# Patient Record
Sex: FEMALE | Race: WHITE | NOT HISPANIC OR LATINO | Employment: OTHER | URBAN - METROPOLITAN AREA
[De-identification: names, ages, dates, MRNs, and addresses within clinical notes are randomized per-mention and may not be internally consistent; named-entity substitution may affect disease eponyms.]

---

## 2017-01-16 ENCOUNTER — HOSPITAL ENCOUNTER (OUTPATIENT)
Dept: RADIOLOGY | Facility: HOSPITAL | Age: 77
Discharge: HOME | End: 2017-01-16
Attending: FAMILY MEDICINE
Payer: MEDICARE

## 2017-01-16 ENCOUNTER — OFFICE VISIT (OUTPATIENT)
Dept: FAMILY MEDICINE | Facility: CLINIC | Age: 77
End: 2017-01-16
Payer: MEDICARE

## 2017-01-16 DIAGNOSIS — J44.9 COPD, MODERATE: ICD-10-CM

## 2017-01-16 DIAGNOSIS — I10 ESSENTIAL HYPERTENSION: ICD-10-CM

## 2017-01-16 DIAGNOSIS — F51.04 PSYCHOPHYSIOLOGICAL INSOMNIA: ICD-10-CM

## 2017-01-16 DIAGNOSIS — J43.9 PULMONARY EMPHYSEMA, UNSPECIFIED EMPHYSEMA TYPE: ICD-10-CM

## 2017-01-16 DIAGNOSIS — J44.1 COPD WITH ACUTE EXACERBATION: ICD-10-CM

## 2017-01-16 DIAGNOSIS — R35.0 URINARY FREQUENCY: ICD-10-CM

## 2017-01-16 DIAGNOSIS — J44.1 COPD WITH ACUTE EXACERBATION: Primary | ICD-10-CM

## 2017-01-16 DIAGNOSIS — E78.5 HYPERLIPIDEMIA, UNSPECIFIED HYPERLIPIDEMIA TYPE: ICD-10-CM

## 2017-01-16 DIAGNOSIS — E11.40 TYPE 2 DIABETES MELLITUS WITH DIABETIC NEUROPATHY, WITHOUT LONG-TERM CURRENT USE OF INSULIN: ICD-10-CM

## 2017-01-16 DIAGNOSIS — E66.9 OBESITY (BMI 30-39.9): ICD-10-CM

## 2017-01-16 LAB
BILIRUB SERPL-MCNC: ABNORMAL MG/DL
BLOOD URINE, POC: ABNORMAL
COLOR, POC UA: YELLOW
GLUCOSE UR QL STRIP: ABNORMAL
KETONES UR QL STRIP: ABNORMAL
LEUKOCYTE ESTERASE URINE, POC: ABNORMAL
NITRITE, POC UA: ABNORMAL
PH, POC UA: 7
PROTEIN, POC: ABNORMAL
SPECIFIC GRAVITY, POC UA: 1.01
UROBILINOGEN, POC UA: NORMAL

## 2017-01-16 PROCEDURE — 99499 UNLISTED E&M SERVICE: CPT | Mod: S$PBB,,, | Performed by: FAMILY MEDICINE

## 2017-01-16 PROCEDURE — 1126F AMNT PAIN NOTED NONE PRSNT: CPT | Mod: S$GLB,,, | Performed by: FAMILY MEDICINE

## 2017-01-16 PROCEDURE — 71020 XR CHEST PA AND LATERAL: CPT | Mod: TC,PO

## 2017-01-16 PROCEDURE — 99204 OFFICE O/P NEW MOD 45 MIN: CPT | Mod: 25,S$GLB,, | Performed by: FAMILY MEDICINE

## 2017-01-16 PROCEDURE — 94640 AIRWAY INHALATION TREATMENT: CPT | Mod: S$GLB,,, | Performed by: FAMILY MEDICINE

## 2017-01-16 PROCEDURE — 81002 URINALYSIS NONAUTO W/O SCOPE: CPT | Mod: S$GLB,,, | Performed by: FAMILY MEDICINE

## 2017-01-16 PROCEDURE — 87086 URINE CULTURE/COLONY COUNT: CPT

## 2017-01-16 PROCEDURE — 1160F RVW MEDS BY RX/DR IN RCRD: CPT | Mod: S$GLB,,, | Performed by: FAMILY MEDICINE

## 2017-01-16 PROCEDURE — 1159F MED LIST DOCD IN RCRD: CPT | Mod: S$GLB,,, | Performed by: FAMILY MEDICINE

## 2017-01-16 PROCEDURE — 2022F DILAT RTA XM EVC RTNOPTHY: CPT | Mod: S$GLB,,, | Performed by: FAMILY MEDICINE

## 2017-01-16 PROCEDURE — 4010F ACE/ARB THERAPY RXD/TAKEN: CPT | Mod: S$GLB,,, | Performed by: FAMILY MEDICINE

## 2017-01-16 PROCEDURE — 3046F HEMOGLOBIN A1C LEVEL >9.0%: CPT | Mod: S$GLB,,, | Performed by: FAMILY MEDICINE

## 2017-01-16 PROCEDURE — 1157F ADVNC CARE PLAN IN RCRD: CPT | Mod: S$GLB,,, | Performed by: FAMILY MEDICINE

## 2017-01-16 PROCEDURE — 71020 XR CHEST PA AND LATERAL: CPT | Mod: 26,,, | Performed by: RADIOLOGY

## 2017-01-16 PROCEDURE — 99999 PR PBB SHADOW E&M-EST. PATIENT-LVL IV: CPT | Mod: PBBFAC,,, | Performed by: FAMILY MEDICINE

## 2017-01-16 RX ORDER — ALBUTEROL SULFATE 90 UG/1
2 AEROSOL, METERED RESPIRATORY (INHALATION) EVERY 6 HOURS PRN
Qty: 3 INHALER | Refills: 3 | Status: CANCELLED | OUTPATIENT
Start: 2017-01-16

## 2017-01-16 RX ORDER — LISINOPRIL 5 MG/1
5 TABLET ORAL DAILY
Qty: 30 TABLET | Refills: 0 | Status: SHIPPED | OUTPATIENT
Start: 2017-01-16 | End: 2017-02-13 | Stop reason: SDUPTHER

## 2017-01-16 RX ORDER — IPRATROPIUM BROMIDE AND ALBUTEROL SULFATE 2.5; .5 MG/3ML; MG/3ML
3 SOLUTION RESPIRATORY (INHALATION)
Status: COMPLETED | OUTPATIENT
Start: 2017-01-16 | End: 2017-01-16

## 2017-01-16 RX ORDER — CETIRIZINE HYDROCHLORIDE 10 MG/1
10 TABLET ORAL DAILY
COMMUNITY
End: 2017-02-21 | Stop reason: SDUPTHER

## 2017-01-16 RX ORDER — ALBUTEROL SULFATE 90 UG/1
2 AEROSOL, METERED RESPIRATORY (INHALATION) EVERY 6 HOURS PRN
Qty: 1 INHALER | Refills: 0 | Status: SHIPPED | OUTPATIENT
Start: 2017-01-16 | End: 2017-03-17

## 2017-01-16 RX ORDER — SIMVASTATIN 10 MG/1
10 TABLET, FILM COATED ORAL NIGHTLY
Qty: 30 TABLET | Refills: 0 | Status: SHIPPED | OUTPATIENT
Start: 2017-01-16 | End: 2017-02-13 | Stop reason: SDUPTHER

## 2017-01-16 RX ORDER — ALBUTEROL SULFATE 90 UG/1
2 AEROSOL, METERED RESPIRATORY (INHALATION) EVERY 6 HOURS PRN
Qty: 1 INHALER | Refills: 11 | Status: CANCELLED | OUTPATIENT
Start: 2017-01-16

## 2017-01-16 RX ORDER — METFORMIN HYDROCHLORIDE 1000 MG/1
500 TABLET, FILM COATED, EXTENDED RELEASE ORAL 2 TIMES DAILY WITH MEALS
Status: CANCELLED | OUTPATIENT
Start: 2017-01-16

## 2017-01-16 RX ORDER — BUDESONIDE AND FORMOTEROL FUMARATE DIHYDRATE 160; 4.5 UG/1; UG/1
2 AEROSOL RESPIRATORY (INHALATION) EVERY 12 HOURS
Qty: 1 INHALER | Refills: 0 | Status: ON HOLD | OUTPATIENT
Start: 2017-01-16 | End: 2017-02-15

## 2017-01-16 RX ORDER — TRAZODONE HYDROCHLORIDE 50 MG/1
50 TABLET ORAL NIGHTLY
Qty: 30 TABLET | Refills: 0 | Status: SHIPPED | OUTPATIENT
Start: 2017-01-16 | End: 2017-01-20 | Stop reason: SDUPTHER

## 2017-01-16 RX ORDER — AZITHROMYCIN 500 MG/1
500 TABLET, FILM COATED ORAL DAILY
Qty: 10 TABLET | Refills: 0 | Status: SHIPPED | OUTPATIENT
Start: 2017-01-16 | End: 2017-01-23 | Stop reason: ALTCHOICE

## 2017-01-16 RX ORDER — IPRATROPIUM BROMIDE AND ALBUTEROL SULFATE 2.5; .5 MG/3ML; MG/3ML
3 SOLUTION RESPIRATORY (INHALATION) EVERY 6 HOURS
Qty: 360 VIAL | Refills: 0 | Status: SHIPPED | OUTPATIENT
Start: 2017-01-16 | End: 2017-01-23 | Stop reason: SDUPTHER

## 2017-01-16 RX ORDER — METFORMIN HYDROCHLORIDE 500 MG/1
500 TABLET ORAL 2 TIMES DAILY WITH MEALS
Qty: 60 TABLET | Refills: 0 | Status: SHIPPED | OUTPATIENT
Start: 2017-01-16 | End: 2017-02-13 | Stop reason: SDUPTHER

## 2017-01-16 RX ADMIN — IPRATROPIUM BROMIDE AND ALBUTEROL SULFATE 3 ML: 2.5; .5 SOLUTION RESPIRATORY (INHALATION) at 01:01

## 2017-01-16 NOTE — PROGRESS NOTES
Tolerated  Nebulizer treatment  Ipratropium vital signs on discharge /58\resp 21  Sao2 90 % pulse 91.  Power Coleman LPN

## 2017-01-16 NOTE — MR AVS SNAPSHOT
[facility]  [address]  [facility] [address]  Phone: [phone]  Fax: [phone]                  Petrona Gonzalez   2017 10:40 AM   Office Visit    Description:  Female : 1940   Provider:  Katy Arriaga MD   Department:  [facility]           Reason for Visit     COPD     Diabetes     Hyperlipidemia     Establish Care           Diagnoses this Visit        Comments    COPD with acute exacerbation    -  Primary     Pulmonary emphysema, unspecified emphysema type         COPD, moderate         Type 2 diabetes mellitus with diabetic neuropathy, without long-term current use of insulin         Essential hypertension         Hyperlipidemia, unspecified hyperlipidemia type         Psychophysiological insomnia         Obesity (BMI 30-39.9)         Urinary frequency                To Do List           Future Appointments        Provider Department Dept Phone    2017 1:30 PM [facility] XR1 [facility] [phone]    2017 8:50 AM LABORATORY, [place] [facility]     2017 10:10 AM PULMONARY LAB, [facility] - Pulm Function [facility] [phone]    2017 10:40 AM Juan Carlos Araujo MD [facility] Pul[facility] Services [phone]      Goals (5 Years of Data)     None       These Medications        Disp Refills Start End    albuterol-ipratropium 2.5mg-0.5mg/3mL (DUO-NEB) 0.5 mg-3 mg(2.5 mg base)/3 mL nebulizer solution 360 vial 0 2017    Take 3 mLs by nebulization every 6 (six) hours. - Nebulization    Pharmacy: [facility]/pharmacy #[id number] - [place] - [address] [address] Ph #: [phone]       budesonide-formoterol 160-4.5 mcg (SYMBICORT) 160-4.5 mcg/actuation HFAA 1 Inhaler 0 2017    Inhale 2 puffs into the lungs every 12 (twelve) hours. Wash out mouth after using - Inhalation    Pharmacy: [facility]/pharmacy #[id number] - [place] - [address] [address] Ph #: [phone]        lisinopril (PRINIVIL,ZESTRIL) 5 MG tablet 30 tablet 0 1/16/2017     Take 1 tablet (5 mg total) by mouth once daily. - Oral    Pharmacy: [facility]/pharmacy #[facility] [place], [address] Ph #: [phone]       simvastatin (ZOCOR) 10 MG tablet 30 tablet 0 1/16/2017     Take 1 tablet (10 mg total) by mouth every evening. - Oral    Pharmacy: [facility]/pharmacy #[facility] [place], [address] Ph #: [phone]       trazodone (DESYREL) 50 MG tablet 30 tablet 0 1/16/2017     Take 1 tablet (50 mg total) by mouth every evening. - Oral    Pharmacy: [facility]/pharmacy #[facility] [place], [address] Ph #: [phone]       metformin (GLUCOPHAGE) 500 MG tablet 60 tablet 0 1/16/2017 1/16/2018    Take 1 tablet (500 mg total) by mouth 2 (two) times daily with meals. - Oral    Pharmacy: [facility]/pharmacy #[facility] [place], [address] Ph #: [phone]       albuterol 90 mcg/actuation inhaler 1 Inhaler 0 1/16/2017 1/16/2018    Inhale 2 puffs into the lungs every 6 (six) hours as needed. Dispense with spacer. - Inhalation    Pharmacy: [facility] [place], [address] Ph #: [phone]       azithromycin (ZITHROMAX) 500 MG tablet 10 tablet 0 1/16/2017 1/26/2017    Take 1 tablet (500 mg total) by mouth once daily. - Oral    Pharmacy: [facility]/pharmacy #[facility] [place], [address] Ph #: [phone]         [facility] On Call     [facility] On Call Nurse Care Line - 24/7 Assistance  Registered nurses in the [facility] provide clinical advisement, health education, appointment booking, and other advisory services.  Call for this free service at [phone].             Medications           START taking these NEW medications        Refills    metformin (GLUCOPHAGE) 500 MG tablet 0    Sig: Take 1 tablet (500 mg total) by mouth 2 (two) times daily with meals.    Class: Normal    Route: Oral    albuterol 90 mcg/actuation inhaler 0    Sig: Inhale 2 puffs  into the lungs every 6 (six) hours as needed. Dispense with spacer.    Class: Normal    Route: Inhalation    azithromycin (ZITHROMAX) 500 MG tablet 0    Sig: Take 1 tablet (500 mg total) by mouth once daily.    Class: Normal    Route: Oral      These medications were administered today        Dose Freq    albuterol-ipratropium 2.5mg-0.5mg/3mL nebulizer solution 3 mL 3 mL Clinic/HOD 1 time    Sig: Take 3 mLs by nebulization one time.    Class: Normal    Route: Nebulization      CHANGE how you are taking these medications     Start Taking Instead of    lisinopril (PRINIVIL,ZESTRIL) 5 MG tablet lisinopril (PRINIVIL,ZESTRIL) 5 MG tablet    Dosage:  Take 1 tablet (5 mg total) by mouth once daily. Dosage:  Take 5 mg by mouth once daily.    Reason for Change:  Reorder     simvastatin (ZOCOR) 10 MG tablet simvastatin (ZOCOR) 10 MG tablet    Dosage:  Take 1 tablet (10 mg total) by mouth every evening. Dosage:  Take 10 mg by mouth every evening.    Reason for Change:  Reorder       STOP taking these medications     metformin (GLUMETZA) 1000 MG (MOD) 24 hr tablet Take 500 mg by mouth 2 (two) times daily with meals.            Verify that the below list of medications is an accurate representation of the medications you are currently taking.  If none reported, the list may be blank. If incorrect, please contact your healthcare provider. Carry this list with you in case of emergency.           Current Medications     albuterol-ipratropium 2.5mg-0.5mg/3mL (DUO-NEB) 0.5 mg-3 mg(2.5 mg base)/3 mL nebulizer solution Take 3 mLs by nebulization every 6 (six) hours.    aspirin (ECOTRIN) 81 MG EC tablet Take 81 mg by mouth once daily.    budesonide-formoterol 160-4.5 mcg (SYMBICORT) 160-4.5 mcg/actuation HFAA Inhale 2 puffs into the lungs every 12 (twelve) hours. Wash out mouth after using    cetirizine (ZYRTEC) 10 MG tablet Take 10 mg by mouth once daily.    guaifenesin (MUCINEX) 600 mg 12 hr tablet Take 600 mg by mouth.    lisinopril  "(PRINIVIL,ZESTRIL) 5 MG tablet Take 1 tablet (5 mg total) by mouth once daily.    simvastatin (ZOCOR) 10 MG tablet Take 1 tablet (10 mg total) by mouth every evening.    trazodone (DESYREL) 50 MG tablet Take 1 tablet (50 mg total) by mouth every evening.    albuterol 90 mcg/actuation inhaler Inhale 2 puffs into the lungs every 6 (six) hours as needed. Dispense with spacer.    azithromycin (ZITHROMAX) 500 MG tablet Take 1 tablet (500 mg total) by mouth once daily.    metformin (GLUCOPHAGE) 500 MG tablet Take 1 tablet (500 mg total) by mouth 2 (two) times daily with meals.    montelukast (SINGULAIR) 10 mg tablet Take 1 tablet (10 mg total) by mouth every evening.           Clinical Reference Information           Vital Signs - Last Recorded  Most recent update: 1/16/2017 11:02 AM by Gertrude Montenegro MA    Pulse Temp Resp Ht Wt       82 98.4 °F (36.9 °C) (Temporal) 14 5' 3" (1.6 m) 88 kg (194 lb 0.1 oz)     SpO2 BMI             (!) 93% 34.37 kg/m2         Allergies as of 1/16/2017     Meloxicam    Naproxen    Morphine      Immunizations Administered on Date of Encounter - 1/16/2017     None      Orders Placed During Today's Visit      Normal Orders This Visit    Microalbumin/creatinine urine ratio     POCT URINE DIPSTICK WITHOUT MICROSCOPE     Urine culture     Future Labs/Procedures Expected by Expires    CBC auto differential  1/16/2017 3/17/2018    Comprehensive metabolic panel  1/16/2017 3/17/2018    Hemoglobin A1c  1/16/2017 3/17/2018    Lipid panel  1/16/2017 3/17/2018    TSH  1/16/2017 3/17/2018    X-Ray Chest PA And Lateral  1/16/2017 1/16/2018 1/16/2017 12:16 PM - Power Coleman LPN      Component Results     Component    Color, UA    yellow    Spec Grav, UA    1.010    pH, UA    7    WBC, UA    2++    Nitrite, UA    neg    Protein, UA    trace    Glucose, UA    neg    Ketones, UA    small +    Urobilinogen, UA    normal    Bilirubin, UA    +    Blood, UA    neg            [facility] Sign-Up     " Activating your [facility] account is as easy as 1-2-3!     1) Visit [email or web address], select Sign Up Now, enter this activation code and your date of birth, then select Next.  [email or web address]  Expires: 3/2/2017 12:42 PM      2) Create a username and password to use when you visit [facility] in the future and select a security question in case you lose your password and select Next.    3) Enter your e-mail address and click Sign Up!    Additional Information  If you have questions, please e-mail [email or web address] or call [phone] to talk to our [facility] staff. Remember, [facility] is NOT to be used for urgent needs. For medical emergencies, dial 911.

## 2017-01-16 NOTE — PROGRESS NOTES
"Subjective:       Patient ID: Petrona Gonzalez is a 76 y.o. female.    Chief Complaint: COPD; Diabetes; Hyperlipidemia; and Establish Care    COPD   This is a chronic problem. The current episode started more than 1 year ago. The problem occurs constantly. The problem has been unchanged. Associated symptoms include arthralgias, coughing, fatigue and myalgias. Pertinent negatives include no abdominal pain, chest pain, chills, congestion, fever, nausea, rash, sore throat, visual change, vomiting or weakness. Associated symptoms comments: wheezing. The symptoms are aggravated by walking (exertion). Treatments tried: albuterol, supplemental oxygen, symbicort. The treatment provided moderate relief.   Diabetes   She presents for her initial diabetic visit. She has type 2 diabetes mellitus. Her disease course has been fluctuating. There are no hypoglycemic associated symptoms. Pertinent negatives for hypoglycemia include no dizziness or nervousness/anxiousness. Associated symptoms include fatigue. Pertinent negatives for diabetes include no blurred vision, no chest pain, no foot ulcerations, no polydipsia, no polyphagia, no polyuria, no visual change and no weakness. Diabetic complications include peripheral neuropathy. Risk factors for coronary artery disease include obesity, post-menopausal and sedentary lifestyle. Current diabetic treatments: Metformin. She is compliant with treatment most of the time.   Patient does not recall last A1c result. Sometime last year her PCP decreased Metformin from 1000mg BID to 500mg BID. No recent diabetic eye or foot examination.  Hyperlipidemia  Patient notes taking Zocor for HLD. She has not had any recent lipid monitoring. She reports a diet that varies from day to day. Her last PCP reportedly advised her to "eat lots of red meat" due to her blood counts and reported anemia. She has occasional leg cramps at night- uncertain as to whether statin use is contributory.     Establish " "Care  Patient desires to establish care.    Past Medical History   Diagnosis Date    COPD (chronic obstructive pulmonary disease) with emphysema     Disorder of kidney and ureter     Hyperlipidemia     Hypertension     Maxillary sinusitis, chronic      Past Surgical History   Procedure Laterality Date    Hysterectomy      Back surgery      Coronary artery bypass graft      Hernia repair       Family History   Problem Relation Age of Onset    Family history unknown: Yes     Review of Systems   Constitutional: Positive for fatigue. Negative for chills and fever.   HENT: Positive for postnasal drip and sinus pressure. Negative for congestion, ear pain, sore throat and trouble swallowing.    Eyes: Negative for blurred vision, pain and visual disturbance.   Respiratory: Positive for cough, chest tightness, shortness of breath and wheezing.    Cardiovascular: Negative for chest pain, palpitations and leg swelling.   Gastrointestinal: Negative for abdominal pain, blood in stool, constipation, diarrhea, nausea and vomiting.   Endocrine: Negative for polydipsia, polyphagia and polyuria.   Genitourinary: Positive for frequency. Negative for decreased urine volume, difficulty urinating, dysuria and hematuria.   Musculoskeletal: Positive for arthralgias and myalgias.   Skin: Negative for color change and rash.   Allergic/Immunologic: Positive for environmental allergies.   Neurological: Positive for light-headedness. Negative for dizziness and weakness.   Hematological: Negative for adenopathy. Bruises/bleeds easily.   Psychiatric/Behavioral: Positive for sleep disturbance. Negative for dysphoric mood and suicidal ideas. The patient is not nervous/anxious.        Objective:     Visit Vitals    Pulse 82    Temp 98.4 °F (36.9 °C) (Temporal)    Resp 14    Ht 5' 3" (1.6 m)    Wt 88 kg (194 lb 0.1 oz)    LMP Comment: 39 yrs old     SpO2 (!) 93%    BMI 34.37 kg/m2     Physical Exam   Constitutional: She is oriented " to person, place, and time. She appears well-developed. No distress.   Obese, non-toxic, chronically ill appearing   HENT:   Head: Normocephalic and atraumatic.   Right Ear: Tympanic membrane, external ear and ear canal normal.   Left Ear: Tympanic membrane, external ear and ear canal normal.   Nose: Rhinorrhea present. No mucosal edema.   Mouth/Throat: Oropharynx is clear and moist.   Eyes: Conjunctivae and EOM are normal. Pupils are equal, round, and reactive to light.   Neck: Normal range of motion. Neck supple.   Cardiovascular: Normal rate, regular rhythm and normal heart sounds.    Pulmonary/Chest: Effort normal. She has decreased breath sounds in the right lower field and the left lower field.   On O2 via NC; harsh non-productive cough   Abdominal: Soft. Bowel sounds are normal. She exhibits no distension. There is no tenderness.   Genitourinary:   Genitourinary Comments: No suprapubic tenderness   Lymphadenopathy:     She has no cervical adenopathy.        Right: No supraclavicular adenopathy present.        Left: No supraclavicular adenopathy present.   Neurological: She is alert and oriented to person, place, and time.   Skin: Skin is warm and dry. She is not diaphoretic.   Psychiatric: She has a normal mood and affect. Her behavior is normal.       Assessment:       1. COPD with acute exacerbation    2. Pulmonary emphysema, unspecified emphysema type    3. COPD, moderate    4. Type 2 diabetes mellitus with diabetic neuropathy, without long-term current use of insulin    5. Essential hypertension    6. Hyperlipidemia, unspecified hyperlipidemia type    7. Psychophysiological insomnia    8. Urinary frequency    9. Obesity (BMI 30-39.9)        Plan:   COPD with acute exacerbation  Duoneb was provided today with equivocal response. CXR without acute infiltrate. Recommend Azithromycin and Prednisone taper. Use of albuterol for rescue is advised with continuation of her usual regimen for maintenance. She  remains on supplemental oxygen as well. Discussed w/s in detail and have advised she seek treatment in the ER for worsening of symptoms. She will need to see Pulmonology for updated assessment as well.  -     albuterol 90 mcg/actuation inhaler; Inhale 2 puffs into the lungs every 6 (six) hours as needed. Dispense with spacer.  Dispense: 1 Inhaler; Refill: 0  -     X-Ray Chest PA And Lateral; Future; Expected date: 1/16/17  -     albuterol-ipratropium 2.5mg-0.5mg/3mL nebulizer solution 3 mL; Take 3 mLs by nebulization one time.  -     azithromycin (ZITHROMAX) 500 MG tablet; Take 1 tablet (500 mg total) by mouth once daily.  Dispense: 10 tablet; Refill: 0  -     predniSONE (DELTASONE) 20 MG tablet; Take 2 tabs po once daily for 4 days then 1 tab po once daily for 3 days then 1/2 tab po once daily for 3 days  Dispense: 14 tablet; Refill: 0    Pulmonary emphysema, unspecified emphysema type  -     albuterol-ipratropium 2.5mg-0.5mg/3mL (DUO-NEB) 0.5 mg-3 mg(2.5 mg base)/3 mL nebulizer solution; Take 3 mLs by nebulization every 6 (six) hours.  Dispense: 360 vial; Refill: 0  -     CBC auto differential; Future; Expected date: 1/16/17  -     X-Ray Chest PA And Lateral; Future; Expected date: 1/16/17  -     predniSONE (DELTASONE) 20 MG tablet; Take 2 tabs po once daily for 4 days then 1 tab po once daily for 3 days then 1/2 tab po once daily for 3 days  Dispense: 14 tablet; Refill: 0    COPD, moderate  -     budesonide-formoterol 160-4.5 mcg (SYMBICORT) 160-4.5 mcg/actuation HFAA; Inhale 2 puffs into the lungs every 12 (twelve) hours. Wash out mouth after using  Dispense: 1 Inhaler; Refill: 0  -     CBC auto differential; Future; Expected date: 1/16/17  -     X-Ray Chest PA And Lateral; Future; Expected date: 1/16/17    Type 2 diabetes mellitus with diabetic neuropathy, without long-term current use of insulin  Uncertain baseline. Recommend continuation of current measures and lab assessment. Annual diabetic eye and foot  examination was discussed as well. Will arrange for reassessment pending lab results.   -     lisinopril (PRINIVIL,ZESTRIL) 5 MG tablet; Take 1 tablet (5 mg total) by mouth once daily.  Dispense: 30 tablet; Refill: 0  -     metformin (GLUCOPHAGE) 500 MG tablet; Take 1 tablet (500 mg total) by mouth 2 (two) times daily with meals.  Dispense: 60 tablet; Refill: 0  -     Hemoglobin A1c; Future; Expected date: 1/16/17  -     Comprehensive metabolic panel; Future; Expected date: 1/16/17  -     Microalbumin/creatinine urine ratio    Essential hypertension  Elevated above desired level today. No home monitoring is performed. Will reassess at her next visit. Target BP <140/90.  -     lisinopril (PRINIVIL,ZESTRIL) 5 MG tablet; Take 1 tablet (5 mg total) by mouth once daily.  Dispense: 30 tablet; Refill: 0  -     Comprehensive metabolic panel; Future; Expected date: 1/16/17    Hyperlipidemia, unspecified hyperlipidemia type  Will assess lipid levels and have patient continue with usual dosing of Zocor. Recommend low cholesterol/heart healthy diet.  -     simvastatin (ZOCOR) 10 MG tablet; Take 1 tablet (10 mg total) by mouth every evening.  Dispense: 30 tablet; Refill: 0  -     Comprehensive metabolic panel; Future; Expected date: 1/16/17  -     Lipid panel; Future; Expected date: 1/16/17    Psychophysiological insomnia  -     trazodone (DESYREL) 50 MG tablet; Take 1 tablet (50 mg total) by mouth every evening.  Dispense: 30 tablet; Refill: 0  -     TSH; Future; Expected date: 1/16/17    Urinary frequency  -     POCT URINE DIPSTICK WITHOUT MICROSCOPE  -     Urine culture    Obesity (BMI 30-39.9)  Weight loss efforts are encouraged through diet and lifestyle measures.    Records release from former PCP.  Appt for updated assessment with Pulmonology  Fasting labs with a visit to follow

## 2017-01-17 ENCOUNTER — LAB VISIT (OUTPATIENT)
Dept: LAB | Facility: HOSPITAL | Age: 77
End: 2017-01-17
Attending: FAMILY MEDICINE
Payer: MEDICARE

## 2017-01-17 ENCOUNTER — TELEPHONE (OUTPATIENT)
Dept: FAMILY MEDICINE | Facility: CLINIC | Age: 77
End: 2017-01-17

## 2017-01-17 VITALS
WEIGHT: 194 LBS | HEIGHT: 63 IN | SYSTOLIC BLOOD PRESSURE: 150 MMHG | HEART RATE: 82 BPM | BODY MASS INDEX: 34.38 KG/M2 | RESPIRATION RATE: 14 BRPM | OXYGEN SATURATION: 93 % | DIASTOLIC BLOOD PRESSURE: 70 MMHG | TEMPERATURE: 98 F

## 2017-01-17 DIAGNOSIS — F51.04 PSYCHOPHYSIOLOGICAL INSOMNIA: ICD-10-CM

## 2017-01-17 DIAGNOSIS — I10 ESSENTIAL HYPERTENSION: ICD-10-CM

## 2017-01-17 DIAGNOSIS — E78.5 HYPERLIPIDEMIA, UNSPECIFIED HYPERLIPIDEMIA TYPE: ICD-10-CM

## 2017-01-17 DIAGNOSIS — J43.9 PULMONARY EMPHYSEMA, UNSPECIFIED EMPHYSEMA TYPE: ICD-10-CM

## 2017-01-17 DIAGNOSIS — J44.9 COPD, MODERATE: ICD-10-CM

## 2017-01-17 DIAGNOSIS — E11.40 TYPE 2 DIABETES MELLITUS WITH DIABETIC NEUROPATHY, WITHOUT LONG-TERM CURRENT USE OF INSULIN: ICD-10-CM

## 2017-01-17 LAB
ALBUMIN SERPL BCP-MCNC: 3.6 G/DL
ALP SERPL-CCNC: 91 U/L
ALT SERPL W/O P-5'-P-CCNC: 12 U/L
ANION GAP SERPL CALC-SCNC: 9 MMOL/L
AST SERPL-CCNC: 14 U/L
BASOPHILS # BLD AUTO: 0.01 K/UL
BASOPHILS NFR BLD: 0.1 %
BILIRUB SERPL-MCNC: 0.2 MG/DL
BUN SERPL-MCNC: 20 MG/DL
CALCIUM SERPL-MCNC: 9.1 MG/DL
CHLORIDE SERPL-SCNC: 101 MMOL/L
CHOLEST/HDLC SERPL: 3 {RATIO}
CO2 SERPL-SCNC: 30 MMOL/L
CREAT SERPL-MCNC: 0.8 MG/DL
DIFFERENTIAL METHOD: ABNORMAL
EOSINOPHIL # BLD AUTO: 0.1 K/UL
EOSINOPHIL NFR BLD: 1.1 %
ERYTHROCYTE [DISTWIDTH] IN BLOOD BY AUTOMATED COUNT: 15.5 %
EST. GFR  (AFRICAN AMERICAN): >60 ML/MIN/1.73 M^2
EST. GFR  (NON AFRICAN AMERICAN): >60 ML/MIN/1.73 M^2
GLUCOSE SERPL-MCNC: 141 MG/DL
HCT VFR BLD AUTO: 39.3 %
HDL/CHOLESTEROL RATIO: 32.9 %
HDLC SERPL-MCNC: 155 MG/DL
HDLC SERPL-MCNC: 51 MG/DL
HGB BLD-MCNC: 12.1 G/DL
LDLC SERPL CALC-MCNC: 92.2 MG/DL
LYMPHOCYTES # BLD AUTO: 1.3 K/UL
LYMPHOCYTES NFR BLD: 14.1 %
MCH RBC QN AUTO: 27.4 PG
MCHC RBC AUTO-ENTMCNC: 30.8 %
MCV RBC AUTO: 89 FL
MONOCYTES # BLD AUTO: 0.5 K/UL
MONOCYTES NFR BLD: 5.7 %
NEUTROPHILS # BLD AUTO: 7.3 K/UL
NEUTROPHILS NFR BLD: 79 %
NONHDLC SERPL-MCNC: 104 MG/DL
PLATELET # BLD AUTO: 281 K/UL
PMV BLD AUTO: 10.3 FL
POTASSIUM SERPL-SCNC: 4.5 MMOL/L
PROT SERPL-MCNC: 6.9 G/DL
RBC # BLD AUTO: 4.41 M/UL
SODIUM SERPL-SCNC: 140 MMOL/L
TRIGL SERPL-MCNC: 59 MG/DL
TSH SERPL DL<=0.005 MIU/L-ACNC: 2.08 UIU/ML
WBC # BLD AUTO: 9.27 K/UL

## 2017-01-17 PROCEDURE — 85025 COMPLETE CBC W/AUTO DIFF WBC: CPT

## 2017-01-17 PROCEDURE — 80061 LIPID PANEL: CPT

## 2017-01-17 PROCEDURE — 84443 ASSAY THYROID STIM HORMONE: CPT

## 2017-01-17 PROCEDURE — 80053 COMPREHEN METABOLIC PANEL: CPT

## 2017-01-17 PROCEDURE — 36415 COLL VENOUS BLD VENIPUNCTURE: CPT | Mod: PO

## 2017-01-17 PROCEDURE — 83036 HEMOGLOBIN GLYCOSYLATED A1C: CPT

## 2017-01-17 RX ORDER — PREDNISONE 20 MG/1
TABLET ORAL
Qty: 14 TABLET | Refills: 0 | Status: SHIPPED | OUTPATIENT
Start: 2017-01-17 | End: 2017-01-23

## 2017-01-17 NOTE — TELEPHONE ENCOUNTER
Pt was notified that prednisone was sent into her pharmacy, pt stated she is still feeling the same. Coming in this morning for fasting blood work

## 2017-01-18 LAB
ESTIMATED AVG GLUCOSE: 143 MG/DL
HBA1C MFR BLD HPLC: 6.6 %

## 2017-01-19 LAB
BACTERIA UR CULT: NORMAL
BACTERIA UR CULT: NORMAL

## 2017-01-20 DIAGNOSIS — F51.04 PSYCHOPHYSIOLOGICAL INSOMNIA: ICD-10-CM

## 2017-01-22 RX ORDER — TRAZODONE HYDROCHLORIDE 50 MG/1
50 TABLET ORAL NIGHTLY
Qty: 90 TABLET | Refills: 4 | Status: ON HOLD | OUTPATIENT
Start: 2017-01-22 | End: 2017-05-23 | Stop reason: HOSPADM

## 2017-01-23 ENCOUNTER — OFFICE VISIT (OUTPATIENT)
Dept: PULMONOLOGY | Facility: CLINIC | Age: 77
End: 2017-01-23
Payer: MEDICARE

## 2017-01-23 VITALS
WEIGHT: 194 LBS | BODY MASS INDEX: 34.38 KG/M2 | DIASTOLIC BLOOD PRESSURE: 68 MMHG | OXYGEN SATURATION: 90 % | RESPIRATION RATE: 28 BRPM | HEART RATE: 71 BPM | SYSTOLIC BLOOD PRESSURE: 116 MMHG | HEIGHT: 63 IN

## 2017-01-23 DIAGNOSIS — J44.1 COPD EXACERBATION: Primary | ICD-10-CM

## 2017-01-23 DIAGNOSIS — R93.89 ABNORMAL CXR (CHEST X-RAY): ICD-10-CM

## 2017-01-23 DIAGNOSIS — J43.9 PULMONARY EMPHYSEMA, UNSPECIFIED EMPHYSEMA TYPE: ICD-10-CM

## 2017-01-23 PROCEDURE — 1159F MED LIST DOCD IN RCRD: CPT | Mod: S$GLB,,, | Performed by: INTERNAL MEDICINE

## 2017-01-23 PROCEDURE — 99215 OFFICE O/P EST HI 40 MIN: CPT | Mod: 25,S$GLB,, | Performed by: INTERNAL MEDICINE

## 2017-01-23 PROCEDURE — 1126F AMNT PAIN NOTED NONE PRSNT: CPT | Mod: S$GLB,,, | Performed by: INTERNAL MEDICINE

## 2017-01-23 PROCEDURE — 3074F SYST BP LT 130 MM HG: CPT | Mod: S$GLB,,, | Performed by: INTERNAL MEDICINE

## 2017-01-23 PROCEDURE — 1160F RVW MEDS BY RX/DR IN RCRD: CPT | Mod: S$GLB,,, | Performed by: INTERNAL MEDICINE

## 2017-01-23 PROCEDURE — 3078F DIAST BP <80 MM HG: CPT | Mod: S$GLB,,, | Performed by: INTERNAL MEDICINE

## 2017-01-23 PROCEDURE — 99999 PR PBB SHADOW E&M-EST. PATIENT-LVL IV: CPT | Mod: PBBFAC,,, | Performed by: INTERNAL MEDICINE

## 2017-01-23 PROCEDURE — 96372 THER/PROPH/DIAG INJ SC/IM: CPT | Mod: S$GLB,,, | Performed by: INTERNAL MEDICINE

## 2017-01-23 PROCEDURE — 1157F ADVNC CARE PLAN IN RCRD: CPT | Mod: S$GLB,,, | Performed by: INTERNAL MEDICINE

## 2017-01-23 PROCEDURE — 99499 UNLISTED E&M SERVICE: CPT | Mod: S$PBB,,, | Performed by: INTERNAL MEDICINE

## 2017-01-23 RX ORDER — PREDNISONE 20 MG/1
TABLET ORAL
Qty: 50 TABLET | Refills: 1 | Status: ON HOLD | OUTPATIENT
Start: 2017-01-23 | End: 2017-02-15

## 2017-01-23 RX ORDER — IPRATROPIUM BROMIDE AND ALBUTEROL SULFATE 2.5; .5 MG/3ML; MG/3ML
3 SOLUTION RESPIRATORY (INHALATION) EVERY 6 HOURS
Qty: 360 VIAL | Refills: 11 | Status: ON HOLD | OUTPATIENT
Start: 2017-01-23 | End: 2017-12-26 | Stop reason: HOSPADM

## 2017-01-23 RX ORDER — GUAIFENESIN 600 MG/1
600 TABLET, EXTENDED RELEASE ORAL
COMMUNITY
Start: 2016-01-19 | End: 2017-01-23 | Stop reason: SDUPTHER

## 2017-01-23 RX ORDER — TRIAMCINOLONE ACETONIDE 40 MG/ML
80 INJECTION, SUSPENSION INTRA-ARTICULAR; INTRAMUSCULAR
Status: COMPLETED | OUTPATIENT
Start: 2017-01-23 | End: 2017-01-23

## 2017-01-23 RX ORDER — LEVOFLOXACIN 500 MG/1
500 TABLET, FILM COATED ORAL DAILY
Qty: 10 TABLET | Refills: 0 | Status: SHIPPED | OUTPATIENT
Start: 2017-01-23 | End: 2017-02-02

## 2017-01-23 RX ORDER — GUAIFENESIN 600 MG/1
600 TABLET, EXTENDED RELEASE ORAL 2 TIMES DAILY
Qty: 60 TABLET | Refills: 11 | Status: SHIPPED | OUTPATIENT
Start: 2017-01-23 | End: 2017-02-21 | Stop reason: SDUPTHER

## 2017-01-23 RX ADMIN — TRIAMCINOLONE ACETONIDE 80 MG: 40 INJECTION, SUSPENSION INTRA-ARTICULAR; INTRAMUSCULAR at 12:01

## 2017-01-23 NOTE — PROGRESS NOTES
Subjective:       Patient ID: Petrona Gonzalez is a 76 y.o. female.    Chief Complaint: Shortness of Breath (rev gopi,  cxr 1/16) and COPD    HPI COPD  She presents for evaluation and treatment of COPD. The patient is  currently having symptoms / an exacerbation. Current symptoms include acute dyspnea, cough productive of yellow sputum in small amounts and wheezing. Symptoms have been present since several weeks ago and have been gradually worsening. She denies chills and fever. Associated symptoms include poor exercise tolerance and shortness of breath. This episode appears to have been triggered by no identifiable factor. Treatments tried for the current exacerbation: albuterol nebulizer. The patient has been having similar episodes for approximately 10 years. She uses 1 pillows at night. Patient currently is on oxygen at 2.5- 3 liters L/min per nasal cannula.. The patient is having no constitutional symptoms, denying fever, chills, anorexia, or weight loss. The patient has been hospitalized for this condition before. She quit smoking approximately 5 years ago. The patient is experiencing exercise intolerance (difficulty walking 10 feet on flat ground).        Abnormal Chest X Ray - right middle lobe infitrate - chronic    Past Medical History   Diagnosis Date    COPD (chronic obstructive pulmonary disease) with emphysema     Disorder of kidney and ureter     Hyperlipidemia     Hypertension     Maxillary sinusitis, chronic      Past Surgical History   Procedure Laterality Date    Hysterectomy      Back surgery      Coronary artery bypass graft      Hernia repair       Social History     Social History    Marital status:      Spouse name: N/A    Number of children: N/A    Years of education: N/A     Occupational History    Not on file.     Social History Main Topics    Smoking status: Former Smoker     Years: 30.00    Smokeless tobacco: Never Used    Alcohol use No    Drug use: No     Sexual activity: Not Currently     Other Topics Concern    Not on file     Social History Narrative     Review of Systems   Constitutional: Positive for fatigue. Negative for fever.   HENT: Positive for postnasal drip and rhinorrhea. Negative for congestion.    Respiratory: Positive for cough, sputum production, shortness of breath, dyspnea on extertion, use of rescue inhaler and Paroxysmal Nocturnal Dyspnea.    Cardiovascular: Negative for chest pain, palpitations and leg swelling.   Skin: Negative for rash.   Gastrointestinal: Negative for nausea and abdominal pain.   Neurological: Negative for dizziness, syncope, weakness and light-headedness.   Hematological: Negative for adenopathy. Does not bruise/bleed easily.   Psychiatric/Behavioral: Negative for sleep disturbance. The patient is not nervous/anxious.        Objective:      Physical Exam   Constitutional: She is oriented to person, place, and time. She appears well-developed and well-nourished.   HENT:   Head: Normocephalic and atraumatic.   Mouth/Throat: Oropharyngeal exudate present.   Eyes: Conjunctivae are normal. Pupils are equal, round, and reactive to light.   Neck: Neck supple. No JVD present. No tracheal deviation present. No thyromegaly present.   Cardiovascular: Normal rate, regular rhythm and normal heart sounds.    Pulmonary/Chest: Effort normal. No respiratory distress. She has decreased breath sounds. She has wheezes in the right lower field and the left lower field. She has no rhonchi. She has no rales. She exhibits no tenderness.   Abdominal: Soft. Bowel sounds are normal.   Musculoskeletal: Normal range of motion. She exhibits no edema.   Lymphadenopathy:     She has no cervical adenopathy.   Neurological: She is alert and oriented to person, place, and time.   Skin: Skin is warm and dry.   Nursing note and vitals reviewed.    Personal Diagnostic Review  Chest X-Ray: I personally reviewed the films and findings are:, infiltrates: middle  lobe on the right  Pulmonary function tests:  Severe obstruct  No flowsheet data found.      Electronically signed by Sky Dickson MD on 12/12/2015 3:05 PM   Result Narrative   REASON FOR EXAM: SOB not improving     TECHNICAL FACTORS: Multiple contiguous axial CT images were obtained of the chest after administration of intravenous contrast.    DOSE: 70  mL [id number]    COMPARISON: None    FINDINGS: There is no axillary, mediastinal, or hilar lymphadenopathy.    The heart is normal in size. There are atherosclerotic calcifications of the coronary vessels and aortic arch. The aorta is normal caliber without aneurysmal dilatation or dissection.    There is a calcified granuloma within the inferior aspect of the left upper lobe. Remaining lungs are clear. There is no pneumothorax or pleural effusions present.        Assessment:       1. COPD exacerbation    2. Pulmonary emphysema, unspecified emphysema type    3. Abnormal CXR (chest x-ray)        Outpatient Encounter Prescriptions as of 1/23/2017   Medication Sig Dispense Refill    albuterol 90 mcg/actuation inhaler Inhale 2 puffs into the lungs every 6 (six) hours as needed. Dispense with spacer. 1 Inhaler 0    albuterol-ipratropium 2.5mg-0.5mg/3mL (DUO-NEB) 0.5 mg-3 mg(2.5 mg base)/3 mL nebulizer solution Take 3 mLs by nebulization every 6 (six) hours. 360 vial 11    aspirin (ECOTRIN) 81 MG EC tablet Take 81 mg by mouth once daily.      budesonide-formoterol 160-4.5 mcg (SYMBICORT) 160-4.5 mcg/actuation HFAA Inhale 2 puffs into the lungs every 12 (twelve) hours. Wash out mouth after using 1 Inhaler 0    cetirizine (ZYRTEC) 10 MG tablet Take 10 mg by mouth once daily.      guaifenesin (MUCINEX) 600 mg 12 hr tablet Take 1 tablet (600 mg total) by mouth 2 (two) times daily. 60 tablet 11    lisinopril (PRINIVIL,ZESTRIL) 5 MG tablet Take 1 tablet (5 mg total) by mouth once daily. 30 tablet 0    metformin (GLUCOPHAGE) 500 MG tablet Take 1 tablet (500 mg total)  by mouth 2 (two) times daily with meals. 60 tablet 0    simvastatin (ZOCOR) 10 MG tablet Take 1 tablet (10 mg total) by mouth every evening. 30 tablet 0    trazodone (DESYREL) 50 MG tablet Take 1 tablet (50 mg total) by mouth every evening. 90 tablet 4    [DISCONTINUED] albuterol-ipratropium 2.5mg-0.5mg/3mL (DUO-NEB) 0.5 mg-3 mg(2.5 mg base)/3 mL nebulizer solution Take 3 mLs by nebulization every 6 (six) hours. 360 vial 0    [DISCONTINUED] azithromycin (ZITHROMAX) 500 MG tablet Take 1 tablet (500 mg total) by mouth once daily. 10 tablet 0    [DISCONTINUED] guaifenesin (MUCINEX) 600 mg 12 hr tablet Take 600 mg by mouth.      [DISCONTINUED] predniSONE (DELTASONE) 20 MG tablet Take 2 tabs po once daily for 4 days then 1 tab po once daily for 3 days then 1/2 tab po once daily for 3 days 14 tablet 0    dextromethorphan-guaifenesin  mg (MUCINEX DM)  mg per 12 hr tablet Take 1 tablet by mouth.      levoFLOXacin (LEVAQUIN) 500 MG tablet Take 1 tablet (500 mg total) by mouth once daily. 10 tablet 0    predniSONE (DELTASONE) 20 MG tablet Prednisone 60 mg/day for 7 days,40 mg/day for 7 days,20 mg/day for 7 days,10 mg/ day (1/2 tablet) for 7 days, then stop 50 tablet 1    [DISCONTINUED] montelukast (SINGULAIR) 10 mg tablet Take 1 tablet (10 mg total) by mouth every evening. 30 tablet 11    [] triamcinolone acetonide injection 80 mg        No facility-administered encounter medications on file as of 2017.      Orders Placed This Encounter   Procedures    Ambulatory Referral to Pulmonary Rehab     Referral Priority:   Routine     Referral Type:   Consultation     Referral Reason:   Specialty Services Required     Requested Specialty:   Respiratory Therapy     Number of Visits Requested:   1    Incentive spirometry     Standing Status:   Future     Standing Expiration Date:   3/24/2018     Plan:       Requested Prescriptions     Signed Prescriptions Disp Refills    guaifenesin (MUCINEX) 600  mg 12 hr tablet 60 tablet 11     Sig: Take 1 tablet (600 mg total) by mouth 2 (two) times daily.    predniSONE (DELTASONE) 20 MG tablet 50 tablet 1     Sig: Prednisone 60 mg/day for 7 days,40 mg/day for 7 days,20 mg/day for 7 days,10 mg/ day (1/2 tablet) for 7 days, then stop    levoFLOXacin (LEVAQUIN) 500 MG tablet 10 tablet 0     Sig: Take 1 tablet (500 mg total) by mouth once daily.    albuterol-ipratropium 2.5mg-0.5mg/3mL (DUO-NEB) 0.5 mg-3 mg(2.5 mg base)/3 mL nebulizer solution 360 vial 11     Sig: Take 3 mLs by nebulization every 6 (six) hours.     COPD exacerbation  -     guaifenesin (MUCINEX) 600 mg 12 hr tablet; Take 1 tablet (600 mg total) by mouth 2 (two) times daily.  Dispense: 60 tablet; Refill: 11  -     predniSONE (DELTASONE) 20 MG tablet; Prednisone 60 mg/day for 7 days,40 mg/day for 7 days,20 mg/day for 7 days,10 mg/ day (1/2 tablet) for 7 days, then stop  Dispense: 50 tablet; Refill: 1  -     levoFLOXacin (LEVAQUIN) 500 MG tablet; Take 1 tablet (500 mg total) by mouth once daily.  Dispense: 10 tablet; Refill: 0  -     triamcinolone acetonide injection 80 mg; Inject 2 mLs (80 mg total) into the muscle one time.  -     Ambulatory Referral to Pulmonary Rehab    Pulmonary emphysema, unspecified emphysema type  -     albuterol-ipratropium 2.5mg-0.5mg/3mL (DUO-NEB) 0.5 mg-3 mg(2.5 mg base)/3 mL nebulizer solution; Take 3 mLs by nebulization every 6 (six) hours.  Dispense: 360 vial; Refill: 11    Abnormal CXR (chest x-ray)  -     Incentive spirometry; Future; Expected date: 1/23/17      Return in about 4 weeks (around 2/20/2017) for cxr.   MEDICAL DECISION MAKING: Moderate to high complexity.  Overall, the multiple problems listed are of moderate to high severity that may impact quality of life and activities of daily living. Side effects of medications, treatment plan as well as options and alternatives reviewed and discussed with patient. There was counseling of patient concerning these  issues.    Total time spent in face to face counseling and coordination of care - 40 minutes over 50% of time was used in discussion of prognosis, risks, benefits of treatment, instructions and compliance with regimen . Discussion with other physicians or health care providers (homehealth, durable medical equipment providers).

## 2017-01-23 NOTE — PATIENT INSTRUCTIONS
Discharge Instructions: Using an Incentive Spirometer  An incentive spirometer is a device that helps you do deep breathing exercises. These exercises will help you breathe better and improve the function of your lungs. The incentive spirometer gives you a way to take an active part in your recovery.  Follow these steps to use your incentive spirometer  Inhale normally. Relax and breathe out:  · Place your lips tightly around the mouthpiece.  · Make sure the device is upright and not tilted.  · Inhale as much air as you can through the mouthpiece (don't breathe through your nose). Some spirometers have an indicator to let you know that you are breathing in too fast. If the indicator goes off, breathe in more slowly.  · Hold your breath long enough to keep the balls (or disk) raised for at least 5 seconds.  · Do this exercise every hour while youre awake or as often as your healthcare provider instructs.  · If you were also taught coughing exercises, do them regularly as instructed.  Follow-up care  Make a follow-up appointment as directed by our staff.     When to call the healthcare provider  Call your healthcare provider right away if you have any of the following:  · Shortness of breath that doesn't get better after taking your medicine  · Chest pain  · Fever above 101.0°F (38.3°C)  · Brownish, bloody, or smelly sputum  · Blue lips or fingernails   © 8481-3538 [employer]. [address] [place] [address]. All rights reserved. This information is not intended as a substitute for professional medical care. Always follow your healthcare professional's instructions.        Using an Incentive Spirometer  Soon after your surgery, a nurse or therapist will teach you breathing exercises. These keep your lungs clear, strengthen your breathing muscles, and help prevent complications.  The exercises include doing a deep-breathing exercise using a device called an incentive spirometer.  To do these  exercises, you will breathe in through your mouth and not your nose. The incentive spirometer only works correctly if you breathe in through your mouth.  Four steps to clear lungs     Deep breathing expands the lungs, aids circulation, and helps prevent pneumonia.   Step 1. Exhale normally.  · Relax and breathe out.  Step 2. Place your lips tightly around the mouthpiece.  · Make sure the device is upright and not tilted.  Step 3. Inhale as much air as you can through the mouthpiece (don't breath through your nose).  · Inhale slowly and deeply.  · Hold your breath long enough to keep the balls or disk raised for at least 3 seconds.  · Some spirometers have an indicator to let you know that you are breathing in too fast. If the indicator goes off, breathe in more slowly.  Step 4. Repeat the exercise regularly.  · Do this exercise every hour while you're awake, or as your healthcare provider tells you to.  · You will also be taught coughing exercises and be asked to do them regularly on your own.  © 2641-6597 [employer]. [address] [place] [address]. All rights reserved. This information is not intended as a substitute for professional medical care. Always follow your healthcare professional's instructions.        Flexible Bronchoscopy  A flexible bronchoscopy is an exam of the airways of your lungs. A thin, flexible instrument called a bronchoscope is used. It has a light and small camera that allow the health care provider to view your airways.  Call your doctor if you have shortness of breath, a temperature above 101.0°F (38.3°C) for more than 24 hours, or bleeding from your nose or throat. If you have chest pain or severe shortness of breath, call right away.   Before your test    · Follow your health care provider's instructions carefully. If you dont, the exam may be canceled or need to be repeated.  · If you are taking blood-thinning medicine, ask your health care provider whether  you should stop taking the medicine before this test.  · Have no food or drink for 6 to 12 hours before the test. Also, avoid smoking for 24 hours before the test.  · You will need to remove any dentures or bridgework.  · Right before the test, you will be given sedating medications to help you relax. The medication may be given intravenously (IV) into one of your veins. In addition, your nose and throat may be numbed with a special spray to help prevent gagging and coughing.  · If you are having this test as an outpatient, make sure you have an adult friend or family member to drive you home.  During your test  Bronchoscopy takes 45 to 60 minutes and includes the following steps:  · You may receive anesthesia so that you are unconscious or asleep during the procedure.  · The health care provider inserts the tube into your nose or mouth.  · If you have not received anesthesia, you might feel a gagging sensation. To help relieve this feeling, you will be told to swallow or take deep breaths. Your airway will remain open even with the tube in place. But you wont be able to talk.  · The provider examines your breathing passages. He or she may also remove tiny tissue samples for biopsy.  After your test  · You may have a mild sore throat. Your voice may also be hoarse. And, you may have a cough.  · Do not eat or drink until the anesthesia wears off.  · If you had a biopsy, avoid coughing hard and clearing your throat.  · Call your health care provider if you have:  ¨ Shortness of breath  ¨ Chest pain  ¨ Bleeding from your nose or throat  ¨ A fever  © 0683-2624 [employer]. [address] [place] [address], [place] [address]. All rights reserved. This information is not intended as a substitute for professional medical care. Always follow your healthcare professional's instructions.

## 2017-01-23 NOTE — MR AVS SNAPSHOT
[facility]  [address] [place] [address]  [place] [address]  Phone: [phone]  Fax: [phone]                  Petrona Gonzalez   2017 10:40 AM   Office Visit    Description:  Female : 1940   Provider:  Juan Carlos Araujo MD   Department:  [facility]           Reason for Visit     Shortness of Breath     COPD           Diagnoses this Visit        Comments    COPD exacerbation    -  Primary     Pulmonary emphysema, unspecified emphysema type                To Do List           Future Appointments        Provider Department Dept Phone    2017 9:20 AM Katy Arriaga MD [facility] [phone]      Goals (5 Years of Data)     None      Follow-Up and Disposition     Return in about 4 weeks (around 2017).       These Medications        Disp Refills Start End    guaifenesin (MUCINEX) 600 mg 12 hr tablet 60 tablet 11 2017     Take 1 tablet (600 mg total) by mouth 2 (two) times daily. - Oral    Pharmacy: [facility]/pharmacy #[id number] - [place], [place] [id number] [facility] Ph #: [phone]       predniSONE (DELTASONE) 20 MG tablet 50 tablet 1 2017     Prednisone 60 mg/day for 7 days,40 mg/day for 7 days,20 mg/day for 7 days,10 mg/ day (1/2 tablet) for 7 days, then stop    Pharmacy: [facility]/pharmacy #[id number] - [place], [address] Ph #: [phone]       levoFLOXacin (LEVAQUIN) 500 MG tablet 10 tablet 0 2017    Take 1 tablet (500 mg total) by mouth once daily. - Oral    Pharmacy: [facility]/pharmacy #[id number] [place]  [address] [facility] Ph #: [phone]       albuterol-ipratropium 2.5mg-0.5mg/3mL (DUO-NEB) 0.5 mg-3 mg(2.5 mg base)/3 mL nebulizer solution 360 vial 11 2017    Take 3 mLs by nebulization every 6 (six) hours. - Nebulization    Pharmacy: [facility]/pharmacy #[id number] - [place], [place] [facility] #: [phone]         [facility]     [facility]  - 24/7 Assistance  Registered nurses in the [facility] provide clinical advisement, health education, appointment booking, and other advisory services.  Call for this free service at [phone].             Medications           START taking these NEW medications        Refills    guaifenesin (MUCINEX) 600 mg 12 hr tablet 11    Sig: Take 1 tablet (600 mg total) by mouth 2 (two) times daily.    Class: Normal    Route: Oral    predniSONE (DELTASONE) 20 MG tablet 1    Sig: Prednisone 60 mg/day for 7 days,40 mg/day for 7 days,20 mg/day for 7 days,10 mg/ day (1/2 tablet) for 7 days, then stop    Class: Normal    levoFLOXacin (LEVAQUIN) 500 MG tablet 0    Sig: Take 1 tablet (500 mg total) by mouth once daily.    Class: Normal    Route: Oral      These medications were administered today        Dose Freq    triamcinolone acetonide injection 80 mg 80 mg [facility] 1 time    Sig: Inject 2 mLs (80 mg total) into the muscle one time.    Class: Normal    Route: Intramuscular      STOP taking these medications     montelukast (SINGULAIR) 10 mg tablet Take 1 tablet (10 mg total) by mouth every evening.    azithromycin (ZITHROMAX) 500 MG tablet Take 1 tablet (500 mg total) by mouth once daily.           Verify that the below list of medications is an accurate representation of the medications you are currently taking.  If none reported, the list may be blank. If incorrect, please contact your healthcare provider. Carry this list with you in case of emergency.           Current Medications     albuterol 90 mcg/actuation inhaler Inhale 2 puffs into the lungs every 6 (six) hours as needed. Dispense with spacer.    albuterol-ipratropium 2.5mg-0.5mg/3mL (DUO-NEB) 0.5 mg-3 mg(2.5 mg base)/3 mL nebulizer solution Take 3 mLs by nebulization every 6 (six) hours.    aspirin (ECOTRIN) 81 MG EC tablet Take 81 mg by mouth once daily.    budesonide-formoterol 160-4.5 mcg (SYMBICORT) 160-4.5 mcg/actuation HFAA Inhale 2 puffs into  "the lungs every 12 (twelve) hours. Wash out mouth after using    cetirizine (ZYRTEC) 10 MG tablet Take 10 mg by mouth once daily.    dextromethorphan-guaifenesin  mg (MUCINEX DM)  mg per 12 hr tablet Take 1 tablet by mouth.    guaifenesin (MUCINEX) 600 mg 12 hr tablet Take 1 tablet (600 mg total) by mouth 2 (two) times daily.    levoFLOXacin (LEVAQUIN) 500 MG tablet Take 1 tablet (500 mg total) by mouth once daily.    lisinopril (PRINIVIL,ZESTRIL) 5 MG tablet Take 1 tablet (5 mg total) by mouth once daily.    metformin (GLUCOPHAGE) 500 MG tablet Take 1 tablet (500 mg total) by mouth 2 (two) times daily with meals.    predniSONE (DELTASONE) 20 MG tablet Prednisone 60 mg/day for 7 days,40 mg/day for 7 days,20 mg/day for 7 days,10 mg/ day (1/2 tablet) for 7 days, then stop    simvastatin (ZOCOR) 10 MG tablet Take 1 tablet (10 mg total) by mouth every evening.    trazodone (DESYREL) 50 MG tablet Take 1 tablet (50 mg total) by mouth every evening.           Clinical Reference Information           Vital Signs - Last Recorded  Most recent update: 1/23/2017 11:06 AM by Judy Dale LPN    BP Pulse Resp Ht Wt SpO2    116/68 71 (!) 28 5' 3" (1.6 m) 88 kg (194 lb) (!) 90%    BMI                34.37 kg/m2          Blood Pressure          Most Recent Value    BP  116/68      Allergies as of 1/23/2017     Meloxicam    Naproxen    Morphine      Immunizations Administered on Date of Encounter - 1/23/2017     None      Orders Placed During Today's Visit      Normal Orders This Visit    Ambulatory Referral to Pulmonary Rehab       Instructions      Discharge Instructions: Using an Incentive Spirometer  An incentive spirometer is a device that helps you do deep breathing exercises. These exercises will help you breathe better and improve the function of your lungs. The incentive spirometer gives you a way to take an active part in your recovery.  Follow these steps to use your incentive spirometer  Inhale normally. " Relax and breathe out:  · Place your lips tightly around the mouthpiece.  · Make sure the device is upright and not tilted.  · Inhale as much air as you can through the mouthpiece (don't breathe through your nose). Some spirometers have an indicator to let you know that you are breathing in too fast. If the indicator goes off, breathe in more slowly.  · Hold your breath long enough to keep the balls (or disk) raised for at least 5 seconds.  · Do this exercise every hour while youre awake or as often as your healthcare provider instructs.  · If you were also taught coughing exercises, do them regularly as instructed.  Follow-up care  Make a follow-up appointment as directed by our staff.     When to call the healthcare provider  Call your healthcare provider right away if you have any of the following:  · Shortness of breath that doesn't get better after taking your medicine  · Chest pain  · Fever above 101.0°F (38.3°C)  · Brownish, bloody, or smelly sputum  · Blue lips or fingernails   © 0516-7084 [employer]. [address] [place], [place] [address]. All rights reserved. This information is not intended as a substitute for professional medical care. Always follow your healthcare professional's instructions.        Using an Incentive Spirometer  Soon after your surgery, a nurse or therapist will teach you breathing exercises. These keep your lungs clear, strengthen your breathing muscles, and help prevent complications.  The exercises include doing a deep-breathing exercise using a device called an incentive spirometer.  To do these exercises, you will breathe in through your mouth and not your nose. The incentive spirometer only works correctly if you breathe in through your mouth.  Four steps to clear lungs     Deep breathing expands the lungs, aids circulation, and helps prevent pneumonia.   Step 1. Exhale normally.  · Relax and breathe out.  Step 2. Place your lips tightly around the  mouthpiece.  · Make sure the device is upright and not tilted.  Step 3. Inhale as much air as you can through the mouthpiece (don't breath through your nose).  · Inhale slowly and deeply.  · Hold your breath long enough to keep the balls or disk raised for at least 3 seconds.  · Some spirometers have an indicator to let you know that you are breathing in too fast. If the indicator goes off, breathe in more slowly.  Step 4. Repeat the exercise regularly.  · Do this exercise every hour while you're awake, or as your healthcare provider tells you to.  · You will also be taught coughing exercises and be asked to do them regularly on your own.  © 0654-4144 [employer]. [address] [place] [address], [place] [address]. All rights reserved. This information is not intended as a substitute for professional medical care. Always follow your healthcare professional's instructions.

## 2017-01-25 ENCOUNTER — PATIENT OUTREACH (OUTPATIENT)
Dept: ADMINISTRATIVE | Facility: HOSPITAL | Age: 77
End: 2017-01-25

## 2017-01-25 NOTE — LETTER
January 25, 2017    Petrona Gonzalez  [address] [address]  [facility] [address]             [facility]  [address]  [facility] [address]  Phone: [phone] Dear Mrs. Gonzalez:       [facility] is committed to your overall health.  To help you get the most out of each of your visits, we will review your information to make sure you are up to date on all of your recommended tests and/or procedures.      Katy Arriaga MD has found that you may be due for   Health Maintenance Due   Topic    Mammogram     DEXA SCAN     Colonoscopy     Zoster Vaccine     Influenza Vaccine       If you have had any of the above done at another facility, please bring the records or information with you so that your record at [facility] will be complete.    If you are currently taking medication, please bring it with you to your appointment for review.    We will be happy to assist you with scheduling any necessary appointments or you may contact the [facility] appointment desk at [phone] to schedule at your convenience.     Thank you for choosing [facility] for your healthcare needs,   Catherine MORENO LPN  Care Coordination Department  [facility]

## 2017-01-31 ENCOUNTER — TELEPHONE (OUTPATIENT)
Dept: PULMONOLOGY | Facility: CLINIC | Age: 77
End: 2017-01-31

## 2017-01-31 DIAGNOSIS — R06.02 SOB (SHORTNESS OF BREATH): Primary | ICD-10-CM

## 2017-02-02 ENCOUNTER — TELEPHONE (OUTPATIENT)
Dept: PULMONOLOGY | Facility: HOSPITAL | Age: 77
End: 2017-02-02

## 2017-02-13 ENCOUNTER — OFFICE VISIT (OUTPATIENT)
Dept: FAMILY MEDICINE | Facility: CLINIC | Age: 77
DRG: 189 | End: 2017-02-13
Payer: MEDICARE

## 2017-02-13 ENCOUNTER — HOSPITAL ENCOUNTER (INPATIENT)
Facility: HOSPITAL | Age: 77
LOS: 2 days | Discharge: HOME | DRG: 189 | End: 2017-02-15
Attending: EMERGENCY MEDICINE | Admitting: INTERNAL MEDICINE
Payer: MEDICARE

## 2017-02-13 VITALS
SYSTOLIC BLOOD PRESSURE: 136 MMHG | DIASTOLIC BLOOD PRESSURE: 70 MMHG | BODY MASS INDEX: 34.02 KG/M2 | TEMPERATURE: 97 F | HEART RATE: 64 BPM | HEIGHT: 63 IN | OXYGEN SATURATION: 89 % | WEIGHT: 192 LBS | RESPIRATION RATE: 16 BRPM

## 2017-02-13 DIAGNOSIS — J44.1 COPD WITH ACUTE EXACERBATION: ICD-10-CM

## 2017-02-13 DIAGNOSIS — J44.1 CHRONIC OBSTRUCTIVE PULMONARY DISEASE WITH ACUTE EXACERBATION: ICD-10-CM

## 2017-02-13 DIAGNOSIS — J44.1 ACUTE EXACERBATION OF CHRONIC OBSTRUCTIVE PULMONARY DISEASE (COPD): ICD-10-CM

## 2017-02-13 DIAGNOSIS — J96.22 ACUTE ON CHRONIC RESPIRATORY FAILURE WITH HYPOXIA AND HYPERCAPNIA: Primary | ICD-10-CM

## 2017-02-13 DIAGNOSIS — J44.1 COPD EXACERBATION: ICD-10-CM

## 2017-02-13 DIAGNOSIS — I10 ESSENTIAL HYPERTENSION: ICD-10-CM

## 2017-02-13 DIAGNOSIS — J44.9 COPD, MODERATE: ICD-10-CM

## 2017-02-13 DIAGNOSIS — J96.21 ACUTE ON CHRONIC RESPIRATORY FAILURE WITH HYPOXIA AND HYPERCAPNIA: Primary | ICD-10-CM

## 2017-02-13 DIAGNOSIS — J41.0 SIMPLE CHRONIC BRONCHITIS: ICD-10-CM

## 2017-02-13 DIAGNOSIS — J96.11 CHRONIC RESPIRATORY FAILURE WITH HYPOXIA: ICD-10-CM

## 2017-02-13 DIAGNOSIS — I27.29 OTHER SECONDARY PULMONARY HYPERTENSION: ICD-10-CM

## 2017-02-13 DIAGNOSIS — E78.5 HYPERLIPIDEMIA, UNSPECIFIED HYPERLIPIDEMIA TYPE: ICD-10-CM

## 2017-02-13 DIAGNOSIS — E66.9 OBESITY (BMI 30-39.9): ICD-10-CM

## 2017-02-13 DIAGNOSIS — E11.40 TYPE 2 DIABETES MELLITUS WITH DIABETIC NEUROPATHY, WITHOUT LONG-TERM CURRENT USE OF INSULIN: Primary | ICD-10-CM

## 2017-02-13 PROBLEM — E11.69 TYPE 2 DIABETES MELLITUS WITH HYPERLIPIDEMIA: Status: ACTIVE | Noted: 2017-02-13

## 2017-02-13 PROBLEM — D72.829 LEUKOCYTOSIS: Status: ACTIVE | Noted: 2017-02-13

## 2017-02-13 LAB
ALBUMIN SERPL BCP-MCNC: 3.9 G/DL
ALLENS TEST: ABNORMAL
ALP SERPL-CCNC: 65 U/L
ALT SERPL W/O P-5'-P-CCNC: 23 U/L
ANION GAP SERPL CALC-SCNC: 13 MMOL/L
AST SERPL-CCNC: 17 U/L
BASOPHILS # BLD AUTO: 0.02 K/UL
BASOPHILS NFR BLD: 0.1 %
BILIRUB SERPL-MCNC: 0.3 MG/DL
BILIRUB UR QL STRIP: NEGATIVE
BNP SERPL-MCNC: 101 PG/ML
BUN SERPL-MCNC: 21 MG/DL
CALCIUM SERPL-MCNC: 9.8 MG/DL
CHLORIDE SERPL-SCNC: 100 MMOL/L
CK SERPL-CCNC: 65 U/L
CLARITY UR: CLEAR
CO2 SERPL-SCNC: 28 MMOL/L
COLOR UR: YELLOW
CREAT SERPL-MCNC: 0.9 MG/DL
DELSYS: ABNORMAL
DIFFERENTIAL METHOD: ABNORMAL
EOSINOPHIL # BLD AUTO: 0.1 K/UL
EOSINOPHIL NFR BLD: 0.6 %
ERYTHROCYTE [DISTWIDTH] IN BLOOD BY AUTOMATED COUNT: 16.7 %
EST. GFR  (AFRICAN AMERICAN): >60 ML/MIN/1.73 M^2
EST. GFR  (NON AFRICAN AMERICAN): >60 ML/MIN/1.73 M^2
FIO2: 36
FLOW: 4
FLUAV AG SPEC QL IA: NEGATIVE
FLUBV AG SPEC QL IA: NEGATIVE
GLUCOSE SERPL-MCNC: 162 MG/DL
GLUCOSE UR QL STRIP: NEGATIVE
HCO3 UR-SCNC: 32.8 MMOL/L (ref 24–28)
HCT VFR BLD AUTO: 44.2 %
HGB BLD-MCNC: 13.7 G/DL
HGB UR QL STRIP: ABNORMAL
KETONES UR QL STRIP: NEGATIVE
LEUKOCYTE ESTERASE UR QL STRIP: NEGATIVE
LYMPHOCYTES # BLD AUTO: 2.1 K/UL
LYMPHOCYTES NFR BLD: 12.3 %
MCH RBC QN AUTO: 27.6 PG
MCHC RBC AUTO-ENTMCNC: 31 %
MCV RBC AUTO: 89 FL
MODE: ABNORMAL
MONOCYTES # BLD AUTO: 1 K/UL
MONOCYTES NFR BLD: 6 %
NEUTROPHILS # BLD AUTO: 13.5 K/UL
NEUTROPHILS NFR BLD: 81 %
NITRITE UR QL STRIP: NEGATIVE
PCO2 BLDA: 56.1 MMHG (ref 35–45)
PH SMN: 7.37 [PH] (ref 7.35–7.45)
PH UR STRIP: 6 [PH] (ref 5–8)
PLATELET # BLD AUTO: 248 K/UL
PMV BLD AUTO: 8.8 FL
PO2 BLDA: 108 MMHG (ref 80–100)
POC BE: 8 MMOL/L
POC SATURATED O2: 98 % (ref 95–100)
POCT GLUCOSE: 174 MG/DL (ref 70–110)
POTASSIUM SERPL-SCNC: 4.6 MMOL/L
PROT SERPL-MCNC: 7.3 G/DL
PROT UR QL STRIP: NEGATIVE
RBC # BLD AUTO: 4.97 M/UL
SAMPLE: ABNORMAL
SITE: ABNORMAL
SODIUM SERPL-SCNC: 141 MMOL/L
SP GR UR STRIP: 1.01 (ref 1–1.03)
SPECIMEN SOURCE: NORMAL
TROPONIN I SERPL DL<=0.01 NG/ML-MCNC: 0.01 NG/ML
TROPONIN I SERPL DL<=0.01 NG/ML-MCNC: 0.01 NG/ML
URN SPEC COLLECT METH UR: ABNORMAL
UROBILINOGEN UR STRIP-ACNC: NEGATIVE EU/DL
WBC # BLD AUTO: 16.73 K/UL

## 2017-02-13 PROCEDURE — 96376 TX/PRO/DX INJ SAME DRUG ADON: CPT

## 2017-02-13 PROCEDURE — 93010 ELECTROCARDIOGRAM REPORT: CPT | Mod: ,,, | Performed by: INTERNAL MEDICINE

## 2017-02-13 PROCEDURE — 81003 URINALYSIS AUTO W/O SCOPE: CPT

## 2017-02-13 PROCEDURE — 1157F ADVNC CARE PLAN IN RCRD: CPT | Mod: S$GLB,,, | Performed by: FAMILY MEDICINE

## 2017-02-13 PROCEDURE — 99285 EMERGENCY DEPT VISIT HI MDM: CPT | Mod: 25

## 2017-02-13 PROCEDURE — 99499 UNLISTED E&M SERVICE: CPT | Mod: S$PBB,,, | Performed by: FAMILY MEDICINE

## 2017-02-13 PROCEDURE — 85025 COMPLETE CBC W/AUTO DIFF WBC: CPT

## 2017-02-13 PROCEDURE — 63600175 PHARM REV CODE 636 W HCPCS: Performed by: EMERGENCY MEDICINE

## 2017-02-13 PROCEDURE — 83880 ASSAY OF NATRIURETIC PEPTIDE: CPT

## 2017-02-13 PROCEDURE — 96372 THER/PROPH/DIAG INJ SC/IM: CPT

## 2017-02-13 PROCEDURE — 82550 ASSAY OF CK (CPK): CPT

## 2017-02-13 PROCEDURE — 63600175 PHARM REV CODE 636 W HCPCS: Performed by: INTERNAL MEDICINE

## 2017-02-13 PROCEDURE — 21400001 HC TELEMETRY ROOM

## 2017-02-13 PROCEDURE — 25000242 PHARM REV CODE 250 ALT 637 W/ HCPCS: Performed by: EMERGENCY MEDICINE

## 2017-02-13 PROCEDURE — 84484 ASSAY OF TROPONIN QUANT: CPT

## 2017-02-13 PROCEDURE — 3044F HG A1C LEVEL LT 7.0%: CPT | Mod: S$GLB,,, | Performed by: FAMILY MEDICINE

## 2017-02-13 PROCEDURE — 36600 WITHDRAWAL OF ARTERIAL BLOOD: CPT

## 2017-02-13 PROCEDURE — 27000221 HC OXYGEN, UP TO 24 HOURS

## 2017-02-13 PROCEDURE — 3075F SYST BP GE 130 - 139MM HG: CPT | Mod: S$GLB,,, | Performed by: FAMILY MEDICINE

## 2017-02-13 PROCEDURE — 25000003 PHARM REV CODE 250: Performed by: INTERNAL MEDICINE

## 2017-02-13 PROCEDURE — 96366 THER/PROPH/DIAG IV INF ADDON: CPT

## 2017-02-13 PROCEDURE — 99999 PR PBB SHADOW E&M-EST. PATIENT-LVL III: CPT | Mod: PBBFAC,,, | Performed by: FAMILY MEDICINE

## 2017-02-13 PROCEDURE — 94640 AIRWAY INHALATION TREATMENT: CPT

## 2017-02-13 PROCEDURE — 4010F ACE/ARB THERAPY RXD/TAKEN: CPT | Mod: S$GLB,,, | Performed by: FAMILY MEDICINE

## 2017-02-13 PROCEDURE — 96365 THER/PROPH/DIAG IV INF INIT: CPT

## 2017-02-13 PROCEDURE — 99214 OFFICE O/P EST MOD 30 MIN: CPT | Mod: S$GLB,,, | Performed by: FAMILY MEDICINE

## 2017-02-13 PROCEDURE — 1126F AMNT PAIN NOTED NONE PRSNT: CPT | Mod: S$GLB,,, | Performed by: FAMILY MEDICINE

## 2017-02-13 PROCEDURE — 80053 COMPREHEN METABOLIC PANEL: CPT

## 2017-02-13 PROCEDURE — 96375 TX/PRO/DX INJ NEW DRUG ADDON: CPT

## 2017-02-13 PROCEDURE — 82803 BLOOD GASES ANY COMBINATION: CPT

## 2017-02-13 PROCEDURE — 84484 ASSAY OF TROPONIN QUANT: CPT | Mod: 91

## 2017-02-13 PROCEDURE — 1160F RVW MEDS BY RX/DR IN RCRD: CPT | Mod: S$GLB,,, | Performed by: FAMILY MEDICINE

## 2017-02-13 PROCEDURE — 2022F DILAT RTA XM EVC RTNOPTHY: CPT | Mod: S$GLB,,, | Performed by: FAMILY MEDICINE

## 2017-02-13 PROCEDURE — 1159F MED LIST DOCD IN RCRD: CPT | Mod: S$GLB,,, | Performed by: FAMILY MEDICINE

## 2017-02-13 PROCEDURE — 99900035 HC TECH TIME PER 15 MIN (STAT)

## 2017-02-13 PROCEDURE — 82962 GLUCOSE BLOOD TEST: CPT

## 2017-02-13 PROCEDURE — 3078F DIAST BP <80 MM HG: CPT | Mod: S$GLB,,, | Performed by: FAMILY MEDICINE

## 2017-02-13 PROCEDURE — 87400 INFLUENZA A/B EACH AG IA: CPT | Mod: 59

## 2017-02-13 RX ORDER — MOXIFLOXACIN HYDROCHLORIDE 400 MG/250ML
400 INJECTION, SOLUTION INTRAVENOUS
Status: DISCONTINUED | OUTPATIENT
Start: 2017-02-14 | End: 2017-02-15 | Stop reason: HOSPADM

## 2017-02-13 RX ORDER — LISINOPRIL 5 MG/1
5 TABLET ORAL DAILY
Status: DISCONTINUED | OUTPATIENT
Start: 2017-02-14 | End: 2017-02-15 | Stop reason: HOSPADM

## 2017-02-13 RX ORDER — ENOXAPARIN SODIUM 100 MG/ML
40 INJECTION SUBCUTANEOUS EVERY 24 HOURS
Status: DISCONTINUED | OUTPATIENT
Start: 2017-02-13 | End: 2017-02-15 | Stop reason: HOSPADM

## 2017-02-13 RX ORDER — ASPIRIN 81 MG/1
81 TABLET ORAL DAILY
Status: DISCONTINUED | OUTPATIENT
Start: 2017-02-14 | End: 2017-02-15 | Stop reason: HOSPADM

## 2017-02-13 RX ORDER — ARFORMOTEROL TARTRATE 15 UG/2ML
15 SOLUTION RESPIRATORY (INHALATION) 2 TIMES DAILY
Status: DISCONTINUED | OUTPATIENT
Start: 2017-02-13 | End: 2017-02-15 | Stop reason: HOSPADM

## 2017-02-13 RX ORDER — LISINOPRIL 5 MG/1
5 TABLET ORAL DAILY
Qty: 30 TABLET | Refills: 5 | Status: ON HOLD | OUTPATIENT
Start: 2017-02-13 | End: 2018-11-04 | Stop reason: HOSPADM

## 2017-02-13 RX ORDER — GUAIFENESIN 600 MG/1
600 TABLET, EXTENDED RELEASE ORAL 2 TIMES DAILY
Status: DISCONTINUED | OUTPATIENT
Start: 2017-02-13 | End: 2017-02-15 | Stop reason: HOSPADM

## 2017-02-13 RX ORDER — TRAZODONE HYDROCHLORIDE 50 MG/1
50 TABLET ORAL NIGHTLY
Status: DISCONTINUED | OUTPATIENT
Start: 2017-02-13 | End: 2017-02-15 | Stop reason: HOSPADM

## 2017-02-13 RX ORDER — IBUPROFEN 200 MG
16 TABLET ORAL
Status: DISCONTINUED | OUTPATIENT
Start: 2017-02-13 | End: 2017-02-15 | Stop reason: HOSPADM

## 2017-02-13 RX ORDER — SIMVASTATIN 10 MG/1
10 TABLET, FILM COATED ORAL NIGHTLY
Qty: 30 TABLET | Refills: 11 | Status: ON HOLD | OUTPATIENT
Start: 2017-02-13 | End: 2018-11-04 | Stop reason: HOSPADM

## 2017-02-13 RX ORDER — IPRATROPIUM BROMIDE AND ALBUTEROL SULFATE 2.5; .5 MG/3ML; MG/3ML
3 SOLUTION RESPIRATORY (INHALATION)
Status: COMPLETED | OUTPATIENT
Start: 2017-02-13 | End: 2017-02-13

## 2017-02-13 RX ORDER — IBUPROFEN 200 MG
24 TABLET ORAL
Status: DISCONTINUED | OUTPATIENT
Start: 2017-02-13 | End: 2017-02-15 | Stop reason: HOSPADM

## 2017-02-13 RX ORDER — GLUCAGON 1 MG
1 KIT INJECTION
Status: DISCONTINUED | OUTPATIENT
Start: 2017-02-13 | End: 2017-02-15 | Stop reason: HOSPADM

## 2017-02-13 RX ORDER — SIMVASTATIN 5 MG/1
10 TABLET, FILM COATED ORAL NIGHTLY
Status: DISCONTINUED | OUTPATIENT
Start: 2017-02-13 | End: 2017-02-15 | Stop reason: HOSPADM

## 2017-02-13 RX ORDER — METFORMIN HYDROCHLORIDE 500 MG/1
500 TABLET ORAL 2 TIMES DAILY WITH MEALS
Qty: 60 TABLET | Refills: 5 | Status: SHIPPED | OUTPATIENT
Start: 2017-02-13 | End: 2018-11-01

## 2017-02-13 RX ORDER — IPRATROPIUM BROMIDE AND ALBUTEROL SULFATE 2.5; .5 MG/3ML; MG/3ML
3 SOLUTION RESPIRATORY (INHALATION)
Status: COMPLETED | OUTPATIENT
Start: 2017-02-13 | End: 2017-02-14

## 2017-02-13 RX ORDER — INSULIN ASPART 100 [IU]/ML
0-5 INJECTION, SOLUTION INTRAVENOUS; SUBCUTANEOUS
Status: DISCONTINUED | OUTPATIENT
Start: 2017-02-13 | End: 2017-02-15 | Stop reason: HOSPADM

## 2017-02-13 RX ORDER — ONDANSETRON 2 MG/ML
4 INJECTION INTRAMUSCULAR; INTRAVENOUS EVERY 12 HOURS PRN
Status: DISCONTINUED | OUTPATIENT
Start: 2017-02-13 | End: 2017-02-15 | Stop reason: HOSPADM

## 2017-02-13 RX ORDER — ALBUTEROL SULFATE 2.5 MG/.5ML
2.5 SOLUTION RESPIRATORY (INHALATION)
Status: DISCONTINUED | OUTPATIENT
Start: 2017-02-13 | End: 2017-02-13

## 2017-02-13 RX ORDER — BUDESONIDE 0.5 MG/2ML
0.5 INHALANT ORAL EVERY 12 HOURS
Status: DISCONTINUED | OUTPATIENT
Start: 2017-02-13 | End: 2017-02-15 | Stop reason: HOSPADM

## 2017-02-13 RX ORDER — MOXIFLOXACIN HYDROCHLORIDE 400 MG/250ML
400 INJECTION, SOLUTION INTRAVENOUS
Status: COMPLETED | OUTPATIENT
Start: 2017-02-13 | End: 2017-02-13

## 2017-02-13 RX ADMIN — METHYLPREDNISOLONE SODIUM SUCCINATE 125 MG: 125 INJECTION, POWDER, FOR SOLUTION INTRAMUSCULAR; INTRAVENOUS at 02:02

## 2017-02-13 RX ADMIN — PANTOPRAZOLE SODIUM 600 MG: 40 TABLET, DELAYED RELEASE ORAL at 09:02

## 2017-02-13 RX ADMIN — IPRATROPIUM BROMIDE AND ALBUTEROL SULFATE 3 ML: .5; 3 SOLUTION RESPIRATORY (INHALATION) at 02:02

## 2017-02-13 RX ADMIN — IPRATROPIUM BROMIDE AND ALBUTEROL SULFATE 3 ML: .5; 3 SOLUTION RESPIRATORY (INHALATION) at 07:02

## 2017-02-13 RX ADMIN — BUDESONIDE 0.5 MG: 0.5 INHALANT RESPIRATORY (INHALATION) at 07:02

## 2017-02-13 RX ADMIN — ENOXAPARIN SODIUM 40 MG: 100 INJECTION SUBCUTANEOUS at 08:02

## 2017-02-13 RX ADMIN — SIMVASTATIN 10 MG: 5 TABLET, FILM COATED ORAL at 09:02

## 2017-02-13 RX ADMIN — TRAZODONE HYDROCHLORIDE 50 MG: 50 TABLET ORAL at 09:02

## 2017-02-13 RX ADMIN — MOXIFLOXACIN HYDROCHLORIDE 400 MG: 400 INJECTION, SOLUTION INTRAVENOUS at 04:02

## 2017-02-13 RX ADMIN — ARFORMOTEROL TARTRATE 15 MCG: 15 SOLUTION RESPIRATORY (INHALATION) at 07:02

## 2017-02-13 NOTE — ED PROVIDER NOTES
SCRIBE #1 NOTE: I, Randolph Mcmanus, am scribing for, and in the presence of, Carol Moody MD. I have scribed the entire note.      History      Chief Complaint   Patient presents with    Shortness of Breath     wears O2 at 2.5 at home       Review of patient's allergies indicates:   Allergen Reactions    Meloxicam Other (See Comments)      Patient stated that she has muscle aches and pains    Naproxen Other (See Comments)     Patient states she has muscle and aches.    Morphine Rash        HPI   HPI    2/13/2017, 4:03 PM   History obtained from the patient and family at bedside      History of Present Illness: Petrona Gonzalez is a 76 y.o. female patient who presents to the Emergency Department for SOB which worsened this AM. Symptoms are constant and moderate in severity. Sx are exacerbated by nothing and relieved by nothing. No other sxs reported. Family at bedside states she has been having worsening SOB intermittently for the past month. Family states she is on 3 liters of oxygen at home and 2 liters of oxygen via nasal canula when traveling. Patient denies any fever, N/V/D, chills, abd pain, lightheadedness, dizziness, weakness/numbness, chest tightness, CP and all other sxs at this time. No further complaints or concerns at this time.     Arrival mode: Personal vehicle    PCP: Katy Arriaga MD       Past Medical History:  Past Medical History   Diagnosis Date    COPD (chronic obstructive pulmonary disease) with emphysema     Diabetes mellitus     Disorder of kidney and ureter     Hyperlipidemia     Hypertension     Maxillary sinusitis, chronic        Past Surgical History:  Past Surgical History   Procedure Laterality Date    Hysterectomy      Back surgery      Coronary artery bypass graft      Hernia repair           Family History:  Family History   Problem Relation Age of Onset    Family history unknown: Yes       Social History:  Social History     Social History Main Topics     Smoking status: Former Smoker     Years: 30.00    Smokeless tobacco: Never Used    Alcohol use No    Drug use: No    Sexual activity: Not Currently       ROS   Review of Systems   Constitutional: Negative for chills and fever.   HENT: Negative for congestion and sore throat.    Respiratory: Positive for shortness of breath. Negative for cough and chest tightness.    Cardiovascular: Negative for chest pain.   Gastrointestinal: Negative for abdominal pain, nausea and vomiting.   Genitourinary: Negative for difficulty urinating and dysuria.   Musculoskeletal: Negative for back pain and neck pain.   Skin: Negative for rash.   Neurological: Negative for dizziness, weakness, light-headedness, numbness and headaches.   Psychiatric/Behavioral: Negative for agitation and confusion.   All other systems reviewed and are negative.      Physical Exam    Initial Vitals   BP Pulse Resp Temp SpO2   02/13/17 1433 02/13/17 1419 02/13/17 1419 02/13/17 1419 02/13/17 1419   183/70 107 30 98.4 °F (36.9 °C) 74 %      Physical Exam  Nursing Notes and Vital Signs Reviewed.  Constitutional: Patient is in moderate distress. Awake and alert. Well-developed and well-nourished.  Head: Atraumatic. Normocephalic.  Eyes: PERRL. EOM intact. Conjunctivae are not pale. No scleral icterus.  ENT: Mucous membranes are moist. Oropharynx is clear and symmetric.    Neck: Supple. Full ROM. No lymphadenopathy.  Cardiovascular: Regular rate. Regular rhythm. No murmurs, rubs, or gallops. Distal pulses are 2+ and symmetric.  Pulmonary/Chest: Moderate respiratory distress, speaking 2 word sentences. Diminished breath sounds noted bilaterally. No wheezing, rales, or rhonchi appreciated.  Abdominal: Soft and non-distended.  There is no tenderness.  No rebound, guarding, or rigidity. Good bowel sounds.  Musculoskeletal: Moves all extremities. No obvious deformities. No edema. No calf tenderness.  Skin: Warm and dry.  Neurological:  Alert, awake, and  "appropriate. Pt is speaking with 2 word sentences.  No acute focal neurological deficits are appreciated.  Psychiatric: Normal affect. Good eye contact. Appropriate in content.    ED Course    Procedures  ED Vital Signs:  Vitals:    02/13/17 1419 02/13/17 1426 02/13/17 1433 02/13/17 1436   BP:   (!) 183/70    Pulse: 107 109 86    Resp: (!) 30  (!) 27    Temp: 98.4 °F (36.9 °C)      TempSrc: Oral      SpO2: (!) 74% (!) 94% 95% 96%   Weight: 87.1 kg (192 lb)      Height: 5' 5" (1.651 m)       02/13/17 1440 02/13/17 1444 02/13/17 1450 02/13/17 1502   BP:    (!) 167/87   Pulse: 86 85 82 91   Resp: 17 16 17 20   Temp:       TempSrc:       SpO2: 97% 99% 98% 99%   Weight:       Height:        02/13/17 1605 02/13/17 1620 02/13/17 1635   BP: (!) 136/53 123/60 (!) 116/43   Pulse: 89 83 80   Resp: (!) 21 17 19   Temp:      TempSrc:      SpO2: (!) 93% (!) 94% 95%   Weight:      Height:          Abnormal Lab Results:  Labs Reviewed   CBC W/ AUTO DIFFERENTIAL - Abnormal; Notable for the following:        Result Value    WBC 16.73 (*)     MCHC 31.0 (*)     RDW 16.7 (*)     MPV 8.8 (*)     Gran # 13.5 (*)     Gran% 81.0 (*)     Lymph% 12.3 (*)     All other components within normal limits   COMPREHENSIVE METABOLIC PANEL - Abnormal; Notable for the following:     Glucose 162 (*)     All other components within normal limits   URINALYSIS - Abnormal; Notable for the following:     Occult Blood UA Trace (*)     All other components within normal limits   B-TYPE NATRIURETIC PEPTIDE - Abnormal; Notable for the following:      (*)     All other components within normal limits   ISTAT PROCEDURE - Abnormal; Notable for the following:     POC PCO2 56.1 (*)     POC PO2 108 (*)     POC HCO3 32.8 (*)     All other components within normal limits   CK   TROPONIN I   TROPONIN I        All Lab Results:  Results for orders placed or performed during the hospital encounter of 02/13/17   CBC auto differential   Result Value Ref Range    WBC " 16.73 (H) 3.90 - 12.70 K/uL    RBC 4.97 4.00 - 5.40 M/uL    Hemoglobin 13.7 12.0 - 16.0 g/dL    Hematocrit 44.2 37.0 - 48.5 %    MCV 89 82 - 98 fL    MCH 27.6 27.0 - 31.0 pg    MCHC 31.0 (L) 32.0 - 36.0 %    RDW 16.7 (H) 11.5 - 14.5 %    Platelets 248 150 - 350 K/uL    MPV 8.8 (L) 9.2 - 12.9 fL    Gran # 13.5 (H) 1.8 - 7.7 K/uL    Lymph # 2.1 1.0 - 4.8 K/uL    Mono # 1.0 0.3 - 1.0 K/uL    Eos # 0.1 0.0 - 0.5 K/uL    Baso # 0.02 0.00 - 0.20 K/uL    Gran% 81.0 (H) 38.0 - 73.0 %    Lymph% 12.3 (L) 18.0 - 48.0 %    Mono% 6.0 4.0 - 15.0 %    Eosinophil% 0.6 0.0 - 8.0 %    Basophil% 0.1 0.0 - 1.9 %    Differential Method Automated    Comprehensive metabolic panel   Result Value Ref Range    Sodium 141 136 - 145 mmol/L    Potassium 4.6 3.5 - 5.1 mmol/L    Chloride 100 95 - 110 mmol/L    CO2 28 23 - 29 mmol/L    Glucose 162 (H) 70 - 110 mg/dL    BUN, Bld 21 8 - 23 mg/dL    Creatinine 0.9 0.5 - 1.4 mg/dL    Calcium 9.8 8.7 - 10.5 mg/dL    Total Protein 7.3 6.0 - 8.4 g/dL    Albumin 3.9 3.5 - 5.2 g/dL    Total Bilirubin 0.3 0.1 - 1.0 mg/dL    Alkaline Phosphatase 65 55 - 135 U/L    AST 17 10 - 40 U/L    ALT 23 10 - 44 U/L    Anion Gap 13 8 - 16 mmol/L    eGFR if African American >60 >60 mL/min/1.73 m^2    eGFR if non African American >60 >60 mL/min/1.73 m^2   Urinalysis   Result Value Ref Range    Specimen UA Urine, Clean Catch     Color, UA Yellow Yellow, Straw, Barbara    Appearance, UA Clear Clear    pH, UA 6.0 5.0 - 8.0    Specific Gravity, UA 1.015 1.005 - 1.030    Protein, UA Negative Negative    Glucose, UA Negative Negative    Ketones, UA Negative Negative    Bilirubin (UA) Negative Negative    Occult Blood UA Trace (A) Negative    Nitrite, UA Negative Negative    Urobilinogen, UA Negative <2.0 EU/dL    Leukocytes, UA Negative Negative   Brain natriuretic peptide   Result Value Ref Range     (H) 0 - 99 pg/mL   CK   Result Value Ref Range    CPK 65 20 - 180 U/L   Troponin I   Result Value Ref Range    Troponin I  0.008 0.000 - 0.026 ng/mL   ISTAT PROCEDURE   Result Value Ref Range    POC PH 7.374 7.35 - 7.45    POC PCO2 56.1 (HH) 35 - 45 mmHg    POC PO2 108 (H) 80 - 100 mmHg    POC HCO3 32.8 (H) 24 - 28 mmol/L    POC BE 8 -2 to 2 mmol/L    POC SATURATED O2 98 95 - 100 %    Sample ARTERIAL     Site RR     Allens Test Pass     DelSys Nasal Can     Mode SPONT     Flow 4     FiO2 36          Imaging Results:  Imaging Results         X-Ray Chest AP Portable (Final result) Result time:  02/13/17 15:18:12    Final result by Goran Domínguez III, MD (02/13/17 15:18:12)    Impression:     Stable chest x-ray. Cardiomegaly. Chronic lung changes including scarring. No gross acute abnormality suggested.      Electronically signed by: GORAN DOMÍNGUEZ MD  Date:     02/13/17  Time:    15:18     Narrative:    Chest x-ray, single AP view.    Clinical indication: Emphysema. Shortness of breath.    Compared to January.    The heart size remains enlarged. This is an apical lordotic projection. There is again scarring in both lower lung fields with generalized emphysematous changes. The extreme lung bases are underpenetrated but the visualized lung zones are grossly clear. No consolidation. No effusion.                  The EKG was ordered, reviewed, and independently interpreted by the ED provider.  Interpretation time: 1501  Rate: 82 BPM  Rhythm: Sinus rhythm with premature atrial complexes  Interpretation: Left axis deviation. Septal infarct. Possible lateral infarct. No STEMI.      The Emergency Provider reviewed the vital signs and test results, which are outlined above.    ED Discussion     3:32 PM: Dr. Morocho transfers care of pt to Dr. Carol Moody, pending imaging and lab results.    4:25 PM: Dr. Carol Moody re-evaluated pt. Pt states she is feeling better overall but states she does not feel comfortable going home. Pt is sitting up on the side of the ED bed and in no distress. Diminished breath sounds and faint wheezing  noted bilaterally but still moving air. I have discussed test results, shared treatment plan, and the need for admission with patient and family at bedside. Pt and family express understanding at this time and agree with all information. All questions answered. Pt and family have no further questions or concerns at this time. Pt is ready for admit.    4:38 PM: Discussed case with Annemarie ([facility], [facility]). Dr. Bah agrees with current care and management of pt and accepts admission.   Admitting Service: [facility] medicine   Admitting Physician: Dr. Bah  Admit to: Tele    4:46 PM: Re-evaluated pt. I have discussed test results, shared treatment plan, and the need for admission with patient and family at bedside. Pt and family express understanding at this time and agree with all information. All questions answered. Pt and family have no further questions or concerns at this time. Pt is ready for admit.      ED Medication(s):  Medications   enoxaparin injection 40 mg (not administered)   ondansetron injection 4 mg (not administered)   methylPREDNISolone sod suc(PF) 125 mg/2 mL injection 80 mg (not administered)   albuterol-ipratropium 2.5mg-0.5mg/3mL nebulizer solution 3 mL (not administered)   albuterol-ipratropium 2.5mg-0.5mg/3mL nebulizer solution 3 mL (3 mLs Nebulization Given 2/13/17 1450)   methylPREDNISolone sod suc(PF) 125 mg/2 mL injection 125 mg (125 mg Intravenous Given 2/13/17 1451)   moxifloxacin 400 mg/250 mL IVPB 400 mg (400 mg Intravenous New Bag 2/13/17 1654)       New Prescriptions    No medications on file             Medical Decision Making    Medical Decision Making:   Clinical Tests:   Lab Tests: Reviewed and Ordered  Radiological Study: Ordered and Reviewed  Medical Tests: Reviewed and Ordered           Scribe Attestation:   Scribe #1: I performed the above scribed service and the documentation accurately describes the services I performed. I attest to the accuracy of the  note.    Attending:   Physician Attestation Statement for Scribe #1: I, Carol Moody MD, personally performed the services described in this documentation, as scribed by Randolph Mcmanus, in my presence, and it is both accurate and complete.          Clinical Impression       ICD-10-CM ICD-9-CM   1. Acute on chronic respiratory failure with hypoxia and hypercapnia J96.21 518.84    J96.22 786.09     799.02   2. Acute exacerbation of chronic obstructive pulmonary disease (COPD) J44.1 491.21   3. Simple chronic bronchitis J41.0 491.0       Disposition:   Disposition: Admitted (Tele)  Condition: Fair         Carol Moody MD  02/13/17 4914

## 2017-02-13 NOTE — IP AVS SNAPSHOT
[address] [place] [address]           Patient Discharge Instructions     Our goal is to set you up for success. This packet includes information on your condition, medications, and your home care. It will help you to care for yourself so you don't get sicker and need to go back to the hospital.     Please ask your nurse if you have any questions.        There are many details to remember when preparing to leave the hospital. Here is what you will need to do:    1. Take your medicine. If you are prescribed medications, review your Medication List in the following pages. You may have new medications to  at the pharmacy and others that you'll need to stop taking. Review the instructions for how and when to take your medications. Talk with your doctor or nurses if you are unsure of what to do.     2. Go to your follow-up appointments. Specific follow-up information is listed in the following pages. Your may be contacted by a transition nurse or clinical provider about future appointments. Be sure we have all of the phone numbers to reach you, if needed. Please contact your provider's office if you are unable to make an appointment.     3. Watch for warning signs. Your doctor or nurse will give you detailed warning signs to watch for and when to call for assistance. These instructions may also include educational information about your condition. If you experience any of warning signs to your health, call your doctor.               ** Verify the list of medication(s) below is accurate and up to date. Carry this with you in case of emergency. If your medications have changed, please notify your healthcare provider.             Medication List      START taking these medications        Additional Info                      azithromycin 250 MG tablet   Commonly known as:  Z-ERIK   Quantity:  5 tablet   Refills:  0   Dose:  250 mg    Instructions:  Take 1 tablet (250  mg total) by mouth once daily.     Begin Date    AM    Noon    PM    Bedtime         CHANGE how you take these medications        Additional Info                      predniSONE 20 MG tablet   Commonly known as:  DELTASONE   Quantity:  25 tablet   Refills:  1   What changed:  additional instructions    Instructions:  40 mg/day for 7 days,20 mg/day for 7 days,10 mg/ day (1/2 tablet) for 7 days, then stop     Begin Date    AM    Noon    PM    Bedtime         CONTINUE taking these medications        Additional Info                      albuterol 90 mcg/actuation inhaler   Quantity:  1 Inhaler   Refills:  0   Dose:  2 puff    Instructions:  Inhale 2 puffs into the lungs every 6 (six) hours as needed. Dispense with spacer.     Begin Date    AM    Noon    PM    Bedtime       albuterol-ipratropium 2.5mg-0.5mg/3mL 0.5 mg-3 mg(2.5 mg base)/3 mL nebulizer solution   Commonly known as:  DUO-NEB   Quantity:  360 vial   Refills:  11   Dose:  3 mL    Last time this was given:  3 mLs on 2/14/2017 12:56 PM   Instructions:  Take 3 mLs by nebulization every 6 (six) hours.     Begin Date    AM    Noon    PM    Bedtime       aspirin 81 MG EC tablet   Commonly known as:  ECOTRIN   Refills:  0   Dose:  81 mg    Last time this was given:  81 mg on 2/15/2017  9:58 AM   Instructions:  Take 81 mg by mouth once daily.     Begin Date    AM    Noon    PM    Bedtime       budesonide-formoterol 160-4.5 mcg 160-4.5 mcg/actuation Hfaa   Commonly known as:  SYMBICORT   Quantity:  1 Inhaler   Refills:  0   Dose:  2 puff    Instructions:  Inhale 2 puffs into the lungs every 12 (twelve) hours. Wash out mouth after using     Begin Date    AM    Noon    PM    Bedtime       cetirizine 10 MG tablet   Commonly known as:  ZYRTEC   Refills:  0   Dose:  10 mg    Instructions:  Take 10 mg by mouth once daily.     Begin Date    AM    Noon    PM    Bedtime       dextromethorphan-guaifenesin  mg  mg per 12 hr tablet   Commonly known as:  MUCINEX DM    Refills:  0   Dose:  1 tablet    Instructions:  Take 1 tablet by mouth.     Begin Date    AM    Noon    PM    Bedtime       guaifenesin 600 mg 12 hr tablet   Commonly known as:  MUCINEX   Quantity:  60 tablet   Refills:  11   Dose:  600 mg    Last time this was given:  600 mg on 2/15/2017  9:58 AM   Instructions:  Take 1 tablet (600 mg total) by mouth 2 (two) times daily.     Begin Date    AM    Noon    PM    Bedtime       lisinopril 5 MG tablet   Commonly known as:  PRINIVIL,ZESTRIL   Quantity:  30 tablet   Refills:  5   Dose:  5 mg    Last time this was given:  5 mg on 2/15/2017  9:58 AM   Instructions:  Take 1 tablet (5 mg total) by mouth once daily.     Begin Date    AM    Noon    PM    Bedtime       metformin 500 MG tablet   Commonly known as:  GLUCOPHAGE   Quantity:  60 tablet   Refills:  5   Dose:  500 mg    Instructions:  Take 1 tablet (500 mg total) by mouth 2 (two) times daily with meals.     Begin Date    AM    Noon    PM    Bedtime       simvastatin 10 MG tablet   Commonly known as:  ZOCOR   Quantity:  30 tablet   Refills:  11   Dose:  10 mg    Last time this was given:  10 mg on 2/14/2017  8:40 PM   Instructions:  Take 1 tablet (10 mg total) by mouth every evening.     Begin Date    AM    Noon    PM    Bedtime       trazodone 50 MG tablet   Commonly known as:  DESYREL   Quantity:  90 tablet   Refills:  4   Dose:  50 mg    Last time this was given:  50 mg on 2/14/2017  8:40 PM   Instructions:  Take 1 tablet (50 mg total) by mouth every evening.     Begin Date    AM    Noon    PM    Bedtime            Where to Get Your Medications      These medications were sent to [facility]/pharmacy #[id number] - [place], [place] - [address] [address] [facility]  [address] [facility] [address]     Phone:  [phone]     budesonide-formoterol 160-4.5 mcg 160-4.5 mcg/actuation Hfaa         You can get these medications from any pharmacy     Bring a paper prescription for each of these medications     azithromycin 250 MG tablet     predniSONE 20 MG tablet                  Please bring to all follow up appointments:    1. A copy of your discharge instructions.  2. All medicines you are currently taking in their original bottles.  3. Identification and insurance card.    Please arrive 15 minutes ahead of scheduled appointment time.    Please call 24 hours in advance if you must reschedule your appointment and/or time.        Your Scheduled Appointments     Feb 21, 2017  1:15 PM CST   Diagnostic Xray with [facility] XR1-   [facility]    [address] [place] [address] [phone]            Feb 21, 2017  1:40 PM CST   Established Patient Visit with Juan Carlos Araujo MD   [facility]    [address] [place] [id number] [phone]              Follow-up Information     Follow up with Katy Arriaga MD In 1 week.    Specialty:  Family Medicine    Contact information:    [address] [address] [address] [phone]          Follow up with Juan Carlos Araujo MD In 5 days.    Specialty:  Pulmonary Disease    Contact information:    [address] [facility] [address] [phone]          Discharge Instructions     Future Orders    Activity as tolerated     Call MD for:  difficulty breathing or increased cough     Call MD for:  increased confusion or weakness     Call MD for:  persistent dizziness, light-headedness, or visual disturbances     Call MD for:  persistent nausea and vomiting or diarrhea     Call MD for:  redness, tenderness, or signs of infection (pain, swelling, redness, odor or green/yellow discharge around incision site)     Call MD for:  severe persistent headache     Call MD for:  severe uncontrolled pain     Call MD for:  temperature >100.4     Call MD for:  worsening rash     Diet general     Questions:    Total calories:  1800 Calorie    Fat restriction, if any:      Protein restriction, if any:      Na  "restriction, if any:  2gNa    Fluid restriction:      Additional restrictions:          Primary Diagnosis     Your primary diagnosis was:  Chronic Bronchitis      Admission Information     Date & Time Provider Department CSN    2/13/2017  2:20 PM Calderon Sanon MD [facility] [id number]      Care Providers     Provider Role Specialty Primary office phone    Calderon Sanon MD Attending Provider Internal Medicine [phone]    Calderon Sanon MD Team Attending  Internal Medicine [phone]      Your Vitals Were     BP Pulse Temp Resp Height Weight    138/72 (BP Location: Left arm, Patient Position: Lying, BP Method: Automatic) 81 98.3 °F (36.8 °C) (Oral) 17 5' 3" (1.6 m) 88.3 kg (194 lb 9.6 oz)    SpO2 BMI             96% 34.47 kg/m2         Recent Lab Values        1/17/2017                           8:54 AM           A1C 6.6 (H)           Comment for A1C at  8:54 AM on 1/17/2017:  According to ADA guidelines, hemoglobin A1C <7.0% represents  optimal control in non-pregnant diabetic patients.  Different  metrics may apply to specific populations.   Standards of Medical Care in Diabetes - 2016.  For the purpose of screening for the presence of diabetes:  <5.7%     Consistent with the absence of diabetes  5.7-6.4%  Consistent with increasing risk for diabetes   (prediabetes)  >or=6.5%  Consistent with diabetes  Currently no consensus exists for use of hemoglobin A1C  for diagnosis of diabetes for children.        Allergies as of 2/15/2017        Reactions    Meloxicam Other (See Comments)     Patient stated that she has muscle aches and pains    Naproxen Other (See Comments)    Patient states she has muscle and aches.    Morphine Rash      [facility]     [facility] Nurse Care Line - 24/7 Assistance  Unless otherwise directed by your provider, please contact [facility], our nurse care line that is available for 24/7 assistance.     Registered nurses in the " [facility] provide clinical advisement, health education, appointment booking, and other advisory services.  Call for this free service at [phone].        Advance Directives     An advance directive is a document which, in the event you are no longer able to make decisions for yourself, tells your healthcare team what kind of treatment you do or do not want to receive, or who you would like to make those decisions for you.  If you do not currently have an advance directive, [facility] encourages you to create one.  For more information call:  [phone] ([phone]), [phone] ([phone]),  or log on to [email or web address].        Smoking Cessation     If you would like to quit smoking:   You may be eligible for free services if you are a [place] resident and started smoking cigarettes before September 1, 1988.  Call the Smoking Cessation Trust ([facility]) toll free at [phone] [phone] or [phone].   Call 5-997-QUIT-NOW if you do not meet the above criteria.            Language Assistance Services     ATTENTION: Language assistance services are available, free of charge. Please call [phone].      ATENCIÓN: Si habla español, tiene a portillo disposición servicios gratuitos de asistencia lingüística. Llame al [phone].     CHÚ Ý: N?u b?n nói Ti?ng Vi?t, có các d?ch v? h? tr? ngôn ng? mi?n phí dành cho b?n. G?i s? [phone].        Pneumonmia Discharge Instructions                Diabetes Discharge Instructions                                    [facility]  complies with applicable Federal civil rights laws and does not discriminate on the basis of race, color, national origin, age, disability, or sex.

## 2017-02-13 NOTE — PROGRESS NOTES
Subjective:       Patient ID: Petrona Gonzalez is a 76 y.o. female.    Chief Complaint: Chronic Care    HPI   Chronic Care  75yo female presents today for chronic care assessment. She is accompanied today by her sister who is providing some of the history. Patient notes she has been progressively more short of breath than her usual baseline. She has been coughing persistently. Compliance with recent outpatient treatment for COPD exacerbation is reported. Patient notes contemplating proceeding to the ER for evaluation earlier today but states she wanted to have her appointment for lab review. She is wearing supplemental oxygen. Lab findings demonstrate stable glycemic control with A1c of 6.6. No symptoms of hyper or hypoglycemia are reported. She is in need of diabetic eye exam. Lipid monitoring demonstrates stable control with Zocor use. She has been taking Lisinopril for BP and renal protection. She does not monitor BP levels at home. Since her last visit she has been evaluated per Pulmonology.     Review of Systems   Constitutional: Positive for fatigue. Negative for chills, fever and unexpected weight change.   HENT: Negative for congestion, ear pain and sinus pressure.    Eyes: Negative for visual disturbance.   Respiratory: Positive for cough, chest tightness, shortness of breath and wheezing.    Cardiovascular: Negative for chest pain, palpitations and leg swelling.   Gastrointestinal: Negative for abdominal pain, diarrhea and nausea.   Endocrine: Negative for polydipsia, polyphagia and polyuria.   Genitourinary: Negative for decreased urine volume and difficulty urinating.   Musculoskeletal: Positive for arthralgias. Negative for myalgias.   Skin: Negative for color change and rash.   Neurological: Negative for dizziness, weakness and headaches.   Psychiatric/Behavioral: Positive for sleep disturbance. The patient is nervous/anxious.        Objective:     Visit Vitals    /70    Pulse 64    Temp 97.1  "°F (36.2 °C) (Temporal)    Resp 16    Ht 5' 3" (1.6 m)    Wt 87.1 kg (192 lb)    SpO2 (!) 89%    BMI 34.01 kg/m2     Physical Exam   Constitutional: She is oriented to person, place, and time. She appears well-developed. No distress.   Obese, chronically ill appearing, non-toxic   HENT:   Head: Normocephalic and atraumatic.   Right Ear: External ear normal.   Left Ear: External ear normal.   Nose: Nose normal.   Mouth/Throat: Oropharynx is clear and moist.   Eyes: Conjunctivae and EOM are normal. Pupils are equal, round, and reactive to light.   Neck: Normal range of motion. Neck supple.   Cardiovascular: Normal rate, regular rhythm and normal heart sounds.    Pulmonary/Chest: Accessory muscle usage present. Tachypnea noted. She has decreased breath sounds in the right middle field, the right lower field, the left middle field and the left lower field.   Abdominal: Soft. Bowel sounds are normal.   Musculoskeletal: She exhibits no edema.   Feet:   Right Foot:   Protective Sensation: 6 sites tested. 6 sites sensed.   Skin Integrity: Positive for callus and dry skin. Negative for ulcer, blister or skin breakdown.   Left Foot:   Protective Sensation: 6 sites tested. 6 sites sensed.   Skin Integrity: Positive for callus and dry skin. Negative for ulcer, blister or skin breakdown.   Neurological: She is alert and oriented to person, place, and time.   Skin: Skin is warm and dry. She is not diaphoretic.   Psychiatric: She has a normal mood and affect.       Assessment:       1. Type 2 diabetes mellitus with diabetic neuropathy, without long-term current use of insulin    2. Hyperlipidemia, unspecified hyperlipidemia type    3. Essential hypertension    4. COPD with acute exacerbation    5. Chronic respiratory failure with hypoxia    6. Obesity (BMI 30-39.9)        Plan:   Type 2 diabetes mellitus with diabetic neuropathy, without long-term current use of insulin  Stable. Continuation of current dosing of Metformin is " advised. Updated diabetic eye exam is recommended. Foot examination was performed today and self examination with foot care was discussed. Will plan to reassess labs again in July 2017. Diabetic diet is advised.  -     metformin (GLUCOPHAGE) 500 MG tablet; Take 1 tablet (500 mg total) by mouth 2 (two) times daily with meals.  Dispense: 60 tablet; Refill: 5  -     lisinopril (PRINIVIL,ZESTRIL) 5 MG tablet; Take 1 tablet (5 mg total) by mouth once daily.  Dispense: 30 tablet; Refill: 5  -     HEMOGLOBIN A1C; Future; Expected date: 2/13/17    Hyperlipidemia, unspecified hyperlipidemia type  Stable. Continuation of statin at current dosing is advised.  -     simvastatin (ZOCOR) 10 MG tablet; Take 1 tablet (10 mg total) by mouth every evening.  Dispense: 30 tablet; Refill: 11    Essential hypertension  Stable. Continuation of Lisinopril at current dosing is advised.  -     lisinopril (PRINIVIL,ZESTRIL) 5 MG tablet; Take 1 tablet (5 mg total) by mouth once daily.  Dispense: 30 tablet; Refill: 5    COPD with acute exacerbation  Patient elects to proceed to the ER for further assessment. She has declined need for EMS- her sister will provide transportation.    Chronic respiratory failure with hypoxia  Continuation of oxygen use is advised. Symptoms today of acute on chronic failure secondary to COPD flare. Recommend assessment in ER as above.    Obesity (BMI 30-39.9)  Weight loss efforts remain encouraged through diet and lifestyle measures.    RTC in July 2017 with labs prior to the visit.

## 2017-02-13 NOTE — ED NOTES
Pt uses 3L of home o2. Pt's portable tank only goes up to 2L. Pt just left from f/u with PCP with no improvement with steriods and antibiotics given for the past 3 weeks.

## 2017-02-14 LAB
ANION GAP SERPL CALC-SCNC: 13 MMOL/L
BASOPHILS # BLD AUTO: 0.01 K/UL
BASOPHILS NFR BLD: 0.1 %
BUN SERPL-MCNC: 20 MG/DL
CALCIUM SERPL-MCNC: 9 MG/DL
CHLORIDE SERPL-SCNC: 99 MMOL/L
CO2 SERPL-SCNC: 30 MMOL/L
CREAT SERPL-MCNC: 0.8 MG/DL
DIASTOLIC DYSFUNCTION: NO
DIFFERENTIAL METHOD: ABNORMAL
EOSINOPHIL # BLD AUTO: 0 K/UL
EOSINOPHIL NFR BLD: 0 %
ERYTHROCYTE [DISTWIDTH] IN BLOOD BY AUTOMATED COUNT: 16.8 %
EST. GFR  (AFRICAN AMERICAN): >60 ML/MIN/1.73 M^2
EST. GFR  (NON AFRICAN AMERICAN): >60 ML/MIN/1.73 M^2
GLUCOSE SERPL-MCNC: 214 MG/DL
HCT VFR BLD AUTO: 40.9 %
HGB BLD-MCNC: 12.6 G/DL
LYMPHOCYTES # BLD AUTO: 0.3 K/UL
LYMPHOCYTES NFR BLD: 3.1 %
MAGNESIUM SERPL-MCNC: 2.4 MG/DL
MCH RBC QN AUTO: 27.3 PG
MCHC RBC AUTO-ENTMCNC: 30.8 %
MCV RBC AUTO: 89 FL
MONOCYTES # BLD AUTO: 0.1 K/UL
MONOCYTES NFR BLD: 0.8 %
NEUTROPHILS # BLD AUTO: 9.8 K/UL
NEUTROPHILS NFR BLD: 96 %
PHOSPHATE SERPL-MCNC: 3.7 MG/DL
PLATELET # BLD AUTO: 227 K/UL
PMV BLD AUTO: 9 FL
POCT GLUCOSE: 160 MG/DL (ref 70–110)
POCT GLUCOSE: 174 MG/DL (ref 70–110)
POCT GLUCOSE: 202 MG/DL (ref 70–110)
POCT GLUCOSE: 291 MG/DL (ref 70–110)
POTASSIUM SERPL-SCNC: 4.8 MMOL/L
RBC # BLD AUTO: 4.61 M/UL
RETIRED EF AND QEF - SEE NOTES: 60 (ref 55–65)
SODIUM SERPL-SCNC: 142 MMOL/L
TROPONIN I SERPL DL<=0.01 NG/ML-MCNC: 0.01 NG/ML
WBC # BLD AUTO: 10.18 K/UL

## 2017-02-14 PROCEDURE — 27000221 HC OXYGEN, UP TO 24 HOURS

## 2017-02-14 PROCEDURE — 94640 AIRWAY INHALATION TREATMENT: CPT

## 2017-02-14 PROCEDURE — 84484 ASSAY OF TROPONIN QUANT: CPT

## 2017-02-14 PROCEDURE — 85025 COMPLETE CBC W/AUTO DIFF WBC: CPT

## 2017-02-14 PROCEDURE — 25000242 PHARM REV CODE 250 ALT 637 W/ HCPCS: Performed by: EMERGENCY MEDICINE

## 2017-02-14 PROCEDURE — 80048 BASIC METABOLIC PNL TOTAL CA: CPT

## 2017-02-14 PROCEDURE — 84100 ASSAY OF PHOSPHORUS: CPT

## 2017-02-14 PROCEDURE — 63600175 PHARM REV CODE 636 W HCPCS: Performed by: EMERGENCY MEDICINE

## 2017-02-14 PROCEDURE — 93306 TTE W/DOPPLER COMPLETE: CPT

## 2017-02-14 PROCEDURE — 83735 ASSAY OF MAGNESIUM: CPT

## 2017-02-14 PROCEDURE — 21400001 HC TELEMETRY ROOM

## 2017-02-14 PROCEDURE — 25000003 PHARM REV CODE 250: Performed by: INTERNAL MEDICINE

## 2017-02-14 PROCEDURE — 63600175 PHARM REV CODE 636 W HCPCS: Performed by: INTERNAL MEDICINE

## 2017-02-14 PROCEDURE — 93306 TTE W/DOPPLER COMPLETE: CPT | Mod: 26,,, | Performed by: INTERNAL MEDICINE

## 2017-02-14 RX ADMIN — ARFORMOTEROL TARTRATE 15 MCG: 15 SOLUTION RESPIRATORY (INHALATION) at 08:02

## 2017-02-14 RX ADMIN — IPRATROPIUM BROMIDE AND ALBUTEROL SULFATE 3 ML: .5; 3 SOLUTION RESPIRATORY (INHALATION) at 08:02

## 2017-02-14 RX ADMIN — ASPIRIN 81 MG: 81 TABLET, COATED ORAL at 09:02

## 2017-02-14 RX ADMIN — INSULIN ASPART 2 UNITS: 100 INJECTION, SOLUTION INTRAVENOUS; SUBCUTANEOUS at 06:02

## 2017-02-14 RX ADMIN — METHYLPREDNISOLONE SODIUM SUCCINATE 80 MG: 125 INJECTION, POWDER, FOR SOLUTION INTRAMUSCULAR; INTRAVENOUS at 01:02

## 2017-02-14 RX ADMIN — BUDESONIDE 0.5 MG: 0.5 INHALANT RESPIRATORY (INHALATION) at 08:02

## 2017-02-14 RX ADMIN — SIMVASTATIN 10 MG: 5 TABLET, FILM COATED ORAL at 08:02

## 2017-02-14 RX ADMIN — IPRATROPIUM BROMIDE AND ALBUTEROL SULFATE 3 ML: .5; 3 SOLUTION RESPIRATORY (INHALATION) at 12:02

## 2017-02-14 RX ADMIN — LISINOPRIL 5 MG: 5 TABLET ORAL at 09:02

## 2017-02-14 RX ADMIN — METHYLPREDNISOLONE SODIUM SUCCINATE 80 MG: 125 INJECTION, POWDER, FOR SOLUTION INTRAMUSCULAR; INTRAVENOUS at 12:02

## 2017-02-14 RX ADMIN — METHYLPREDNISOLONE SODIUM SUCCINATE 80 MG: 125 INJECTION, POWDER, FOR SOLUTION INTRAMUSCULAR; INTRAVENOUS at 09:02

## 2017-02-14 RX ADMIN — INSULIN ASPART 3 UNITS: 100 INJECTION, SOLUTION INTRAVENOUS; SUBCUTANEOUS at 01:02

## 2017-02-14 RX ADMIN — PANTOPRAZOLE SODIUM 600 MG: 40 TABLET, DELAYED RELEASE ORAL at 09:02

## 2017-02-14 RX ADMIN — TRAZODONE HYDROCHLORIDE 50 MG: 50 TABLET ORAL at 08:02

## 2017-02-14 RX ADMIN — METHYLPREDNISOLONE SODIUM SUCCINATE 80 MG: 125 INJECTION, POWDER, FOR SOLUTION INTRAMUSCULAR; INTRAVENOUS at 06:02

## 2017-02-14 RX ADMIN — PANTOPRAZOLE SODIUM 600 MG: 40 TABLET, DELAYED RELEASE ORAL at 08:02

## 2017-02-14 RX ADMIN — MOXIFLOXACIN HYDROCHLORIDE 400 MG: 400 INJECTION, SOLUTION INTRAVENOUS at 06:02

## 2017-02-14 RX ADMIN — ENOXAPARIN SODIUM 40 MG: 100 INJECTION SUBCUTANEOUS at 01:02

## 2017-02-14 NOTE — SUBJECTIVE & OBJECTIVE
Interval History: Pt was seen and examined at bedside . She states feel  Better . No acute event overnight .     Review of Systems   Constitutional: Negative for chills, fatigue and fever.   HENT: Negative for congestion, ear discharge, ear pain, postnasal drip and sore throat.    Respiratory: Positive for cough and shortness of breath. Negative for chest tightness.    Cardiovascular: Negative for chest pain, palpitations and leg swelling.   Gastrointestinal: Negative for abdominal distention, abdominal pain, constipation, diarrhea and vomiting.   Genitourinary: Negative for hematuria and pelvic pain.   Musculoskeletal: Negative for arthralgias, back pain and neck pain.   Skin: Negative for rash and wound.   Neurological: Negative for dizziness, light-headedness, numbness and headaches.   Psychiatric/Behavioral: Negative for confusion and hallucinations. The patient is not nervous/anxious.      Objective:     Vital Signs (Most Recent):  Temp: 98.7 °F (37.1 °C) (02/14/17 1245)  Pulse: 91 (02/14/17 1256)  Resp: 18 (02/14/17 1256)  BP: (!) 154/68 (02/14/17 1245)  SpO2: (!) 94 % (02/14/17 1256) Vital Signs (24h Range):  Temp:  [97.7 °F (36.5 °C)-98.7 °F (37.1 °C)] 98.7 °F (37.1 °C)  Pulse:  [61-93] 91  Resp:  [17-24] 18  SpO2:  [91 %-98 %] 94 %  BP: (116-174)/(43-87) 154/68     Weight: 88.3 kg (194 lb 9.6 oz)  Body mass index is 34.47 kg/(m^2).  No intake or output data in the 24 hours ending 02/14/17 1544   Physical Exam   Constitutional: She is oriented to person, place, and time. She appears well-developed.   HENT:   Head: Normocephalic and atraumatic.   Eyes: EOM are normal. Pupils are equal, round, and reactive to light.   Neck: Normal range of motion. Neck supple.   Cardiovascular: Normal rate and regular rhythm.    No murmur heard.  Pulmonary/Chest: Effort normal. No respiratory distress.   Abdominal: Soft. She exhibits no distension. There is no tenderness. There is no rebound and no guarding.   Musculoskeletal:  Normal range of motion. She exhibits no edema.   Neurological: She is alert and oriented to person, place, and time. No cranial nerve deficit.   Skin: Skin is warm.   Psychiatric: She has a normal mood and affect. Her behavior is normal.   Nursing note and vitals reviewed.      Significant Labs:   BMP:   Recent Labs  Lab 02/14/17  0352   *      K 4.8   CL 99   CO2 30*   BUN 20   CREATININE 0.8   CALCIUM 9.0   MG 2.4     CBC:   Recent Labs  Lab 02/13/17  1430 02/14/17  0352   WBC 16.73* 10.18   HGB 13.7 12.6   HCT 44.2 40.9    227       Significant Imaging: I have reviewed all pertinent imaging results/findings within the past 24 hours.

## 2017-02-14 NOTE — H&P
[facility] Hospital Medicine  History & Physical    Patient Name: Petrona Gonzalez  MRN: [id number]  Admission Date: 2/13/2017  Attending Physician: Carol Moody MD   Primary Care Provider: Katy Arriaga MD         Patient information was obtained from patient and ER records.     Subjective:     Principal Problem:<principal problem not specified>    Chief Complaint:   Chief Complaint   Patient presents with    Shortness of Breath     wears O2 at 2.5 at home        HPI: 77 y/o WF with a PMHx of COPD  O2 dependent , DM and Hyperlipidemia presented to the ER with a  C/O  SOB  For the last days which has gotten worse this am .  She states  The SOB is associated with nonproductive cough  And REES . She denies any Chest pain , fever , runny nose , sore throat  , abdominal pain , nausea , vomit , diarrhea , dizziness , HA  Or weakness .  Last time admitted to the hospital  Was on 2015 .  She F/U Dr Araujo and Dr Gama as outpt .  Dr Araujo Prescribe a Levaquin and steroid taper at the end of January .  ED initial VS: Bp : 107/70   Hr 105    RR   30  Temp 97.1   o2 sat 74 %  ED Course: Solumedrol 125 mg iv x 1 , duoneb x 3 , avelox iv x 1 , ABG ; PH 7.37 ; PCo2 56 ; PO2 108 ; Hco3 32.8 ; WBC 16 ;  ; Troponin 0.008 ;  ; CXR no consolidation , no effusion .  Pt was seen and examined at bedside, She is aao x 3 in nad and hemodynamically stable .  She is NC with a o2 sat > 92 %.         Past Medical History   Diagnosis Date    COPD (chronic obstructive pulmonary disease) with emphysema     Diabetes mellitus     Disorder of kidney and ureter     Hyperlipidemia     Hypertension     Maxillary sinusitis, chronic        Past Surgical History   Procedure Laterality Date    Hysterectomy      Back surgery      Coronary artery bypass graft      Hernia repair         Review of patient's allergies indicates:   Allergen Reactions    Meloxicam Other (See Comments)      Patient stated  that she has muscle aches and pains    Naproxen Other (See Comments)     Patient states she has muscle and aches.    Morphine Rash       No current facility-administered medications on file prior to encounter.      Current Outpatient Prescriptions on File Prior to Encounter   Medication Sig    albuterol 90 mcg/actuation inhaler Inhale 2 puffs into the lungs every 6 (six) hours as needed. Dispense with spacer.    albuterol-ipratropium 2.5mg-0.5mg/3mL (DUO-NEB) 0.5 mg-3 mg(2.5 mg base)/3 mL nebulizer solution Take 3 mLs by nebulization every 6 (six) hours.    aspirin (ECOTRIN) 81 MG EC tablet Take 81 mg by mouth once daily.    budesonide-formoterol 160-4.5 mcg (SYMBICORT) 160-4.5 mcg/actuation HFAA Inhale 2 puffs into the lungs every 12 (twelve) hours. Wash out mouth after using    cetirizine (ZYRTEC) 10 MG tablet Take 10 mg by mouth once daily.    dextromethorphan-guaifenesin  mg (MUCINEX DM)  mg per 12 hr tablet Take 1 tablet by mouth.    guaifenesin (MUCINEX) 600 mg 12 hr tablet Take 1 tablet (600 mg total) by mouth 2 (two) times daily.    lisinopril (PRINIVIL,ZESTRIL) 5 MG tablet Take 1 tablet (5 mg total) by mouth once daily.    metformin (GLUCOPHAGE) 500 MG tablet Take 1 tablet (500 mg total) by mouth 2 (two) times daily with meals.    predniSONE (DELTASONE) 20 MG tablet Prednisone 60 mg/day for 7 days,40 mg/day for 7 days,20 mg/day for 7 days,10 mg/ day (1/2 tablet) for 7 days, then stop    simvastatin (ZOCOR) 10 MG tablet Take 1 tablet (10 mg total) by mouth every evening.    trazodone (DESYREL) 50 MG tablet Take 1 tablet (50 mg total) by mouth every evening.    [DISCONTINUED] lisinopril (PRINIVIL,ZESTRIL) 5 MG tablet Take 1 tablet (5 mg total) by mouth once daily.    [DISCONTINUED] metformin (GLUCOPHAGE) 500 MG tablet Take 1 tablet (500 mg total) by mouth 2 (two) times daily with meals.    [DISCONTINUED] simvastatin (ZOCOR) 10 MG tablet Take 1 tablet (10 mg total) by mouth every  evening.     Family History     Family history is unknown by patient.        Social History Main Topics    Smoking status: Former Smoker     Years: 30.00    Smokeless tobacco: Never Used    Alcohol use No    Drug use: No    Sexual activity: Not Currently     Review of Systems   Constitutional: Negative for chills, fatigue and fever.   HENT: Positive for sinus pressure. Negative for congestion, ear discharge, ear pain, postnasal drip and sore throat.    Respiratory: Positive for cough, shortness of breath and wheezing. Negative for chest tightness.    Cardiovascular: Negative for chest pain, palpitations and leg swelling.   Gastrointestinal: Negative for abdominal distention, abdominal pain, constipation, diarrhea and vomiting.   Genitourinary: Negative for hematuria and pelvic pain.   Musculoskeletal: Negative for arthralgias, back pain and neck pain.   Skin: Negative for rash and wound.   Neurological: Negative for dizziness, light-headedness, numbness and headaches.   Psychiatric/Behavioral: Negative for confusion and hallucinations. The patient is not nervous/anxious.      Objective:     Vital Signs (Most Recent):  Temp: 98.4 °F (36.9 °C) (02/13/17 1419)  Pulse: 80 (02/13/17 1635)  Resp: 19 (02/13/17 1635)  BP: (!) 116/43 (02/13/17 1635)  SpO2: 95 % (02/13/17 1635) Vital Signs (24h Range):  Temp:  [97.1 °F (36.2 °C)-98.4 °F (36.9 °C)] 98.4 °F (36.9 °C)  Pulse:  [] 80  Resp:  [16-30] 19  SpO2:  [74 %-99 %] 95 %  BP: (116-183)/(43-87) 116/43     Weight: 87.1 kg (192 lb)  Body mass index is 31.95 kg/(m^2).    Physical Exam   Constitutional: She is oriented to person, place, and time. She appears well-developed.   HENT:   Head: Normocephalic and atraumatic.   Eyes: EOM are normal. Pupils are equal, round, and reactive to light.   Neck: Normal range of motion. Neck supple.   Cardiovascular: Normal rate and regular rhythm.    No murmur heard.  Pulmonary/Chest: Effort normal. No respiratory distress. She has  decreased breath sounds in the right upper field, the right middle field, the left upper field and the left middle field. She has wheezes.   Abdominal: Soft. She exhibits no distension. There is no tenderness. There is no rebound and no guarding.   Musculoskeletal: Normal range of motion. She exhibits no edema.   Neurological: She is alert and oriented to person, place, and time. No cranial nerve deficit.   Skin: Skin is warm.   Psychiatric: She has a normal mood and affect. Her behavior is normal.   Nursing note and vitals reviewed.       Significant Labs:   BMP:   Recent Labs  Lab 02/13/17  1430   *      K 4.6      CO2 28   BUN 21   CREATININE 0.9   CALCIUM 9.8     CBC:   Recent Labs  Lab 02/13/17  1430   WBC 16.73*   HGB 13.7   HCT 44.2          Significant Imaging: I have reviewed all pertinent imaging results/findings within the past 24 hours.    Assessment/Plan:     Chronic obstructive pulmonary disease with acute exacerbation  - Cont LABA , NEWTON and IHC  - Cont  IV steroid  - Cont o2 by  Nc to keep a o2 sat between 88 to 92 %  - Cont empiric  IVAB      Dyslipidemia  - resume home med      Type 2 diabetes mellitus with hyperlipidemia  - ISS   - Kepp CBG < 180      Acute on chronic respiratory failure with hypoxia and hypercapnia  Cont current TX        Leukocytosis  -  Pt on steroid recently due to COPD exacerbation .   - Could be reactive  - Cont empiric IVAB        VTE Risk Mitigation         Ordered     enoxaparin injection 40 mg  Daily     Route:  Subcutaneous        02/13/17 8546        Calderon Sanon MD  Department of Hospital Medicine   [facility]

## 2017-02-14 NOTE — ASSESSMENT & PLAN NOTE
-  Pt on steroid recently due to COPD exacerbation .   - Could be reactive  - Cont empiric IVAB

## 2017-02-14 NOTE — PLAN OF CARE
Problem: Patient Care Overview  Goal: Plan of Care Review  Outcome: Ongoing (interventions implemented as appropriate)  No acute distress since admit. Oxygen at 3 liters nasal cannula. No falls or injury. Bed low, side rails up x 2, call bell within reach.

## 2017-02-14 NOTE — ASSESSMENT & PLAN NOTE
-  Pt on steroid recently due to COPD exacerbation .   - Could be reactive  - Cont empiric IVAB  -Resolved

## 2017-02-14 NOTE — NURSING
Pt brought to unit via stretcher by ED nurse RASHID Silva. Pt on oxygen at 3 liters nasal cannula, placed on tele monitor. Shortness of breath with exertion noted. VSS. Pt oriented to use of call bell, fall band placed on arm, bed low, side rails up x 2. Non skid socks in place. See flow sheet for full ROS.

## 2017-02-14 NOTE — PROGRESS NOTES
[facility] Hospital Medicine  Progress Note    Patient Name: Petrona Gonzalez  MRN: [id number]  Patient Class: IP- Inpatient   Admission Date: 2/13/2017  Length of Stay: 1 days  Attending Physician: Calderon Sanon, *  Primary Care Provider: Katy Arriaga MD        Subjective:     Principal Problem:<principal problem not specified>    HPI:  77 y/o WF with a PMHx of COPD  O2 dependent , DM and Hyperlipidemia presented to the ER with a  C/O  SOB  For the last days which has gotten worse this am .  She states  The SOB is associated with nonproductive cough  And REES . She denies any Chest pain , fever , runny nose , sore throat  , abdominal pain , nausea , vomit , diarrhea , dizziness , HA  Or weakness .  Last time admitted to the hospital  Was on 2015 .  She F/U Dr Araujo and Dr Gama as outpt .  Dr Araujo Prescribe a Levaquin and steroid taper at the end of January .  ED initial VS: Bp : 107/70   Hr 105    RR   30  Temp 97.1   o2 sat 74 %  ED Course: Solumedrol 125 mg iv x 1 , duoneb x 3 , avelox iv x 1 , ABG ; PH 7.37 ; PCo2 56 ; PO2 108 ; Hco3 32.8 ; WBC 16 ;  ; Troponin 0.008 ;  ; CXR no consolidation , no effusion .  Pt was seen and examined at bedside, She is aao x 3 in nad and hemodynamically stable .  She is NC with a o2 sat > 92 %.         Hospital Course:       Interval History: Pt was seen and examined at bedside . She states feel  Better . No acute event overnight .     Review of Systems   Constitutional: Negative for chills, fatigue and fever.   HENT: Negative for congestion, ear discharge, ear pain, postnasal drip and sore throat.    Respiratory: Positive for cough and shortness of breath. Negative for chest tightness.    Cardiovascular: Negative for chest pain, palpitations and leg swelling.   Gastrointestinal: Negative for abdominal distention, abdominal pain, constipation, diarrhea and vomiting.   Genitourinary: Negative for hematuria and pelvic pain.    Musculoskeletal: Negative for arthralgias, back pain and neck pain.   Skin: Negative for rash and wound.   Neurological: Negative for dizziness, light-headedness, numbness and headaches.   Psychiatric/Behavioral: Negative for confusion and hallucinations. The patient is not nervous/anxious.      Objective:     Vital Signs (Most Recent):  Temp: 98.7 °F (37.1 °C) (02/14/17 1245)  Pulse: 91 (02/14/17 1256)  Resp: 18 (02/14/17 1256)  BP: (!) 154/68 (02/14/17 1245)  SpO2: (!) 94 % (02/14/17 1256) Vital Signs (24h Range):  Temp:  [97.7 °F (36.5 °C)-98.7 °F (37.1 °C)] 98.7 °F (37.1 °C)  Pulse:  [61-93] 91  Resp:  [17-24] 18  SpO2:  [91 %-98 %] 94 %  BP: (116-174)/(43-87) 154/68     Weight: 88.3 kg (194 lb 9.6 oz)  Body mass index is 34.47 kg/(m^2).  No intake or output data in the 24 hours ending 02/14/17 1544   Physical Exam   Constitutional: She is oriented to person, place, and time. She appears well-developed.   HENT:   Head: Normocephalic and atraumatic.   Eyes: EOM are normal. Pupils are equal, round, and reactive to light.   Neck: Normal range of motion. Neck supple.   Cardiovascular: Normal rate and regular rhythm.    No murmur heard.  Pulmonary/Chest: Effort normal. No respiratory distress.   Abdominal: Soft. She exhibits no distension. There is no tenderness. There is no rebound and no guarding.   Musculoskeletal: Normal range of motion. She exhibits no edema.   Neurological: She is alert and oriented to person, place, and time. No cranial nerve deficit.   Skin: Skin is warm.   Psychiatric: She has a normal mood and affect. Her behavior is normal.   Nursing note and vitals reviewed.      Significant Labs:   BMP:   Recent Labs  Lab 02/14/17  0352   *      K 4.8   CL 99   CO2 30*   BUN 20   CREATININE 0.8   CALCIUM 9.0   MG 2.4     CBC:   Recent Labs  Lab 02/13/17  1430 02/14/17  0352   WBC 16.73* 10.18   HGB 13.7 12.6   HCT 44.2 40.9    227       Significant Imaging: I have reviewed all  pertinent imaging results/findings within the past 24 hours.    Assessment/Plan:      Chronic obstructive pulmonary disease with acute exacerbation  - Cont LABA , NEWTON and IHC  - Cont  IV steroid  - Cont o2 by  Nc to keep a o2 sat between 88 to 92 %  - Cont empiric  IVAB      Dyslipidemia  - resume home med      Type 2 diabetes mellitus with hyperlipidemia  - ISS   - Kepp CBG < 180      Acute on chronic respiratory failure with hypoxia and hypercapnia  Cont current TX        Leukocytosis  -  Pt on steroid recently due to COPD exacerbation .   - Could be reactive  - Cont empiric IVAB  -Resolved        VTE Risk Mitigation         Ordered     Medium Risk of VTE  Once      02/14/17 0138     Place sequential compression device  Until discontinued      02/14/17 0138     enoxaparin injection 40 mg  Daily     Route:  Subcutaneous        02/13/17 1708          Calderon Sanon MD  Department of Hospital Medicine   [facility]

## 2017-02-14 NOTE — ASSESSMENT & PLAN NOTE
- Cont LABA , NEWTON and IHC  - Cont  IV steroid  - Cont o2 by  Nc to keep a o2 sat between 88 to 92 %  - Cont empiric  IVAB

## 2017-02-14 NOTE — SUBJECTIVE & OBJECTIVE
Past Medical History   Diagnosis Date    COPD (chronic obstructive pulmonary disease) with emphysema     Diabetes mellitus     Disorder of kidney and ureter     Hyperlipidemia     Hypertension     Maxillary sinusitis, chronic        Past Surgical History   Procedure Laterality Date    Hysterectomy      Back surgery      Coronary artery bypass graft      Hernia repair         Review of patient's allergies indicates:   Allergen Reactions    Meloxicam Other (See Comments)      Patient stated that she has muscle aches and pains    Naproxen Other (See Comments)     Patient states she has muscle and aches.    Morphine Rash       No current facility-administered medications on file prior to encounter.      Current Outpatient Prescriptions on File Prior to Encounter   Medication Sig    albuterol 90 mcg/actuation inhaler Inhale 2 puffs into the lungs every 6 (six) hours as needed. Dispense with spacer.    albuterol-ipratropium 2.5mg-0.5mg/3mL (DUO-NEB) 0.5 mg-3 mg(2.5 mg base)/3 mL nebulizer solution Take 3 mLs by nebulization every 6 (six) hours.    aspirin (ECOTRIN) 81 MG EC tablet Take 81 mg by mouth once daily.    budesonide-formoterol 160-4.5 mcg (SYMBICORT) 160-4.5 mcg/actuation HFAA Inhale 2 puffs into the lungs every 12 (twelve) hours. Wash out mouth after using    cetirizine (ZYRTEC) 10 MG tablet Take 10 mg by mouth once daily.    dextromethorphan-guaifenesin  mg (MUCINEX DM)  mg per 12 hr tablet Take 1 tablet by mouth.    guaifenesin (MUCINEX) 600 mg 12 hr tablet Take 1 tablet (600 mg total) by mouth 2 (two) times daily.    lisinopril (PRINIVIL,ZESTRIL) 5 MG tablet Take 1 tablet (5 mg total) by mouth once daily.    metformin (GLUCOPHAGE) 500 MG tablet Take 1 tablet (500 mg total) by mouth 2 (two) times daily with meals.    predniSONE (DELTASONE) 20 MG tablet Prednisone 60 mg/day for 7 days,40 mg/day for 7 days,20 mg/day for 7 days,10 mg/ day (1/2 tablet) for 7 days, then stop     simvastatin (ZOCOR) 10 MG tablet Take 1 tablet (10 mg total) by mouth every evening.    trazodone (DESYREL) 50 MG tablet Take 1 tablet (50 mg total) by mouth every evening.    [DISCONTINUED] lisinopril (PRINIVIL,ZESTRIL) 5 MG tablet Take 1 tablet (5 mg total) by mouth once daily.    [DISCONTINUED] metformin (GLUCOPHAGE) 500 MG tablet Take 1 tablet (500 mg total) by mouth 2 (two) times daily with meals.    [DISCONTINUED] simvastatin (ZOCOR) 10 MG tablet Take 1 tablet (10 mg total) by mouth every evening.     Family History     Family history is unknown by patient.        Social History Main Topics    Smoking status: Former Smoker     Years: 30.00    Smokeless tobacco: Never Used    Alcohol use No    Drug use: No    Sexual activity: Not Currently     Review of Systems   Constitutional: Negative for chills, fatigue and fever.   HENT: Positive for sinus pressure. Negative for congestion, ear discharge, ear pain, postnasal drip and sore throat.    Respiratory: Positive for cough, shortness of breath and wheezing. Negative for chest tightness.    Cardiovascular: Negative for chest pain, palpitations and leg swelling.   Gastrointestinal: Negative for abdominal distention, abdominal pain, constipation, diarrhea and vomiting.   Genitourinary: Negative for hematuria and pelvic pain.   Musculoskeletal: Negative for arthralgias, back pain and neck pain.   Skin: Negative for rash and wound.   Neurological: Negative for dizziness, light-headedness, numbness and headaches.   Psychiatric/Behavioral: Negative for confusion and hallucinations. The patient is not nervous/anxious.      Objective:     Vital Signs (Most Recent):  Temp: 98.4 °F (36.9 °C) (02/13/17 1419)  Pulse: 80 (02/13/17 1635)  Resp: 19 (02/13/17 1635)  BP: (!) 116/43 (02/13/17 1635)  SpO2: 95 % (02/13/17 1635) Vital Signs (24h Range):  Temp:  [97.1 °F (36.2 °C)-98.4 °F (36.9 °C)] 98.4 °F (36.9 °C)  Pulse:  [] 80  Resp:  [16-30] 19  SpO2:  [74 %-99 %] 95  %  BP: (116-183)/(43-87) 116/43     Weight: 87.1 kg (192 lb)  Body mass index is 31.95 kg/(m^2).    Physical Exam   Constitutional: She is oriented to person, place, and time. She appears well-developed.   HENT:   Head: Normocephalic and atraumatic.   Eyes: EOM are normal. Pupils are equal, round, and reactive to light.   Neck: Normal range of motion. Neck supple.   Cardiovascular: Normal rate and regular rhythm.    No murmur heard.  Pulmonary/Chest: Effort normal. No respiratory distress. She has decreased breath sounds in the right upper field, the right middle field, the left upper field and the left middle field. She has wheezes.   Abdominal: Soft. She exhibits no distension. There is no tenderness. There is no rebound and no guarding.   Musculoskeletal: Normal range of motion. She exhibits no edema.   Neurological: She is alert and oriented to person, place, and time. No cranial nerve deficit.   Skin: Skin is warm.   Psychiatric: She has a normal mood and affect. Her behavior is normal.   Nursing note and vitals reviewed.       Significant Labs:   BMP:   Recent Labs  Lab 02/13/17  1430   *      K 4.6      CO2 28   BUN 21   CREATININE 0.9   CALCIUM 9.8     CBC:   Recent Labs  Lab 02/13/17  1430   WBC 16.73*   HGB 13.7   HCT 44.2          Significant Imaging: I have reviewed all pertinent imaging results/findings within the past 24 hours.

## 2017-02-15 VITALS
BODY MASS INDEX: 34.48 KG/M2 | DIASTOLIC BLOOD PRESSURE: 72 MMHG | WEIGHT: 194.63 LBS | TEMPERATURE: 98 F | RESPIRATION RATE: 17 BRPM | HEIGHT: 63 IN | HEART RATE: 81 BPM | SYSTOLIC BLOOD PRESSURE: 138 MMHG | OXYGEN SATURATION: 96 %

## 2017-02-15 PROBLEM — J96.21 ACUTE ON CHRONIC RESPIRATORY FAILURE WITH HYPOXIA AND HYPERCAPNIA: Status: RESOLVED | Noted: 2017-02-13 | Resolved: 2017-02-15

## 2017-02-15 PROBLEM — J96.22 ACUTE ON CHRONIC RESPIRATORY FAILURE WITH HYPOXIA AND HYPERCAPNIA: Status: RESOLVED | Noted: 2017-02-13 | Resolved: 2017-02-15

## 2017-02-15 LAB
ANION GAP SERPL CALC-SCNC: 11 MMOL/L
BASOPHILS # BLD AUTO: 0.01 K/UL
BASOPHILS NFR BLD: 0.1 %
BUN SERPL-MCNC: 26 MG/DL
CALCIUM SERPL-MCNC: 9.2 MG/DL
CHLORIDE SERPL-SCNC: 99 MMOL/L
CO2 SERPL-SCNC: 30 MMOL/L
CREAT SERPL-MCNC: 0.8 MG/DL
DIFFERENTIAL METHOD: ABNORMAL
EOSINOPHIL # BLD AUTO: 0 K/UL
EOSINOPHIL NFR BLD: 0 %
ERYTHROCYTE [DISTWIDTH] IN BLOOD BY AUTOMATED COUNT: 16.6 %
EST. GFR  (AFRICAN AMERICAN): >60 ML/MIN/1.73 M^2
EST. GFR  (NON AFRICAN AMERICAN): >60 ML/MIN/1.73 M^2
GLUCOSE SERPL-MCNC: 170 MG/DL
HCT VFR BLD AUTO: 41.7 %
HGB BLD-MCNC: 13.1 G/DL
LYMPHOCYTES # BLD AUTO: 0.4 K/UL
LYMPHOCYTES NFR BLD: 2.3 %
MCH RBC QN AUTO: 27.6 PG
MCHC RBC AUTO-ENTMCNC: 31.4 %
MCV RBC AUTO: 88 FL
MONOCYTES # BLD AUTO: 0.3 K/UL
MONOCYTES NFR BLD: 1.3 %
NEUTROPHILS # BLD AUTO: 17.9 K/UL
NEUTROPHILS NFR BLD: 96.3 %
PLATELET # BLD AUTO: 253 K/UL
PMV BLD AUTO: 8.8 FL
POCT GLUCOSE: 182 MG/DL (ref 70–110)
POTASSIUM SERPL-SCNC: 4.7 MMOL/L
RBC # BLD AUTO: 4.74 M/UL
SODIUM SERPL-SCNC: 140 MMOL/L
WBC # BLD AUTO: 18.53 K/UL

## 2017-02-15 PROCEDURE — 36415 COLL VENOUS BLD VENIPUNCTURE: CPT

## 2017-02-15 PROCEDURE — 93010 ELECTROCARDIOGRAM REPORT: CPT | Mod: ,,, | Performed by: INTERNAL MEDICINE

## 2017-02-15 PROCEDURE — 63600175 PHARM REV CODE 636 W HCPCS: Performed by: INTERNAL MEDICINE

## 2017-02-15 PROCEDURE — 93005 ELECTROCARDIOGRAM TRACING: CPT

## 2017-02-15 PROCEDURE — 85025 COMPLETE CBC W/AUTO DIFF WBC: CPT

## 2017-02-15 PROCEDURE — 99900035 HC TECH TIME PER 15 MIN (STAT)

## 2017-02-15 PROCEDURE — 94640 AIRWAY INHALATION TREATMENT: CPT

## 2017-02-15 PROCEDURE — 80048 BASIC METABOLIC PNL TOTAL CA: CPT

## 2017-02-15 PROCEDURE — 63600175 PHARM REV CODE 636 W HCPCS: Performed by: EMERGENCY MEDICINE

## 2017-02-15 PROCEDURE — 25000003 PHARM REV CODE 250: Performed by: INTERNAL MEDICINE

## 2017-02-15 PROCEDURE — 27000221 HC OXYGEN, UP TO 24 HOURS

## 2017-02-15 RX ORDER — PREDNISONE 20 MG/1
TABLET ORAL
Qty: 25 TABLET | Refills: 1 | Status: SHIPPED | OUTPATIENT
Start: 2017-02-15 | End: 2017-03-17

## 2017-02-15 RX ORDER — AZITHROMYCIN 250 MG/1
250 TABLET, FILM COATED ORAL DAILY
Qty: 5 TABLET | Refills: 0 | Status: SHIPPED | OUTPATIENT
Start: 2017-02-15 | End: 2017-02-21 | Stop reason: ALTCHOICE

## 2017-02-15 RX ORDER — BUDESONIDE AND FORMOTEROL FUMARATE DIHYDRATE 160; 4.5 UG/1; UG/1
2 AEROSOL RESPIRATORY (INHALATION) EVERY 12 HOURS
Qty: 1 INHALER | Refills: 0 | Status: SHIPPED | OUTPATIENT
Start: 2017-02-15 | End: 2017-04-04 | Stop reason: ALTCHOICE

## 2017-02-15 RX ADMIN — PANTOPRAZOLE SODIUM 600 MG: 40 TABLET, DELAYED RELEASE ORAL at 09:02

## 2017-02-15 RX ADMIN — BUDESONIDE 0.5 MG: 0.5 INHALANT RESPIRATORY (INHALATION) at 08:02

## 2017-02-15 RX ADMIN — METHYLPREDNISOLONE SODIUM SUCCINATE 80 MG: 125 INJECTION, POWDER, FOR SOLUTION INTRAMUSCULAR; INTRAVENOUS at 05:02

## 2017-02-15 RX ADMIN — LISINOPRIL 5 MG: 5 TABLET ORAL at 09:02

## 2017-02-15 RX ADMIN — ARFORMOTEROL TARTRATE 15 MCG: 15 SOLUTION RESPIRATORY (INHALATION) at 09:02

## 2017-02-15 RX ADMIN — ASPIRIN 81 MG: 81 TABLET, COATED ORAL at 09:02

## 2017-02-15 NOTE — DISCHARGE SUMMARY
[facility] Hospital Medicine  Discharge Summary      Patient Name: Petrona Gonzalez  MRN: [id number]  Admission Date: 2/13/2017  Hospital Length of Stay: 2 days  Discharge Date and Time:  02/15/2017 11:24 AM  Attending Physician: Calderon Sanon, *   Discharging Provider: Calderon Sanon MD  Primary Care Provider: Katy Arriaga MD      HPI:   75 y/o WF with a PMHx of COPD  O2 dependent , DM and Hyperlipidemia presented to the ER with a  C/O  SOB  For the last days which has gotten worse this am .  She states  The SOB is associated with nonproductive cough  And REES . She denies any Chest pain , fever , runny nose , sore throat  , abdominal pain , nausea , vomit , diarrhea , dizziness , HA  Or weakness .  Last time admitted to the hospital  Was on 2015 .  She F/U Dr Araujo and Dr Gama as outpt .  Dr Araujo Prescribe a Levaquin and steroid taper at the end of January .  ED initial VS: Bp : 107/70   Hr 105    RR   30  Temp 97.1   o2 sat 74 %  ED Course: Solumedrol 125 mg iv x 1 , duoneb x 3 , avelox iv x 1 , ABG ; PH 7.37 ; PCo2 56 ; PO2 108 ; Hco3 32.8 ; WBC 16 ;  ; Troponin 0.008 ;  ; CXR no consolidation , no effusion .  Pt was seen and examined at bedside, She is aao x 3 in nad and hemodynamically stable .  She is NC with a o2 sat > 92 %.         * No surgery found *      Indwelling Lines/Drains at time of discharge:   Lines/Drains/Airways          No matching active lines, drains, or airways        Hospital Course:   75 y/o WF with a PMHx of COPD and chronic respiratory  Failure  Admitted with a Dx of acute on chronic COPD exacerbation . She was started on [place] , LABA , IHC and IV steroid   With and improvement of her sx . She  Use continues O2 at 3 lt . TTE was done  Did not show any abnormality . Her breathing  And O2 sat  improved and is a baseline  At 3 lt by nc . No acute event  During hospital stay . WBC elevated w/o any sign of infection most likely due to  steroid induced. Pt was seen and examined at bedside . She is aao x 3 in nad and hemodynamically stable . She was determined suitable for D/C . She will F/U Her Pulmonology and PCP next week .      Consults:   Consults         Status Ordering Provider     Inpatient consult to Hospitalist  Once     Provider:  (Not yet assigned)    Acknowledged RENEE CARDONA          Significant Diagnostic Studies: Labs:   BMP:   Recent Labs  Lab 02/13/17  1430 02/14/17  0352 02/15/17  0816   * 214* 170*    142 140   K 4.6 4.8 4.7    99 99   CO2 28 30* 30*   BUN 21 20 26*   CREATININE 0.9 0.8 0.8   CALCIUM 9.8 9.0 9.2   MG  --  2.4  --     and CBC   Recent Labs  Lab 02/13/17  1430 02/14/17  0352 02/15/17  0816   WBC 16.73* 10.18 18.53*   HGB 13.7 12.6 13.1   HCT 44.2 40.9 41.7    227 253       Pending Diagnostic Studies:     None        Final Active Diagnoses:    Diagnosis Date Noted POA    Leukocytosis [D72.829] 02/13/2017 Unknown    Type 2 diabetes mellitus with hyperlipidemia [E11.69, E78.5] 02/13/2017 Yes    Dyslipidemia [E78.5] 02/05/2013 Yes      Problems Resolved During this Admission:    Diagnosis Date Noted Date Resolved POA    PRINCIPAL PROBLEM:  Chronic obstructive pulmonary disease with acute exacerbation [J44.1]  02/15/2017 Yes    Acute on chronic respiratory failure with hypoxia and hypercapnia [J96.21, J96.22] 02/13/2017 02/15/2017 Yes      Dyslipidemia  - resume home med      Type 2 diabetes mellitus with hyperlipidemia  - ISS   - Kepp CBG < 180      Leukocytosis  - 2/2 to steroid           Discharged Condition: stable    Disposition: Home or Self Care    Follow Up:  Follow-up Information     Follow up with Katy Arriaga MD In 1 week.    Specialty:  Family Medicine    Contact information:    [address] [address] [address] [phone]          Follow up with Juan Carlos Araujo MD In 5 days.    Specialty:  Pulmonary Disease    Contact information:    [address] [facility]  [place]  [place] [id number] [phone]          Patient Instructions:     Diet general   Order Specific Question Answer Comments   Total calories: 1800 Calorie    Na restriction, if any: 2gNa      Activity as tolerated     Call MD for:  temperature >100.4     Call MD for:  persistent nausea and vomiting or diarrhea     Call MD for:  severe uncontrolled pain     Call MD for:  redness, tenderness, or signs of infection (pain, swelling, redness, odor or green/yellow discharge around incision site)     Call MD for:  difficulty breathing or increased cough     Call MD for:  severe persistent headache     Call MD for:  worsening rash     Call MD for:  persistent dizziness, light-headedness, or visual disturbances     Call MD for:  increased confusion or weakness       Medications:  Reconciled Home Medications:   Current Discharge Medication List      START taking these medications    Details   azithromycin (Z-ERIK) 250 MG tablet Take 1 tablet (250 mg total) by mouth once daily.  Qty: 5 tablet, Refills: 0         CONTINUE these medications which have CHANGED    Details   budesonide-formoterol 160-4.5 mcg (SYMBICORT) 160-4.5 mcg/actuation HFAA Inhale 2 puffs into the lungs every 12 (twelve) hours. Wash out mouth after using  Qty: 1 Inhaler, Refills: 0    Associated Diagnoses: COPD, moderate      predniSONE (DELTASONE) 20 MG tablet 40 mg/day for 7 days,20 mg/day for 7 days,10 mg/ day (1/2 tablet) for 7 days, then stop  Qty: 25 tablet, Refills: 1    Associated Diagnoses: COPD exacerbation         CONTINUE these medications which have NOT CHANGED    Details   albuterol 90 mcg/actuation inhaler Inhale 2 puffs into the lungs every 6 (six) hours as needed. Dispense with spacer.  Qty: 1 Inhaler, Refills: 0    Associated Diagnoses: COPD with acute exacerbation      albuterol-ipratropium 2.5mg-0.5mg/3mL (DUO-NEB) 0.5 mg-3 mg(2.5 mg base)/3 mL nebulizer solution Take 3 mLs by nebulization every 6 (six) hours.  Qty: 360 vial,  Refills: 11    Associated Diagnoses: Pulmonary emphysema, unspecified emphysema type      aspirin (ECOTRIN) 81 MG EC tablet Take 81 mg by mouth once daily.      cetirizine (ZYRTEC) 10 MG tablet Take 10 mg by mouth once daily.      dextromethorphan-guaifenesin  mg (MUCINEX DM)  mg per 12 hr tablet Take 1 tablet by mouth.      guaifenesin (MUCINEX) 600 mg 12 hr tablet Take 1 tablet (600 mg total) by mouth 2 (two) times daily.  Qty: 60 tablet, Refills: 11    Associated Diagnoses: COPD exacerbation      lisinopril (PRINIVIL,ZESTRIL) 5 MG tablet Take 1 tablet (5 mg total) by mouth once daily.  Qty: 30 tablet, Refills: 5    Associated Diagnoses: Type 2 diabetes mellitus with diabetic neuropathy, without long-term current use of insulin; Essential hypertension      metformin (GLUCOPHAGE) 500 MG tablet Take 1 tablet (500 mg total) by mouth 2 (two) times daily with meals.  Qty: 60 tablet, Refills: 5    Associated Diagnoses: Type 2 diabetes mellitus with diabetic neuropathy, without long-term current use of insulin      simvastatin (ZOCOR) 10 MG tablet Take 1 tablet (10 mg total) by mouth every evening.  Qty: 30 tablet, Refills: 11    Associated Diagnoses: Hyperlipidemia, unspecified hyperlipidemia type      trazodone (DESYREL) 50 MG tablet Take 1 tablet (50 mg total) by mouth every evening.  Qty: 90 tablet, Refills: 4    Associated Diagnoses: Psychophysiological insomnia           Time spent on the discharge of patient: 45 minutes    Calderon Sanon MD  Department of Hospital Medicine  [facility]

## 2017-02-15 NOTE — PROGRESS NOTES
Pulmonary Disease Management Inpatient Evaluation    Admitting Diagnosis: Acute on Chronic Respiratory Failure with Hypoxia and Hypercapnia    Current Pulmonologist:  Dr. Araujo    Pulse:  65 bpm    SpO2:  95 %    Breath Sounds:  Diminished, Bilateral Breath Sounds, ULL, URL,LLL, LRL, LML    Edema: Yes or No    Inpatient Respiratory Treatment: Medications: Duoneb Q4 W/A , Perforomist Q12 , Pulmicort Q12 , Nasal Cannula  L/M,      Home CPAP/BIPAP: none    Home O2: NC 2.5 L/M    Smoking History: yes    Supplier of Home CPAP/BIPAP/Oxygen: [facility]    Home Respiratory Meds: Albuterol Q6PRN, Duoneb Q6, Symbicort BID      Current or Past PFTs: Yes   Date: 6/7/16  FVC: 1.30  FEV1: .57 FEV1/FVC: 44  TLC:  DLCO:  4.2      Education on Respiratory Issues:        Mrs. Gonzalez has a history of DM, Hyperlipidema, HTN, COPD and oxygen dependent. She presented to the ER with complaints of SOB and a non productive cough. Patient is followed by Dr. Araujo in the clinic. Patient is resting well sitting up on side of bed. She still has a productive cough of white sputum. She states she feels much better today she denies having any SOB at rest. Patient has an appointment with Dr. Araujo on 2/21/17. Message sent to Dr. araujo nurse informing her of patients hospital admission. The patient was visited and informed of Pulmonary Disease Management Program. Patient was also informed of the pulmonary rehab program. Patient has an issue with transportation pulmonary rehab is not an option for her at this time. Patient will be discharged home today.The Pulmonary Disease Management Referral Order has been placed. Patient will be scheduled into program within one week of discharge.

## 2017-02-15 NOTE — PLAN OF CARE
Problem: Patient Care Overview  Goal: Plan of Care Review  Outcome: Ongoing (interventions implemented as appropriate)  Remains free of injury/falls  Decrease in SOB noted  Skin w/d/i  Nebs and steriods in progress  O2 2L n/c in progress  Safety and comfort measures maintained.

## 2017-02-15 NOTE — PROGRESS NOTES
Notified ERIC Sánchez of pt being in a fib on the monitor. Pt denies any issues at this time and is resting in bed.will continue to monitor.

## 2017-02-15 NOTE — PLAN OF CARE
Problem: Patient Care Overview  Goal: Plan of Care Review  Outcome: Ongoing (interventions implemented as appropriate)  Patient remained free of falls, patient up with assist, patient on iv antibiotics.  Bed will remain in low position at all times. Call bell will remain in reach, and reminded too call for assistance when needed. VS will remain stable. Will do hourly rounding.

## 2017-02-15 NOTE — NURSING
Patient assessed for diabetes educational needs following chart review  She reports was diagnosed 6 years ago and has attended diabetes education in the past  She has a home glucose meter and checks FBS daily    If she is not taking steroids and eating properly, her glucose average is less than 105  She is consistent with taking her diabetes meds at home    Reports her a1c is 6   She denies any diabetes educational needs at this time and does not need literature

## 2017-02-17 ENCOUNTER — PATIENT OUTREACH (OUTPATIENT)
Dept: ADMINISTRATIVE | Facility: CLINIC | Age: 77
End: 2017-02-17
Payer: MEDICARE

## 2017-02-17 NOTE — PATIENT INSTRUCTIONS
Discharge Instructions: COPD  You have been diagnosed with chronic obstructive pulmonary disease (COPD). This is a name given to a group of diseases that limit the flow of air in and out of your lungs. This makes it harder to breathe. With COPD, you are also more likely to get lung infections. COPD includes chronic bronchitis and emphysema. COPD is most often caused by heavy, long-term cigarette smoking.  Home care  Quit smoking  · If you smoke, quit. It is the best thing you can do for your COPD and your overall health.  · Join a stop-smoking program. There are even telephone, text message, and Internet programs to help you quit.  · Ask your healthcare provider about medicines or other methods to help you quit.  · Ask family members to quit smoking as well.  · Don't allow people to smoke in your home, in your car, or when they are around you.  Protect yourself from infection  · Wash your hands often. Do your best to keep your hands away from your face. Most germs are spread from your hands to your mouth.  · Get a flu shot every year. Also ask your provider about pneumonia vaccines.  · Avoid crowds. It's especially important to do this in the winter when more people have colds and flu.  · To stay healthy, get enough sleep, exercise regularly, and eat a balanced diet. You should:  ¨ Get about 8 hours of sleep every night.  ¨ Try to exercise for at least 30 minutes on most days.  ¨ Have healthy foods including fruits and vegetables, 100% whole grains, lean meats and fish, and low-fat dairy products. Try to stay away from foods high in fats and sugar.  Take your medicines  Take your medicines exactly as directed. Don't skip doses.  Manage your stress  Stress can make COPD worse. Use this stress management technique:  · Find a quiet place and sit or lie in a comfortable position.  · Close your eyes and perform breathing exercises for several minutes. Ask your provider about the best way to breathe.  Pulmonary  rehabilitation  · Pulmonary rehab can help you feel better. These programs include exercise, breathing techniques, information about COPD, counseling, and help for smokers.  · Ask your provider or your local hospital about programs in your area.  When to call your healthcare provider  Call your provider immediately if you have any of the following:  · Shortness of breath, wheezing, or coughing  · Increased mucus  · Yellow, green, bloody, or smelly mucus  · Fever or chills  · Tightness in your chest that does not go away with rest or medicine  · An irregular heartbeat or a feeling that your heart is beating very fast  · Swollen ankles   Date Last Reviewed: 5/1/2016  © 8442-7835 [employer]. [address] [place] [address], [place] [address]. All rights reserved. This information is not intended as a substitute for professional medical care. Always follow your healthcare professional's instructions.

## 2017-02-21 ENCOUNTER — OFFICE VISIT (OUTPATIENT)
Dept: PULMONOLOGY | Facility: CLINIC | Age: 77
End: 2017-02-21
Payer: MEDICARE

## 2017-02-21 VITALS
DIASTOLIC BLOOD PRESSURE: 84 MMHG | SYSTOLIC BLOOD PRESSURE: 136 MMHG | WEIGHT: 190 LBS | HEART RATE: 85 BPM | BODY MASS INDEX: 33.66 KG/M2 | HEIGHT: 63 IN | OXYGEN SATURATION: 92 %

## 2017-02-21 DIAGNOSIS — R12 HEARTBURN: ICD-10-CM

## 2017-02-21 DIAGNOSIS — R60.0 LOCALIZED EDEMA: Primary | ICD-10-CM

## 2017-02-21 DIAGNOSIS — J44.1 COPD EXACERBATION: ICD-10-CM

## 2017-02-21 DIAGNOSIS — J30.89 NON-SEASONAL ALLERGIC RHINITIS DUE TO OTHER ALLERGIC TRIGGER: ICD-10-CM

## 2017-02-21 DIAGNOSIS — J96.11 CHRONIC RESPIRATORY FAILURE WITH HYPOXIA: ICD-10-CM

## 2017-02-21 PROCEDURE — 1159F MED LIST DOCD IN RCRD: CPT | Mod: S$GLB,,, | Performed by: INTERNAL MEDICINE

## 2017-02-21 PROCEDURE — 1126F AMNT PAIN NOTED NONE PRSNT: CPT | Mod: S$GLB,,, | Performed by: INTERNAL MEDICINE

## 2017-02-21 PROCEDURE — 99999 PR PBB SHADOW E&M-EST. PATIENT-LVL IV: CPT | Mod: PBBFAC,,, | Performed by: INTERNAL MEDICINE

## 2017-02-21 PROCEDURE — 3075F SYST BP GE 130 - 139MM HG: CPT | Mod: S$GLB,,, | Performed by: INTERNAL MEDICINE

## 2017-02-21 PROCEDURE — 99499 UNLISTED E&M SERVICE: CPT | Mod: S$PBB,,, | Performed by: INTERNAL MEDICINE

## 2017-02-21 PROCEDURE — 1160F RVW MEDS BY RX/DR IN RCRD: CPT | Mod: S$GLB,,, | Performed by: INTERNAL MEDICINE

## 2017-02-21 PROCEDURE — 3079F DIAST BP 80-89 MM HG: CPT | Mod: S$GLB,,, | Performed by: INTERNAL MEDICINE

## 2017-02-21 PROCEDURE — 1157F ADVNC CARE PLAN IN RCRD: CPT | Mod: S$GLB,,, | Performed by: INTERNAL MEDICINE

## 2017-02-21 PROCEDURE — 99215 OFFICE O/P EST HI 40 MIN: CPT | Mod: S$GLB,,, | Performed by: INTERNAL MEDICINE

## 2017-02-21 RX ORDER — THEOPHYLLINE 300 MG/1
300 TABLET, EXTENDED RELEASE ORAL 2 TIMES DAILY
Qty: 60 TABLET | Refills: 11 | Status: SHIPPED | OUTPATIENT
Start: 2017-02-21 | End: 2017-03-17

## 2017-02-21 RX ORDER — LEVOFLOXACIN 500 MG/1
500 TABLET, FILM COATED ORAL DAILY
Qty: 14 TABLET | Refills: 1 | Status: SHIPPED | OUTPATIENT
Start: 2017-02-21 | End: 2017-03-07

## 2017-02-21 RX ORDER — FUROSEMIDE 20 MG/1
20 TABLET ORAL DAILY
Qty: 30 TABLET | Refills: 11 | Status: SHIPPED | OUTPATIENT
Start: 2017-02-21 | End: 2017-03-17

## 2017-02-21 RX ORDER — OMEPRAZOLE 20 MG/1
20 CAPSULE, DELAYED RELEASE ORAL DAILY
Qty: 30 CAPSULE | Refills: 11 | Status: SHIPPED | OUTPATIENT
Start: 2017-02-21 | End: 2017-03-17

## 2017-02-21 RX ORDER — PREDNISONE 5 MG/1
5 TABLET ORAL DAILY
Qty: 30 TABLET | Refills: 5 | Status: SHIPPED | OUTPATIENT
Start: 2017-02-21 | End: 2017-03-23

## 2017-02-21 RX ORDER — CETIRIZINE HYDROCHLORIDE 10 MG/1
10 TABLET ORAL DAILY
Qty: 30 TABLET | Refills: 11 | COMMUNITY
Start: 2017-02-21 | End: 2017-04-04 | Stop reason: SDUPTHER

## 2017-02-21 NOTE — PATIENT INSTRUCTIONS
Theophylline  Does this test have other names?  Serum theophylline concentrations, blood theophylline level  What is this test?  This test measures the level of theophylline in your blood.  Theophylline is a chemical similar to caffeine. It's sometimes used as a medicine to treat lung conditions such as asthma, chronic obstructive pulmonary disease, and bronchiolitis. Sometimes it's prescribed to babies, especially premature infants, to help their breathing and lung function.  Theophylline reduces inflammation or irritation in lungs and airways, relaxes smooth muscles in the airways and digestive system, and stimulates the central nervous system. But theophylline can be harmful in high doses or if the theophylline level in your blood is too high, so your healthcare provider may want to test your level of theophylline.  Why do I need this test?  You will need this test if your healthcare provider gives you theophylline for a health condition. The test will help your provider figure out the proper dose and see if you have too much theophylline Win your body. Too much theophylline can be life-threatening. Signs and symptoms of too much theophylline include:  · Vomiting, heartburn, and belly (abdominal) pain  · Rapid heart rate or other changes in heart rhythm  · Muscle aches and tremors  · Anxiety  · Difficulty sleeping  · Restlessness  · Seizures  · High blood sugar  · Low potassium  · Low blood pressure  What other tests might I have along with this test?  Your healthcare provider may order other tests if you have taken too much theophylline. These include:  · Tests to measure other substances in your blood, such as blood sugar and electrolytes  · Blood gas test to check the oxygen and carbon dioxide levels in your blood  · Liver function tests  What do my test results mean?  Many things may affect your lab test results. These include the method each lab uses to do the test. Even if your test results are different  from the normal value, you may not have a problem. To learn what the results mean for you, talk with your healthcare provider.  Results are given in micrograms per milliliter (mcg/mL). Safe levels for people taking theophylline are usually 8 to 20 mcg/mL. Levels above 20 mcg/mL may be harmful if you've taken too many pills within a short time period. Such cases might include accidentally taking extra pills or having a prescription that was written for too high a dose. This may also happen if someone was using the medicine to attempt suicide.  A safe level for you depends on how much alcohol you drink, whether you smoke, whether you take herbal medicines, and how nutritiously you eat.  How is this test done?  The test requires a blood sample, which is drawn through a needle from a vein in your arm.  Does this test pose any risks?  Taking a blood sample with a needle carries risks that include bleeding, infection, bruising, or feeling dizzy. When the needle pricks your arm, you may feel a slight stinging sensation or pain. Afterward, the site may be slightly sore.  What might affect my test results?  Certain medicines cause theophylline to break down more slowly in the body, which can lead to higher test results. These include:  · Cimetidine, used to treat problems related to stomach acid  · Birth control pills  · Certain antibiotics, such as erythromycin, clarithromycin, ciprofloxacin, and norfloxacin  · Fluvoxamine, a medicine for anxiety  Other medicines cause theophylline to leave your body more quickly. This leads to a lower test result. Smoking, drinking alcohol, or having a viral infection or heart failure can cause levels to suddenly go up or down.  How do I get ready for this test?  Tell your healthcare provider how much theophylline you have taken. In addition, be sure your provider knows about all medicines, herbs, vitamins, and supplements you are taking. This includes medicines that don't need a  prescription and any illicit drugs you may use.     Date Last Reviewed: 10/12/2015  © 9710-2070 [employer]. [address] [place] [address], [place] [address]. All rights reserved. This information is not intended as a substitute for professional medical care. Always follow your healthcare professional's instructions.        Theophylline extended-release tablets or capsules  What is this medicine?  THEOPHYLLINE (the OFF i kay) is a bronchodilator. It helps open up the airways in your lungs to make it easier to breathe. This medicine is used to treat the symptoms of asthma, bronchitis, and emphysema.  How should I use this medicine?  Take this medicine by mouth with a glass of water. Follow the directions on the prescription label. Take this medicine on an empty stomach, at least 1 hour before eating. Do not crush or chew. Take your doses at the same time each day. Do not take your medicine more often than directed. Do not stop taking except on your doctor's advice.  Talk to your pediatrician regarding the use of this medicine in children. While this drug may be prescribed for selected conditions, precautions do apply.  What side effects may I notice from receiving this medicine?  Side effects that you should report to your doctor or health care professional as soon as possible:  · allergic reactions like skin rash, itching or hives, swelling of the face, lips, or tongue  · fast or irregular breathing or heartbeat  · feeling faint or lightheaded, falls  · fever, infection  · nausea, vomiting  · seizures  Side effects that usually do not require medical attention (report to your doctor or health care professional if they continue or are bothersome):  · anxiety, irritable, restless  · diarrhea  · headache  · increased need to pass urine  · tremors  · trouble sleeping  What may interact with this medicine?  Do not take this medicine with any of the following medications:  · riociguat  This medicine may also  interact with the following medications:  · adenosine  · allopurinol  · aminoglutethimide  · caffeine  · cimetidine  · disulfiram  · ephedrine  · fluvoxamine  · interferon  · isoproterenol  · lithium  · medicines for anxiety or sleep  · medicines for colds and breathing difficulties  · methotrexate  · pentoxifylline  · some antibiotics or anti infectives  · some medicines for irregular heart rhythms  · some medicines for treating seizures  · some medicines used during surgery  · sulfinpyrazone  · tacrine  What if I miss a dose?  If you miss a dose, take it as soon as you can. If it is almost time for your next dose, take only that dose. Do not take double or extra doses.  Where should I keep my medicine?  Keep out of the reach of children.  Store at room temperature below 25 degrees C (77 degrees F). Keep container tightly closed. Protect from light and moisture. Throw away any unused medicine after the expiration date.  What should I tell my health care provider before I take this medicine?  They need to know if you have any of these conditions:  · heart disease  · if you smoke  · irregular heartbeat  · liver disease  · seizures  · stomach problems like ulcers  · thyroid disease  · an unusual or allergic reaction to theophylline, aminophylline, caffeine, theobromine, other medicines, foods, dyes, or preservatives  · pregnant or trying to get pregnant  · breast-feeding  What should I watch for while using this medicine?  Visit your doctor or health care professional for regular checks on your progress. Your doctor or health care professional may schedule regular blood tests, especially at first. Tell your doctor if your symptoms do not improve or if they get worse.  There are many different brands of this medicine. Do not change your brand without checking with your health care professional. Different brands of this medicine may act differently in your body.  Tell all of your doctors that you are taking this  medicine. Talk to your doctor before you start or stop ANY over-the-counter or prescription medicines. Also talk to your health care professional about foods that affect this medicine, like chocolate, coffee, teas and charcoal-broiled foods. If you smoke tobacco or marijuana you may affect the level of this medicine in your body. Alcohol may interfere with the effect of this medicine. Avoid alcoholic drinks.  Date Last Reviewed:   NOTE:This sheet is a summary. It may not cover all possible information. If you have questions about this medicine, talk to your doctor, pharmacist, or health care provider. Copyright© 2016 [employer]        Breathing Techniques: Diaphragmatic Breathing  Diaphragmatic breathing or belly breathing helps you to breathe with your diaphragm. The diaphragm is a large muscle that plays an important part in breathing. It's located below your lungs. It separates your chest from your abdomen.  With a chronic lung disease, you may use your accessory muscles (a combination of muscles in your chest, shoulders, and neck) instead of your diaphragm. Using these muscles takes more effort and makes shortness of breath worse. Using your diaphragm makes breathing easier and allows you to take in more air.  Diaphragmatic breathing may help you:  · Be able to breathe easier  · Take in more air  · Relax  · Exercise or be more active            1. Lie on your back with a pillow under your head or sit in a comfortable chair. Make sure your back is supported.  2. Place one hand on your chest and one hand on your abdomen, the area over your stomach.  3. Breathe in slowly through your nose. Count to 2. As you inhale, your abdomen should move out against your hand. Your chest should stay still. .  4. Breathe out with your lips together. Count to 4. As you breathe out, you should feel your stomach move in.  5. Notice that you breathe in to a count of 2 and that you breathe out to a count of 4. This helps you to  keep your breathing slow and steady.  6. Practice this breathing technique for 5 to 10 minutes at first. Try to do it 3 to 4 times a day. Then increase the length of time and how often you practice it.  Date Last Reviewed: 7/24/2014  © 7607-2273 [employer]. [address] [place] [address]. All rights reserved. This information is not intended as a substitute for professional medical care. Always follow your healthcare professional's instructions.        Deep Breathing  Deep breathing helps keep your lungs clear. If youve had surgery or not, it will help you get better faster. It may also prevent a lung infection. If you have lung problems, it will help you breathe easier. Follow the steps below.       1.  · Sit on the edge of a bed or a chair. Or lie on your back with your knees slightly bent.  · Hold a pillow or rolled-up towel firmly against your incision with both hands.  · Breathe out normally. 2.  · Breathe in deeply through your nose.  · You should feel your stomach push out as you breathe in. 3.  · Pucker your lips as if you were going to blow out a candle.  · Breathe out slowly through your mouth. You should feel your chest go down as you breathe out.  · Rest for a few seconds. Then repeat as many times as directed by your health care provider.  · You may be given a device called an incentive spirometer to help you with the exercise while in the hospital.   Date Last Reviewed: 10/16/2014  © 0568-9535 [employer]. [address] [place] [id number]. All rights reserved. This information is not intended as a substitute for professional medical care. Always follow your healthcare professional's instructions.

## 2017-02-21 NOTE — MR AVS SNAPSHOT
[facility]  [address] [facility] [address]  Phone: [phone]  Fax: [phone]                  Petrona Gonzalez   2017 1:40 PM   Office Visit    Description:  Female : 1940   Provider:  Juan Carlos Araujo MD   Department:  [facility]           Reason for Visit     COPD           Diagnoses this Visit        Comments    Localized edema    -  Primary     COPD exacerbation         Heartburn         Chronic respiratory failure with hypoxia                To Do List           Future Appointments        Provider Department Dept Phone    2017 10:00 AM Katy Arriaga MD [facility] [phone]    2017 10:00 AM Katy Arriaga MD [facility] [phone]    2017 2:00 PM PULMONARY LAB, [facility] [phone]    2017 2:20 PM Juan Carlos Araujo MD [facility] [phone]      Goals (5 Years of Data)     None      Follow-Up and Disposition     Return in about 6 weeks (around 2017) for gopi on retrun.       These Medications        Disp Refills Start End    theophylline (THEODUR) 300 MG 12 hr tablet 60 tablet 11 2017    Take 1 tablet (300 mg total) by mouth 2 (two) times daily. - Oral    Pharmacy: [facility]/pharmacy #[id number] - [place], [place] - [address] [address] Ph #: [phone]       predniSONE (DELTASONE) 5 MG tablet 30 tablet 5 2017 3/23/2017    Take 1 tablet (5 mg total) by mouth once daily. Start after finishing high dose prednisone - Oral    Pharmacy: [facility]/pharmacy #[id number] - [place] - [address] [facility] Ph #: [phone]       furosemide (LASIX) 20 MG tablet 30 tablet 11 2017    Take 1 tablet (20 mg total) by mouth once daily. - Oral    Pharmacy: [facility]/pharmacy #[id number] - [place] - [address] [facility] Ph #: [phone]       levoFLOXacin (LEVAQUIN) 500 MG tablet 14 tablet 1 2017 3/7/2017     Take 1 tablet (500 mg total) by mouth once daily. - Oral    Pharmacy: [facility]/pharmacy #[id number] - [place] - [address] [address] Ph #: [phone]       omeprazole (PRILOSEC) 20 MG capsule 30 capsule 11 2/21/2017 2/21/2018    Take 1 capsule (20 mg total) by mouth once daily. - Oral    Pharmacy: [facility]/pharmacy #[id number] - [place], [place] - [address] [address] Ph #: [phone]         [facility]     [facility] Care Line - 24/7 Assistance  Registered nurses in the [facility] provide clinical advisement, health education, appointment booking, and other advisory services.  Call for this free service at [phone].             Medications           START taking these NEW medications        Refills    theophylline (THEODUR) 300 MG 12 hr tablet 11    Sig: Take 1 tablet (300 mg total) by mouth 2 (two) times daily.    Class: Normal    Route: Oral    predniSONE (DELTASONE) 5 MG tablet 5    Sig: Take 1 tablet (5 mg total) by mouth once daily. Start after finishing high dose prednisone    Class: Normal    Route: Oral    furosemide (LASIX) 20 MG tablet 11    Sig: Take 1 tablet (20 mg total) by mouth once daily.    Class: Normal    Route: Oral    levoFLOXacin (LEVAQUIN) 500 MG tablet 1    Sig: Take 1 tablet (500 mg total) by mouth once daily.    Class: Normal    Route: Oral    omeprazole (PRILOSEC) 20 MG capsule 11    Sig: Take 1 capsule (20 mg total) by mouth once daily.    Class: Normal    Route: Oral      STOP taking these medications     azithromycin (Z-ERIK) 250 MG tablet Take 1 tablet (250 mg total) by mouth once daily.    guaifenesin (MUCINEX) 600 mg 12 hr tablet Take 1 tablet (600 mg total) by mouth 2 (two) times daily.           Verify that the below list of medications is an accurate representation of the medications you are currently taking.  If none reported, the list may be blank. If incorrect, please contact your healthcare provider. Carry this list with you in case of emergency.          "  Current Medications     albuterol 90 mcg/actuation inhaler Inhale 2 puffs into the lungs every 6 (six) hours as needed. Dispense with spacer.    albuterol-ipratropium 2.5mg-0.5mg/3mL (DUO-NEB) 0.5 mg-3 mg(2.5 mg base)/3 mL nebulizer solution Take 3 mLs by nebulization every 6 (six) hours.    aspirin (ECOTRIN) 81 MG EC tablet Take 81 mg by mouth once daily.    budesonide-formoterol 160-4.5 mcg (SYMBICORT) 160-4.5 mcg/actuation HFAA Inhale 2 puffs into the lungs every 12 (twelve) hours. Wash out mouth after using    cetirizine (ZYRTEC) 10 MG tablet Take 10 mg by mouth once daily.    dextromethorphan-guaifenesin  mg (MUCINEX DM)  mg per 12 hr tablet Take 1 tablet by mouth.    furosemide (LASIX) 20 MG tablet Take 1 tablet (20 mg total) by mouth once daily.    levoFLOXacin (LEVAQUIN) 500 MG tablet Take 1 tablet (500 mg total) by mouth once daily.    lisinopril (PRINIVIL,ZESTRIL) 5 MG tablet Take 1 tablet (5 mg total) by mouth once daily.    metformin (GLUCOPHAGE) 500 MG tablet Take 1 tablet (500 mg total) by mouth 2 (two) times daily with meals.    omeprazole (PRILOSEC) 20 MG capsule Take 1 capsule (20 mg total) by mouth once daily.    OXYGEN-AIR DELIVERY SYSTEMS MISC by [facility]Non-Drug; Combo Route) route.    predniSONE (DELTASONE) 20 MG tablet 40 mg/day for 7 days,20 mg/day for 7 days,10 mg/ day (1/2 tablet) for 7 days, then stop    predniSONE (DELTASONE) 5 MG tablet Take 1 tablet (5 mg total) by mouth once daily. Start after finishing high dose prednisone    simvastatin (ZOCOR) 10 MG tablet Take 1 tablet (10 mg total) by mouth every evening.    theophylline (THEODUR) 300 MG 12 hr tablet Take 1 tablet (300 mg total) by mouth 2 (two) times daily.    trazodone (DESYREL) 50 MG tablet Take 1 tablet (50 mg total) by mouth every evening.           Clinical Reference Information           Your Vitals Were     BP Pulse Height Weight SpO2 BMI    136/84 85 5' 3" (1.6 m) 86.2 kg (190 lb) 92% 33.66 kg/m2      Blood " Pressure          Most Recent Value    BP  136/84      Allergies as of 2/21/2017     Meloxicam    Naproxen    Morphine      Immunizations Administered on Date of Encounter - 2/21/2017     None      Orders Placed During Today's Visit      Normal Orders This Visit    Ambulatory Referral to Pulmonary Rehab     Future Labs/Procedures Expected by Expires    Spirometry with/without bronchodilator  8/24/2017 (Approximate) 2/21/2018      Instructions      Theophylline  Does this test have other names?  Serum theophylline concentrations, blood theophylline level  What is this test?  This test measures the level of theophylline in your blood.  Theophylline is a chemical similar to caffeine. It's sometimes used as a medicine to treat lung conditions such as asthma, chronic obstructive pulmonary disease, and bronchiolitis. Sometimes it's prescribed to babies, especially premature infants, to help their breathing and lung function.  Theophylline reduces inflammation or irritation in lungs and airways, relaxes smooth muscles in the airways and digestive system, and stimulates the central nervous system. But theophylline can be harmful in high doses or if the theophylline level in your blood is too high, so your healthcare provider may want to test your level of theophylline.  Why do I need this test?  You will need this test if your healthcare provider gives you theophylline for a health condition. The test will help your provider figure out the proper dose and see if you have too much theophylline Win your body. Too much theophylline can be life-threatening. Signs and symptoms of too much theophylline include:  · Vomiting, heartburn, and belly (abdominal) pain  · Rapid heart rate or other changes in heart rhythm  · Muscle aches and tremors  · Anxiety  · Difficulty sleeping  · Restlessness  · Seizures  · High blood sugar  · Low potassium  · Low blood pressure  What other tests might I have along with this test?  Your healthcare  provider may order other tests if you have taken too much theophylline. These include:  · Tests to measure other substances in your blood, such as blood sugar and electrolytes  · Blood gas test to check the oxygen and carbon dioxide levels in your blood  · Liver function tests  What do my test results mean?  Many things may affect your lab test results. These include the method each lab uses to do the test. Even if your test results are different from the normal value, you may not have a problem. To learn what the results mean for you, talk with your healthcare provider.  Results are given in micrograms per milliliter (mcg/mL). Safe levels for people taking theophylline are usually 8 to 20 mcg/mL. Levels above 20 mcg/mL may be harmful if you've taken too many pills within a short time period. Such cases might include accidentally taking extra pills or having a prescription that was written for too high a dose. This may also happen if someone was using the medicine to attempt suicide.  A safe level for you depends on how much alcohol you drink, whether you smoke, whether you take herbal medicines, and how nutritiously you eat.  How is this test done?  The test requires a blood sample, which is drawn through a needle from a vein in your arm.  Does this test pose any risks?  Taking a blood sample with a needle carries risks that include bleeding, infection, bruising, or feeling dizzy. When the needle pricks your arm, you may feel a slight stinging sensation or pain. Afterward, the site may be slightly sore.  What might affect my test results?  Certain medicines cause theophylline to break down more slowly in the body, which can lead to higher test results. These include:  · Cimetidine, used to treat problems related to stomach acid  · Birth control pills  · Certain antibiotics, such as erythromycin, clarithromycin, ciprofloxacin, and norfloxacin  · Fluvoxamine, a medicine for anxiety  Other medicines cause theophylline  to leave your body more quickly. This leads to a lower test result. Smoking, drinking alcohol, or having a viral infection or heart failure can cause levels to suddenly go up or down.  How do I get ready for this test?  Tell your healthcare provider how much theophylline you have taken. In addition, be sure your provider knows about all medicines, herbs, vitamins, and supplements you are taking. This includes medicines that don't need a prescription and any illicit drugs you may use.     Date Last Reviewed: 10/12/2015  © 4260-9194 [employer]. [address] [place] [address]. All rights reserved. This information is not intended as a substitute for professional medical care. Always follow your healthcare professional's instructions.        Theophylline extended-release tablets or capsules  What is this medicine?  THEOPHYLLINE (the OFF i kay) is a bronchodilator. It helps open up the airways in your lungs to make it easier to breathe. This medicine is used to treat the symptoms of asthma, bronchitis, and emphysema.  How should I use this medicine?  Take this medicine by mouth with a glass of water. Follow the directions on the prescription label. Take this medicine on an empty stomach, at least 1 hour before eating. Do not crush or chew. Take your doses at the same time each day. Do not take your medicine more often than directed. Do not stop taking except on your doctor's advice.  Talk to your pediatrician regarding the use of this medicine in children. While this drug may be prescribed for selected conditions, precautions do apply.  What side effects may I notice from receiving this medicine?  Side effects that you should report to your doctor or health care professional as soon as possible:  · allergic reactions like skin rash, itching or hives, swelling of the face, lips, or tongue  · fast or irregular breathing or heartbeat  · feeling faint or lightheaded, falls  · fever,  infection  · nausea, vomiting  · seizures  Side effects that usually do not require medical attention (report to your doctor or health care professional if they continue or are bothersome):  · anxiety, irritable, restless  · diarrhea  · headache  · increased need to pass urine  · tremors  · trouble sleeping  What may interact with this medicine?  Do not take this medicine with any of the following medications:  · riociguat  This medicine may also interact with the following medications:  · adenosine  · allopurinol  · aminoglutethimide  · caffeine  · cimetidine  · disulfiram  · ephedrine  · fluvoxamine  · interferon  · isoproterenol  · lithium  · medicines for anxiety or sleep  · medicines for colds and breathing difficulties  · methotrexate  · pentoxifylline  · some antibiotics or anti infectives  · some medicines for irregular heart rhythms  · some medicines for treating seizures  · some medicines used during surgery  · sulfinpyrazone  · tacrine  What if I miss a dose?  If you miss a dose, take it as soon as you can. If it is almost time for your next dose, take only that dose. Do not take double or extra doses.  Where should I keep my medicine?  Keep out of the reach of children.  Store at room temperature below 25 degrees C (77 degrees F). Keep container tightly closed. Protect from light and moisture. Throw away any unused medicine after the expiration date.  What should I tell my health care provider before I take this medicine?  They need to know if you have any of these conditions:  · heart disease  · if you smoke  · irregular heartbeat  · liver disease  · seizures  · stomach problems like ulcers  · thyroid disease  · an unusual or allergic reaction to theophylline, aminophylline, caffeine, theobromine, other medicines, foods, dyes, or preservatives  · pregnant or trying to get pregnant  · breast-feeding  What should I watch for while using this medicine?  Visit your doctor or health care professional for  regular checks on your progress. Your doctor or health care professional may schedule regular blood tests, especially at first. Tell your doctor if your symptoms do not improve or if they get worse.  There are many different brands of this medicine. Do not change your brand without checking with your health care professional. Different brands of this medicine may act differently in your body.  Tell all of your doctors that you are taking this medicine. Talk to your doctor before you start or stop ANY over-the-counter or prescription medicines. Also talk to your health care professional about foods that affect this medicine, like chocolate, coffee, teas and charcoal-broiled foods. If you smoke tobacco or marijuana you may affect the level of this medicine in your body. Alcohol may interfere with the effect of this medicine. Avoid alcoholic drinks.  Date Last Reviewed:   NOTE:This sheet is a summary. It may not cover all possible information. If you have questions about this medicine, talk to your doctor, pharmacist, or health care provider. Copyright© 2016 [employer]        Breathing Techniques: Diaphragmatic Breathing  Diaphragmatic breathing or belly breathing helps you to breathe with your diaphragm. The diaphragm is a large muscle that plays an important part in breathing. It's located below your lungs. It separates your chest from your abdomen.  With a chronic lung disease, you may use your accessory muscles (a combination of muscles in your chest, shoulders, and neck) instead of your diaphragm. Using these muscles takes more effort and makes shortness of breath worse. Using your diaphragm makes breathing easier and allows you to take in more air.  Diaphragmatic breathing may help you:  · Be able to breathe easier  · Take in more air  · Relax  · Exercise or be more active            1. Lie on your back with a pillow under your head or sit in a comfortable chair. Make sure your back is supported.  2. Place one  hand on your chest and one hand on your abdomen, the area over your stomach.  3. Breathe in slowly through your nose. Count to 2. As you inhale, your abdomen should move out against your hand. Your chest should stay still. .  4. Breathe out with your lips together. Count to 4. As you breathe out, you should feel your stomach move in.  5. Notice that you breathe in to a count of 2 and that you breathe out to a count of 4. This helps you to keep your breathing slow and steady.  6. Practice this breathing technique for 5 to 10 minutes at first. Try to do it 3 to 4 times a day. Then increase the length of time and how often you practice it.  Date Last Reviewed: 7/24/2014  © 6548-1464 [employer]. [address] [place] [address], [place] [address]. All rights reserved. This information is not intended as a substitute for professional medical care. Always follow your healthcare professional's instructions.        Deep Breathing  Deep breathing helps keep your lungs clear. If youve had surgery or not, it will help you get better faster. It may also prevent a lung infection. If you have lung problems, it will help you breathe easier. Follow the steps below.       1.  · Sit on the edge of a bed or a chair. Or lie on your back with your knees slightly bent.  · Hold a pillow or rolled-up towel firmly against your incision with both hands.  · Breathe out normally. 2.  · Breathe in deeply through your nose.  · You should feel your stomach push out as you breathe in. 3.  · Pucker your lips as if you were going to blow out a candle.  · Breathe out slowly through your mouth. You should feel your chest go down as you breathe out.  · Rest for a few seconds. Then repeat as many times as directed by your health care provider.  · You may be given a device called an incentive spirometer to help you with the exercise while in the hospital.   Date Last Reviewed: 10/16/2014  © 4858-5484 [employer]. [address] [place] [address]  Road, [place] [address]. All rights reserved. This information is not intended as a substitute for professional medical care. Always follow your healthcare professional's instructions.             Language Assistance Services     ATTENTION: Language assistance services are available, free of charge. Please call [phone].      ATENCIÓN: Si habla john, tiene a portillo disposición servicios gratuitos de asistencia lingüística. Llame al [phone].     CHÚ Ý: N?u b?n nói Ti?ng Vi?t, có các d?ch v? h? tr? ngôn ng? mi?n phí dành cho b?n. G?i s? [phone].         [facility] complies with applicable Federal civil rights laws and does not discriminate on the basis of race, color, national origin, age, disability, or sex.

## 2017-02-21 NOTE — PROGRESS NOTES
Subjective:       Patient ID: Petrona Gonzalez is a 76 y.o. female.    Chief Complaint:   She   presents for evaluation and treatment of Chronic Obstructive Pulmonary Disease , Acute on chronic respiratory failure    after being discharged from the hospital  6  days ago. Since discharge symptoms have unchanged course since that time. Patient denies chest pain . Symptoms are aggravated by activity. Symptoms improve with rest and oxygen.  Respiratory: positive for dyspnea on exertion, sputum and wheezing; Cardiovascular: positive for chest pressure/discomfort, palpitations and lower extremity edema.  Patient currently is on oxygen at 3 L/min per nasal cannula..    COPD (hospital f/u )    [facility]  Discharge Summary        Patient Name: Petrona Gonzalez  MRN: [id number]  Admission Date: 2/13/2017  Hospital Length of Stay: 2 days  Discharge Date and Time:  02/15/2017 11:24 AM  Attending Physician: Calderon Sanon, *   Discharging Provider: Calderon Sanon MD  Primary Care Provider: Katy Arriaga MD     HPI:   75 y/o WF with a PMHx of COPD O2 dependent , DM and Hyperlipidemia presented to the ER with a C/O SOB For the last days which has gotten worse this am . She states The SOB is associated with nonproductive cough And REES . She denies any Chest pain , fever , runny nose , sore throat , abdominal pain , nausea , vomit , diarrhea , dizziness , HA Or weakness . Last time admitted to the hospital Was on 2015 . She F/U Dr Araujo and Dr Gama as outpt . Dr Araujo Prescribe a Levaquin and steroid taper at the end of January .  ED initial VS: Bp : 107/70 Hr 105 RR 30 Temp 97.1 o2 sat 74 %  ED Course: Solumedrol 125 mg iv x 1 , duoneb x 3 , avelox iv x 1 , ABG ; PH 7.37 ; PCo2 56 ; PO2 108 ; Hco3 32.8 ; WBC 16 ;  ; Troponin 0.008 ;  ; CXR no consolidation , no effusion .  Pt was seen and examined at bedside, She is aao x 3 in nad and hemodynamically stable .  She is NC with a o2 sat > 92 %.            * No surgery found *       Indwelling Lines/Drains at time of discharge:   Lines/Drains/Airways            No matching active lines, drains, or airways         Hospital Course:   77 y/o WF with a PMHx of COPD and chronic respiratory Failure Admitted with a Dx of acute on chronic COPD exacerbation . She was started on [place] , LABA , IHC and IV steroid With and improvement of her sx . She Use continues O2 at 3 lt . TTE was done Did not show any abnormality . Her breathing And O2 sat improved and is a baseline At 3 lt by nc . No acute event During hospital stay . WBC elevated w/o any sign of infection most likely due to steroid induced. Pt was seen and examined at bedside . She is aao x 3 in nad and hemodynamically stable . She was determined suitable for D/C . She will F/U Her Pulmonology and PCP next week .         HPI  Past Medical History   Diagnosis Date    COPD (chronic obstructive pulmonary disease) with emphysema     Diabetes mellitus     Hyperlipidemia     Hypertension     Maxillary sinusitis, chronic      Past Surgical History   Procedure Laterality Date    Hysterectomy      Back surgery      Hernia repair       Social History     Social History    Marital status:      Spouse name: N/A    Number of children: N/A    Years of education: N/A     Occupational History    Not on file.     Social History Main Topics    Smoking status: Former Smoker     Years: 30.00    Smokeless tobacco: Never Used    Alcohol use No    Drug use: No    Sexual activity: Not Currently     Other Topics Concern    Not on file     Social History Narrative     Review of Systems   Constitutional: Positive for fatigue. Negative for fever.   HENT: Positive for postnasal drip and rhinorrhea. Negative for congestion.    Respiratory: Positive for cough, sputum production, shortness of breath, dyspnea on extertion, use of rescue inhaler and Paroxysmal Nocturnal Dyspnea.     Cardiovascular: Positive for leg swelling. Negative for chest pain and palpitations.   Musculoskeletal: Positive for arthralgias.   Skin: Negative for rash.   Gastrointestinal: Negative for nausea and abdominal pain.   Neurological: Negative for dizziness, syncope, weakness and light-headedness.   Hematological: Negative for adenopathy. Does not bruise/bleed easily.   Psychiatric/Behavioral: Negative for sleep disturbance. The patient is not nervous/anxious.        Objective:      Physical Exam   Constitutional: She is oriented to person, place, and time. She appears well-developed and well-nourished.   Chronically ill appearing  in a wheelchair   HENT:   Head: Normocephalic and atraumatic.   Mouth/Throat: Oropharyngeal exudate present.   Eyes: Conjunctivae are normal. Pupils are equal, round, and reactive to light.   Neck: Neck supple. No JVD present. No tracheal deviation present. No thyromegaly present.   Cardiovascular: Normal rate, regular rhythm and normal heart sounds.    Pulmonary/Chest: Effort normal. No respiratory distress. She has decreased breath sounds. She has wheezes in the right lower field and the left lower field. She has no rhonchi. She has no rales. She exhibits no tenderness.   Abdominal: Soft. Bowel sounds are normal.   Musculoskeletal: Normal range of motion. She exhibits no edema.   Lymphadenopathy:     She has no cervical adenopathy.   Neurological: She is alert and oriented to person, place, and time.   Skin: Skin is warm and dry.   Nursing note and vitals reviewed.    Personal Diagnostic Review  Chest x-ray: cardiomegaly    .[email or web address]  No flowsheet data found.      EF 55 - 65 60   Diastolic Dysfunction  No   Resulting Agency  [facility]   Narrative      Date of Procedure: 02/14/2017        TEST DESCRIPTION   Technical Quality: This is a technically challenging study. There is poor endocardial definition.     Aorta: The aortic root is normal in size, measuring 3.3 cm at sinotubular junction  and 3.1 cm at Sinuses of Valsalva. The proximal ascending aorta is normal in size, measuring 3.3 cm across.     Left Atrium: The left atrial volume index is normal, measuring 27.33 cc/m2.     Left Ventricle: The left ventricle is normal in size, with an end-diastolic diameter of 3.1 cm, and an end-systolic diameter of 2.1 cm. LV wall thickness is normal, with the septum measuring 1.3 cm and the posterior wall measuring 1.2 cm across. Relative   wall thickness was increased at 0.77, and the LV mass index was 72.3 g/m2 consistent with concentric remodeling. Global left ventricular systolic function appears normal. Visually estimated ejection fraction is 60-65%. The LV Doppler derived stroke   volume equals 87.0 ccs.   The E/e'(lat) is 11, consistent with normal diastolic function.     Right Atrium: The right atrium is normal in size, measuring 5.1 cm in length and 3.5 cm in width in the apical view.     Right Ventricle: The right ventricle is normal in size. Global right ventricular systolic function appears normal. Tricuspid annular plane systolic excursion (TAPSE) is 3.1 cm.     Aortic Valve:  The aortic valve is mildly sclerotic.     Mitral Valve:  The mitral valve is mildly sclerotic. The pressure half time is 58 msec. The calculated mitral valve area is 3.79 cm2. There is mitral annular calcification.     Tricuspid Valve:  The tricuspid valve is normal in structure.     Pulmonary Valve:  The pulmonic valve is not well seen.     IVC: IVC is normal in size and collapses > 50% with a sniff, suggesting normal right atrial pressure of 3 mmHg.     Intracavitary: There is no evidence of pericardial effusion, intracavity mass, thrombi, or vegetation.         CONCLUSIONS     1 - Normal left ventricular systolic function (EF 60-65%).     2 - Normal left ventricular diastolic function.     3 - Normal right ventricular systolic function .             This document has been electronically    SIGNED BY: Michele Perdomo MD On:  02/14/2017 17:07          Assessment:       1. Localized edema    2. COPD exacerbation    3. Heartburn    4. Chronic respiratory failure with hypoxia        Outpatient Encounter Prescriptions as of 2/21/2017   Medication Sig Dispense Refill    albuterol 90 mcg/actuation inhaler Inhale 2 puffs into the lungs every 6 (six) hours as needed. Dispense with spacer. 1 Inhaler 0    albuterol-ipratropium 2.5mg-0.5mg/3mL (DUO-NEB) 0.5 mg-3 mg(2.5 mg base)/3 mL nebulizer solution Take 3 mLs by nebulization every 6 (six) hours. 360 vial 11    aspirin (ECOTRIN) 81 MG EC tablet Take 81 mg by mouth once daily.      budesonide-formoterol 160-4.5 mcg (SYMBICORT) 160-4.5 mcg/actuation HFAA Inhale 2 puffs into the lungs every 12 (twelve) hours. Wash out mouth after using 1 Inhaler 0    cetirizine (ZYRTEC) 10 MG tablet Take 10 mg by mouth once daily.      lisinopril (PRINIVIL,ZESTRIL) 5 MG tablet Take 1 tablet (5 mg total) by mouth once daily. 30 tablet 5    metformin (GLUCOPHAGE) 500 MG tablet Take 1 tablet (500 mg total) by mouth 2 (two) times daily with meals. 60 tablet 5    OXYGEN-AIR DELIVERY SYSTEMS MISC by Misc.(Non-Drug; Combo Route) route.      predniSONE (DELTASONE) 20 MG tablet 40 mg/day for 7 days,20 mg/day for 7 days,10 mg/ day (1/2 tablet) for 7 days, then stop 25 tablet 1    simvastatin (ZOCOR) 10 MG tablet Take 1 tablet (10 mg total) by mouth every evening. 30 tablet 11    trazodone (DESYREL) 50 MG tablet Take 1 tablet (50 mg total) by mouth every evening. 90 tablet 4    [DISCONTINUED] guaifenesin (MUCINEX) 600 mg 12 hr tablet Take 1 tablet (600 mg total) by mouth 2 (two) times daily. 60 tablet 11    dextromethorphan-guaifenesin  mg (MUCINEX DM)  mg per 12 hr tablet Take 1 tablet by mouth.      furosemide (LASIX) 20 MG tablet Take 1 tablet (20 mg total) by mouth once daily. 30 tablet 11    levoFLOXacin (LEVAQUIN) 500 MG tablet Take 1 tablet (500 mg total) by mouth once daily. 14 tablet 1     omeprazole (PRILOSEC) 20 MG capsule Take 1 capsule (20 mg total) by mouth once daily. 30 capsule 11    predniSONE (DELTASONE) 5 MG tablet Take 1 tablet (5 mg total) by mouth once daily. Start after finishing high dose prednisone 30 tablet 5    theophylline (THEODUR) 300 MG 12 hr tablet Take 1 tablet (300 mg total) by mouth 2 (two) times daily. 60 tablet 11    [DISCONTINUED] azithromycin (Z-ERIK) 250 MG tablet Take 1 tablet (250 mg total) by mouth once daily. 5 tablet 0    [DISCONTINUED] budesonide-formoterol 160-4.5 mcg (SYMBICORT) 160-4.5 mcg/actuation HFAA Inhale 2 puffs into the lungs every 12 (twelve) hours. Wash out mouth after using 1 Inhaler 0    [DISCONTINUED] predniSONE (DELTASONE) 20 MG tablet Prednisone 60 mg/day for 7 days,40 mg/day for 7 days,20 mg/day for 7 days,10 mg/ day (1/2 tablet) for 7 days, then stop 50 tablet 1    albuterol-ipratropium 2.5mg-0.5mg/3mL nebulizer solution 3 mL       [DISCONTINUED] arformoterol nebulizer solution 15 mcg       [DISCONTINUED] aspirin EC tablet 81 mg       [DISCONTINUED] budesonide nebulizer solution 0.5 mg       [DISCONTINUED] dextrose 50% injection 12.5 g       [DISCONTINUED] dextrose 50% injection 25 g       [DISCONTINUED] enoxaparin injection 40 mg       [DISCONTINUED] glucagon (human recombinant) injection 1 mg       [DISCONTINUED] glucose chewable tablet 16 g       [DISCONTINUED] glucose chewable tablet 24 g       [DISCONTINUED] guaifenesin 12 hr tablet 600 mg       [DISCONTINUED] insulin aspart pen 0-5 Units       [DISCONTINUED] lisinopril tablet 5 mg       [DISCONTINUED] methylPREDNISolone sod suc(PF) 125 mg/2 mL injection 80 mg       [DISCONTINUED] moxifloxacin 400 mg/250 mL IVPB 400 mg       [DISCONTINUED] ondansetron injection 4 mg       [DISCONTINUED] simvastatin tablet 10 mg       [DISCONTINUED] trazodone tablet 50 mg        No facility-administered encounter medications on file as of 2/21/2017.      Orders Placed This Encounter    Procedures    Ambulatory Referral to Pulmonary Rehab     Referral Priority:   Routine     Referral Type:   Consultation     Referral Reason:   Specialty Services Required     Requested Specialty:   Respiratory Therapy     Number of Visits Requested:   1    Spirometry with/without bronchodilator     Standing Status:   Future     Standing Expiration Date:   2/21/2018     Plan:       Requested Prescriptions     Signed Prescriptions Disp Refills    theophylline (THEODUR) 300 MG 12 hr tablet 60 tablet 11     Sig: Take 1 tablet (300 mg total) by mouth 2 (two) times daily.    predniSONE (DELTASONE) 5 MG tablet 30 tablet 5     Sig: Take 1 tablet (5 mg total) by mouth once daily. Start after finishing high dose prednisone    furosemide (LASIX) 20 MG tablet 30 tablet 11     Sig: Take 1 tablet (20 mg total) by mouth once daily.    levoFLOXacin (LEVAQUIN) 500 MG tablet 14 tablet 1     Sig: Take 1 tablet (500 mg total) by mouth once daily.    omeprazole (PRILOSEC) 20 MG capsule 30 capsule 11     Sig: Take 1 capsule (20 mg total) by mouth once daily.     Localized edema  -     furosemide (LASIX) 20 MG tablet; Take 1 tablet (20 mg total) by mouth once daily.  Dispense: 30 tablet; Refill: 11    COPD exacerbation  -     Ambulatory Referral to Pulmonary Rehab  -     theophylline (THEODUR) 300 MG 12 hr tablet; Take 1 tablet (300 mg total) by mouth 2 (two) times daily.  Dispense: 60 tablet; Refill: 11  -     predniSONE (DELTASONE) 5 MG tablet; Take 1 tablet (5 mg total) by mouth once daily. Start after finishing high dose prednisone  Dispense: 30 tablet; Refill: 5  -     levoFLOXacin (LEVAQUIN) 500 MG tablet; Take 1 tablet (500 mg total) by mouth once daily.  Dispense: 14 tablet; Refill: 1    Heartburn  -     omeprazole (PRILOSEC) 20 MG capsule; Take 1 capsule (20 mg total) by mouth once daily.  Dispense: 30 capsule; Refill: 11    Chronic respiratory failure with hypoxia  -     Spirometry with/without bronchodilator; Future;  Expected date: 8/24/17         Return in about 6 weeks (around 4/4/2017) for gopi on retrun.   MEDICAL DECISION MAKING: Moderate to high complexity.  Overall, the multiple problems listed are of moderate to high severity that may impact quality of life and activities of daily living. Side effects of medications, treatment plan as well as options and alternatives reviewed and discussed with patient. There was counseling of patient concerning these issues.    Total time spent in face to face counseling and coordination of care - 40 minutes over 50% of time was used in discussion of prognosis, risks, benefits of treatment, instructions and compliance with regimen . Discussion with other physicians or health care providers (homehealth, durable medical equipment providers).

## 2017-03-02 ENCOUNTER — TELEPHONE (OUTPATIENT)
Dept: FAMILY MEDICINE | Facility: CLINIC | Age: 77
End: 2017-03-02

## 2017-03-02 NOTE — TELEPHONE ENCOUNTER
Pt is asking for a order to request a rolling walker to be sent to [employer].   2/13/17 lov   2/15/17 last labs

## 2017-03-03 NOTE — TELEPHONE ENCOUNTER
----- Message from Endy Isbell sent at 3/3/2017  4:00 PM CST -----  Contact: [facility] ( [employer] )   [facility] ( [employer] ) is requesting addition clinical notes for an order.        Please call [facility] ( [employer] [facility] ) back at [phone]

## 2017-03-03 NOTE — TELEPHONE ENCOUNTER
Spoke with patient and she said she never received a rolling walker  And is requesting one. [facility] will send a fax request ([employer]).  Power Coleman LPN

## 2017-03-08 ENCOUNTER — TELEPHONE (OUTPATIENT)
Dept: FAMILY MEDICINE | Facility: CLINIC | Age: 77
End: 2017-03-08

## 2017-03-08 NOTE — TELEPHONE ENCOUNTER
Spoke with [employer] informed her pt has not had a face to face visit for this to be ordered. Called and spoke with pt she stated she never asked for a rolling walker from [facility].

## 2017-03-08 NOTE — TELEPHONE ENCOUNTER
----- Message from Jackelyn Perdomo sent at 3/8/2017  8:38 AM CST -----  Contact: [facility] [phone] ask for [facility] calling to speak with Catherine,,, the representative informed me that they have called several times over the last two weeks an no one has called them back.   Regarding a request for a rolling walker for patient, they need a signed physician order,,, please fax to [phone]

## 2017-03-13 ENCOUNTER — TELEPHONE (OUTPATIENT)
Dept: FAMILY MEDICINE | Facility: CLINIC | Age: 77
End: 2017-03-13

## 2017-03-13 NOTE — TELEPHONE ENCOUNTER
----- Message from Lana Waters sent at 3/7/2017 11:33 AM CST -----  Contact: [facility] would like a call back regarding the request that was was sent over for a rolling walker,need a signed physician order....[phone]

## 2017-03-15 ENCOUNTER — TELEPHONE (OUTPATIENT)
Dept: PULMONOLOGY | Facility: HOSPITAL | Age: 77
End: 2017-03-15

## 2017-03-17 ENCOUNTER — TELEPHONE (OUTPATIENT)
Dept: FAMILY MEDICINE | Facility: CLINIC | Age: 77
End: 2017-03-17

## 2017-03-17 ENCOUNTER — OFFICE VISIT (OUTPATIENT)
Dept: FAMILY MEDICINE | Facility: CLINIC | Age: 77
End: 2017-03-17
Payer: MEDICARE

## 2017-03-17 VITALS
HEIGHT: 63 IN | DIASTOLIC BLOOD PRESSURE: 76 MMHG | WEIGHT: 180 LBS | RESPIRATION RATE: 16 BRPM | SYSTOLIC BLOOD PRESSURE: 140 MMHG | OXYGEN SATURATION: 93 % | BODY MASS INDEX: 31.89 KG/M2 | HEART RATE: 73 BPM | TEMPERATURE: 97 F

## 2017-03-17 DIAGNOSIS — E11.40 TYPE 2 DIABETES MELLITUS WITH DIABETIC NEUROPATHY, WITHOUT LONG-TERM CURRENT USE OF INSULIN: ICD-10-CM

## 2017-03-17 DIAGNOSIS — J30.89 PERENNIAL ALLERGIC RHINITIS, UNSPECIFIED ALLERGIC RHINITIS TRIGGER: ICD-10-CM

## 2017-03-17 DIAGNOSIS — E66.9 OBESITY (BMI 30-39.9): ICD-10-CM

## 2017-03-17 DIAGNOSIS — J44.9 COPD, MODERATE: Primary | ICD-10-CM

## 2017-03-17 DIAGNOSIS — I10 ESSENTIAL HYPERTENSION: ICD-10-CM

## 2017-03-17 DIAGNOSIS — J96.11 CHRONIC RESPIRATORY FAILURE WITH HYPOXIA: ICD-10-CM

## 2017-03-17 PROCEDURE — 99999 PR PBB SHADOW E&M-EST. PATIENT-LVL III: CPT | Mod: PBBFAC,,, | Performed by: FAMILY MEDICINE

## 2017-03-17 PROCEDURE — 99499 UNLISTED E&M SERVICE: CPT | Mod: S$PBB,,, | Performed by: FAMILY MEDICINE

## 2017-03-17 PROCEDURE — 2022F DILAT RTA XM EVC RTNOPTHY: CPT | Mod: S$GLB,,, | Performed by: FAMILY MEDICINE

## 2017-03-17 PROCEDURE — 1126F AMNT PAIN NOTED NONE PRSNT: CPT | Mod: S$GLB,,, | Performed by: FAMILY MEDICINE

## 2017-03-17 PROCEDURE — 1160F RVW MEDS BY RX/DR IN RCRD: CPT | Mod: S$GLB,,, | Performed by: FAMILY MEDICINE

## 2017-03-17 PROCEDURE — 3044F HG A1C LEVEL LT 7.0%: CPT | Mod: S$GLB,,, | Performed by: FAMILY MEDICINE

## 2017-03-17 PROCEDURE — 99214 OFFICE O/P EST MOD 30 MIN: CPT | Mod: S$GLB,,, | Performed by: FAMILY MEDICINE

## 2017-03-17 PROCEDURE — 1157F ADVNC CARE PLAN IN RCRD: CPT | Mod: S$GLB,,, | Performed by: FAMILY MEDICINE

## 2017-03-17 PROCEDURE — 3078F DIAST BP <80 MM HG: CPT | Mod: S$GLB,,, | Performed by: FAMILY MEDICINE

## 2017-03-17 PROCEDURE — 4010F ACE/ARB THERAPY RXD/TAKEN: CPT | Mod: S$GLB,,, | Performed by: FAMILY MEDICINE

## 2017-03-17 PROCEDURE — 1159F MED LIST DOCD IN RCRD: CPT | Mod: S$GLB,,, | Performed by: FAMILY MEDICINE

## 2017-03-17 PROCEDURE — 3077F SYST BP >= 140 MM HG: CPT | Mod: S$GLB,,, | Performed by: FAMILY MEDICINE

## 2017-03-17 RX ORDER — ALBUTEROL SULFATE 90 UG/1
2 AEROSOL, METERED RESPIRATORY (INHALATION) EVERY 6 HOURS PRN
Qty: 1 INHALER | Refills: 5 | Status: ON HOLD | OUTPATIENT
Start: 2017-03-17 | End: 2017-12-26 | Stop reason: HOSPADM

## 2017-03-17 NOTE — TELEPHONE ENCOUNTER
----- Message from Diane Ferreira sent at 3/17/2017 10:46 AM CDT -----  Contact: [facility] Pharmacist- Sonia [phone]  [facility] PharmacistRosalba Scott [phone] states a separate prescription is needed to dispense the spacer.

## 2017-03-17 NOTE — MR AVS SNAPSHOT
[facility]  [address]  [facility] [address]  Phone: [phone]  Fax: [phone]                  Petrona Gonzalez   3/17/2017 11:40 AM   Office Visit    Description:  Female : 1940   Provider:  Katy Arriaga MD   Department:  [facility]           Reason for Visit     Hospital Follow Up           Diagnoses this Visit        Comments    COPD, moderate    -  Primary     Chronic respiratory failure with hypoxia         Perennial allergic rhinitis, unspecified allergic rhinitis trigger                To Do List           Future Appointments        Provider Department Dept Phone    3/17/2017 11:40 AM Katy Arriaga MD [facility] [phone]    2017 1:00 PM PULMONARY DISEASE MANAGEMENT [facility] Pulm Function [facility] [phone]    2017 2:00 PM PULMONARY LAB, [facility] - Pulm Function [facility] [phone]    2017 2:20 PM Juan Carlos Araujo MD [facility] - Pulmonary Services [phone]      Goals (5 Years of Data)     None       These Medications        Disp Refills Start End    albuterol 90 mcg/actuation inhaler 1 Inhaler 5 3/17/2017 3/17/2018    Inhale 2 puffs into the lungs every 6 (six) hours as needed. Dispense with spacer. - Inhalation    Pharmacy: [facility]/pharmacy #[id number] [facility] [address] [facility] Ph #: [phone]         [facility] On Call     [facility] On Call Nurse Care Line -  Assistance  Registered nurses in the [facility] provide clinical advisement, health education, appointment booking, and other advisory services.  Call for this free service at [phone].             Medications           START taking these NEW medications        Refills    albuterol 90 mcg/actuation inhaler 5    Sig: Inhale 2 puffs into the lungs every 6 (six) hours as needed. Dispense with spacer.    Class: Normal    Route: Inhalation      STOP taking these medications     omeprazole  (PRILOSEC) 20 MG capsule Take 1 capsule (20 mg total) by mouth once daily.    theophylline (THEODUR) 300 MG 12 hr tablet Take 1 tablet (300 mg total) by mouth 2 (two) times daily.    furosemide (LASIX) 20 MG tablet Take 1 tablet (20 mg total) by mouth once daily.           Verify that the below list of medications is an accurate representation of the medications you are currently taking.  If none reported, the list may be blank. If incorrect, please contact your healthcare provider. Carry this list with you in case of emergency.           Current Medications     albuterol-ipratropium 2.5mg-0.5mg/3mL (DUO-NEB) 0.5 mg-3 mg(2.5 mg base)/3 mL nebulizer solution Take 3 mLs by nebulization every 6 (six) hours.    aspirin (ECOTRIN) 81 MG EC tablet Take 81 mg by mouth once daily.    budesonide-formoterol 160-4.5 mcg (SYMBICORT) 160-4.5 mcg/actuation HFAA Inhale 2 puffs into the lungs every 12 (twelve) hours. Wash out mouth after using    cetirizine (ZYRTEC) 10 MG tablet Take 1 tablet (10 mg total) by mouth once daily.    dextromethorphan-guaifenesin  mg (MUCINEX DM)  mg per 12 hr tablet Take 1 tablet by mouth.    lisinopril (PRINIVIL,ZESTRIL) 5 MG tablet Take 1 tablet (5 mg total) by mouth once daily.    metformin (GLUCOPHAGE) 500 MG tablet Take 1 tablet (500 mg total) by mouth 2 (two) times daily with meals.    OXYGEN-AIR DELIVERY SYSTEMS MISC by Misc.(Non-Drug; Combo Route) route.    predniSONE (DELTASONE) 5 MG tablet Take 1 tablet (5 mg total) by mouth once daily. Start after finishing high dose prednisone    simvastatin (ZOCOR) 10 MG tablet Take 1 tablet (10 mg total) by mouth every evening.    trazodone (DESYREL) 50 MG tablet Take 1 tablet (50 mg total) by mouth every evening.    albuterol 90 mcg/actuation inhaler Inhale 2 puffs into the lungs every 6 (six) hours as needed. Dispense with spacer.           Clinical Reference Information           Your Vitals Were     Pulse Temp Resp Height Weight SpO2    73  "96.9 °F (36.1 °C) (Temporal) 16 5' 3" (1.6 m) 81.6 kg (180 lb) 93%    BMI                31.89 kg/m2          Allergies as of 3/17/2017     Meloxicam    Naproxen    Morphine      Immunizations Administered on Date of Encounter - 3/17/2017     None      Language Assistance Services     ATTENTION: Language assistance services are available, free of charge. Please call [phone].      ATENCIÓN: Si habla español, tiene a portillo disposición servicios gratuitos de asistencia lingüística. Llame al [phone].     CHÚ Ý: N?u b?n nói Ti?ng Vi?t, có các d?ch v? h? tr? ngôn ng? mi?n phí dành cho b?n. G?i s? [phone].         [facility] complies with applicable Federal civil rights laws and does not discriminate on the basis of race, color, national origin, age, disability, or sex.        "

## 2017-04-04 ENCOUNTER — HOSPITAL ENCOUNTER (OUTPATIENT)
Dept: RADIOLOGY | Facility: HOSPITAL | Age: 77
Discharge: HOME | End: 2017-04-04
Attending: INTERNAL MEDICINE
Payer: MEDICARE

## 2017-04-04 ENCOUNTER — PROCEDURE VISIT (OUTPATIENT)
Dept: PULMONOLOGY | Facility: CLINIC | Age: 77
End: 2017-04-04
Payer: MEDICARE

## 2017-04-04 ENCOUNTER — OFFICE VISIT (OUTPATIENT)
Dept: PULMONOLOGY | Facility: CLINIC | Age: 77
End: 2017-04-04
Payer: MEDICARE

## 2017-04-04 VITALS
BODY MASS INDEX: 35.2 KG/M2 | HEIGHT: 63 IN | OXYGEN SATURATION: 92 % | SYSTOLIC BLOOD PRESSURE: 124 MMHG | HEART RATE: 76 BPM | WEIGHT: 198.63 LBS | DIASTOLIC BLOOD PRESSURE: 80 MMHG

## 2017-04-04 DIAGNOSIS — J30.89 NON-SEASONAL ALLERGIC RHINITIS DUE TO OTHER ALLERGIC TRIGGER: ICD-10-CM

## 2017-04-04 DIAGNOSIS — J44.89 ASTHMA WITH COPD: Primary | ICD-10-CM

## 2017-04-04 DIAGNOSIS — R06.02 SOB (SHORTNESS OF BREATH): ICD-10-CM

## 2017-04-04 DIAGNOSIS — J96.11 CHRONIC RESPIRATORY FAILURE WITH HYPOXIA: ICD-10-CM

## 2017-04-04 DIAGNOSIS — J44.1 COPD EXACERBATION: ICD-10-CM

## 2017-04-04 PROCEDURE — 1159F MED LIST DOCD IN RCRD: CPT | Mod: S$GLB,,, | Performed by: INTERNAL MEDICINE

## 2017-04-04 PROCEDURE — 99499 UNLISTED E&M SERVICE: CPT | Mod: S$PBB,,, | Performed by: INTERNAL MEDICINE

## 2017-04-04 PROCEDURE — 96372 THER/PROPH/DIAG INJ SC/IM: CPT | Mod: S$GLB,,, | Performed by: INTERNAL MEDICINE

## 2017-04-04 PROCEDURE — 3079F DIAST BP 80-89 MM HG: CPT | Mod: S$GLB,,, | Performed by: INTERNAL MEDICINE

## 2017-04-04 PROCEDURE — 1160F RVW MEDS BY RX/DR IN RCRD: CPT | Mod: S$GLB,,, | Performed by: INTERNAL MEDICINE

## 2017-04-04 PROCEDURE — 1126F AMNT PAIN NOTED NONE PRSNT: CPT | Mod: S$GLB,,, | Performed by: INTERNAL MEDICINE

## 2017-04-04 PROCEDURE — 3074F SYST BP LT 130 MM HG: CPT | Mod: S$GLB,,, | Performed by: INTERNAL MEDICINE

## 2017-04-04 PROCEDURE — 99999 PR PBB SHADOW E&M-EST. PATIENT-LVL III: CPT | Mod: PBBFAC,,, | Performed by: INTERNAL MEDICINE

## 2017-04-04 PROCEDURE — 71020 XR CHEST PA AND LATERAL: CPT | Mod: 26,,, | Performed by: RADIOLOGY

## 2017-04-04 PROCEDURE — 99215 OFFICE O/P EST HI 40 MIN: CPT | Mod: 25,S$GLB,, | Performed by: INTERNAL MEDICINE

## 2017-04-04 PROCEDURE — 1157F ADVNC CARE PLAN IN RCRD: CPT | Mod: S$GLB,,, | Performed by: INTERNAL MEDICINE

## 2017-04-04 RX ORDER — GUAIFENESIN 600 MG/1
1200 TABLET, EXTENDED RELEASE ORAL 2 TIMES DAILY
Qty: 60 TABLET | Status: SHIPPED | OUTPATIENT
Start: 2017-04-04 | End: 2017-04-14

## 2017-04-04 RX ORDER — MINERAL OIL
180 ENEMA (ML) RECTAL DAILY
Qty: 30 TABLET | Refills: 11 | Status: SHIPPED | OUTPATIENT
Start: 2017-04-04 | End: 2017-04-04 | Stop reason: ALTCHOICE

## 2017-04-04 RX ORDER — CIPROFLOXACIN 500 MG/1
500 TABLET ORAL 2 TIMES DAILY
Qty: 60 TABLET | Refills: 1 | Status: SHIPPED | OUTPATIENT
Start: 2017-04-04 | End: 2017-05-01 | Stop reason: ALTCHOICE

## 2017-04-04 RX ORDER — CETIRIZINE HYDROCHLORIDE 10 MG/1
10 TABLET ORAL DAILY
Qty: 30 TABLET | Refills: 11 | Status: SHIPPED | OUTPATIENT
Start: 2017-04-04 | End: 2017-12-21

## 2017-04-04 RX ORDER — PREDNISONE 5 MG/1
40 TABLET ORAL DAILY
COMMUNITY
Start: 2017-03-28 | End: 2018-06-18

## 2017-04-04 RX ORDER — TRIAMCINOLONE ACETONIDE 40 MG/ML
80 INJECTION, SUSPENSION INTRA-ARTICULAR; INTRAMUSCULAR
Status: COMPLETED | OUTPATIENT
Start: 2017-04-04 | End: 2017-04-04

## 2017-04-04 RX ORDER — CIPROFLOXACIN 500 MG/1
500 TABLET ORAL 2 TIMES DAILY
Qty: 60 TABLET | Refills: 1 | Status: SHIPPED | OUTPATIENT
Start: 2017-04-04 | End: 2017-04-04 | Stop reason: SDUPTHER

## 2017-04-04 RX ORDER — BUDESONIDE 0.5 MG/2ML
0.5 INHALANT ORAL 2 TIMES DAILY
Qty: 120 ML | Refills: 12 | Status: SHIPPED | OUTPATIENT
Start: 2017-04-04 | End: 2017-06-14

## 2017-04-04 RX ORDER — PREDNISONE 20 MG/1
TABLET ORAL
Qty: 50 TABLET | Refills: 1 | Status: SHIPPED | OUTPATIENT
Start: 2017-04-04 | End: 2017-04-04 | Stop reason: SDUPTHER

## 2017-04-04 RX ORDER — PREDNISONE 20 MG/1
TABLET ORAL
Qty: 50 TABLET | Refills: 1 | Status: ON HOLD | OUTPATIENT
Start: 2017-04-04 | End: 2017-05-23 | Stop reason: HOSPADM

## 2017-04-04 RX ORDER — CETIRIZINE HYDROCHLORIDE 10 MG/1
10 TABLET ORAL DAILY
Qty: 30 TABLET | Refills: 11 | Status: SHIPPED | OUTPATIENT
Start: 2017-04-04 | End: 2017-04-04 | Stop reason: SDUPTHER

## 2017-04-04 RX ADMIN — TRIAMCINOLONE ACETONIDE 80 MG: 40 INJECTION, SUSPENSION INTRA-ARTICULAR; INTRAMUSCULAR at 03:04

## 2017-04-04 NOTE — PATIENT INSTRUCTIONS
Budesonide inhalation solution  What is this medicine?  BUDESONIDE (bue JUAN CARLOS oh nide) is a corticosteroid. It helps decrease inflammation in your lungs. This medicine is used to treat the symptoms of asthma. Never use this medicine for an acute asthma attack.  How should I use this medicine?  This medicine is used in a nebulizer. Nebulizers make a liquid into an aerosol that you breathe in through your mouth or your mouth and nose into your lungs. You will be taught how to use your nebulizer. Rinse your mouth with water after use. Follow the directions on your prescription label. Do not mix this medicine with other medicines in your nebulizer. Do not use more often than directed.  Talk to your pediatrician regarding the use of this medicine in children. Special care may be needed.  What side effects may I notice from receiving this medicine?  Side effects that you should report to your doctor or health care professional as soon as possible:  · allergic reactions like skin rash, itching or hives, swelling of the face, lips, or tongue  · breathing problems  · changes in vision  · unusual swelling  · white patches or sores in the mouth or throat  Side effects that usually do not require medical attention (report to your doctor or health care professional if they continue or are bothersome):  · coughing, hoarseness  · headache  · runny nose  · stomach upset  What may interact with this medicine?  Do not take this medicine with any of the following medications:  · mifepristone  This medicine may also interact with the following medications:  · cimetidine  · clarithromycin  · erythromycin  · itraconazole  · ketoconazole  · some vaccinations  What if I miss a dose?  If you miss a dose, use it as soon as you remember. If it is almost time for your next dose, use only that dose and continue with your regular schedule, spacing doses evenly. Do not use double or extra doses.  Where should I keep my medicine?  Keep out of the  reach of children.  Store at a room temperature between 20 and 25 degrees C (68 and 77 degrees F). Do not refrigerate or freeze. Keep unopened vials in the foil pouch. When the package has been opened, the shelf life of the unused medicine is 2 weeks when protected from light. Unused medicine should be returned to the aluminum foil envelope right away to protect them from light. Throw away any unused medicine after the expiration date.  What should I tell my health care provider before I take this medicine?  They need to know if you have any of these conditions:  · bone problems  · glaucoma  · immune system problems  · infection, like chickenpox, tuberculosis, herpes, or fungal infection  · recent surgery or injury of the mouth or throat  · taking corticosteroids by mouth  · an unusual or allergic reaction to budesonide, steroids, other medicines, foods, dyes, or preservatives  · pregnant or trying to get pregnant  · breast-feeding  What should I watch for while using this medicine?  Visit your doctor or health care professional for regular checks on your progress. Check with your health care professional if your symptoms do not improve. If your symptoms get worse or if you need your short acting inhalers more often, call your doctor right away.  The medicine may increase your risk of getting an infection. Stay away from people who are sick. Tell your doctor or health care professional if you are around anyone with measles or chickenpox.  Date Last Reviewed:   NOTE:This sheet is a summary. It may not cover all possible information. If you have questions about this medicine, talk to your doctor, pharmacist, or health care provider. Copyright© 2016 [employer]        Ciprofloxacin tablets  What is this medicine?  CIPROFLOXACIN (sip brayden FLOX a sin) is a quinolone antibiotic. It is used to treat certain kinds of bacterial infections. It will not work for colds, flu, or other viral infections.  How should I use this  medicine?  Take this medicine by mouth with a glass of water. Follow the directions on the prescription label. Take your medicine at regular intervals. Do not take your medicine more often than directed. Take all of your medicine as directed even if you think your are better. Do not skip doses or stop your medicine early.  You can take this medicine with food or on an empty stomach. It can be taken with a meal that contains dairy or calcium, but do not take it alone with a dairy product, like milk or yogurt or calcium-fortified juice.  A special MedGuide will be given to you by the pharmacist with each prescription and refill. Be sure to read this information carefully each time.  Talk to your pediatrician regarding the use of this medicine in children. Special care may be needed.  What side effects may I notice from receiving this medicine?  Side effects that you should report to your doctor or health care professional as soon as possible:  · allergic reactions like skin rash or hives, swelling of the face, lips, or tongue  · anxious  · confusion  · depressed mood  · diarrhea  · fast, irregular heartbeat  · hallucination, loss of contact with reality  · joint, muscle, or tendon pain or swelling  · pain, tingling, numbness in the hands or feet  · suicidal thoughts or other mood changes  · sunburn  · unusually weak or tired  Side effects that usually do not require medical attention (report to your doctor or health care professional if they continue or are bothersome):  · dry mouth  · headache  · nausea  · trouble sleeping  What may interact with this medicine?  Do not take this medicine with any of the following medications:  · cisapride  · droperidol  · terfenadine  · tizanidine  This medicine may also interact with the following medications:  · antacids  · birth control pills  · caffeine  · cyclosporin  · didanosine (ddI) buffered tablets or powder  · medicines for diabetes  · medicines for inflammation like  ibuprofen, naproxen  · methotrexate  · multivitamins  · omeprazole  · phenytoin  · probenecid  · sucralfate  · theophylline  · warfarin  What if I miss a dose?  If you miss a dose, take it as soon as you can. If it is almost time for your next dose, take only that dose. Do not take double or extra doses.  Where should I keep my medicine?  Keep out of the reach of children.  Store at room temperature below 30 degrees C (86 degrees F). Keep container tightly closed. Throw away any unused medicine after the expiration date.  What should I tell my health care provider before I take this medicine?  They need to know if you have any of these conditions:  · bone problems  · cerebral disease  · history of low levels of potassium in the blood  · joint problems  · irregular heartbeat  · kidney disease  · myasthenia gravis  · seizures  · tendon problems  · tingling of the fingers or toes, or other nerve disorder  · an unusual or allergic reaction to ciprofloxacin, other antibiotics or medicines, foods, dyes, or preservatives  · pregnant or trying to get pregnant  · breast-feeding  What should I watch for while using this medicine?  Tell your doctor or health care professional if your symptoms do not improve.  Do not treat diarrhea with over the counter products. Contact your doctor if you have diarrhea that lasts more than 2 days or if it is severe and watery.  You may get drowsy or dizzy. Do not drive, use machinery, or do anything that needs mental alertness until you know how this medicine affects you. Do not stand or sit up quickly, especially if you are an older patient. This reduces the risk of dizzy or fainting spells.  This medicine can make you more sensitive to the sun. Keep out of the sun. If you cannot avoid being in the sun, wear protective clothing and use sunscreen. Do not use sun lamps or tanning beds/booths.  Avoid antacids, aluminum, calcium, iron, magnesium, and zinc products for 6 hours before and 2 hours  after taking a dose of this medicine.  Date Last Reviewed:   NOTE:This sheet is a summary. It may not cover all possible information. If you have questions about this medicine, talk to your doctor, pharmacist, or health care provider. Copyright© 2016 [employer]        Prednisone tablets  What is this medicine?  PREDNISONE (PRED ni sone) is a corticosteroid. It is commonly used to treat inflammation of the skin, joints, lungs, and other organs. Common conditions treated include asthma, allergies, and arthritis. It is also used for other conditions, such as blood disorders and diseases of the adrenal glands.  How should I use this medicine?  Take this medicine by mouth with a glass of water. Follow the directions on the prescription label. Take this medicine with food. If you are taking this medicine once a day, take it in the morning. Do not take more medicine than you are told to take. Do not suddenly stop taking your medicine because you may develop a severe reaction. Your doctor will tell you how much medicine to take. If your doctor wants you to stop the medicine, the dose may be slowly lowered over time to avoid any side effects.  Talk to your pediatrician regarding the use of this medicine in children. Special care may be needed.  What side effects may I notice from receiving this medicine?  Side effects that you should report to your doctor or health care professional as soon as possible:  · allergic reactions like skin rash, itching or hives, swelling of the face, lips, or tongue  · changes in emotions or moods  · changes in vision  · depressed mood  · eye pain  · fever or chills, cough, sore throat, pain or difficulty passing urine  · increased thirst  · swelling of ankles, feet  Side effects that usually do not require medical attention (report to your doctor or health care professional if they continue or are bothersome):  · confusion, excitement, restlessness  · headache  · nausea, vomiting  · skin  problems, acne, thin and shiny skin  · trouble sleeping  · weight gain  What may interact with this medicine?  Do not take this medicine with any of the following medications:  · metyrapone  · mifepristone  This medicine may also interact with the following medications:  · aminoglutethimide  · amphotericin B  · aspirin and aspirin-like medicines  · barbiturates  · certain medicines for diabetes, like glipizide or glyburide  · cholestyramine  · cholinesterase inhibitors  · cyclosporine  · digoxin  · diuretics  · ephedrine  · female hormones, like estrogens and birth control pills  · isoniazid  · ketoconazole  · NSAIDS, medicines for pain and inflammation, like ibuprofen or naproxen  · phenytoin  · rifampin  · toxoids  · vaccines  · warfarin  What if I miss a dose?  If you miss a dose, take it as soon as you can. If it is almost time for your next dose, talk to your doctor or health care professional. You may need to miss a dose or take an extra dose. Do not take double or extra doses without advice.  Where should I keep my medicine?  Keep out of the reach of children.  Store at room temperature between 15 and 30 degrees C (59 and 86 degrees F). Protect from light. Keep container tightly closed. Throw away any unused medicine after the expiration date.  What should I tell my health care provider before I take this medicine?  They need to know if you have any of these conditions:  · Cushing's syndrome  · diabetes  · glaucoma  · heart disease  · high blood pressure  · infection (especially a virus infection such as chickenpox, cold sores, or herpes)  · kidney disease  · liver disease  · mental illness  · myasthenia gravis  · osteoporosis  · seizures  · stomach or intestine problems  · thyroid disease  · an unusual or allergic reaction to lactose, prednisone, other medicines, foods, dyes, or preservatives  · pregnant or trying to get pregnant  · breast-feeding  What should I watch for while using this medicine?  Visit  your doctor or health care professional for regular checks on your progress. If you are taking this medicine over a prolonged period, carry an identification card with your name and address, the type and dose of your medicine, and your doctor's name and address.  This medicine may increase your risk of getting an infection. Tell your doctor or health care professional if you are around anyone with measles or chickenpox, or if you develop sores or blisters that do not heal properly.  If you are going to have surgery, tell your doctor or health care professional that you have taken this medicine within the last twelve months.  Ask your doctor or health care professional about your diet. You may need to lower the amount of salt you eat.  This medicine may affect blood sugar levels. If you have diabetes, check with your doctor or health care professional before you change your diet or the dose of your diabetic medicine.  Date Last Reviewed:   NOTE:This sheet is a summary. It may not cover all possible information. If you have questions about this medicine, talk to your doctor, pharmacist, or health care provider. Copyright© 2016 [employer]

## 2017-04-04 NOTE — PROGRESS NOTES
Subjective:       Patient ID: Petrona Gonzalez is a 76 y.o. female.    Chief Complaint: COPD    HPI COPD  She presents for evaluation and treatment of COPD. The patient is currently having symptoms / an exacerbation. Current symptoms include acute dyspnea, chronic dyspnea, cough productive of yellow and green sputum in small amounts and wheezing. Symptoms have been present since several weeks ago and have been gradually worsening. She denies chills and fever. Associated symptoms include poor exercise tolerance.  This episode appears to have been triggered by no identifiable factor. Treatments tried for the current exacerbation: albuterol inhaler. The patient has been having similar episodes for approximately 10 years. She uses 1 pillows at night. Patient currently is on oxygen at 2 L/min per nasal cannula.. The patient is having constitutional symptoms, including fatigue and malaise. The patient has been hospitalized for this condition before. She quit smoking approximately 3 years ago. The patient is experiencing exercise intolerance (difficulty walking 10 feet on flat ground).  Past Medical History:   Diagnosis Date    COPD (chronic obstructive pulmonary disease) with emphysema     Diabetes mellitus     Hyperlipidemia     Hypertension     Maxillary sinusitis, chronic      Past Surgical History:   Procedure Laterality Date    BACK SURGERY      HERNIA REPAIR      HYSTERECTOMY       Social History     Social History    Marital status:      Spouse name: N/A    Number of children: N/A    Years of education: N/A     Occupational History    Not on file.     Social History Main Topics    Smoking status: Former Smoker     Years: 30.00    Smokeless tobacco: Never Used    Alcohol use No    Drug use: No    Sexual activity: Not Currently     Other Topics Concern    Not on file     Social History Narrative     Review of Systems   Constitutional: Positive for fatigue. Negative for fever.   HENT: Positive  for postnasal drip, rhinorrhea and congestion.    Eyes: Negative for redness and itching.   Respiratory: Positive for cough, sputum production, shortness of breath, dyspnea on extertion, use of rescue inhaler and Paroxysmal Nocturnal Dyspnea.    Cardiovascular: Negative for chest pain, palpitations and leg swelling.   Genitourinary: Negative for difficulty urinating and hematuria.   Endocrine: Negative for cold intolerance and heat intolerance.    Skin: Negative for rash.   Gastrointestinal: Negative for nausea and abdominal pain.   Neurological: Negative for dizziness, syncope, weakness and light-headedness.   Hematological: Negative for adenopathy. Does not bruise/bleed easily.   Psychiatric/Behavioral: Negative for sleep disturbance. The patient is not nervous/anxious.        Objective:      Physical Exam   Constitutional: She is oriented to person, place, and time. She appears well-developed and well-nourished.   HENT:   Head: Normocephalic and atraumatic.   Mouth/Throat: Oropharyngeal exudate present.   Eyes: Conjunctivae are normal. Pupils are equal, round, and reactive to light.   Neck: Neck supple. No JVD present. No tracheal deviation present. No thyromegaly present.   Cardiovascular: Normal rate, regular rhythm and normal heart sounds.    Pulmonary/Chest: Effort normal. No respiratory distress. She has decreased breath sounds. She has wheezes in the right lower field and the left lower field. She has no rhonchi. She has no rales. She exhibits no tenderness.   Abdominal: Soft. Bowel sounds are normal.   Musculoskeletal: Normal range of motion. She exhibits no edema.   Lymphadenopathy:     She has no cervical adenopathy.   Neurological: She is alert and oriented to person, place, and time.   Skin: Skin is warm and dry.   Nursing note and vitals reviewed.    Personal Diagnostic Review  Chest X-Ray: I personally reviewed the films and findings are:, air trapping/emphysema  Pulmonary function tests:  na  No  flowsheet data found.      Assessment:       1. Asthma with COPD    2. COPD exacerbation    3. Non-seasonal allergic rhinitis due to other allergic trigger        Outpatient Encounter Prescriptions as of 4/4/2017   Medication Sig Dispense Refill    albuterol 90 mcg/actuation inhaler Inhale 2 puffs into the lungs every 6 (six) hours as needed. Dispense with spacer. 1 Inhaler 5    albuterol-ipratropium 2.5mg-0.5mg/3mL (DUO-NEB) 0.5 mg-3 mg(2.5 mg base)/3 mL nebulizer solution Take 3 mLs by nebulization every 6 (six) hours. 360 vial 11    aspirin (ECOTRIN) 81 MG EC tablet Take 81 mg by mouth once daily.      cetirizine (ZYRTEC) 10 MG tablet Take 1 tablet (10 mg total) by mouth once daily. 30 tablet 11    dextromethorphan-guaifenesin  mg (MUCINEX DM)  mg per 12 hr tablet Take 1 tablet by mouth.      lisinopril (PRINIVIL,ZESTRIL) 5 MG tablet Take 1 tablet (5 mg total) by mouth once daily. 30 tablet 5    metformin (GLUCOPHAGE) 500 MG tablet Take 1 tablet (500 mg total) by mouth 2 (two) times daily with meals. 60 tablet 5    OXYGEN-AIR DELIVERY SYSTEMS MISC by [facility] [place].(Non-Drug; Combo Route) route.      predniSONE (DELTASONE) 5 MG tablet       simvastatin (ZOCOR) 10 MG tablet Take 1 tablet (10 mg total) by mouth every evening. 30 tablet 11    trazodone (DESYREL) 50 MG tablet Take 1 tablet (50 mg total) by mouth every evening. 90 tablet 4    [DISCONTINUED] budesonide-formoterol 160-4.5 mcg (SYMBICORT) 160-4.5 mcg/actuation HFAA Inhale 2 puffs into the lungs every 12 (twelve) hours. Wash out mouth after using 1 Inhaler 0    [DISCONTINUED] cetirizine (ZYRTEC) 10 MG tablet Take 1 tablet (10 mg total) by mouth once daily. 30 tablet 11    [DISCONTINUED] cetirizine (ZYRTEC) 10 MG tablet Take 1 tablet (10 mg total) by mouth once daily. 30 tablet 11    budesonide (PULMICORT) 0.5 mg/2 mL nebulizer solution Take 2 mLs (0.5 mg total) by nebulization 2 (two) times daily. Wash out mouth after using. 120 mL 12     ciprofloxacin HCl (CIPRO) 500 MG tablet Take 1 tablet (500 mg total) by mouth 2 (two) times daily. 60 tablet 1    guaifenesin (MUCINEX) 600 mg 12 hr tablet Take 2 tablets (1,200 mg total) by mouth 2 (two) times daily. 60 tablet prn    predniSONE (DELTASONE) 20 MG tablet Prednisone 60 mg/day for 7 days,40 mg/day for 7 days,20 mg/day for 7 days,10 mg/ day (1/2 tablet) for 7 days 50 tablet 1    [DISCONTINUED] ciprofloxacin HCl (CIPRO) 500 MG tablet Take 1 tablet (500 mg total) by mouth 2 (two) times daily. 60 tablet 1    [DISCONTINUED] fexofenadine (ALLEGRA) 180 MG tablet Take 1 tablet (180 mg total) by mouth once daily. 30 tablet 11    [DISCONTINUED] predniSONE (DELTASONE) 20 MG tablet Prednisone 60 mg/day for 7 days,40 mg/day for 7 days,20 mg/day for 7 days,10 mg/ day (1/2 tablet) for 7 days 50 tablet 1    [] triamcinolone acetonide injection 80 mg        No facility-administered encounter medications on file as of 2017.      No orders of the defined types were placed in this encounter.    Plan:       Requested Prescriptions     Signed Prescriptions Disp Refills    budesonide (PULMICORT) 0.5 mg/2 mL nebulizer solution 120 mL 12     Sig: Take 2 mLs (0.5 mg total) by nebulization 2 (two) times daily. Wash out mouth after using.    guaifenesin (MUCINEX) 600 mg 12 hr tablet 60 tablet prn     Sig: Take 2 tablets (1,200 mg total) by mouth 2 (two) times daily.    cetirizine (ZYRTEC) 10 MG tablet 30 tablet 11     Sig: Take 1 tablet (10 mg total) by mouth once daily.    predniSONE (DELTASONE) 20 MG tablet 50 tablet 1     Sig: Prednisone 60 mg/day for 7 days,40 mg/day for 7 days,20 mg/day for 7 days,10 mg/ day (1/2 tablet) for 7 days    ciprofloxacin HCl (CIPRO) 500 MG tablet 60 tablet 1     Sig: Take 1 tablet (500 mg total) by mouth 2 (two) times daily.     Asthma with COPD  -     budesonide (PULMICORT) 0.5 mg/2 mL nebulizer solution; Take 2 mLs (0.5 mg total) by nebulization 2 (two) times daily. Wash  out mouth after using.  Dispense: 120 mL; Refill: 12  -     triamcinolone acetonide injection 80 mg; Inject 2 mLs (80 mg total) into the muscle one time.  -     Discontinue: predniSONE (DELTASONE) 20 MG tablet; Prednisone 60 mg/day for 7 days,40 mg/day for 7 days,20 mg/day for 7 days,10 mg/ day (1/2 tablet) for 7 days  Dispense: 50 tablet; Refill: 1  -     predniSONE (DELTASONE) 20 MG tablet; Prednisone 60 mg/day for 7 days,40 mg/day for 7 days,20 mg/day for 7 days,10 mg/ day (1/2 tablet) for 7 days  Dispense: 50 tablet; Refill: 1    COPD exacerbation  -     Discontinue: predniSONE (DELTASONE) 20 MG tablet; Prednisone 60 mg/day for 7 days,40 mg/day for 7 days,20 mg/day for 7 days,10 mg/ day (1/2 tablet) for 7 days  Dispense: 50 tablet; Refill: 1  -     guaifenesin (MUCINEX) 600 mg 12 hr tablet; Take 2 tablets (1,200 mg total) by mouth 2 (two) times daily.  Dispense: 60 tablet; Refill: prn  -     Discontinue: ciprofloxacin HCl (CIPRO) 500 MG tablet; Take 1 tablet (500 mg total) by mouth 2 (two) times daily.  Dispense: 60 tablet; Refill: 1  -     predniSONE (DELTASONE) 20 MG tablet; Prednisone 60 mg/day for 7 days,40 mg/day for 7 days,20 mg/day for 7 days,10 mg/ day (1/2 tablet) for 7 days  Dispense: 50 tablet; Refill: 1  -     ciprofloxacin HCl (CIPRO) 500 MG tablet; Take 1 tablet (500 mg total) by mouth 2 (two) times daily.  Dispense: 60 tablet; Refill: 1    Non-seasonal allergic rhinitis due to other allergic trigger  -     Discontinue: cetirizine (ZYRTEC) 10 MG tablet; Take 1 tablet (10 mg total) by mouth once daily.  Dispense: 30 tablet; Refill: 11  -     cetirizine (ZYRTEC) 10 MG tablet; Take 1 tablet (10 mg total) by mouth once daily.  Dispense: 30 tablet; Refill: 11    Other orders  -     Discontinue: fexofenadine (ALLEGRA) 180 MG tablet; Take 1 tablet (180 mg total) by mouth once daily.  Dispense: 30 tablet; Refill: 11      Return in about 4 months (around 8/4/2017) for cxr.    MEDICAL DECISION MAKING:  Moderate to high complexity.  Overall, the multiple problems listed are of moderate to high severity that may impact quality of life and activities of daily living. Side effects of medications, treatment plan as well as options and alternatives reviewed and discussed with patient. There was counseling of patient concerning these issues.    Total time spent in face to face counseling and coordination of care - 40 minutes over 50% of time was used in discussion of prognosis, risks, benefits of treatment, instructions and compliance with regimen . Discussion with other physicians or health care providers (homehealth, durable medical equipment providers).

## 2017-04-04 NOTE — MR AVS SNAPSHOT
[facility]  [address] [place] [id number]  Phone: [phone]  Fax: [phone]                  Petrona Gonzalez   2017 2:20 PM   Office Visit    Description:  Female : 1940   Provider:  Juan Carlos Araujo MD   Department:  [facility]           Reason for Visit     COPD           Diagnoses this Visit        Comments    Asthma with COPD    -  Primary     COPD exacerbation         Non-seasonal allergic rhinitis due to other allergic trigger                To Do List           Future Appointments        Provider Department Dept Phone    2017 11:00 AM ONLH XR1- [facility] [phone]    2017 11:20 AM Pratima Cheng NP [facility] [phone]      Goals (5 Years of Data)     None      Follow-Up and Disposition     Return in about 4 months (around 2017) for cxr.       These Medications        Disp Refills Start End    budesonide (PULMICORT) 0.5 mg/2 mL nebulizer solution 120 mL 12 2017    Take 2 mLs (0.5 mg total) by nebulization 2 (two) times daily. Wash out mouth after using. - Nebulization    Pharmacy: [facility] [address] Ph #: [phone]       Notes to Pharmacy: Dispense # 60 vials of 0.5mg/2ml    guaifenesin (MUCINEX) 600 mg 12 hr tablet 60 tablet prn 2017    Take 2 tablets (1,200 mg total) by mouth 2 (two) times daily. - Oral    Pharmacy: [facility] [address] Ph #: [phone]       cetirizine (ZYRTEC) 10 MG tablet 30 tablet 11 2017     Take 1 tablet (10 mg total) by mouth once daily. - Oral    Pharmacy: [facility]/pharmacy #[id number] - [facility] [id number] [place] [address] Ph #: [phone]       predniSONE (DELTASONE) 20 MG tablet 50 tablet 1 2017     Prednisone 60 mg/day for 7 days,40 mg/day for 7 days,20 mg/day for 7 days,10  mg/ day (1/2 tablet) for 7 days    Pharmacy: [facility]/pharmacy #[id number] - [place] - [address] [facility] Ph #: [phone]       ciprofloxacin HCl (CIPRO) 500 MG tablet 60 tablet 1 4/4/2017 5/4/2017    Take 1 tablet (500 mg total) by mouth 2 (two) times daily. - Oral    Pharmacy: [facility]/pharmacy #[id number] - [place] - [address] [facility] Ph #: [phone]         [facility]     [facility] Nurse Care [facility] - 24/7 Assistance  Unless otherwise directed by your provider, please contact [facility], our nurse care line that is available for 24/7 assistance.     Registered nurses in the [facility] provide: appointment scheduling, clinical advisement, health education, and other advisory services.  Call: [phone] (toll free)               Medications           START taking these NEW medications        Refills    budesonide (PULMICORT) 0.5 mg/2 mL nebulizer solution 12    Sig: Take 2 mLs (0.5 mg total) by nebulization 2 (two) times daily. Wash out mouth after using.    Class: Normal    Route: Nebulization    guaifenesin (MUCINEX) 600 mg 12 hr tablet prn    Sig: Take 2 tablets (1,200 mg total) by mouth 2 (two) times daily.    Class: Normal    Route: Oral    predniSONE (DELTASONE) 20 MG tablet 1    Sig: Prednisone 60 mg/day for 7 days,40 mg/day for 7 days,20 mg/day for 7 days,10 mg/ day (1/2 tablet) for 7 days    Class: Normal    ciprofloxacin HCl (CIPRO) 500 MG tablet 1    Sig: Take 1 tablet (500 mg total) by mouth 2 (two) times daily.    Class: Normal    Route: Oral      These medications were administered today        Dose Freq    triamcinolone acetonide injection 80 mg 80 mg Clinic/HOD 1 time    Sig: Inject 2 mLs (80 mg total) into the muscle one time.    Class: Normal    Route: Intramuscular      STOP taking these medications     budesonide-formoterol 160-4.5 mcg (SYMBICORT) 160-4.5 mcg/actuation HFAA Inhale 2 puffs into the lungs every 12 (twelve) hours. Wash out mouth after using          "  Verify that the below list of medications is an accurate representation of the medications you are currently taking.  If none reported, the list may be blank. If incorrect, please contact your healthcare provider. Carry this list with you in case of emergency.           Current Medications     albuterol 90 mcg/actuation inhaler Inhale 2 puffs into the lungs every 6 (six) hours as needed. Dispense with spacer.    albuterol-ipratropium 2.5mg-0.5mg/3mL (DUO-NEB) 0.5 mg-3 mg(2.5 mg base)/3 mL nebulizer solution Take 3 mLs by nebulization every 6 (six) hours.    aspirin (ECOTRIN) 81 MG EC tablet Take 81 mg by mouth once daily.    budesonide (PULMICORT) 0.5 mg/2 mL nebulizer solution Take 2 mLs (0.5 mg total) by nebulization 2 (two) times daily. Wash out mouth after using.    cetirizine (ZYRTEC) 10 MG tablet Take 1 tablet (10 mg total) by mouth once daily.    ciprofloxacin HCl (CIPRO) 500 MG tablet Take 1 tablet (500 mg total) by mouth 2 (two) times daily.    dextromethorphan-guaifenesin  mg (MUCINEX DM)  mg per 12 hr tablet Take 1 tablet by mouth.    guaifenesin (MUCINEX) 600 mg 12 hr tablet Take 2 tablets (1,200 mg total) by mouth 2 (two) times daily.    lisinopril (PRINIVIL,ZESTRIL) 5 MG tablet Take 1 tablet (5 mg total) by mouth once daily.    metformin (GLUCOPHAGE) 500 MG tablet Take 1 tablet (500 mg total) by mouth 2 (two) times daily with meals.    OXYGEN-AIR DELIVERY SYSTEMS MISC by [facility] [place].(Non-Drug; Combo Route) route.    predniSONE (DELTASONE) 20 MG tablet Prednisone 60 mg/day for 7 days,40 mg/day for 7 days,20 mg/day for 7 days,10 mg/ day (1/2 tablet) for 7 days    predniSONE (DELTASONE) 5 MG tablet     simvastatin (ZOCOR) 10 MG tablet Take 1 tablet (10 mg total) by mouth every evening.    trazodone (DESYREL) 50 MG tablet Take 1 tablet (50 mg total) by mouth every evening.           Clinical Reference Information           Your Vitals Were     BP Pulse Height Weight SpO2 BMI    124/80 76 5' 3" (1.6 " m) 90.1 kg (198 lb 10.2 oz) 92% 35.19 kg/m2      Blood Pressure          Most Recent Value    BP  124/80      Allergies as of 4/4/2017     Meloxicam    Naproxen    Morphine      Immunizations Administered on Date of Encounter - 4/4/2017     None      Instructions      Budesonide inhalation solution  What is this medicine?  BUDESONIDE (bue JUAN CARLOS oh nide) is a corticosteroid. It helps decrease inflammation in your lungs. This medicine is used to treat the symptoms of asthma. Never use this medicine for an acute asthma attack.  How should I use this medicine?  This medicine is used in a nebulizer. Nebulizers make a liquid into an aerosol that you breathe in through your mouth or your mouth and nose into your lungs. You will be taught how to use your nebulizer. Rinse your mouth with water after use. Follow the directions on your prescription label. Do not mix this medicine with other medicines in your nebulizer. Do not use more often than directed.  Talk to your pediatrician regarding the use of this medicine in children. Special care may be needed.  What side effects may I notice from receiving this medicine?  Side effects that you should report to your doctor or health care professional as soon as possible:  · allergic reactions like skin rash, itching or hives, swelling of the face, lips, or tongue  · breathing problems  · changes in vision  · unusual swelling  · white patches or sores in the mouth or throat  Side effects that usually do not require medical attention (report to your doctor or health care professional if they continue or are bothersome):  · coughing, hoarseness  · headache  · runny nose  · stomach upset  What may interact with this medicine?  Do not take this medicine with any of the following medications:  · mifepristone  This medicine may also interact with the following medications:  · cimetidine  · clarithromycin  · erythromycin  · itraconazole  · ketoconazole  · some vaccinations  What if I miss a  dose?  If you miss a dose, use it as soon as you remember. If it is almost time for your next dose, use only that dose and continue with your regular schedule, spacing doses evenly. Do not use double or extra doses.  Where should I keep my medicine?  Keep out of the reach of children.  Store at a room temperature between 20 and 25 degrees C (68 and 77 degrees F). Do not refrigerate or freeze. Keep unopened vials in the foil pouch. When the package has been opened, the shelf life of the unused medicine is 2 weeks when protected from light. Unused medicine should be returned to the aluminum foil envelope right away to protect them from light. Throw away any unused medicine after the expiration date.  What should I tell my health care provider before I take this medicine?  They need to know if you have any of these conditions:  · bone problems  · glaucoma  · immune system problems  · infection, like chickenpox, tuberculosis, herpes, or fungal infection  · recent surgery or injury of the mouth or throat  · taking corticosteroids by mouth  · an unusual or allergic reaction to budesonide, steroids, other medicines, foods, dyes, or preservatives  · pregnant or trying to get pregnant  · breast-feeding  What should I watch for while using this medicine?  Visit your doctor or health care professional for regular checks on your progress. Check with your health care professional if your symptoms do not improve. If your symptoms get worse or if you need your short acting inhalers more often, call your doctor right away.  The medicine may increase your risk of getting an infection. Stay away from people who are sick. Tell your doctor or health care professional if you are around anyone with measles or chickenpox.  Date Last Reviewed:   NOTE:This sheet is a summary. It may not cover all possible information. If you have questions about this medicine, talk to your doctor, pharmacist, or health care provider. Copyright© 2016 [employer]        Ciprofloxacin tablets  What is this medicine?  CIPROFLOXACIN (sip brayden FLOX a sin) is a quinolone antibiotic. It is used to treat certain kinds of bacterial infections. It will not work for colds, flu, or other viral infections.  How should I use this medicine?  Take this medicine by mouth with a glass of water. Follow the directions on the prescription label. Take your medicine at regular intervals. Do not take your medicine more often than directed. Take all of your medicine as directed even if you think your are better. Do not skip doses or stop your medicine early.  You can take this medicine with food or on an empty stomach. It can be taken with a meal that contains dairy or calcium, but do not take it alone with a dairy product, like milk or yogurt or calcium-fortified juice.  A special MedGuide will be given to you by the pharmacist with each prescription and refill. Be sure to read this information carefully each time.  Talk to your pediatrician regarding the use of this medicine in children. Special care may be needed.  What side effects may I notice from receiving this medicine?  Side effects that you should report to your doctor or health care professional as soon as possible:  · allergic reactions like skin rash or hives, swelling of the face, lips, or tongue  · anxious  · confusion  · depressed mood  · diarrhea  · fast, irregular heartbeat  · hallucination, loss of contact with reality  · joint, muscle, or tendon pain or swelling  · pain, tingling, numbness in the hands or feet  · suicidal thoughts or other mood changes  · sunburn  · unusually weak or tired  Side effects that usually do not require medical attention (report to your doctor or health care professional if they continue or are bothersome):  · dry mouth  · headache  · nausea  · trouble sleeping  What may interact with this medicine?  Do not take this medicine with any of the following  medications:  · cisapride  · droperidol  · terfenadine  · tizanidine  This medicine may also interact with the following medications:  · antacids  · birth control pills  · caffeine  · cyclosporin  · didanosine (ddI) buffered tablets or powder  · medicines for diabetes  · medicines for inflammation like ibuprofen, naproxen  · methotrexate  · multivitamins  · omeprazole  · phenytoin  · probenecid  · sucralfate  · theophylline  · warfarin  What if I miss a dose?  If you miss a dose, take it as soon as you can. If it is almost time for your next dose, take only that dose. Do not take double or extra doses.  Where should I keep my medicine?  Keep out of the reach of children.  Store at room temperature below 30 degrees C (86 degrees F). Keep container tightly closed. Throw away any unused medicine after the expiration date.  What should I tell my health care provider before I take this medicine?  They need to know if you have any of these conditions:  · bone problems  · cerebral disease  · history of low levels of potassium in the blood  · joint problems  · irregular heartbeat  · kidney disease  · myasthenia gravis  · seizures  · tendon problems  · tingling of the fingers or toes, or other nerve disorder  · an unusual or allergic reaction to ciprofloxacin, other antibiotics or medicines, foods, dyes, or preservatives  · pregnant or trying to get pregnant  · breast-feeding  What should I watch for while using this medicine?  Tell your doctor or health care professional if your symptoms do not improve.  Do not treat diarrhea with over the counter products. Contact your doctor if you have diarrhea that lasts more than 2 days or if it is severe and watery.  You may get drowsy or dizzy. Do not drive, use machinery, or do anything that needs mental alertness until you know how this medicine affects you. Do not stand or sit up quickly, especially if you are an older patient. This reduces the risk of dizzy or fainting  spells.  This medicine can make you more sensitive to the sun. Keep out of the sun. If you cannot avoid being in the sun, wear protective clothing and use sunscreen. Do not use sun lamps or tanning beds/booths.  Avoid antacids, aluminum, calcium, iron, magnesium, and zinc products for 6 hours before and 2 hours after taking a dose of this medicine.  Date Last Reviewed:   NOTE:This sheet is a summary. It may not cover all possible information. If you have questions about this medicine, talk to your doctor, pharmacist, or health care provider. Copyright© 2016 [employer]        Prednisone tablets  What is this medicine?  PREDNISONE (PRED ni sone) is a corticosteroid. It is commonly used to treat inflammation of the skin, joints, lungs, and other organs. Common conditions treated include asthma, allergies, and arthritis. It is also used for other conditions, such as blood disorders and diseases of the adrenal glands.  How should I use this medicine?  Take this medicine by mouth with a glass of water. Follow the directions on the prescription label. Take this medicine with food. If you are taking this medicine once a day, take it in the morning. Do not take more medicine than you are told to take. Do not suddenly stop taking your medicine because you may develop a severe reaction. Your doctor will tell you how much medicine to take. If your doctor wants you to stop the medicine, the dose may be slowly lowered over time to avoid any side effects.  Talk to your pediatrician regarding the use of this medicine in children. Special care may be needed.  What side effects may I notice from receiving this medicine?  Side effects that you should report to your doctor or health care professional as soon as possible:  · allergic reactions like skin rash, itching or hives, swelling of the face, lips, or tongue  · changes in emotions or moods  · changes in vision  · depressed mood  · eye pain  · fever or chills, cough, sore  throat, pain or difficulty passing urine  · increased thirst  · swelling of ankles, feet  Side effects that usually do not require medical attention (report to your doctor or health care professional if they continue or are bothersome):  · confusion, excitement, restlessness  · headache  · nausea, vomiting  · skin problems, acne, thin and shiny skin  · trouble sleeping  · weight gain  What may interact with this medicine?  Do not take this medicine with any of the following medications:  · metyrapone  · mifepristone  This medicine may also interact with the following medications:  · aminoglutethimide  · amphotericin B  · aspirin and aspirin-like medicines  · barbiturates  · certain medicines for diabetes, like glipizide or glyburide  · cholestyramine  · cholinesterase inhibitors  · cyclosporine  · digoxin  · diuretics  · ephedrine  · female hormones, like estrogens and birth control pills  · isoniazid  · ketoconazole  · NSAIDS, medicines for pain and inflammation, like ibuprofen or naproxen  · phenytoin  · rifampin  · toxoids  · vaccines  · warfarin  What if I miss a dose?  If you miss a dose, take it as soon as you can. If it is almost time for your next dose, talk to your doctor or health care professional. You may need to miss a dose or take an extra dose. Do not take double or extra doses without advice.  Where should I keep my medicine?  Keep out of the reach of children.  Store at room temperature between 15 and 30 degrees C (59 and 86 degrees F). Protect from light. Keep container tightly closed. Throw away any unused medicine after the expiration date.  What should I tell my health care provider before I take this medicine?  They need to know if you have any of these conditions:  · Cushing's syndrome  · diabetes  · glaucoma  · heart disease  · high blood pressure  · infection (especially a virus infection such as chickenpox, cold sores, or herpes)  · kidney disease  · liver disease  · mental  illness  · myasthenia gravis  · osteoporosis  · seizures  · stomach or intestine problems  · thyroid disease  · an unusual or allergic reaction to lactose, prednisone, other medicines, foods, dyes, or preservatives  · pregnant or trying to get pregnant  · breast-feeding  What should I watch for while using this medicine?  Visit your doctor or health care professional for regular checks on your progress. If you are taking this medicine over a prolonged period, carry an identification card with your name and address, the type and dose of your medicine, and your doctor's name and address.  This medicine may increase your risk of getting an infection. Tell your doctor or health care professional if you are around anyone with measles or chickenpox, or if you develop sores or blisters that do not heal properly.  If you are going to have surgery, tell your doctor or health care professional that you have taken this medicine within the last twelve months.  Ask your doctor or health care professional about your diet. You may need to lower the amount of salt you eat.  This medicine may affect blood sugar levels. If you have diabetes, check with your doctor or health care professional before you change your diet or the dose of your diabetic medicine.  Date Last Reviewed:   NOTE:This sheet is a summary. It may not cover all possible information. If you have questions about this medicine, talk to your doctor, pharmacist, or health care provider. Copyright© 2016 [employer]             Language Assistance Services     ATTENTION: Language assistance services are available, free of charge. Please call [phone].      ATENCIÓN: Si habla español, tiene a portillo disposición servicios gratuitos de asistencia lingüística. Llame al [phone].     [facility] Ý: N?u b?n nói Ti?ng Vi?t, có các d?ch v? h? tr? ngôn ng? mi?n phí dành cho b?n. G?i s? [phone].         [facility] complies with applicable Federal civil  rights laws and does not discriminate on the basis of race, color, national origin, age, disability, or sex.

## 2017-04-10 ENCOUNTER — TELEPHONE (OUTPATIENT)
Dept: PULMONOLOGY | Facility: CLINIC | Age: 77
End: 2017-04-10

## 2017-04-10 DIAGNOSIS — J43.9 PULMONARY EMPHYSEMA, UNSPECIFIED EMPHYSEMA TYPE: ICD-10-CM

## 2017-04-10 NOTE — TELEPHONE ENCOUNTER
Spoke with pt, states she is having severe stomach pain, nausea, weakness when taking medication. Pt seems to think it maybe the budesonide & cipro causing these side effects. Advise pt to hold medication until I speak with Dr. Araujo and he advises. Pt verbalized understanding.

## 2017-04-10 NOTE — TELEPHONE ENCOUNTER
C/o cramps and hunger pain  Instructed to stop predisone  Stop cipro  Continue symbicort  And duo- nebs

## 2017-04-10 NOTE — TELEPHONE ENCOUNTER
----- Message from Radha Beal sent at 4/10/2017  9:03 AM CDT -----  Contact: Pt  Pt is requesting to speak with the nurse./ States her new medications are causing her severe stomach cramps./ Please advise [phone]

## 2017-04-12 RX ORDER — BUDESONIDE AND FORMOTEROL FUMARATE DIHYDRATE 160; 4.5 UG/1; UG/1
2 AEROSOL RESPIRATORY (INHALATION) EVERY 12 HOURS
Qty: 1 INHALER | Refills: 11 | Status: SHIPPED | OUTPATIENT
Start: 2017-04-12 | End: 2018-06-18

## 2017-04-12 NOTE — TELEPHONE ENCOUNTER
----- Message from Susie Grimaldo sent at 4/12/2017  9:25 AM CDT -----  Contact: pt  Patient needs a refill on symbricort called into [facility] pharmacy at [place].  Please call patient at [phone], if you have any questions. Thanks!

## 2017-05-01 ENCOUNTER — TELEPHONE (OUTPATIENT)
Dept: PULMONOLOGY | Facility: CLINIC | Age: 77
End: 2017-05-01

## 2017-05-01 DIAGNOSIS — J44.1 COPD EXACERBATION: Primary | ICD-10-CM

## 2017-05-01 RX ORDER — METHYLPREDNISOLONE 4 MG/1
TABLET ORAL
Qty: 1 PACKAGE | Refills: 1 | Status: ON HOLD | OUTPATIENT
Start: 2017-05-01 | End: 2017-05-23 | Stop reason: HOSPADM

## 2017-05-01 RX ORDER — AMOXICILLIN AND CLAVULANATE POTASSIUM 875; 125 MG/1; MG/1
1 TABLET, FILM COATED ORAL EVERY 12 HOURS
Qty: 20 TABLET | Refills: 1 | Status: SHIPPED | OUTPATIENT
Start: 2017-05-01 | End: 2017-05-11

## 2017-05-01 NOTE — TELEPHONE ENCOUNTER
Returned call  Patient with cough and phlegm production  SOB  No high fever or chills  No pleurisy  DX: COPD exacerbation  PLAN:  Antibiotics  medrol  Office appointment if not improved of fever  Greater than 101

## 2017-05-01 NOTE — TELEPHONE ENCOUNTER
----- Message from Jyoti Beal sent at 5/1/2017  2:05 PM CDT -----  Patient checking the status of message she left earlier. Please adv/call [phone].//thanks. cw

## 2017-05-01 NOTE — TELEPHONE ENCOUNTER
Spoke with pt, states sob, cough (yellow), denies fever. Would like an antibiotic called into her pharmacy. She was just in on 4/4/17. She does not want Cipro. Please advise.

## 2017-05-16 ENCOUNTER — PATIENT OUTREACH (OUTPATIENT)
Dept: ADMINISTRATIVE | Facility: HOSPITAL | Age: 77
End: 2017-05-16

## 2017-05-16 NOTE — PROGRESS NOTES
I contacted Mrs Gonzalez to discuss 90 day refill request sent to us per Deonna Upton RN MSN with "[employer]. Patient stated she did not request 90 day refills from no one and that she can not afford to purchase them monthly much less a 90 day supply. When I tried to explained that Denona was with her insurance company "[employer] pt butted in and got upset (loudly stating) she does not want 90 day refills. I apologized and let her know that I would not send in request. Patient declined 90 day refills on simvastatin, lisinopril, and metformin.

## 2017-05-17 ENCOUNTER — HOSPITAL ENCOUNTER (INPATIENT)
Facility: HOSPITAL | Age: 77
LOS: 6 days | Discharge: NURSING, REHAB OR LONG-TERM CARE | DRG: 189 | End: 2017-05-23
Attending: EMERGENCY MEDICINE | Admitting: INTERNAL MEDICINE
Payer: MEDICARE

## 2017-05-17 DIAGNOSIS — J20.9 ACUTE BRONCHITIS, UNSPECIFIED ORGANISM: ICD-10-CM

## 2017-05-17 DIAGNOSIS — Z99.81 SUPPLEMENTAL OXYGEN DEPENDENT: ICD-10-CM

## 2017-05-17 DIAGNOSIS — J44.1 ACUTE EXACERBATION OF CHRONIC OBSTRUCTIVE PULMONARY DISEASE (COPD): ICD-10-CM

## 2017-05-17 DIAGNOSIS — J96.22 ACUTE ON CHRONIC RESPIRATORY FAILURE WITH HYPOXIA AND HYPERCAPNIA: Primary | ICD-10-CM

## 2017-05-17 DIAGNOSIS — J96.21 ACUTE ON CHRONIC RESPIRATORY FAILURE WITH HYPOXIA AND HYPERCAPNIA: Primary | ICD-10-CM

## 2017-05-17 DIAGNOSIS — J96.02 ACUTE HYPERCAPNIC RESPIRATORY FAILURE: ICD-10-CM

## 2017-05-17 DIAGNOSIS — J44.9 COPD, MODERATE: ICD-10-CM

## 2017-05-17 PROBLEM — R79.89 ELEVATED TROPONIN: Status: ACTIVE | Noted: 2017-05-17

## 2017-05-17 LAB
ALBUMIN SERPL BCP-MCNC: 3.7 G/DL
ALLENS TEST: ABNORMAL
ALLENS TEST: ABNORMAL
ALP SERPL-CCNC: 71 U/L
ALT SERPL W/O P-5'-P-CCNC: 24 U/L
ANION GAP SERPL CALC-SCNC: 10 MMOL/L
APTT BLDCRRT: 28 SEC
AST SERPL-CCNC: 16 U/L
BASOPHILS # BLD AUTO: 0.01 K/UL
BASOPHILS NFR BLD: 0.1 %
BILIRUB SERPL-MCNC: 0.3 MG/DL
BNP SERPL-MCNC: 179 PG/ML
BUN SERPL-MCNC: 18 MG/DL
CALCIUM SERPL-MCNC: 9.6 MG/DL
CHLORIDE SERPL-SCNC: 100 MMOL/L
CO2 SERPL-SCNC: 32 MMOL/L
CREAT SERPL-MCNC: 0.9 MG/DL
DELSYS: ABNORMAL
DELSYS: ABNORMAL
DIFFERENTIAL METHOD: ABNORMAL
EOSINOPHIL # BLD AUTO: 0.1 K/UL
EOSINOPHIL NFR BLD: 0.7 %
EP: 5
ERYTHROCYTE [DISTWIDTH] IN BLOOD BY AUTOMATED COUNT: 16.1 %
ERYTHROCYTE [SEDIMENTATION RATE] IN BLOOD BY WESTERGREN METHOD: 18 MM/H
EST. GFR  (AFRICAN AMERICAN): >60 ML/MIN/1.73 M^2
EST. GFR  (NON AFRICAN AMERICAN): >60 ML/MIN/1.73 M^2
FIO2: 32
FIO2: 32
FLOW: 3
GLUCOSE SERPL-MCNC: 162 MG/DL
HCO3 UR-SCNC: 33.2 MMOL/L (ref 24–28)
HCO3 UR-SCNC: 33.7 MMOL/L (ref 24–28)
HCT VFR BLD AUTO: 43.3 %
HGB BLD-MCNC: 13.7 G/DL
INR PPP: 1
IP: 14
LYMPHOCYTES # BLD AUTO: 1.8 K/UL
LYMPHOCYTES NFR BLD: 13.6 %
MCH RBC QN AUTO: 28.7 PG
MCHC RBC AUTO-ENTMCNC: 31.6 %
MCV RBC AUTO: 91 FL
MODE: ABNORMAL
MODE: ABNORMAL
MONOCYTES # BLD AUTO: 1 K/UL
MONOCYTES NFR BLD: 7.3 %
NEUTROPHILS # BLD AUTO: 10.1 K/UL
NEUTROPHILS NFR BLD: 78.3 %
PCO2 BLDA: 60.8 MMHG (ref 35–45)
PCO2 BLDA: 71.5 MMHG (ref 35–45)
PH SMN: 7.28 [PH] (ref 7.35–7.45)
PH SMN: 7.34 [PH] (ref 7.35–7.45)
PLATELET # BLD AUTO: 254 K/UL
PMV BLD AUTO: 9.3 FL
PO2 BLDA: 89 MMHG (ref 80–100)
PO2 BLDA: 90 MMHG (ref 80–100)
POC BE: 7 MMOL/L
POC BE: 8 MMOL/L
POC SATURATED O2: 95 % (ref 95–100)
POC SATURATED O2: 96 % (ref 95–100)
POTASSIUM SERPL-SCNC: 4.2 MMOL/L
PROT SERPL-MCNC: 7.7 G/DL
PROTHROMBIN TIME: 9.9 SEC
RBC # BLD AUTO: 4.78 M/UL
SAMPLE: ABNORMAL
SAMPLE: ABNORMAL
SITE: ABNORMAL
SITE: ABNORMAL
SODIUM SERPL-SCNC: 142 MMOL/L
TROPONIN I SERPL DL<=0.01 NG/ML-MCNC: 0.02 NG/ML
TROPONIN I SERPL DL<=0.01 NG/ML-MCNC: 0.03 NG/ML
WBC # BLD AUTO: 12.94 K/UL

## 2017-05-17 PROCEDURE — 96361 HYDRATE IV INFUSION ADD-ON: CPT

## 2017-05-17 PROCEDURE — 84484 ASSAY OF TROPONIN QUANT: CPT

## 2017-05-17 PROCEDURE — 25000242 PHARM REV CODE 250 ALT 637 W/ HCPCS: Performed by: EMERGENCY MEDICINE

## 2017-05-17 PROCEDURE — 21400001 HC TELEMETRY ROOM

## 2017-05-17 PROCEDURE — 80053 COMPREHEN METABOLIC PANEL: CPT

## 2017-05-17 PROCEDURE — 96365 THER/PROPH/DIAG IV INF INIT: CPT

## 2017-05-17 PROCEDURE — 85025 COMPLETE CBC W/AUTO DIFF WBC: CPT

## 2017-05-17 PROCEDURE — 25000003 PHARM REV CODE 250: Performed by: EMERGENCY MEDICINE

## 2017-05-17 PROCEDURE — 83880 ASSAY OF NATRIURETIC PEPTIDE: CPT

## 2017-05-17 PROCEDURE — 85730 THROMBOPLASTIN TIME PARTIAL: CPT

## 2017-05-17 PROCEDURE — 93005 ELECTROCARDIOGRAM TRACING: CPT

## 2017-05-17 PROCEDURE — 99285 EMERGENCY DEPT VISIT HI MDM: CPT | Mod: 25

## 2017-05-17 PROCEDURE — 93010 ELECTROCARDIOGRAM REPORT: CPT | Mod: ,,, | Performed by: INTERNAL MEDICINE

## 2017-05-17 PROCEDURE — 82803 BLOOD GASES ANY COMBINATION: CPT

## 2017-05-17 PROCEDURE — 36600 WITHDRAWAL OF ARTERIAL BLOOD: CPT

## 2017-05-17 PROCEDURE — 96372 THER/PROPH/DIAG INJ SC/IM: CPT

## 2017-05-17 PROCEDURE — 94660 CPAP INITIATION&MGMT: CPT

## 2017-05-17 PROCEDURE — 51702 INSERT TEMP BLADDER CATH: CPT

## 2017-05-17 PROCEDURE — 87040 BLOOD CULTURE FOR BACTERIA: CPT

## 2017-05-17 PROCEDURE — 96376 TX/PRO/DX INJ SAME DRUG ADON: CPT

## 2017-05-17 PROCEDURE — 99900035 HC TECH TIME PER 15 MIN (STAT)

## 2017-05-17 PROCEDURE — 63600175 PHARM REV CODE 636 W HCPCS: Performed by: EMERGENCY MEDICINE

## 2017-05-17 PROCEDURE — 94640 AIRWAY INHALATION TREATMENT: CPT

## 2017-05-17 PROCEDURE — 85610 PROTHROMBIN TIME: CPT

## 2017-05-17 PROCEDURE — 27000190 HC CPAP FULL FACE MASK W/VALVE

## 2017-05-17 PROCEDURE — 96375 TX/PRO/DX INJ NEW DRUG ADDON: CPT

## 2017-05-17 RX ORDER — FAMOTIDINE 20 MG/1
20 TABLET, FILM COATED ORAL DAILY
Status: DISCONTINUED | OUTPATIENT
Start: 2017-05-18 | End: 2017-05-23 | Stop reason: HOSPADM

## 2017-05-17 RX ORDER — ASPIRIN 81 MG/1
81 TABLET ORAL DAILY
Status: DISCONTINUED | OUTPATIENT
Start: 2017-05-18 | End: 2017-05-23 | Stop reason: HOSPADM

## 2017-05-17 RX ORDER — BUDESONIDE AND FORMOTEROL FUMARATE DIHYDRATE 160; 4.5 UG/1; UG/1
2 AEROSOL RESPIRATORY (INHALATION) EVERY 12 HOURS
Status: DISCONTINUED | OUTPATIENT
Start: 2017-05-17 | End: 2017-05-17 | Stop reason: RX

## 2017-05-17 RX ORDER — INSULIN ASPART 100 [IU]/ML
0-5 INJECTION, SOLUTION INTRAVENOUS; SUBCUTANEOUS
Status: DISCONTINUED | OUTPATIENT
Start: 2017-05-17 | End: 2017-05-23 | Stop reason: HOSPADM

## 2017-05-17 RX ORDER — IBUPROFEN 200 MG
16 TABLET ORAL
Status: DISCONTINUED | OUTPATIENT
Start: 2017-05-17 | End: 2017-05-23 | Stop reason: HOSPADM

## 2017-05-17 RX ORDER — GLUCAGON 1 MG
1 KIT INJECTION
Status: DISCONTINUED | OUTPATIENT
Start: 2017-05-17 | End: 2017-05-23 | Stop reason: HOSPADM

## 2017-05-17 RX ORDER — LEVALBUTEROL INHALATION SOLUTION 0.63 MG/3ML
0.63 SOLUTION RESPIRATORY (INHALATION)
Status: COMPLETED | OUTPATIENT
Start: 2017-05-17 | End: 2017-05-17

## 2017-05-17 RX ORDER — SODIUM CHLORIDE 9 MG/ML
INJECTION, SOLUTION INTRAVENOUS CONTINUOUS
Status: DISCONTINUED | OUTPATIENT
Start: 2017-05-17 | End: 2017-05-17

## 2017-05-17 RX ORDER — BUDESONIDE 0.5 MG/2ML
0.5 INHALANT ORAL 2 TIMES DAILY
Status: DISCONTINUED | OUTPATIENT
Start: 2017-05-17 | End: 2017-05-23 | Stop reason: HOSPADM

## 2017-05-17 RX ORDER — MOXIFLOXACIN HYDROCHLORIDE 400 MG/250ML
400 INJECTION, SOLUTION INTRAVENOUS
Status: COMPLETED | OUTPATIENT
Start: 2017-05-17 | End: 2017-05-17

## 2017-05-17 RX ORDER — ENOXAPARIN SODIUM 100 MG/ML
40 INJECTION SUBCUTANEOUS EVERY 24 HOURS
Status: DISCONTINUED | OUTPATIENT
Start: 2017-05-17 | End: 2017-05-23 | Stop reason: HOSPADM

## 2017-05-17 RX ORDER — ONDANSETRON 2 MG/ML
4 INJECTION INTRAMUSCULAR; INTRAVENOUS EVERY 8 HOURS PRN
Status: DISCONTINUED | OUTPATIENT
Start: 2017-05-17 | End: 2017-05-23 | Stop reason: HOSPADM

## 2017-05-17 RX ORDER — ARFORMOTEROL TARTRATE 15 UG/2ML
15 SOLUTION RESPIRATORY (INHALATION) 2 TIMES DAILY
Status: DISCONTINUED | OUTPATIENT
Start: 2017-05-17 | End: 2017-05-18

## 2017-05-17 RX ORDER — IPRATROPIUM BROMIDE AND ALBUTEROL SULFATE 2.5; .5 MG/3ML; MG/3ML
3 SOLUTION RESPIRATORY (INHALATION)
Status: DISPENSED | OUTPATIENT
Start: 2017-05-17 | End: 2017-05-17

## 2017-05-17 RX ORDER — LISINOPRIL 5 MG/1
5 TABLET ORAL DAILY
Status: DISCONTINUED | OUTPATIENT
Start: 2017-05-18 | End: 2017-05-23 | Stop reason: HOSPADM

## 2017-05-17 RX ORDER — SIMVASTATIN 5 MG/1
10 TABLET, FILM COATED ORAL NIGHTLY
Status: DISCONTINUED | OUTPATIENT
Start: 2017-05-17 | End: 2017-05-23 | Stop reason: HOSPADM

## 2017-05-17 RX ORDER — IBUPROFEN 200 MG
24 TABLET ORAL
Status: DISCONTINUED | OUTPATIENT
Start: 2017-05-17 | End: 2017-05-23 | Stop reason: HOSPADM

## 2017-05-17 RX ORDER — IPRATROPIUM BROMIDE AND ALBUTEROL SULFATE 2.5; .5 MG/3ML; MG/3ML
3 SOLUTION RESPIRATORY (INHALATION)
Status: COMPLETED | OUTPATIENT
Start: 2017-05-17 | End: 2017-05-18

## 2017-05-17 RX ORDER — ARFORMOTEROL TARTRATE 15 UG/2ML
15 SOLUTION RESPIRATORY (INHALATION) 2 TIMES DAILY
Status: DISCONTINUED | OUTPATIENT
Start: 2017-05-17 | End: 2017-05-23 | Stop reason: HOSPADM

## 2017-05-17 RX ORDER — TRAZODONE HYDROCHLORIDE 50 MG/1
50 TABLET ORAL NIGHTLY
Status: DISCONTINUED | OUTPATIENT
Start: 2017-05-17 | End: 2017-05-23 | Stop reason: HOSPADM

## 2017-05-17 RX ORDER — BUDESONIDE 0.5 MG/2ML
0.5 INHALANT ORAL 2 TIMES DAILY
Status: DISCONTINUED | OUTPATIENT
Start: 2017-05-17 | End: 2017-05-18

## 2017-05-17 RX ADMIN — METHYLPREDNISOLONE SODIUM SUCCINATE 125 MG: 125 INJECTION, POWDER, FOR SOLUTION INTRAMUSCULAR; INTRAVENOUS at 04:05

## 2017-05-17 RX ADMIN — MOXIFLOXACIN HYDROCHLORIDE 400 MG: 400 INJECTION, SOLUTION INTRAVENOUS at 05:05

## 2017-05-17 RX ADMIN — LEVALBUTEROL 0.63 MG: 0.63 SOLUTION RESPIRATORY (INHALATION) at 05:05

## 2017-05-17 RX ADMIN — ENOXAPARIN SODIUM 40 MG: 100 INJECTION SUBCUTANEOUS at 08:05

## 2017-05-17 RX ADMIN — IPRATROPIUM BROMIDE AND ALBUTEROL SULFATE 3 ML: .5; 3 SOLUTION RESPIRATORY (INHALATION) at 09:05

## 2017-05-17 RX ADMIN — ARFORMOTEROL TARTRATE 15 MCG: 15 SOLUTION RESPIRATORY (INHALATION) at 07:05

## 2017-05-17 RX ADMIN — SIMVASTATIN 10 MG: 5 TABLET, FILM COATED ORAL at 11:05

## 2017-05-17 RX ADMIN — METHYLPREDNISOLONE SODIUM SUCCINATE 80 MG: 125 INJECTION, POWDER, LYOPHILIZED, FOR SOLUTION INTRAMUSCULAR; INTRAVENOUS at 09:05

## 2017-05-17 RX ADMIN — SODIUM CHLORIDE: 0.9 INJECTION, SOLUTION INTRAVENOUS at 08:05

## 2017-05-17 RX ADMIN — SODIUM CHLORIDE 1000 ML: 0.9 INJECTION, SOLUTION INTRAVENOUS at 04:05

## 2017-05-17 NOTE — SUBJECTIVE & OBJECTIVE
Past Medical History:   Diagnosis Date    COPD (chronic obstructive pulmonary disease) with emphysema     Diabetes mellitus     Hyperlipidemia     Hypertension     Maxillary sinusitis, chronic        Past Surgical History:   Procedure Laterality Date    BACK SURGERY      HERNIA REPAIR      HYSTERECTOMY         Review of patient's allergies indicates:   Allergen Reactions    Meloxicam Other (See Comments)      Patient stated that she has muscle aches and pains    Naproxen Other (See Comments)     Patient states she has muscle and aches.    Morphine Rash       No current facility-administered medications on file prior to encounter.      Current Outpatient Prescriptions on File Prior to Encounter   Medication Sig    albuterol 90 mcg/actuation inhaler Inhale 2 puffs into the lungs every 6 (six) hours as needed. Dispense with spacer.    albuterol-ipratropium 2.5mg-0.5mg/3mL (DUO-NEB) 0.5 mg-3 mg(2.5 mg base)/3 mL nebulizer solution Take 3 mLs by nebulization every 6 (six) hours.    aspirin (ECOTRIN) 81 MG EC tablet Take 81 mg by mouth once daily.    budesonide (PULMICORT) 0.5 mg/2 mL nebulizer solution Take 2 mLs (0.5 mg total) by nebulization 2 (two) times daily. Wash out mouth after using.    budesonide-formoterol 160-4.5 mcg (SYMBICORT) 160-4.5 mcg/actuation HFAA Inhale 2 puffs into the lungs every 12 (twelve) hours. Controller    cetirizine (ZYRTEC) 10 MG tablet Take 1 tablet (10 mg total) by mouth once daily.    dextromethorphan-guaifenesin  mg (MUCINEX DM)  mg per 12 hr tablet Take 1 tablet by mouth.    lisinopril (PRINIVIL,ZESTRIL) 5 MG tablet Take 1 tablet (5 mg total) by mouth once daily.    metformin (GLUCOPHAGE) 500 MG tablet Take 1 tablet (500 mg total) by mouth 2 (two) times daily with meals.    methylPREDNISolone (MEDROL DOSEPACK) 4 mg tablet use as directed    OXYGEN-AIR DELIVERY SYSTEMS MISC by [facility] [place] [facility].(Non-Drug; Combo Route) route.    predniSONE (DELTASONE) 20 MG tablet  Prednisone 60 mg/day for 7 days,40 mg/day for 7 days,20 mg/day for 7 days,10 mg/ day (1/2 tablet) for 7 days    predniSONE (DELTASONE) 5 MG tablet     simvastatin (ZOCOR) 10 MG tablet Take 1 tablet (10 mg total) by mouth every evening.    trazodone (DESYREL) 50 MG tablet Take 1 tablet (50 mg total) by mouth every evening.     Family History     Problem Relation (Age of Onset)    Arthritis Father    Cancer Father, Sister, Brother    Early death Sister    Hearing loss Father    Heart disease Brother        Social History Main Topics    Smoking status: Former Smoker     Years: 30.00    Smokeless tobacco: Never Used    Alcohol use No    Drug use: No    Sexual activity: Not Currently     Review of Systems   Constitutional: Negative for activity change, appetite change, chills, diaphoresis, fatigue, fever and unexpected weight change.   HENT: Negative for congestion, drooling, facial swelling, rhinorrhea, sinus pressure, sneezing and sore throat.    Eyes: Negative for discharge, redness, itching and visual disturbance.   Respiratory: Positive for cough and shortness of breath. Negative for apnea, choking, chest tightness, wheezing and stridor.    Cardiovascular: Negative for chest pain, palpitations and leg swelling.   Gastrointestinal: Negative for abdominal distention, abdominal pain, anal bleeding, blood in stool, constipation, diarrhea, nausea and vomiting.   Genitourinary: Negative for decreased urine volume, difficulty urinating, dysuria, frequency, hematuria, pelvic pain, urgency, vaginal bleeding and vaginal discharge.   Musculoskeletal: Negative for arthralgias, back pain, gait problem, joint swelling, myalgias, neck pain and neck stiffness.   Skin: Negative for color change, pallor, rash and wound.   Neurological: Negative for dizziness, seizures, facial asymmetry, speech difficulty, weakness, light-headedness, numbness and headaches.   Psychiatric/Behavioral: Negative for agitation, confusion,  hallucinations and suicidal ideas.   All other systems reviewed and are negative.    Objective:     Vital Signs (Most Recent):  Temp: 98 °F (36.7 °C) (05/17/17 1622)  Pulse: 100 (05/17/17 1805)  Resp: 16 (05/17/17 1805)  BP: (!) 140/48 (05/17/17 1805)  SpO2: 97 % (05/17/17 1805) Vital Signs (24h Range):  Temp:  [98 °F (36.7 °C)] 98 °F (36.7 °C)  Pulse:  [] 100  Resp:  [16-35] 16  SpO2:  [75 %-99 %] 97 %  BP: (140-171)/() 140/48     Weight: (!) 206.1 kg (454 lb 5.9 oz)  Body mass index is 80.49 kg/(m^2).    Physical Exam   Constitutional: She is oriented to person, place, and time. She appears well-developed and well-nourished.   Morbidly obese   HENT:   Head: Normocephalic and atraumatic.   Eyes: Conjunctivae and EOM are normal. Pupils are equal, round, and reactive to light.   Neck: Normal range of motion. Neck supple.   Cardiovascular: Regular rhythm, normal heart sounds and intact distal pulses.    No murmur heard.  Tachycardic     Pulmonary/Chest: Breath sounds normal. She is in respiratory distress.   On BiPAP, BBS diminished and coarse. Mild respiratory distress noted.    Abdominal: Soft. Bowel sounds are normal. She exhibits no distension. There is no tenderness.   Musculoskeletal: Normal range of motion. She exhibits no edema, tenderness or deformity.   Neurological: She is alert and oriented to person, place, and time. She has normal reflexes.   Skin: Skin is warm and dry. No erythema.   Psychiatric: She has a normal mood and affect. Her behavior is normal.   Nursing note and vitals reviewed.       Significant Labs: All pertinent labs within the past 24 hours have been reviewed.    Significant Imaging:   Imaging Results         X-Ray Chest AP Portable (Final result) Result time:  05/17/17 17:45:57    Final result by LYDIA Diez Sr., MD (05/17/17 17:45:57)    Impression:        1.  No significant interval change in appearance.  2.  There is no evidence of acute pulmonary process.   3.  The  borders of the heart are not well seen.  This may be secondary to pericardial fat pads.  If additional imaging evaluation is clinically indicated, I recommend consideration of a CT examination of the thorax with IV contrast.      Electronically signed by: LYDIA NELSON MD  Date:     05/17/17  Time:    17:45     Narrative:    One-view chest x-ray    Clinical History: Chest pain    Finding: Comparison was made to a prior examination performed on 4/4/2017.  The borders of the heart are not well seen.  There is no evidence of acute pulmonary process.  There is no pneumothorax.  The right costophrenic angle is sharp.  The left costophrenic angle is not well seen.

## 2017-05-17 NOTE — ED NOTES
Pt lying in bed on her right side with HOB elevated per request. Pt is on Bipap. Bed locked and low with call bell in reach. Pt has no further needs at this time.

## 2017-05-17 NOTE — PROGRESS NOTES
Pt reports some relief of sob at this time. Pt continues to be on bipap at documented settings in tripod position.

## 2017-05-17 NOTE — ED PROVIDER NOTES
SCRIBE #1 NOTE: I, Sara Jimenez, am scribing for, and in the presence of, Carol Moody MD. I have scribed the entire note.      History      Chief Complaint   Patient presents with    Shortness of Breath     for several days but worse today       Review of patient's allergies indicates:   Allergen Reactions    Meloxicam Other (See Comments)      Patient stated that she has muscle aches and pains    Naproxen Other (See Comments)     Patient states she has muscle and aches.    Morphine Rash        HPI   HPI    5/17/2017, 4:40 PM   History obtained from the patient      History of Present Illness: Petrona Gonzalez is a 76 y.o. female patient with hx of COPD, HTN, who presents to the Emergency Department for SOB which has been worsening over the past 3 days . Symptoms are constant and moderate in severity. Pt is on 3 L of home oxygen and states that she has been using her breathing txs w/o improvement. No mitigating or exacerbating factors reported. Associated sx include productive cough with green sputum. Patient denies fever, chills, CP, BLE edema, N/V, extremity weakness/numbness, dizziness, fatigue, and all other sxs at this time. No further complaints or concerns at this time.       Arrival mode: Personal vehicle    PCP: Katy Arriaga MD       Past Medical History:  Past Medical History:   Diagnosis Date    COPD (chronic obstructive pulmonary disease) with emphysema     Diabetes mellitus     Hyperlipidemia     Hypertension     Maxillary sinusitis, chronic        Past Surgical History:  Past Surgical History:   Procedure Laterality Date    BACK SURGERY      HERNIA REPAIR      HYSTERECTOMY           Family History:  Family History   Problem Relation Age of Onset    Arthritis Father     Cancer Father     Hearing loss Father     Cancer Sister     Early death Sister     Cancer Brother     Heart disease Brother        Social History:  Social History     Social History Main Topics     Smoking status: Former Smoker     Years: 30.00    Smokeless tobacco: Never Used    Alcohol use No    Drug use: No    Sexual activity: Not Currently       ROS   Review of Systems   Constitutional: Negative for chills, diaphoresis, fatigue and fever.   HENT: Negative for sore throat.    Respiratory: Positive for cough and shortness of breath. Negative for chest tightness.    Cardiovascular: Negative for chest pain and palpitations.   Gastrointestinal: Negative for abdominal pain, diarrhea, nausea and vomiting.   Genitourinary: Negative for dysuria.   Musculoskeletal: Negative for back pain.   Skin: Negative for rash.   Neurological: Negative for dizziness, weakness, numbness and headaches.   Hematological: Does not bruise/bleed easily.   All other systems reviewed and are negative.      Physical Exam    Initial Vitals   BP Pulse Resp Temp SpO2   05/17/17 1622 05/17/17 1622 05/17/17 1622 05/17/17 1622 05/17/17 1622   167/108 131 30 98 °F (36.7 °C) 75 %      Physical Exam  Nursing Notes and Vital Signs Reviewed.  Constitutional: Patient is in no acute distress. Awake and alert. Well-developed and well-nourished.  Head: Atraumatic. Normocephalic.  Eyes: PERRL. EOM intact. Conjunctivae are not pale. No scleral icterus.  ENT: Mucous membranes are moist. Oropharynx is clear and symmetric.    Neck: Supple. Full ROM. No lymphadenopathy.  Cardiovascular: Tachycardic. Regular rhythm. No murmurs, rubs, or gallops. Distal pulses are 2+ and symmetric.  Pulmonary/Chest: Moderate respiratory distress. Tachypneic. Pursed lip breathing. Diminished breath sounds bilaterally. Clear to auscultation bilaterally. No wheezing, rales, or rhonchi.  Abdominal: Soft and non-distended.  There is no tenderness.  No rebound, guarding, or rigidity.  Good bowel sounds.    Musculoskeletal: Moves all extremities. No obvious deformities. No edema. No calf tenderness.  Skin: Warm and dry.  Neurological:  Alert, awake, and appropriate.  Normal  "speech.  No acute focal neurological deficits are appreciated.  Psychiatric: Normal affect. Good eye contact. Appropriate in content.    ED Course    Critical Care  Date/Time: 5/17/2017 6:07 PM  Performed by: RENEE CARDONA  Authorized by: RENEE CARDONA   Direct patient critical care time: 15 minutes  Additional history critical care time: 8 minutes  Ordering / reviewing critical care time: 8 minutes  Documentation critical care time: 8 minutes  Consulting other physicians critical care time: 4 minutes  Total critical care time (exclusive of procedural time) : 43 minutes  Critical care time was exclusive of separately billable procedures and treating other patients and teaching time.  Critical care was necessary to treat or prevent imminent or life-threatening deterioration of the following conditions: respiratory failure.  Critical care was time spent personally by me on the following activities: blood draw for specimens, development of treatment plan with patient or surrogate, discussions with consultants, interpretation of cardiac output measurements, evaluation of patient's response to treatment, examination of patient, obtaining history from patient or surrogate, ordering and performing treatments and interventions, ordering and review of laboratory studies, ordering and review of radiographic studies, pulse oximetry, re-evaluation of patient's condition, review of old charts and vascular access procedures.        ED Vital Signs:  Vitals:    05/17/17 1622 05/17/17 1635 05/17/17 1700 05/17/17 1710   BP: (!) 167/108 (!) 171/83     Pulse: (!) 131 (!) 119 73 (!) 130   Resp: (!) 30 19 (!) 25 (!) 35   Temp: 98 °F (36.7 °C)      SpO2: (!) 75% 96% 97% 97%   Weight: (!) 206.1 kg (454 lb 5.9 oz)      Height: 5' 3" (1.6 m)       05/17/17 1735 05/17/17 1805 05/17/17 1910 05/17/17 1946   BP: (!) 151/73 (!) 140/48 (!) 112/54    Pulse: 106 100 88 85   Resp: (!) 22 16 19 (!) 22   Temp:       SpO2: 99% 97% 97% " 99%   Weight:       Height:        05/17/17 1949   BP:    Pulse: 85   Resp: (!) 22   Temp:    SpO2: 99%   Weight:    Height:        Abnormal Lab Results:  Labs Reviewed   CBC W/ AUTO DIFFERENTIAL - Abnormal; Notable for the following:        Result Value    WBC 12.94 (*)     MCHC 31.6 (*)     RDW 16.1 (*)     Gran # 10.1 (*)     Gran% 78.3 (*)     Lymph% 13.6 (*)     All other components within normal limits   COMPREHENSIVE METABOLIC PANEL - Abnormal; Notable for the following:     CO2 32 (*)     Glucose 162 (*)     All other components within normal limits   TROPONIN I - Abnormal; Notable for the following:     Troponin I 0.028 (*)     All other components within normal limits   B-TYPE NATRIURETIC PEPTIDE - Abnormal; Notable for the following:      (*)     All other components within normal limits   ISTAT PROCEDURE - Abnormal; Notable for the following:     POC PH 7.281 (*)     POC PCO2 71.5 (*)     POC HCO3 33.7 (*)     All other components within normal limits   ISTAT PROCEDURE - Abnormal; Notable for the following:     POC PH 7.345 (*)     POC PCO2 60.8 (*)     POC HCO3 33.2 (*)     All other components within normal limits   CULTURE, BLOOD   CULTURE, BLOOD   CULTURE, RESPIRATORY   APTT   PROTIME-INR        All Lab Results:  Results for orders placed or performed during the hospital encounter of 05/17/17   CBC auto differential   Result Value Ref Range    WBC 12.94 (H) 3.90 - 12.70 K/uL    RBC 4.78 4.00 - 5.40 M/uL    Hemoglobin 13.7 12.0 - 16.0 g/dL    Hematocrit 43.3 37.0 - 48.5 %    MCV 91 82 - 98 fL    MCH 28.7 27.0 - 31.0 pg    MCHC 31.6 (L) 32.0 - 36.0 %    RDW 16.1 (H) 11.5 - 14.5 %    Platelets 254 150 - 350 K/uL    MPV 9.3 9.2 - 12.9 fL    Gran # 10.1 (H) 1.8 - 7.7 K/uL    Lymph # 1.8 1.0 - 4.8 K/uL    Mono # 1.0 0.3 - 1.0 K/uL    Eos # 0.1 0.0 - 0.5 K/uL    Baso # 0.01 0.00 - 0.20 K/uL    Gran% 78.3 (H) 38.0 - 73.0 %    Lymph% 13.6 (L) 18.0 - 48.0 %    Mono% 7.3 4.0 - 15.0 %    Eosinophil% 0.7  0.0 - 8.0 %    Basophil% 0.1 0.0 - 1.9 %    Differential Method Automated    Comprehensive metabolic panel   Result Value Ref Range    Sodium 142 136 - 145 mmol/L    Potassium 4.2 3.5 - 5.1 mmol/L    Chloride 100 95 - 110 mmol/L    CO2 32 (H) 23 - 29 mmol/L    Glucose 162 (H) 70 - 110 mg/dL    BUN, Bld 18 8 - 23 mg/dL    Creatinine 0.9 0.5 - 1.4 mg/dL    Calcium 9.6 8.7 - 10.5 mg/dL    Total Protein 7.7 6.0 - 8.4 g/dL    Albumin 3.7 3.5 - 5.2 g/dL    Total Bilirubin 0.3 0.1 - 1.0 mg/dL    Alkaline Phosphatase 71 55 - 135 U/L    AST 16 10 - 40 U/L    ALT 24 10 - 44 U/L    Anion Gap 10 8 - 16 mmol/L    eGFR if African American >60 >60 mL/min/1.73 m^2    eGFR if non African American >60 >60 mL/min/1.73 m^2   Troponin I #1   Result Value Ref Range    Troponin I 0.028 (H) 0.000 - 0.026 ng/mL   B-Type natriuretic peptide (BNP)   Result Value Ref Range     (H) 0 - 99 pg/mL   APTT   Result Value Ref Range    aPTT 28.0 21.0 - 32.0 sec   Protime-INR   Result Value Ref Range    Prothrombin Time 9.9 9.0 - 12.5 sec    INR 1.0 0.8 - 1.2   ISTAT PROCEDURE   Result Value Ref Range    POC PH 7.281 (LL) 7.35 - 7.45    POC PCO2 71.5 (HH) 35 - 45 mmHg    POC PO2 90 80 - 100 mmHg    POC HCO3 33.7 (H) 24 - 28 mmol/L    POC BE 7 -2 to 2 mmol/L    POC SATURATED O2 95 95 - 100 %    Sample ARTERIAL     Site LR     Allens Test Pass     DelSys Nasal Can     Mode SPONT     Flow 3     FiO2 32    ISTAT PROCEDURE   Result Value Ref Range    POC PH 7.345 (L) 7.35 - 7.45    POC PCO2 60.8 (HH) 35 - 45 mmHg    POC PO2 89 80 - 100 mmHg    POC HCO3 33.2 (H) 24 - 28 mmol/L    POC BE 8 -2 to 2 mmol/L    POC SATURATED O2 96 95 - 100 %    Rate 18     Sample ARTERIAL     Site LR     Allens Test Pass     DelSys CPAP/BiPAP     Mode BiPAP     FiO2 32     IP 14     EP 5      Imaging Results:  Imaging Results         X-Ray Chest AP Portable (Final result) Result time:  05/17/17 17:45:57    Final result by LYDIA Diez Sr., MD (05/17/17 17:45:57)     Impression:        1.  No significant interval change in appearance.  2.  There is no evidence of acute pulmonary process.   3.  The borders of the heart are not well seen.  This may be secondary to pericardial fat pads.  If additional imaging evaluation is clinically indicated, I recommend consideration of a CT examination of the thorax with IV contrast.      Electronically signed by: LYDIA NELSON MD  Date:     05/17/17  Time:    17:45     Narrative:    One-view chest x-ray    Clinical History: Chest pain    Finding: Comparison was made to a prior examination performed on 4/4/2017.  The borders of the heart are not well seen.  There is no evidence of acute pulmonary process.  There is no pneumothorax.  The right costophrenic angle is sharp.  The left costophrenic angle is not well seen.             The EKG was ordered, reviewed, and independently interpreted by the ED provider.  Interpretation time: 16:41  Rate: 108 BPM  Rhythm: sinus tachycardic with premature suparventricular complexes with occasional premature ventricular complexes  Interpretation: Left axis deviation. Septal infarct. No STEMI.    The Emergency Provider reviewed the vital signs and test results, which are outlined above.    ED Discussion     5:06 PM: Re-evaluated pt. Pt is on BiPAP right now. Blood gas shows acute on chronic hypercapnic respiratory failure. She is using less work to breathe. Pt's HR has decreased. Breathe sounds are still diminished.    6:03 PM: Re-evaluated pt. D/w patient CXR results. I have discussed test results, shared treatment plan, and the need for admission with patient and family at bedside. Pt and family express understanding at this time and agree with all information. All questions answered. Pt and family have no further questions or concerns at this time. Pt is ready for admit.     6:06 PM: Discussed case with Marco A ([facility] ). Dr. De agrees with current care and management of pt and accepts admission.    Admitting Service: Hospital medicine   Admitting Physician: Dr. De  Admit to: ICU      ED Medication(s):  Medications   albuterol-ipratropium 2.5mg-0.5mg/3mL nebulizer solution 3 mL ( Nebulization Canceled Entry 5/17/17 1655)   arformoterol nebulizer solution 15 mcg (15 mcg Nebulization Given 5/17/17 1946)   methylPREDNISolone sod suc(PF) 125 mg/2 mL injection 125 mg (125 mg Intravenous Given 5/17/17 1653)   sodium chloride 0.9% bolus 1,000 mL (0 mLs Intravenous Stopped 5/17/17 1835)   levalbuterol nebulizer solution 0.63 mg (0.63 mg Nebulization Given 5/17/17 1710)   moxifloxacin 400 mg/250 mL IVPB 400 mg (0 mg Intravenous Stopped 5/17/17 1912)       New Prescriptions    No medications on file            Medical Decision Making    Medical Decision Making:   History:   Old Medical Records: I decided to obtain old medical records.  Old Records Summarized: records from previous admission(s).  Clinical Tests:   Lab Tests: Ordered and Reviewed  Radiological Study: Reviewed and Ordered  Medical Tests: Ordered and Reviewed           Scribe Attestation:   Scribe #1: I performed the above scribed service and the documentation accurately describes the services I performed. I attest to the accuracy of the note.    Attending:   Physician Attestation Statement for Scribe #1: I, Carol Moody MD, personally performed the services described in this documentation, as scribed by Sara Jimenez, in my presence, and it is both accurate and complete.          Clinical Impression       ICD-10-CM ICD-9-CM   1. Acute on chronic respiratory failure with hypoxia and hypercapnia J96.21 518.84    J96.22 786.09     799.02   2. Acute exacerbation of chronic obstructive pulmonary disease (COPD) J44.1 491.21   3. Acute bronchitis, unspecified organism J20.9 466.0   4. Supplemental oxygen dependent Z99.81 V46.2       Disposition:   Disposition: Admitted (ICU)  Condition: Serious         Carol Moody MD  05/17/17 2018

## 2017-05-18 PROBLEM — R79.89 ELEVATED TROPONIN: Status: RESOLVED | Noted: 2017-05-17 | Resolved: 2017-05-18

## 2017-05-18 LAB
ALBUMIN SERPL BCP-MCNC: 2.9 G/DL
ALLENS TEST: ABNORMAL
ALP SERPL-CCNC: 58 U/L
ALT SERPL W/O P-5'-P-CCNC: 19 U/L
ANION GAP SERPL CALC-SCNC: 9 MMOL/L
AST SERPL-CCNC: 10 U/L
BASOPHILS # BLD AUTO: ABNORMAL K/UL
BASOPHILS NFR BLD: 0 %
BILIRUB SERPL-MCNC: 0.2 MG/DL
BUN SERPL-MCNC: 18 MG/DL
CALCIUM SERPL-MCNC: 9 MG/DL
CHLORIDE SERPL-SCNC: 104 MMOL/L
CO2 SERPL-SCNC: 29 MMOL/L
CREAT SERPL-MCNC: 0.8 MG/DL
DELSYS: ABNORMAL
DIFFERENTIAL METHOD: ABNORMAL
EOSINOPHIL # BLD AUTO: ABNORMAL K/UL
EOSINOPHIL NFR BLD: 0 %
ERYTHROCYTE [DISTWIDTH] IN BLOOD BY AUTOMATED COUNT: 16.3 %
EST. GFR  (AFRICAN AMERICAN): >60 ML/MIN/1.73 M^2
EST. GFR  (NON AFRICAN AMERICAN): >60 ML/MIN/1.73 M^2
FLOW: 3
GLUCOSE SERPL-MCNC: 279 MG/DL
HCO3 UR-SCNC: 30.5 MMOL/L (ref 24–28)
HCT VFR BLD AUTO: 36.7 %
HGB BLD-MCNC: 11.4 G/DL
LYMPHOCYTES # BLD AUTO: ABNORMAL K/UL
LYMPHOCYTES NFR BLD: 6 %
MAGNESIUM SERPL-MCNC: 2.1 MG/DL
MCH RBC QN AUTO: 27.9 PG
MCHC RBC AUTO-ENTMCNC: 31.1 %
MCV RBC AUTO: 90 FL
MODE: ABNORMAL
MONOCYTES # BLD AUTO: ABNORMAL K/UL
MONOCYTES NFR BLD: 0 %
NEUTROPHILS NFR BLD: 94 %
OVALOCYTES BLD QL SMEAR: ABNORMAL
PCO2 BLDA: 53.3 MMHG (ref 35–45)
PH SMN: 7.37 [PH] (ref 7.35–7.45)
PHOSPHATE SERPL-MCNC: 3.1 MG/DL
PLATELET # BLD AUTO: 213 K/UL
PMV BLD AUTO: 9.2 FL
PO2 BLDA: 91 MMHG (ref 80–100)
POC BE: 5 MMOL/L
POC SATURATED O2: 97 % (ref 95–100)
POIKILOCYTOSIS BLD QL SMEAR: SLIGHT
POTASSIUM SERPL-SCNC: 4.7 MMOL/L
PROT SERPL-MCNC: 6 G/DL
RBC # BLD AUTO: 4.08 M/UL
SAMPLE: ABNORMAL
SITE: ABNORMAL
SODIUM SERPL-SCNC: 142 MMOL/L
STOMATOCYTES BLD QL SMEAR: PRESENT
TROPONIN I SERPL DL<=0.01 NG/ML-MCNC: 0.03 NG/ML
WBC # BLD AUTO: 9.05 K/UL

## 2017-05-18 PROCEDURE — 21400001 HC TELEMETRY ROOM

## 2017-05-18 PROCEDURE — 25000242 PHARM REV CODE 250 ALT 637 W/ HCPCS: Performed by: EMERGENCY MEDICINE

## 2017-05-18 PROCEDURE — 25000003 PHARM REV CODE 250: Performed by: NURSE PRACTITIONER

## 2017-05-18 PROCEDURE — 94640 AIRWAY INHALATION TREATMENT: CPT

## 2017-05-18 PROCEDURE — 84100 ASSAY OF PHOSPHORUS: CPT

## 2017-05-18 PROCEDURE — 85007 BL SMEAR W/DIFF WBC COUNT: CPT

## 2017-05-18 PROCEDURE — 85027 COMPLETE CBC AUTOMATED: CPT

## 2017-05-18 PROCEDURE — 27000221 HC OXYGEN, UP TO 24 HOURS

## 2017-05-18 PROCEDURE — 63600175 PHARM REV CODE 636 W HCPCS: Performed by: EMERGENCY MEDICINE

## 2017-05-18 PROCEDURE — 25000003 PHARM REV CODE 250: Performed by: EMERGENCY MEDICINE

## 2017-05-18 PROCEDURE — 99223 1ST HOSP IP/OBS HIGH 75: CPT | Mod: ,,, | Performed by: INTERNAL MEDICINE

## 2017-05-18 PROCEDURE — 84484 ASSAY OF TROPONIN QUANT: CPT

## 2017-05-18 PROCEDURE — 83735 ASSAY OF MAGNESIUM: CPT

## 2017-05-18 PROCEDURE — 80053 COMPREHEN METABOLIC PANEL: CPT

## 2017-05-18 PROCEDURE — 36415 COLL VENOUS BLD VENIPUNCTURE: CPT

## 2017-05-18 RX ORDER — AZITHROMYCIN 250 MG/1
250 TABLET, FILM COATED ORAL DAILY
Status: DISCONTINUED | OUTPATIENT
Start: 2017-05-19 | End: 2017-05-23 | Stop reason: HOSPADM

## 2017-05-18 RX ORDER — ROFLUMILAST 500 UG/1
500 TABLET ORAL DAILY
Status: DISCONTINUED | OUTPATIENT
Start: 2017-05-19 | End: 2017-05-23 | Stop reason: HOSPADM

## 2017-05-18 RX ADMIN — IPRATROPIUM BROMIDE AND ALBUTEROL SULFATE 3 ML: .5; 3 SOLUTION RESPIRATORY (INHALATION) at 07:05

## 2017-05-18 RX ADMIN — ARFORMOTEROL TARTRATE 15 MCG: 15 SOLUTION RESPIRATORY (INHALATION) at 07:05

## 2017-05-18 RX ADMIN — BUDESONIDE 0.5 MG: 0.5 SUSPENSION RESPIRATORY (INHALATION) at 07:05

## 2017-05-18 RX ADMIN — LISINOPRIL 5 MG: 5 TABLET ORAL at 09:05

## 2017-05-18 RX ADMIN — FAMOTIDINE 20 MG: 20 TABLET ORAL at 09:05

## 2017-05-18 RX ADMIN — ASPIRIN 81 MG: 81 TABLET, COATED ORAL at 09:05

## 2017-05-18 RX ADMIN — ARFORMOTEROL TARTRATE 15 MCG: 15 SOLUTION RESPIRATORY (INHALATION) at 08:05

## 2017-05-18 RX ADMIN — METHYLPREDNISOLONE SODIUM SUCCINATE 80 MG: 125 INJECTION, POWDER, LYOPHILIZED, FOR SOLUTION INTRAMUSCULAR; INTRAVENOUS at 01:05

## 2017-05-18 RX ADMIN — BUDESONIDE 0.5 MG: 0.5 SUSPENSION RESPIRATORY (INHALATION) at 08:05

## 2017-05-18 RX ADMIN — SIMVASTATIN 10 MG: 5 TABLET, FILM COATED ORAL at 09:05

## 2017-05-18 RX ADMIN — METHYLPREDNISOLONE SODIUM SUCCINATE 80 MG: 125 INJECTION, POWDER, LYOPHILIZED, FOR SOLUTION INTRAMUSCULAR; INTRAVENOUS at 09:05

## 2017-05-18 RX ADMIN — IPRATROPIUM BROMIDE AND ALBUTEROL SULFATE 3 ML: .5; 3 SOLUTION RESPIRATORY (INHALATION) at 11:05

## 2017-05-18 RX ADMIN — ENOXAPARIN SODIUM 40 MG: 100 INJECTION SUBCUTANEOUS at 05:05

## 2017-05-18 RX ADMIN — METHYLPREDNISOLONE SODIUM SUCCINATE 80 MG: 125 INJECTION, POWDER, LYOPHILIZED, FOR SOLUTION INTRAMUSCULAR; INTRAVENOUS at 05:05

## 2017-05-18 RX ADMIN — TRAZODONE HYDROCHLORIDE 50 MG: 50 TABLET ORAL at 09:05

## 2017-05-18 NOTE — PLAN OF CARE
CM met with pt for d/c planning assessment.  Pt admitted for severe SOB, and reports she was recently in the hospital in Feb for the same symptoms.  Pt currently lives at home with one of her sons but reports that son is not able to provide care and support because he works long hours, so pt is home alone most of the time.  Pt getting to a point where she is unable to care for herself at home.  Pt is currently on the waiting list for placement at [facility] for a residential bed, but there are no beds currently available.  CM will follow up with Samara at [facility] to find out about place on waiting list.  CM discussed other nursing home options but pt has a strong preference for [facility].  Pt has had home health (SN) before but pt does not think home health provides level of care needed.     D/C Plan: nursing home vs home with family        05/18/17 1055   Discharge Assessment   Assessment Type Discharge Planning Assessment   Confirmed/corrected address and phone number on facesheet? Yes   Assessment information obtained from? Patient;Caregiver;Medical Record   Expected Length of Stay (days) (TBD)   Prior to hospitilization cognitive status: Alert/Oriented   Prior to hospitalization functional status: Assistive Equipment   Current cognitive status: Alert/Oriented   Current Functional Status: Assistive Equipment   Arrived From home or self-care   Lives With child(shellie), adult   Able to Return to Prior Arrangements yes   Is patient able to care for self after discharge? Unable to determine at this time (comments)   How many people do you have in your home that can help with your care after discharge? 1   Who are your caregiver(s) and their phone number(s)? Brittany Carmichael, sister: [phone] (home), [phone] (cell)   Patient's perception of discharge disposition home or selfcare;nursing home   Readmission Within The Last 30 Days no previous admission in last 30 days   Patient currently being followed  by outpatient case management? No   Patient currently receives home health services? No   Does the patient currently use HME? Yes   Patient currently receives private duty nursing? No   Patient currently receives any other outside agency services? No   Equipment Currently Used at Home oxygen  (nebulizer)   Do you have any problems affording any of your prescribed medications? No   Is the patient taking medications as prescribed? yes   Do you have any financial concerns preventing you from receiving the healthcare you need? No   Does the patient have transportation to healthcare appointments? Yes   Transportation Available family or friend will provide   On Dialysis? No   Does the patient receive services at the [facility]? No   Are there any open cases? No   Discharge Plan A New Nursing Home placement - senior care care facility   Discharge Plan B Home with family   Patient/Family In Agreement With Plan yes

## 2017-05-18 NOTE — ED NOTES
Pt is resting in bed in NAD. RR equal and non labored. Bed in low position and  Locked, side rails up X 2. Call light within reach. Will continue to monitor. Pt updated on current POC, notified that she will remain in ED until ICU bed is available. Pt and family member verbalized understanding.

## 2017-05-18 NOTE — ED NOTES
Dr. De at bedside. Pt removed from BiPap per Rasheeda, RRT. Placed on Nasal Canula @ 3 LPM. Pt remains sitting up at bedside. AAOx4, conversing appropriately, no SOB noted. NS infusion d/c'd per MD order. 18 g R AC, Saline Lock.

## 2017-05-18 NOTE — PROGRESS NOTES
[facility] Hospital Medicine  Progress Note    Patient Name: Petrona Gonzalez  MRN: [id number]  Patient Class: IP- Inpatient   Admission Date: 5/17/2017  Length of Stay: 1 days  Attending Physician: Bipin Caro MD  Primary Care Provider: Katy Arriaga MD    Subjective:     Principal Problem:Acute hypercapnic respiratory failure    HPI:  Petrona Gonzalez is a 76 y.o. female patient with a PMH of COPD, HTN, who presented to the ER with c/o gradually increasing shortness of breath over 3 days. Associated symptoms include a productive cough with green sputum. The patient denies any modifying factors. Patient denies fever, chills, CP, pnd, orthopnea, palpitations, diaphoresis, headache, blurred vision, numbness, tingling, dizziness, localized weakness, n/v/d, abdominal pain, blood in stools, melena, hematemesis, urinary frequency, urgency, dysuria or hematuria. In the ER the patient was found to have SpO2 75% with a PCO2 of 71.5. The patient reported that she wears 3LNC at home. The patient was admitted to ICU on BiPAP.       Hospital Course:  5/18 - Improvement in breathing reported. Patient now on nasal cannula.    Interval History: Reports an improvement in breathing compared to admission.    Review of Systems   Constitutional: Negative for activity change, appetite change, chills, diaphoresis, fatigue, fever and unexpected weight change.   HENT: Negative for congestion, drooling, facial swelling, rhinorrhea, sinus pressure, sneezing and sore throat.    Eyes: Negative for discharge, redness, itching and visual disturbance.   Respiratory: Positive for cough and shortness of breath. Negative for apnea, choking, chest tightness, wheezing and stridor.    Cardiovascular: Negative for chest pain, palpitations and leg swelling.   Gastrointestinal: Negative for abdominal distention, abdominal pain, anal bleeding, blood in stool, constipation, diarrhea, nausea and vomiting.   Genitourinary: Negative for  decreased urine volume, difficulty urinating, dysuria, frequency, hematuria, pelvic pain, urgency, vaginal bleeding and vaginal discharge.   Musculoskeletal: Negative for arthralgias, back pain, gait problem, joint swelling, myalgias, neck pain and neck stiffness.   Skin: Negative for color change, pallor, rash and wound.   Neurological: Negative for dizziness, seizures, facial asymmetry, speech difficulty, weakness, light-headedness, numbness and headaches.   Psychiatric/Behavioral: Negative for agitation, confusion, hallucinations and suicidal ideas.   All other systems reviewed and are negative.    Objective:     Vital Signs (Most Recent):  Temp: 99.3 °F (37.4 °C) (05/18/17 1100)  Pulse: 63 (05/18/17 1101)  Resp: 20 (05/18/17 1101)  BP: (!) 144/62 (05/18/17 1100)  SpO2: 96 % (05/18/17 1100) Vital Signs (24h Range):  Temp:  [97.9 °F (36.6 °C)-99.3 °F (37.4 °C)] 99.3 °F (37.4 °C)  Pulse:  [] 63  Resp:  [16-35] 20  SpO2:  [75 %-99 %] 96 %  BP: (112-171)/() 144/62     Weight: (!) 206.1 kg (454 lb 5.9 oz)  Body mass index is 80.49 kg/(m^2).    Intake/Output Summary (Last 24 hours) at 05/18/17 1321  Last data filed at 05/18/17 0500   Gross per 24 hour   Intake                0 ml   Output             1175 ml   Net            -1175 ml      Physical Exam   Constitutional: She is oriented to person, place, and time. She appears well-developed and well-nourished.   Morbidly obese   HENT:   Head: Normocephalic and atraumatic.   Eyes: Conjunctivae and EOM are normal. Pupils are equal, round, and reactive to light.   Neck: Normal range of motion. Neck supple.   Cardiovascular: Regular rhythm, normal heart sounds and intact distal pulses.    No murmur heard.  Tachycardic     Pulmonary/Chest: Breath sounds normal. No respiratory distress.   Diminished breath sounds on bases. No tachypnea or accessory muscle use.   Abdominal: Soft. Bowel sounds are normal. She exhibits no distension. There is no tenderness.    Musculoskeletal: Normal range of motion. She exhibits no edema, tenderness or deformity.   Neurological: She is alert and oriented to person, place, and time. She has normal reflexes.   Skin: Skin is warm and dry. No erythema.   Psychiatric: She has a normal mood and affect. Her behavior is normal.   Nursing note and vitals reviewed.    Significant Labs:   A1C:   Recent Labs  Lab 01/17/17  0854   HGBA1C 6.6*     ABGs:   Recent Labs  Lab 05/18/17  0517   PH 7.366   PCO2 53.3*   HCO3 30.5*   POCSATURATED 97   BE 5     Bilirubin:   Recent Labs  Lab 05/17/17  1640 05/18/17  0506   BILITOT 0.3 0.2     Blood Culture:   Recent Labs  Lab 05/17/17  1640 05/17/17  1700   LABBLOO No Growth to date No Growth to date     BMP:   Recent Labs  Lab 05/18/17  0506   *      K 4.7      CO2 29   BUN 18   CREATININE 0.8   CALCIUM 9.0   MG 2.1     CBC:   Recent Labs  Lab 05/17/17  1640 05/18/17  0506   WBC 12.94* 9.05   HGB 13.7 11.4*   HCT 43.3 36.7*    213     CMP:   Recent Labs  Lab 05/17/17  1640 05/18/17  0506    142   K 4.2 4.7    104   CO2 32* 29   * 279*   BUN 18 18   CREATININE 0.9 0.8   CALCIUM 9.6 9.0   PROT 7.7 6.0   ALBUMIN 3.7 2.9*   BILITOT 0.3 0.2   ALKPHOS 71 58   AST 16 10   ALT 24 19   ANIONGAP 10 9   EGFRNONAA >60 >60     Cardiac Markers:   Recent Labs  Lab 05/17/17  1640   *     Coagulation:   Recent Labs  Lab 05/17/17  1640   INR 1.0   APTT 28.0     Lactic Acid: No results for input(s): LACTATE in the last 48 hours.  Lipase: No results for input(s): LIPASE in the last 48 hours.    Significant Imaging:   Imaging Results         X-Ray Chest AP Portable (Final result) Result time:  05/17/17 17:45:57    Final result by LYDIA Diez Sr., MD (05/17/17 17:45:57)    Impression:        1.  No significant interval change in appearance.  2.  There is no evidence of acute pulmonary process.   3.  The borders of the heart are not well seen.  This may be secondary to  pericardial fat pads.  If additional imaging evaluation is clinically indicated, I recommend consideration of a CT examination of the thorax with IV contrast.      Electronically signed by: LYDIA NELSON MD  Date:     05/17/17  Time:    17:45     Narrative:    One-view chest x-ray    Clinical History: Chest pain    Finding: Comparison was made to a prior examination performed on 4/4/2017.  The borders of the heart are not well seen.  There is no evidence of acute pulmonary process.  There is no pneumothorax.  The right costophrenic angle is sharp.  The left costophrenic angle is not well seen.                Assessment/Plan:      * Acute hypercapnic respiratory failure  - Symptoms have improved with BiPAP.  - Pulmonology consult  - Supplemental O2 as needed.  - Neb tx PRN  - Continue i/v Solumedrol      COPD exacerbation  - Supplemental O2   - Empirically cover with Avelox   - IV steroids  - Neb tx  - Pulse Ox      Essential (primary) hypertension  - monitor VS   - resume home meds      Hyperlipidemia  - Continue statin       Poorly controlled diabetes mellitus  - SSI   - diabetic diet      Elevated troponin, resolved as of 5/18/2017  - trend troponin       VTE Risk Mitigation         Ordered     enoxaparin injection 40 mg  Daily     Route:  Subcutaneous        05/17/17 2037     Medium Risk of VTE  Once      05/17/17 2037     Place sequential compression device  Until discontinued      05/17/17 2037        Bipin Caro MD  Department of Hospital Medicine   [facility]

## 2017-05-18 NOTE — ED NOTES
Pt remains sitting up at bedside. RR equal, even, and unlabored. Remains on NC @ 3 LPM. Denies chest pain at this time. Conversing appropriately. RT remains at bedside, cardiac monitoring continues.

## 2017-05-18 NOTE — ED NOTES
Care assumed from Ramandeep Chapin RN at bedside. Pt sitting up at bedside, leaning on bedside tray. BiPap continues. Avelox infusion running via pump. V/S stable. RR equal, even, and unlabored. Denies chest pain. Will continue to monitor.

## 2017-05-18 NOTE — SUBJECTIVE & OBJECTIVE
Interval History: Reports an improvement in breathing compared to admission.    Review of Systems   Constitutional: Negative for activity change, appetite change, chills, diaphoresis, fatigue, fever and unexpected weight change.   HENT: Negative for congestion, drooling, facial swelling, rhinorrhea, sinus pressure, sneezing and sore throat.    Eyes: Negative for discharge, redness, itching and visual disturbance.   Respiratory: Positive for cough and shortness of breath. Negative for apnea, choking, chest tightness, wheezing and stridor.    Cardiovascular: Negative for chest pain, palpitations and leg swelling.   Gastrointestinal: Negative for abdominal distention, abdominal pain, anal bleeding, blood in stool, constipation, diarrhea, nausea and vomiting.   Genitourinary: Negative for decreased urine volume, difficulty urinating, dysuria, frequency, hematuria, pelvic pain, urgency, vaginal bleeding and vaginal discharge.   Musculoskeletal: Negative for arthralgias, back pain, gait problem, joint swelling, myalgias, neck pain and neck stiffness.   Skin: Negative for color change, pallor, rash and wound.   Neurological: Negative for dizziness, seizures, facial asymmetry, speech difficulty, weakness, light-headedness, numbness and headaches.   Psychiatric/Behavioral: Negative for agitation, confusion, hallucinations and suicidal ideas.   All other systems reviewed and are negative.    Objective:     Vital Signs (Most Recent):  Temp: 99.3 °F (37.4 °C) (05/18/17 1100)  Pulse: 63 (05/18/17 1101)  Resp: 20 (05/18/17 1101)  BP: (!) 144/62 (05/18/17 1100)  SpO2: 96 % (05/18/17 1100) Vital Signs (24h Range):  Temp:  [97.9 °F (36.6 °C)-99.3 °F (37.4 °C)] 99.3 °F (37.4 °C)  Pulse:  [] 63  Resp:  [16-35] 20  SpO2:  [75 %-99 %] 96 %  BP: (112-171)/() 144/62     Weight: (!) 206.1 kg (454 lb 5.9 oz)  Body mass index is 80.49 kg/(m^2).    Intake/Output Summary (Last 24 hours) at 05/18/17 1321  Last data filed at 05/18/17  0500   Gross per 24 hour   Intake                0 ml   Output             1175 ml   Net            -1175 ml      Physical Exam   Constitutional: She is oriented to person, place, and time. She appears well-developed and well-nourished.   Morbidly obese   HENT:   Head: Normocephalic and atraumatic.   Eyes: Conjunctivae and EOM are normal. Pupils are equal, round, and reactive to light.   Neck: Normal range of motion. Neck supple.   Cardiovascular: Regular rhythm, normal heart sounds and intact distal pulses.    No murmur heard.  Tachycardic     Pulmonary/Chest: Breath sounds normal. No respiratory distress.   Diminished breath sounds on bases. No tachypnea or accessory muscle use.   Abdominal: Soft. Bowel sounds are normal. She exhibits no distension. There is no tenderness.   Musculoskeletal: Normal range of motion. She exhibits no edema, tenderness or deformity.   Neurological: She is alert and oriented to person, place, and time. She has normal reflexes.   Skin: Skin is warm and dry. No erythema.   Psychiatric: She has a normal mood and affect. Her behavior is normal.   Nursing note and vitals reviewed.    Significant Labs:   A1C:   Recent Labs  Lab 01/17/17  0854   HGBA1C 6.6*     ABGs:   Recent Labs  Lab 05/18/17  0517   PH 7.366   PCO2 53.3*   HCO3 30.5*   POCSATURATED 97   BE 5     Bilirubin:   Recent Labs  Lab 05/17/17  1640 05/18/17  0506   BILITOT 0.3 0.2     Blood Culture:   Recent Labs  Lab 05/17/17  1640 05/17/17  1700   LABBLOO No Growth to date No Growth to date     BMP:   Recent Labs  Lab 05/18/17  0506   *      K 4.7      CO2 29   BUN 18   CREATININE 0.8   CALCIUM 9.0   MG 2.1     CBC:   Recent Labs  Lab 05/17/17  1640 05/18/17  0506   WBC 12.94* 9.05   HGB 13.7 11.4*   HCT 43.3 36.7*    213     CMP:   Recent Labs  Lab 05/17/17  1640 05/18/17  0506    142   K 4.2 4.7    104   CO2 32* 29   * 279*   BUN 18 18   CREATININE 0.9 0.8   CALCIUM 9.6 9.0   PROT  7.7 6.0   ALBUMIN 3.7 2.9*   BILITOT 0.3 0.2   ALKPHOS 71 58   AST 16 10   ALT 24 19   ANIONGAP 10 9   EGFRNONAA >60 >60     Cardiac Markers:   Recent Labs  Lab 05/17/17  1640   *     Coagulation:   Recent Labs  Lab 05/17/17  1640   INR 1.0   APTT 28.0     Lactic Acid: No results for input(s): LACTATE in the last 48 hours.  Lipase: No results for input(s): LIPASE in the last 48 hours.    Significant Imaging:   Imaging Results         X-Ray Chest AP Portable (Final result) Result time:  05/17/17 17:45:57    Final result by LYDIA Diez Sr., MD (05/17/17 17:45:57)    Impression:        1.  No significant interval change in appearance.  2.  There is no evidence of acute pulmonary process.   3.  The borders of the heart are not well seen.  This may be secondary to pericardial fat pads.  If additional imaging evaluation is clinically indicated, I recommend consideration of a CT examination of the thorax with IV contrast.      Electronically signed by: LYDIA DIEZ MD  Date:     05/17/17  Time:    17:45     Narrative:    One-view chest x-ray    Clinical History: Chest pain    Finding: Comparison was made to a prior examination performed on 4/4/2017.  The borders of the heart are not well seen.  There is no evidence of acute pulmonary process.  There is no pneumothorax.  The right costophrenic angle is sharp.  The left costophrenic angle is not well seen.

## 2017-05-18 NOTE — PLAN OF CARE
Problem: Patient Care Overview  Goal: Plan of Care Review  Outcome: Ongoing (interventions implemented as appropriate)  Patient doing well since arriving to unit. Free from falls and injury. Vital signs stable. No complaints of pain. Pt remains on 3L NC. No s/s of distress. POC reviewed. Will continue to monitor.

## 2017-05-18 NOTE — NURSING
Chart reviewed for diabetes  Patient was seen by this nurse on previous admit   Please see note from 2-15-17  If any new diabetes educational needs are identified, please consult

## 2017-05-18 NOTE — H&P
[facility] Hospital Medicine  History & Physical    Patient Name: Petrona Gonzalez  MRN: [id number]  Admission Date: 5/17/2017  Attending Physician: Carol Moody MD   Primary Care Provider: Katy Arriaga MD         Patient information was obtained from patient and ER records.     Subjective:     Principal Problem:Acute hypercapnic respiratory failure    Chief Complaint:   Chief Complaint   Patient presents with    Shortness of Breath     for several days but worse today        HPI: Petrona Gonzalez is a 76 y.o. female patient with a PMH of COPD, HTN, who presents to the ER with c/o gradually increasing shortness of breath over the past 3 days. Associated symptoms include a productive cough with green sputum. The patient denies any modifying factors. Patient denies fever, chills, CP, pnd, orthopnea, palpitations, diaphoresis, headache, blurred vision, numbness, tingling, dizziness, localized weakness, n/v/d, abdominal pain, blood in stools, melena, hematemesis, urinary frequency, urgency, dysuria or hematuria. In the ER the patient was found to have SpO2 75% with a PCO2 of 71.5. The patient reports that she wears 3LNC at home. The patient is being admitted to ICU on BiPAP.         Past Medical History:   Diagnosis Date    COPD (chronic obstructive pulmonary disease) with emphysema     Diabetes mellitus     Hyperlipidemia     Hypertension     Maxillary sinusitis, chronic        Past Surgical History:   Procedure Laterality Date    BACK SURGERY      HERNIA REPAIR      HYSTERECTOMY         Review of patient's allergies indicates:   Allergen Reactions    Meloxicam Other (See Comments)      Patient stated that she has muscle aches and pains    Naproxen Other (See Comments)     Patient states she has muscle and aches.    Morphine Rash       No current facility-administered medications on file prior to encounter.      Current Outpatient Prescriptions on File Prior to Encounter    Medication Sig    albuterol 90 mcg/actuation inhaler Inhale 2 puffs into the lungs every 6 (six) hours as needed. Dispense with spacer.    albuterol-ipratropium 2.5mg-0.5mg/3mL (DUO-NEB) 0.5 mg-3 mg(2.5 mg base)/3 mL nebulizer solution Take 3 mLs by nebulization every 6 (six) hours.    aspirin (ECOTRIN) 81 MG EC tablet Take 81 mg by mouth once daily.    budesonide (PULMICORT) 0.5 mg/2 mL nebulizer solution Take 2 mLs (0.5 mg total) by nebulization 2 (two) times daily. Wash out mouth after using.    budesonide-formoterol 160-4.5 mcg (SYMBICORT) 160-4.5 mcg/actuation HFAA Inhale 2 puffs into the lungs every 12 (twelve) hours. Controller    cetirizine (ZYRTEC) 10 MG tablet Take 1 tablet (10 mg total) by mouth once daily.    dextromethorphan-guaifenesin  mg (MUCINEX DM)  mg per 12 hr tablet Take 1 tablet by mouth.    lisinopril (PRINIVIL,ZESTRIL) 5 MG tablet Take 1 tablet (5 mg total) by mouth once daily.    metformin (GLUCOPHAGE) 500 MG tablet Take 1 tablet (500 mg total) by mouth 2 (two) times daily with meals.    methylPREDNISolone (MEDROL DOSEPACK) 4 mg tablet use as directed    OXYGEN-AIR DELIVERY SYSTEMS MISC by [facility].(Non-Drug; Combo Route) route.    predniSONE (DELTASONE) 20 MG tablet Prednisone 60 mg/day for 7 days,40 mg/day for 7 days,20 mg/day for 7 days,10 mg/ day (1/2 tablet) for 7 days    predniSONE (DELTASONE) 5 MG tablet     simvastatin (ZOCOR) 10 MG tablet Take 1 tablet (10 mg total) by mouth every evening.    trazodone (DESYREL) 50 MG tablet Take 1 tablet (50 mg total) by mouth every evening.     Family History     Problem Relation (Age of Onset)    Arthritis Father    Cancer Father, Sister, Brother    Early death Sister    Hearing loss Father    Heart disease Brother        Social History Main Topics    Smoking status: Former Smoker     Years: 30.00    Smokeless tobacco: Never Used    Alcohol use No    Drug use: No    Sexual activity: Not Currently     Review of  Systems   Constitutional: Negative for activity change, appetite change, chills, diaphoresis, fatigue, fever and unexpected weight change.   HENT: Negative for congestion, drooling, facial swelling, rhinorrhea, sinus pressure, sneezing and sore throat.    Eyes: Negative for discharge, redness, itching and visual disturbance.   Respiratory: Positive for cough and shortness of breath. Negative for apnea, choking, chest tightness, wheezing and stridor.    Cardiovascular: Negative for chest pain, palpitations and leg swelling.   Gastrointestinal: Negative for abdominal distention, abdominal pain, anal bleeding, blood in stool, constipation, diarrhea, nausea and vomiting.   Genitourinary: Negative for decreased urine volume, difficulty urinating, dysuria, frequency, hematuria, pelvic pain, urgency, vaginal bleeding and vaginal discharge.   Musculoskeletal: Negative for arthralgias, back pain, gait problem, joint swelling, myalgias, neck pain and neck stiffness.   Skin: Negative for color change, pallor, rash and wound.   Neurological: Negative for dizziness, seizures, facial asymmetry, speech difficulty, weakness, light-headedness, numbness and headaches.   Psychiatric/Behavioral: Negative for agitation, confusion, hallucinations and suicidal ideas.   All other systems reviewed and are negative.    Objective:     Vital Signs (Most Recent):  Temp: 98 °F (36.7 °C) (05/17/17 1622)  Pulse: 100 (05/17/17 1805)  Resp: 16 (05/17/17 1805)  BP: (!) 140/48 (05/17/17 1805)  SpO2: 97 % (05/17/17 1805) Vital Signs (24h Range):  Temp:  [98 °F (36.7 °C)] 98 °F (36.7 °C)  Pulse:  [] 100  Resp:  [16-35] 16  SpO2:  [75 %-99 %] 97 %  BP: (140-171)/() 140/48     Weight: (!) 206.1 kg (454 lb 5.9 oz)  Body mass index is 80.49 kg/(m^2).    Physical Exam   Constitutional: She is oriented to person, place, and time. She appears well-developed and well-nourished.   Morbidly obese   HENT:   Head: Normocephalic and atraumatic.   Eyes:  Conjunctivae and EOM are normal. Pupils are equal, round, and reactive to light.   Neck: Normal range of motion. Neck supple.   Cardiovascular: Regular rhythm, normal heart sounds and intact distal pulses.    No murmur heard.  Tachycardic     Pulmonary/Chest: Breath sounds normal. She is in respiratory distress.   On BiPAP, BBS diminished and coarse. Mild respiratory distress noted.    Abdominal: Soft. Bowel sounds are normal. She exhibits no distension. There is no tenderness.   Musculoskeletal: Normal range of motion. She exhibits no edema, tenderness or deformity.   Neurological: She is alert and oriented to person, place, and time. She has normal reflexes.   Skin: Skin is warm and dry. No erythema.   Psychiatric: She has a normal mood and affect. Her behavior is normal.   Nursing note and vitals reviewed.       Significant Labs: All pertinent labs within the past 24 hours have been reviewed.    Significant Imaging:   Imaging Results         X-Ray Chest AP Portable (Final result) Result time:  05/17/17 17:45:57    Final result by LYDIA Diez Sr., MD (05/17/17 17:45:57)    Impression:        1.  No significant interval change in appearance.  2.  There is no evidence of acute pulmonary process.   3.  The borders of the heart are not well seen.  This may be secondary to pericardial fat pads.  If additional imaging evaluation is clinically indicated, I recommend consideration of a CT examination of the thorax with IV contrast.      Electronically signed by: LYDIA DIEZ MD  Date:     05/17/17  Time:    17:45     Narrative:    One-view chest x-ray    Clinical History: Chest pain    Finding: Comparison was made to a prior examination performed on 4/4/2017.  The borders of the heart are not well seen.  There is no evidence of acute pulmonary process.  There is no pneumothorax.  The right costophrenic angle is sharp.  The left costophrenic angle is not well seen.                Assessment/Plan:     * Acute hypercapnic  respiratory failure  - Admit to ICU on BiPAP  - Pulmonology consult  - Supplemental O2  - Neb tx PRN  - pulse ox       Obesity with body mass index 30 or greater  - Pt counseled on diet/exercise/weight loss      Essential (primary) hypertension  - monitor VS   - resume home meds      COPD exacerbation  - Supplemental O2   - Empirically cover with Avelox   - IV steroids  - Neb tx  - Pulse Ox      Hyperlipidemia  - resume statin       Poorly controlled diabetes mellitus  - SSI   - diabetic diet      Elevated troponin  - trend troponin       VTE Risk Mitigation     None        Marco A Sánchez NP  Department of Hospital Medicine   [facility]

## 2017-05-18 NOTE — ASSESSMENT & PLAN NOTE
- Symptoms have improved with BiPAP.  - Pulmonology consult  - Supplemental O2 as needed.  - Neb tx PRN  - Continue i/v Solumedrol

## 2017-05-19 PROBLEM — R78.81 BACTEREMIA: Status: ACTIVE | Noted: 2017-05-19

## 2017-05-19 LAB — VANCOMYCIN SERPL-MCNC: 6.3 UG/ML

## 2017-05-19 PROCEDURE — 97110 THERAPEUTIC EXERCISES: CPT

## 2017-05-19 PROCEDURE — 99231 SBSQ HOSP IP/OBS SF/LOW 25: CPT | Mod: ,,, | Performed by: INTERNAL MEDICINE

## 2017-05-19 PROCEDURE — 80202 ASSAY OF VANCOMYCIN: CPT

## 2017-05-19 PROCEDURE — G8979 MOBILITY GOAL STATUS: HCPCS | Mod: CI

## 2017-05-19 PROCEDURE — 25000003 PHARM REV CODE 250: Performed by: INTERNAL MEDICINE

## 2017-05-19 PROCEDURE — 21400001 HC TELEMETRY ROOM

## 2017-05-19 PROCEDURE — 25000003 PHARM REV CODE 250: Performed by: NURSE PRACTITIONER

## 2017-05-19 PROCEDURE — 94640 AIRWAY INHALATION TREATMENT: CPT

## 2017-05-19 PROCEDURE — G8987 SELF CARE CURRENT STATUS: HCPCS | Mod: CK

## 2017-05-19 PROCEDURE — 94660 CPAP INITIATION&MGMT: CPT

## 2017-05-19 PROCEDURE — 63600175 PHARM REV CODE 636 W HCPCS: Performed by: INTERNAL MEDICINE

## 2017-05-19 PROCEDURE — 97166 OT EVAL MOD COMPLEX 45 MIN: CPT

## 2017-05-19 PROCEDURE — 94761 N-INVAS EAR/PLS OXIMETRY MLT: CPT

## 2017-05-19 PROCEDURE — 36415 COLL VENOUS BLD VENIPUNCTURE: CPT

## 2017-05-19 PROCEDURE — 25000003 PHARM REV CODE 250: Performed by: EMERGENCY MEDICINE

## 2017-05-19 PROCEDURE — 99900035 HC TECH TIME PER 15 MIN (STAT)

## 2017-05-19 PROCEDURE — G8978 MOBILITY CURRENT STATUS: HCPCS | Mod: CK

## 2017-05-19 PROCEDURE — 63600175 PHARM REV CODE 636 W HCPCS: Performed by: EMERGENCY MEDICINE

## 2017-05-19 PROCEDURE — 27000221 HC OXYGEN, UP TO 24 HOURS

## 2017-05-19 PROCEDURE — G8988 SELF CARE GOAL STATUS: HCPCS | Mod: CJ

## 2017-05-19 PROCEDURE — 97162 PT EVAL MOD COMPLEX 30 MIN: CPT

## 2017-05-19 RX ORDER — ALPRAZOLAM 0.5 MG/1
0.5 TABLET ORAL 2 TIMES DAILY PRN
Status: DISCONTINUED | OUTPATIENT
Start: 2017-05-19 | End: 2017-05-23 | Stop reason: HOSPADM

## 2017-05-19 RX ADMIN — TRAZODONE HYDROCHLORIDE 50 MG: 50 TABLET ORAL at 09:05

## 2017-05-19 RX ADMIN — METHYLPREDNISOLONE SODIUM SUCCINATE 80 MG: 125 INJECTION, POWDER, LYOPHILIZED, FOR SOLUTION INTRAMUSCULAR; INTRAVENOUS at 02:05

## 2017-05-19 RX ADMIN — FAMOTIDINE 20 MG: 20 TABLET ORAL at 08:05

## 2017-05-19 RX ADMIN — BUDESONIDE 0.5 MG: 0.5 SUSPENSION RESPIRATORY (INHALATION) at 08:05

## 2017-05-19 RX ADMIN — SIMVASTATIN 10 MG: 5 TABLET, FILM COATED ORAL at 09:05

## 2017-05-19 RX ADMIN — METHYLPREDNISOLONE SODIUM SUCCINATE 80 MG: 125 INJECTION, POWDER, LYOPHILIZED, FOR SOLUTION INTRAMUSCULAR; INTRAVENOUS at 09:05

## 2017-05-19 RX ADMIN — AZITHROMYCIN 250 MG: 250 TABLET, FILM COATED ORAL at 08:05

## 2017-05-19 RX ADMIN — VANCOMYCIN HYDROCHLORIDE 1000 MG: 1 INJECTION, POWDER, LYOPHILIZED, FOR SOLUTION INTRAVENOUS at 02:05

## 2017-05-19 RX ADMIN — ROFLUMILAST 500 MCG: 500 TABLET ORAL at 08:05

## 2017-05-19 RX ADMIN — LISINOPRIL 5 MG: 5 TABLET ORAL at 08:05

## 2017-05-19 RX ADMIN — BUDESONIDE 0.5 MG: 0.5 SUSPENSION RESPIRATORY (INHALATION) at 07:05

## 2017-05-19 RX ADMIN — ARFORMOTEROL TARTRATE 15 MCG: 15 SOLUTION RESPIRATORY (INHALATION) at 07:05

## 2017-05-19 RX ADMIN — ENOXAPARIN SODIUM 40 MG: 100 INJECTION SUBCUTANEOUS at 06:05

## 2017-05-19 RX ADMIN — METHYLPREDNISOLONE SODIUM SUCCINATE 80 MG: 125 INJECTION, POWDER, LYOPHILIZED, FOR SOLUTION INTRAMUSCULAR; INTRAVENOUS at 05:05

## 2017-05-19 RX ADMIN — VANCOMYCIN HYDROCHLORIDE 1000 MG: 1 INJECTION, POWDER, LYOPHILIZED, FOR SOLUTION INTRAVENOUS at 04:05

## 2017-05-19 RX ADMIN — ARFORMOTEROL TARTRATE 15 MCG: 15 SOLUTION RESPIRATORY (INHALATION) at 08:05

## 2017-05-19 RX ADMIN — ASPIRIN 81 MG: 81 TABLET, COATED ORAL at 08:05

## 2017-05-19 RX ADMIN — ALPRAZOLAM 0.5 MG: 0.5 TABLET ORAL at 02:05

## 2017-05-19 NOTE — SUBJECTIVE & OBJECTIVE
Interval History: No acute overnight events. Was able to tolerate the CPAP overnight.    Review of Systems   Constitutional: Negative for activity change, appetite change, chills, diaphoresis, fatigue, fever and unexpected weight change.   HENT: Negative for congestion, drooling, facial swelling, rhinorrhea, sinus pressure, sneezing and sore throat.    Eyes: Negative for discharge, redness, itching and visual disturbance.   Respiratory: Positive for cough and shortness of breath. Negative for apnea, choking, chest tightness, wheezing and stridor.    Cardiovascular: Negative for chest pain, palpitations and leg swelling.   Gastrointestinal: Negative for abdominal distention, abdominal pain, anal bleeding, blood in stool, constipation, diarrhea, nausea and vomiting.   Genitourinary: Negative for decreased urine volume, difficulty urinating, dysuria, frequency, hematuria, pelvic pain, urgency, vaginal bleeding and vaginal discharge.   Musculoskeletal: Negative for arthralgias, back pain, gait problem, joint swelling, myalgias, neck pain and neck stiffness.   Skin: Negative for color change, pallor, rash and wound.   Neurological: Negative for dizziness, seizures, facial asymmetry, speech difficulty, weakness, light-headedness, numbness and headaches.   Psychiatric/Behavioral: Negative for agitation, confusion, hallucinations and suicidal ideas.   All other systems reviewed and are negative.    Objective:     Vital Signs (Most Recent):  Temp: 98.6 °F (37 °C) (05/19/17 0354)  Pulse: 76 (05/19/17 0822)  Resp: 20 (05/19/17 0822)  BP: (!) 152/70 (05/19/17 0822)  SpO2: (!) 93 % (05/19/17 0822) Vital Signs (24h Range):  Temp:  [97.8 °F (36.6 °C)-98.6 °F (37 °C)] 98.6 °F (37 °C)  Pulse:  [63-76] 76  Resp:  [16-24] 20  SpO2:  [93 %-97 %] 93 %  BP: (128-152)/(54-81) 152/70     Weight: (!) 206.1 kg (454 lb 5.9 oz)  Body mass index is 80.49 kg/(m^2).    Intake/Output Summary (Last 24 hours) at 05/19/17 1104  Last data filed at  05/19/17 0638   Gross per 24 hour   Intake              620 ml   Output             2275 ml   Net            -1655 ml      Physical Exam   Constitutional: She is oriented to person, place, and time. She appears well-developed and well-nourished.   Morbidly obese   HENT:   Head: Normocephalic and atraumatic.   Eyes: Conjunctivae and EOM are normal. Pupils are equal, round, and reactive to light.   Neck: Normal range of motion. Neck supple.   Cardiovascular: Regular rhythm, normal heart sounds and intact distal pulses.    No murmur heard.  Tachycardic     Pulmonary/Chest: Breath sounds normal. No respiratory distress.   Diminished breath sounds on bases. No tachypnea or accessory muscle use.   Abdominal: Soft. Bowel sounds are normal. She exhibits no distension. There is no tenderness.   Musculoskeletal: Normal range of motion. She exhibits no edema, tenderness or deformity.   Neurological: She is alert and oriented to person, place, and time. She has normal reflexes.   Skin: Skin is warm and dry. No erythema.   Psychiatric: She has a normal mood and affect. Her behavior is normal.   Nursing note and vitals reviewed.      Significant Labs:   A1C:   Recent Labs  Lab 01/17/17  0854   HGBA1C 6.6*     ABGs:   Recent Labs  Lab 05/18/17  0517   PH 7.366   PCO2 53.3*   HCO3 30.5*   POCSATURATED 97   BE 5     Bilirubin:   Recent Labs  Lab 05/17/17  1640 05/18/17  0506   BILITOT 0.3 0.2     Blood Culture:   Recent Labs  Lab 05/17/17  1640 05/17/17  1700   LABBLOO Gram stain aer bottle: Gram positive cocci   Results called to and read back by:Yudy Hernandez RN  05/19/2017  01:29 No Growth to date  No Growth to date     BMP:   Recent Labs  Lab 05/18/17  0506   *      K 4.7      CO2 29   BUN 18   CREATININE 0.8   CALCIUM 9.0   MG 2.1     CBC:   Recent Labs  Lab 05/17/17  1640 05/18/17  0506   WBC 12.94* 9.05   HGB 13.7 11.4*   HCT 43.3 36.7*    213     CMP:   Recent Labs  Lab 05/17/17  1640  05/18/17  0506    142   K 4.2 4.7    104   CO2 32* 29   * 279*   BUN 18 18   CREATININE 0.9 0.8   CALCIUM 9.6 9.0   PROT 7.7 6.0   ALBUMIN 3.7 2.9*   BILITOT 0.3 0.2   ALKPHOS 71 58   AST 16 10   ALT 24 19   ANIONGAP 10 9   EGFRNONAA >60 >60     Cardiac Markers:   Recent Labs  Lab 05/17/17  1640   *     Coagulation:   Recent Labs  Lab 05/17/17  1640   INR 1.0   APTT 28.0     Lactic Acid: No results for input(s): LACTATE in the last 48 hours.  Lipase: No results for input(s): LIPASE in the last 48 hours.  Lipid Panel: No results for input(s): CHOL, HDL, LDLCALC, TRIG, CHOLHDL in the last 48 hours.    Significant Imaging:   Imaging Results         X-Ray Chest AP Portable (Final result) Result time:  05/17/17 17:45:57    Final result by LYDIA Diez Sr., MD (05/17/17 17:45:57)    Impression:        1.  No significant interval change in appearance.  2.  There is no evidence of acute pulmonary process.   3.  The borders of the heart are not well seen.  This may be secondary to pericardial fat pads.  If additional imaging evaluation is clinically indicated, I recommend consideration of a CT examination of the thorax with IV contrast.      Electronically signed by: LYDIA DIEZ MD  Date:     05/17/17  Time:    17:45     Narrative:    One-view chest x-ray    Clinical History: Chest pain    Finding: Comparison was made to a prior examination performed on 4/4/2017.  The borders of the heart are not well seen.  There is no evidence of acute pulmonary process.  There is no pneumothorax.  The right costophrenic angle is sharp.  The left costophrenic angle is not well seen.

## 2017-05-19 NOTE — CONSULTS
Pulmonary Consult Note    Inpatient consult to Pulmonology  Consult performed by: REYNA TUNRER  Consult ordered by: RENEE CARDONA        SUBJECTIVE: SOB     History of Present Illness:   Patient is a 76 y.o. female PMH of COPD, hypertension and chronic respiratory failure last hospitalized in 2/2017  presents with cough, sputum production and SOB. Onset of symptoms was gradual starting 4 days ago with rapidly worsening course since that time. Patient denies high fever or pleurisy. Symptoms are aggravated by activity. Symptoms improve with rest. Quit smoking in 2012    Review of patient's allergies indicates:   Allergen Reactions    Meloxicam Other (See Comments)      Patient stated that she has muscle aches and pains    Naproxen Other (See Comments)     Patient states she has muscle and aches.    Morphine Rash     Past Medical History:   Diagnosis Date    COPD (chronic obstructive pulmonary disease) with emphysema     Diabetes mellitus     Hyperlipidemia     Hypertension     Maxillary sinusitis, chronic      Past Surgical History:   Procedure Laterality Date    BACK SURGERY      HERNIA REPAIR      HYSTERECTOMY       Family History   Problem Relation Age of Onset    Arthritis Father     Cancer Father     Hearing loss Father     Cancer Sister     Early death Sister     Cancer Brother     Heart disease Brother      Social History   Substance Use Topics    Smoking status: Former Smoker     Years: 30.00    Smokeless tobacco: Never Used    Alcohol use No     ROS  OBJECTIVE:     Vital Signs:  Temp:  [98 °F (36.7 °C)-99.3 °F (37.4 °C)]   Pulse:  [63-70]   Resp:  [20]   BP: (131-144)/(62-65)   SpO2:  [93 %-96 %]     Physical Exam  Laboratory:  CBC:   Recent Labs  Lab 05/18/17  0506   WBC 9.05   RBC 4.08   HGB 11.4*   HCT 36.7*      MCV 90   MCH 27.9   MCHC 31.1*     BMP:   Recent Labs  Lab 05/18/17  0506   *      K 4.7      CO2 29   BUN 18   CREATININE 0.8   CALCIUM  9.0   MG 2.1     CMP:   Recent Labs  Lab 05/18/17  0506   *   CALCIUM 9.0   ALBUMIN 2.9*   PROT 6.0      K 4.7   CO2 29      BUN 18   CREATININE 0.8   ALKPHOS 58   ALT 19   AST 10   BILITOT 0.2     ABGs:   Recent Labs  Lab 05/18/17  0517   PH 7.366   PCO2 53.3*   PO2 91   HCO3 30.5*   POCSATURATED 97   BE 5       Diagnostic Results:  X-Ray: Reviewed  hyperinflation and lower lobe atelectesis    ASSESSMENT/PLAN:     Problem   Acute On Chronic Respiratory Failure With Hypoxia and Hypercapnia   Acute Hypercapnic Respiratory Failure   Copd Exacerbation   Poorly Controlled Diabetes Mellitus         Patient Active Problem List   Diagnosis    Hay fever    Anemia, deficiency    Anxiety    Obesity with body mass index 30 or greater    Cellulitis of elbow    Chest pain    Chronic maxillary sinusitis    Depression    Diabetes mellitus without complication    Diabetic polyneuropathy    Dyslipidemia    Essential (primary) hypertension    Anemia associated with nutritional deficiency    COPD exacerbation    Pneumonia    Shoulder pain    Exomphalos    Chronic respiratory failure with hypoxia    Insomnia    Breath shortness    Type 2 diabetes mellitus with hyperlipidemia    Type 2 diabetes mellitus with diabetic neuropathy, without long-term current use of insulin    Hyperlipidemia    Leukocytosis    Poorly controlled diabetes mellitus    Acute on chronic respiratory failure with hypoxia and hypercapnia    Acute hypercapnic respiratory failure         Plan: Medications: add antibiotic for bronchitis, refer to pulmonary disease management program, add daliresp.  Bipap and supplemental oxygen  See orders      Juan Carlos Araujo MD  Pulmonary / Critical Care Medicine  .

## 2017-05-19 NOTE — CONSULTS
Petrona Gonzalez [id number] is a 76 y.o. female who has been consulted for vancomycin dosing.  Consult ordered by Dr ANJEL Ron    Dx: Bronchitis, possibly pneumonia  Vancomycin trough goal = 15-20 mcg/mL    Microbiology: Blood culture from tube #1 Gram stain aerobic bottle: Gram + cocci    Tmax: 99.3 F  The patient has the following labs:     Date Creatinine (mg/dl)    BUN WBC Count   5/19/2017 Estimated Creatinine Clearance: 110.9 mL/min (based on Cr of 0.8). Lab Results   Component Value Date    BUN 18 05/18/2017     Lab Results   Component Value Date    WBC 9.05 05/18/2017      which calculates to an Estimated Creatinine Clearance: 110.9 mL/min (based on Cr of 0.8)..       Current weight is (!) 206.1 kg (454 lb 5.9 oz)  AdjBW = 113.9 kg, T 1/2 = 14.75 hrs, Est VD = 0.75 L/kg    The patient will be started on vancomycin at a dose of 2000 mg every 18 hours.   Dose is given in 2 divided doses at 1 G per bag every 1.5 hrs.    Patient will be followed by pharmacy for changes in renal function, toxicity, and efficacy.  The vancomycin trough has been ordered for 5/20/17 at 1400.      Thank you for allowing us to participate in this patient's care.     Goran Stoddard

## 2017-05-19 NOTE — ASSESSMENT & PLAN NOTE
- Symptoms have improved with BiPAP on the first day.  - Appreciate Pulmonology input.  - Supplemental O2 as needed.  - Neb tx PRN  - Continue i/v Solumedrol.

## 2017-05-19 NOTE — PROGRESS NOTES
[facility] Hospital Medicine  Progress Note    Patient Name: Petrona Gonzalez  MRN: [id number]  Patient Class: IP- Inpatient   Admission Date: 5/17/2017  Length of Stay: 2 days  Attending Physician: Bipin Caro MD  Primary Care Provider: Katy Arriaga MD    Subjective:     Principal Problem:Acute hypercapnic respiratory failure    HPI:  Petrona Gonzalez is a 76 y.o. female patient with a PMH of COPD, HTN, who presented to the ER with c/o gradually increasing shortness of breath over 3 days. Associated symptoms include a productive cough with green sputum. The patient denies any modifying factors. Patient denies fever, chills, CP, pnd, orthopnea, palpitations, diaphoresis, headache, blurred vision, numbness, tingling, dizziness, localized weakness, n/v/d, abdominal pain, blood in stools, melena, hematemesis, urinary frequency, urgency, dysuria or hematuria. In the ER the patient was found to have SpO2 75% with a PCO2 of 71.5. The patient reported that she wears 3LNC at home. The patient was admitted to ICU on BiPAP.         Hospital Course:  5/18 - Improvement in breathing reported. Patient now on nasal cannula. Used CPAP overnight.    Interval History: No acute overnight events. Was able to tolerate the CPAP overnight.    Review of Systems   Constitutional: Negative for activity change, appetite change, chills, diaphoresis, fatigue, fever and unexpected weight change.   HENT: Negative for congestion, drooling, facial swelling, rhinorrhea, sinus pressure, sneezing and sore throat.    Eyes: Negative for discharge, redness, itching and visual disturbance.   Respiratory: Positive for cough and shortness of breath. Negative for apnea, choking, chest tightness, wheezing and stridor.    Cardiovascular: Negative for chest pain, palpitations and leg swelling.   Gastrointestinal: Negative for abdominal distention, abdominal pain, anal bleeding, blood in stool, constipation, diarrhea, nausea and  vomiting.   Genitourinary: Negative for decreased urine volume, difficulty urinating, dysuria, frequency, hematuria, pelvic pain, urgency, vaginal bleeding and vaginal discharge.   Musculoskeletal: Negative for arthralgias, back pain, gait problem, joint swelling, myalgias, neck pain and neck stiffness.   Skin: Negative for color change, pallor, rash and wound.   Neurological: Negative for dizziness, seizures, facial asymmetry, speech difficulty, weakness, light-headedness, numbness and headaches.   Psychiatric/Behavioral: Negative for agitation, confusion, hallucinations and suicidal ideas.   All other systems reviewed and are negative.    Objective:     Vital Signs (Most Recent):  Temp: 98.6 °F (37 °C) (05/19/17 0354)  Pulse: 76 (05/19/17 0822)  Resp: 20 (05/19/17 0822)  BP: (!) 152/70 (05/19/17 0822)  SpO2: (!) 93 % (05/19/17 0822) Vital Signs (24h Range):  Temp:  [97.8 °F (36.6 °C)-98.6 °F (37 °C)] 98.6 °F (37 °C)  Pulse:  [63-76] 76  Resp:  [16-24] 20  SpO2:  [93 %-97 %] 93 %  BP: (128-152)/(54-81) 152/70     Weight: (!) 206.1 kg (454 lb 5.9 oz)  Body mass index is 80.49 kg/(m^2).    Intake/Output Summary (Last 24 hours) at 05/19/17 1104  Last data filed at 05/19/17 0638   Gross per 24 hour   Intake              620 ml   Output             2275 ml   Net            -1655 ml      Physical Exam   Constitutional: She is oriented to person, place, and time. She appears well-developed and well-nourished.   Morbidly obese   HENT:   Head: Normocephalic and atraumatic.   Eyes: Conjunctivae and EOM are normal. Pupils are equal, round, and reactive to light.   Neck: Normal range of motion. Neck supple.   Cardiovascular: Regular rhythm, normal heart sounds and intact distal pulses.    No murmur heard.  Tachycardic     Pulmonary/Chest: Breath sounds normal. No respiratory distress.   Diminished breath sounds on bases. No tachypnea or accessory muscle use.   Abdominal: Soft. Bowel sounds are normal. She exhibits no  distension. There is no tenderness.   Musculoskeletal: Normal range of motion. She exhibits no edema, tenderness or deformity.   Neurological: She is alert and oriented to person, place, and time. She has normal reflexes.   Skin: Skin is warm and dry. No erythema.   Psychiatric: She has a normal mood and affect. Her behavior is normal.   Nursing note and vitals reviewed.      Significant Labs:   A1C:   Recent Labs  Lab 01/17/17  0854   HGBA1C 6.6*     ABGs:   Recent Labs  Lab 05/18/17  0517   PH 7.366   PCO2 53.3*   HCO3 30.5*   POCSATURATED 97   BE 5     Bilirubin:   Recent Labs  Lab 05/17/17  1640 05/18/17  0506   BILITOT 0.3 0.2     Blood Culture:   Recent Labs  Lab 05/17/17 1640 05/17/17  1700   LABBLOO Gram stain aer bottle: Gram positive cocci   Results called to and read back by:Yudy Hernandez RN  05/19/2017  01:29 No Growth to date  No Growth to date     BMP:   Recent Labs  Lab 05/18/17  0506   *      K 4.7      CO2 29   BUN 18   CREATININE 0.8   CALCIUM 9.0   MG 2.1     CBC:   Recent Labs  Lab 05/17/17  1640 05/18/17  0506   WBC 12.94* 9.05   HGB 13.7 11.4*   HCT 43.3 36.7*    213     CMP:   Recent Labs  Lab 05/17/17  1640 05/18/17  0506    142   K 4.2 4.7    104   CO2 32* 29   * 279*   BUN 18 18   CREATININE 0.9 0.8   CALCIUM 9.6 9.0   PROT 7.7 6.0   ALBUMIN 3.7 2.9*   BILITOT 0.3 0.2   ALKPHOS 71 58   AST 16 10   ALT 24 19   ANIONGAP 10 9   EGFRNONAA >60 >60     Cardiac Markers:   Recent Labs  Lab 05/17/17  1640   *     Coagulation:   Recent Labs  Lab 05/17/17  1640   INR 1.0   APTT 28.0     Lactic Acid: No results for input(s): LACTATE in the last 48 hours.  Lipase: No results for input(s): LIPASE in the last 48 hours.  Lipid Panel: No results for input(s): CHOL, HDL, LDLCALC, TRIG, CHOLHDL in the last 48 hours.    Significant Imaging:   Imaging Results         X-Ray Chest AP Portable (Final result) Result time:  05/17/17 17:45:57    Final result by  LYDIA Diez Sr., MD (05/17/17 17:45:57)    Impression:        1.  No significant interval change in appearance.  2.  There is no evidence of acute pulmonary process.   3.  The borders of the heart are not well seen.  This may be secondary to pericardial fat pads.  If additional imaging evaluation is clinically indicated, I recommend consideration of a CT examination of the thorax with IV contrast.      Electronically signed by: LYDIA DIEZ MD  Date:     05/17/17  Time:    17:45     Narrative:    One-view chest x-ray    Clinical History: Chest pain    Finding: Comparison was made to a prior examination performed on 4/4/2017.  The borders of the heart are not well seen.  There is no evidence of acute pulmonary process.  There is no pneumothorax.  The right costophrenic angle is sharp.  The left costophrenic angle is not well seen.                Assessment/Plan:      * Acute hypercapnic respiratory failure  - Symptoms have improved with BiPAP on the first day.  - Appreciate Pulmonology input.  - Supplemental O2 as needed.  - Neb tx PRN  - Continue i/v Solumedrol.    COPD exacerbation  - Supplemental O2   - Empirically cover with Avelox   - IV steroids  - Neb tx  - Pulse Ox      Hyperlipidemia  - Continue statin       Poorly controlled diabetes mellitus  - SSI   - diabetic diet      Bacteremia  - Blood cultures are growing gram positive cocci - final identification awaited.  - Patient has been started empirically on Vancomycin.    VTE Risk Mitigation         Ordered     enoxaparin injection 40 mg  Daily     Route:  Subcutaneous        05/17/17 2037     Medium Risk of VTE  Once      05/17/17 2037     Place sequential compression device  Until discontinued      05/17/17 2037          Bipin Caro MD  Department of Hospital Medicine   [facility]

## 2017-05-19 NOTE — ASSESSMENT & PLAN NOTE
- Blood cultures are growing gram positive cocci - final identification awaited.  - Patient has been started empirically on Vancomycin.

## 2017-05-19 NOTE — PROGRESS NOTES
CM followed up with Samara Sanabria at [facility] regarding pt place on waiting list for nursing home placement.  Samara confirmed that pt is on waiting list. Samara informed CM that SNF beds are currently available, and if pt comes to facility for SNF, she can remain at facility and transition to [facility] care while there.  CM asked if Samara could hold SNF bed for pt until Monday and Samara agreed to do so.      CM updated pt and sister regarding d/c plan.  Pt agreeable to plan.  CM completed LOCET and faxed PASRR to [facility].

## 2017-05-19 NOTE — PLAN OF CARE
Problem: Patient Care Overview  Goal: Plan of Care Review  Outcome: Ongoing (interventions implemented as appropriate)  Patient free from falls and injury. Patient states to this nurse that she is breathing easier today. Patient remains on 3 liters NC. Patient's vital signs stable. Will continue to monitor.

## 2017-05-19 NOTE — PLAN OF CARE
Problem: Patient Care Overview  Goal: Plan of Care Review  Outcome: Ongoing (interventions implemented as appropriate)  Patient remains free from falls. Fall precautions remain in place. No c/o pain. IV steroids given per orders. Bipap in place at night. VS are stable. No other c/o at this time. Call bell within reach. Reminded to call for assistance.

## 2017-05-19 NOTE — PT/OT/SLP EVAL
Physical Therapy  Evaluation    Petrona Gonzalez   MRN: [id number]   Admitting Diagnosis: Acute hypercapnic respiratory failure    PT Received On: 17  PT Start Time: 1340     PT Stop Time: 1405    PT Total Time (min): 25 min       Billable Minutes:  Evaluation 15 and Gait [id number]    Diagnosis: Acute hypercapnic respiratory failure  P.T. TX DX: IMPAIRED FUNCTIONAL MOBILITY    Past Medical History:   Diagnosis Date    COPD (chronic obstructive pulmonary disease) with emphysema     Diabetes mellitus     Hyperlipidemia     Hypertension     Maxillary sinusitis, chronic       Past Surgical History:   Procedure Laterality Date    BACK SURGERY      HERNIA REPAIR      HYSTERECTOMY       Referring physician: DR. PALACIO  Date referred to PT: 2017    General Precautions: Standard, fall, respiratory  Orthopedic Precautions: N/A   Braces: N/A       Patient History:  Lives With: child(shellie), adult  Living Arrangements: mobile home  Home Accessibility: stairs to enter home  Home Layout: Able to live on 1st floor  Number of Stairs to Enter Home: 6 (PT ONLY ASC/DESC STEPS WITH ASSISTANCE FROM FAMILY)  Stair Railings at Home: outside, present on left side  Transportation Available: family or friend will provide (PT DOES NOT DRIVE)  Living Environment Comment: PT HAS A SON THAT LIVES WITH HER BUT PT MOSTLY ALONE DURING THE DAY BC SON WORKS, PT IS O2 DEPENDENT  Equipment Currently Used at Home: oxygen, walker, rolling (TRANSPORT CHAIR)  DME owned (not currently used): none    Previous Level of Function:  Ambulation Skills: independent (FURNITURE WALKS VERY SHORT DISTANCES WITH HOME WITH REST BREAKS AND O2 IN TOW)  Transfer Skills: independent  ADL Skills: independent (PT HAS SISTER WASH HER HAIR, PT TAKES SPONGEBATHS IF ALONE)    Subjective:  Communicated with NURSE NOBLE prior to session.  PT AGREEABLE TO P.T. EVAL PT C/O SOB AT REST  Pain Ratin/10     Objective:   Patient found with: oxygen, telemetry, nuñez  catheter     Cognitive Exam:  Oriented to: Person, Place, Time and Situation    Follows Commands/attention: Follows one-step commands  Communication: clear/fluent  Safety awareness/insight to disability: impaired    Physical Exam:  Postural examination/scapula alignment: Rounded shoulder    Skin integrity: Visible skin intact  Edema: None noted     Sensation:   Intact    Upper Extremity Range of Motion:  REFER TO O.T. EVAL    Lower Extremity Range of Motion:  Right Lower Extremity: WFL  Left Lower Extremity: WFL    Lower Extremity Strength:  Right Lower Extremity: GROSSLY 4-/5  Left Lower Extremity: GROSSLY 4-/5     Functional Mobility:  Bed Mobility:  Rolling/Turning to Left: Supervision  Rolling/Turning Right: Supervision  Scooting/Bridging: Supervision  Supine to Sit: Supervision    Transfers:  Sit <> Stand Assistance: Contact Guard Assistance  Sit <> Stand Assistive Device: Rolling Walker  Bed <> Chair Technique: Stand Pivot  Bed <> Chair Assistance: Contact Guard Assistance  Bed <> Chair Assistive Device: Rolling Walker    Gait:   Gait Distance: PT AMB 20' IN ROOM WITH RW AND CGA, VERY SOB WITH MINIMAL EXERTION DESPITE 3L OF O2 IN TOW, CUES FOR UPRIGHT POSTURE AND DEEP BREATHING  Assistance 1: Contact Guard Assistance  Gait Assistive Device: Rolling walker  Gait Pattern: swing-through gait  Gait Deviation(s): decreased drake, decreased step length    Balance:   Static Sit: GOOD  Dynamic Sit: GOOD  Static Stand: FAIR  Dynamic stand: FAIR-    Therapeutic Activities and Exercises:  PT EDUCATED IN BLE THEREX TO PERFORM WHILE SEATED IN CHAIR TO TOLERANCE WITH REST    AM-PAC 6 CLICK MOBILITY  How much help from another person does this patient currently need?   1 = Unable, Total/Dependent Assistance  2 = A lot, Maximum/Moderate Assistance  3 = A little, Minimum/Contact Guard/Supervision  4 = None, Modified [place]/Independent    Turning over in bed (including adjusting bedclothes, sheets and blankets)?:  4  Sitting down on and standing up from a chair with arms (e.g., wheelchair, bedside commode, etc.): 4  Moving from lying on back to sitting on the side of the bed?: 4  Moving to and from a bed to a chair (including a wheelchair)?: 3  Need to walk in hospital room?: 3  Climbing 3-5 steps with a railing?: 1  Total Score: 19     AM-PAC Raw Score CMS G-Code Modifier Level of Impairment Assistance   6 % Total / Unable   7 - 9 CM 80 - 100% Maximal Assist   10 - 14 CL 60 - 80% Moderate Assist   15 - 19 CK 40 - 60% Moderate Assist   20 - 22 CJ 20 - 40% Minimal Assist   23 CI 1-20% SBA / CGA   24 CH 0% Independent/ Mod I     Patient left up in chair with all lines intact, call button in reach, NURSE notified and SISTER present.    Assessment:   Petrona Gonzalez is a 76 y.o. female with a medical diagnosis of Acute hypercapnic respiratory failure and presents with IMPAIRED FUNCTIONAL MOBILITY. PT WILL BENEFIT FROM CONT. SKILLED P.T. TO ADDRESS IMPAIRMENTS TO PT'S TOLERANCE.  PT WITH VERY LOW ACTIVITY TOLERANCE, DYSPNEA WITH MINIMAL EXERTION    Rehab identified problem list/impairments: Rehab identified problem list/impairments: weakness, impaired endurance, impaired functional mobilty, gait instability, impaired balance, decreased safety awareness, impaired coordination    Rehab potential is fair.    Activity tolerance: Fair    Discharge recommendations: Discharge Facility/Level Of Care Needs: nursing facility, skilled     Barriers to discharge: Barriers to Discharge: Decreased caregiver support    Equipment recommendations: Equipment Needed After Discharge: bath bench     GOALS:   Physical Therapy Goals        Problem: Physical Therapy Goal    Goal Priority Disciplines Outcome Goal Variances Interventions   Physical Therapy Goal     PT/OT, PT      Description:  LTG'S TO BE MET IN 7 DAYS (5-26-17)  1. PT WILL BE NESSA WITH BED MOBILITY  2. PT WILL BE NESSA WITH TF'S  3. PT WILL AMB 50' WITH RW AND SBA TO TOLERANCE  4.  PT WILL TOLERATE BLE THEREX X 15 REPS WITH REST AS NEEDED  5. PT WILL DEMO F+ DYNAMIC BALANCE DURING GAIT            PLAN:    Patient to be seen 5 x/week to address the above listed problems via gait training, therapeutic activities, therapeutic exercises  Plan of Care expires: 05/26/17  Plan of Care reviewed with: patient, sibling    PT ENCOURAGED TO CALL FOR ASSISTANCE WITH ALL NEEDS DUE TO FALL RISK STATUS, PT AGREEABLE.  PT ENCOURAGED TO INCREASE TIME OOB IN CHAIR, ALL MEALS IN CHAIR OOB, PT AGREEABLE    Shu Duarte, PT  05/19/2017

## 2017-05-19 NOTE — PROGRESS NOTES
PATTY spoke with Samara Sanabria in admissions at [facility] ([phone]) who requested clinical documents on pt to review for potential nursing home placement.  CM faxed paperwork via [facility] and will follow up.

## 2017-05-19 NOTE — PT/OT/SLP EVAL
Occupational Therapy  Evaluation    Petrona Gonzalez   MRN: [id number]   Admitting Diagnosis: Acute hypercapnic respiratory failure    OT Date of Treatment: 17   OT Start Time: 1350  OT Stop Time: 1405  OT Total Time (min): 15 min    Billable Minutes:  Evaluation 15    Diagnosis: Acute hypercapnic respiratory failure   OT TX DX: DEBILITY    Past Medical History:   Diagnosis Date    COPD (chronic obstructive pulmonary disease) with emphysema     Diabetes mellitus     Hyperlipidemia     Hypertension     Maxillary sinusitis, chronic       Past Surgical History:   Procedure Laterality Date    BACK SURGERY      HERNIA REPAIR      HYSTERECTOMY         Referring physician: DR. PALACIO  Date referred to OT: 17    General Precautions: Standard, fall, respiratory  Orthopedic Precautions: N/A  Braces: N/A          Patient History:  Living Environment  Lives With: child(shellie), adult  Living Arrangements: mobile home  Home Accessibility: stairs to enter home  Home Layout: Able to live on 1st floor  Number of Stairs to Enter Home: 6  Stair Railings at Home: outside, present on left side  Transportation Available: family or friend will provide  Equipment Currently Used at Home: oxygen, walker, rolling    Prior level of function:   Bed Mobility/Transfers: independent  Grooming: independent  Bathing: independent  Upper Body Dressing: independent  Lower Body Dressing: independent  Toileting: independent  Home Management Skills: independent  Homemaking Responsibilities: No  Driving License: No  Mode of Transportation: Family  Occupation: Retired     Dominant hand: right    Subjective:  Communicated with RN prior to session.    Chief Complaint: SOB  Patient/Family stated goals: RETURN TO HOME    Pain Ratin/10              Pain Rating Post-Intervention: 0/10    Objective:  Patient found with: telemetry, peripheral IV, oxygen, nuñez catheter    Cognitive Exam:  Oriented to: Person, Place, Time and Situation  Follows  Commands/attention: Follows two-step commands  Communication: clear/fluent  Memory:  No Deficits noted  Safety awareness/insight to disability: impaired  Coping skills/emotional control: Appropriate to situation    Visual/perceptual:  Intact    Physical Exam:  Postural examination/scapula alignment: Rounded shoulder  Skin integrity: Visible skin intact  Edema: None noted     Sensation:   Intact    Upper Extremity Range of Motion:  Right Upper Extremity: WFL  Left Upper Extremity: WFL    Upper Extremity Strength:  Right Upper Extremity: WFL  Left Upper Extremity: WFL   Strength: WFL    Fine motor coordination:   Intact      Functional Mobility:  Bed Mobility:  Rolling/Turning to Left: Supervision  Rolling/Turning Right: Supervision  Scooting/Bridging: Supervision  Supine to Sit: Supervision    Transfers:  Sit <> Stand Assistance: Contact Guard Assistance  Sit <> Stand Assistive Device: Rolling Walker  Bed <> Chair Transfer Assistance: Contact Guard Assistance  Bed <> Chair Assistive Device: Rolling Walker    Functional Ambulation: AMBULATED TO DOOR AND BACK CGA-SBA WITH RW, RETURNING TO BEDSIDE CHAIR D/T SHORTNESS OF BREATH    Activities of Daily Living:  Feeding Level of Assistance: Modified independent    LE Dressing Level of Assistance: Moderate assistance      Balance:   Static Sit: GOOD+: Takes MAXIMAL challenges from all directions.    Dynamic Sit: GOOD: Maintains balance through MODERATE excursions of active trunk movement  Static Stand: FAIR+: Takes MINIMAL challenges from all directions  Dynamic stand: FAIR: Needs CONTACT GUARD during gait    Therapeutic Activities and Exercises:  PT VERY LIMITED DUE TO SOB AND POOR ACTIVITY TOLERANCE    AM-PAC 6 CLICK ADL  How much help from another person does this patient currently need?  1 = Unable, Total/Dependent Assistance  2 = A lot, Maximum/Moderate Assistance  3 = A little, Minimum/Contact Guard/Supervision  4 = None, Modified  "Hand/Independent    Putting on and taking off regular lower body clothing? : 2  Bathing (including washing, rinsing, drying)?: 1 (NOT ASSESSED)  Toileting, which includes using toilet, bedpan, or urinal? : 3  Putting on and taking off regular upper body clothing?: 3  Taking care of personal grooming such as brushing teeth?: 3  Eating meals?: 3  Total Score: 15    AM-PAC Raw Score CMS "G-Code Modifier Level of Impairment Assistance   6 % Total / Unable   7 - 9 CM 80 - 100% Maximal Assist   10 - 14 CL 60 - 80% Moderate Assist   15 - 19 CK 40 - 60% Moderate Assist   20 - 22 CJ 20 - 40% Minimal Assist   23 CI 1-20% SBA / CGA   24 CH 0% Independent/ Mod I       Patient left up in chair with all lines intact, call button in reach, NURSE notified and FAMILY present    Assessment:  Petrona Gonzalez is a 76 y.o. female with a medical diagnosis of Acute hypercapnic respiratory failure and presents with DEBILITY, IMPAIRED SAFETY, POOR ACTIVITY TOLERANCE AND IMPAIRED ADLS, ALL LIMITED DUE TO SOB. PT WOULD BENEFIT FROM SKILLED OT SERVICES IN ORDER TO INCREASE FUNCTIONAL INDEPENDENCE AND SAFETY AWARENESS.    Rehab identified problem list/impairments: Rehab identified problem list/impairments: weakness, impaired endurance, impaired self care skills, impaired balance, impaired functional mobilty, decreased safety awareness, impaired cardiopulmonary response to activity    Rehab potential is good.    Activity tolerance: Fair    Discharge recommendations: Discharge Facility/Level Of Care Needs: nursing facility, skilled (AS OF THIS DATE, PT IS UNSAFE TO RETURN HOME WITHOUT 24 HR SUPERVISION. PT WOULD BENEFIT FROM SKILLED NURSING FACILITY IN ORDER TO ADDRESS CURRENT FUNCTIONAL DEFICITS PRIOR TO RETURNING HOME DUE TO SAFETY CONCERNS)     Barriers to discharge: Barriers to Discharge: Decreased caregiver support (PT'S SON WORKS 6DAYS/WK )    Equipment recommendations: none     GOALS:   Occupational Therapy Goals        " Problem: Occupational Therapy Goal    Goal Priority Disciplines Outcome Interventions   Occupational Therapy Goal     OT, PT/OT     Description:  Goals to be met by: 5/26/17     Patient will increase functional independence with ADLs by performing:    UE Dressing with Set-up Assistance.  LE Dressing with Set-up Assistance.  Grooming while standing at sink with Supervision.  Toileting from toilet with Supervision for hygiene and clothing management.   Toilet transfer to toilet with Stand-by Assistance.  Upper extremity exercise program x10 reps per handout, with assistance as needed.                PLAN:  Patient to be seen 3 x/week to address the above listed problems via self-care/home management, therapeutic activities, therapeutic exercises  Plan of Care expires: 05/26/17  Plan of Care reviewed with: patient, sibling    OT G-codes  Functional Assessment Tool Used: FIM-ADL  Score: 4  Functional Limitation: Self care  Self Care Current Status (): CK  Self Care Goal Status (): REGGIE Laird OT  05/19/2017

## 2017-05-20 PROBLEM — R78.81 BACTEREMIA: Status: ACTIVE | Noted: 2017-05-20

## 2017-05-20 LAB
ANION GAP SERPL CALC-SCNC: 9 MMOL/L
BASOPHILS # BLD AUTO: 0.01 K/UL
BASOPHILS NFR BLD: 0.1 %
BUN SERPL-MCNC: 24 MG/DL
CALCIUM SERPL-MCNC: 9 MG/DL
CHLORIDE SERPL-SCNC: 98 MMOL/L
CO2 SERPL-SCNC: 31 MMOL/L
CREAT SERPL-MCNC: 0.8 MG/DL
DIFFERENTIAL METHOD: ABNORMAL
EOSINOPHIL # BLD AUTO: 0 K/UL
EOSINOPHIL NFR BLD: 0 %
ERYTHROCYTE [DISTWIDTH] IN BLOOD BY AUTOMATED COUNT: 15.8 %
EST. GFR  (AFRICAN AMERICAN): >60 ML/MIN/1.73 M^2
EST. GFR  (NON AFRICAN AMERICAN): >60 ML/MIN/1.73 M^2
GLUCOSE SERPL-MCNC: 287 MG/DL
HCT VFR BLD AUTO: 41.4 %
HGB BLD-MCNC: 13 G/DL
LYMPHOCYTES # BLD AUTO: 0.5 K/UL
LYMPHOCYTES NFR BLD: 3.4 %
MCH RBC QN AUTO: 27.7 PG
MCHC RBC AUTO-ENTMCNC: 31.4 %
MCV RBC AUTO: 88 FL
MONOCYTES # BLD AUTO: 0.5 K/UL
MONOCYTES NFR BLD: 3.4 %
NEUTROPHILS # BLD AUTO: 12.9 K/UL
NEUTROPHILS NFR BLD: 93.5 %
PLATELET # BLD AUTO: 273 K/UL
PMV BLD AUTO: 9.3 FL
POCT GLUCOSE: 193 MG/DL (ref 70–110)
POTASSIUM SERPL-SCNC: 4.8 MMOL/L
RBC # BLD AUTO: 4.7 M/UL
SODIUM SERPL-SCNC: 138 MMOL/L
WBC # BLD AUTO: 13.86 K/UL

## 2017-05-20 PROCEDURE — 97110 THERAPEUTIC EXERCISES: CPT

## 2017-05-20 PROCEDURE — 36415 COLL VENOUS BLD VENIPUNCTURE: CPT

## 2017-05-20 PROCEDURE — 25000003 PHARM REV CODE 250: Performed by: EMERGENCY MEDICINE

## 2017-05-20 PROCEDURE — 94799 UNLISTED PULMONARY SVC/PX: CPT

## 2017-05-20 PROCEDURE — 25000003 PHARM REV CODE 250: Performed by: NURSE PRACTITIONER

## 2017-05-20 PROCEDURE — 94640 AIRWAY INHALATION TREATMENT: CPT

## 2017-05-20 PROCEDURE — 25500020 PHARM REV CODE 255: Performed by: INTERNAL MEDICINE

## 2017-05-20 PROCEDURE — 94761 N-INVAS EAR/PLS OXIMETRY MLT: CPT

## 2017-05-20 PROCEDURE — 25000003 PHARM REV CODE 250: Performed by: INTERNAL MEDICINE

## 2017-05-20 PROCEDURE — 99900035 HC TECH TIME PER 15 MIN (STAT)

## 2017-05-20 PROCEDURE — 80048 BASIC METABOLIC PNL TOTAL CA: CPT

## 2017-05-20 PROCEDURE — 85025 COMPLETE CBC W/AUTO DIFF WBC: CPT

## 2017-05-20 PROCEDURE — 99233 SBSQ HOSP IP/OBS HIGH 50: CPT | Mod: ,,, | Performed by: INTERNAL MEDICINE

## 2017-05-20 PROCEDURE — 97116 GAIT TRAINING THERAPY: CPT

## 2017-05-20 PROCEDURE — 21400001 HC TELEMETRY ROOM

## 2017-05-20 PROCEDURE — 63600175 PHARM REV CODE 636 W HCPCS: Performed by: HOSPITALIST

## 2017-05-20 PROCEDURE — 94664 DEMO&/EVAL PT USE INHALER: CPT

## 2017-05-20 PROCEDURE — 25000003 PHARM REV CODE 250: Performed by: HOSPITALIST

## 2017-05-20 PROCEDURE — 63600175 PHARM REV CODE 636 W HCPCS: Performed by: EMERGENCY MEDICINE

## 2017-05-20 PROCEDURE — 27000221 HC OXYGEN, UP TO 24 HOURS

## 2017-05-20 RX ADMIN — ASPIRIN 81 MG: 81 TABLET, COATED ORAL at 08:05

## 2017-05-20 RX ADMIN — ROFLUMILAST 500 MCG: 500 TABLET ORAL at 08:05

## 2017-05-20 RX ADMIN — METHYLPREDNISOLONE SODIUM SUCCINATE 80 MG: 125 INJECTION, POWDER, LYOPHILIZED, FOR SOLUTION INTRAMUSCULAR; INTRAVENOUS at 05:05

## 2017-05-20 RX ADMIN — IOHEXOL 100 ML: 350 INJECTION, SOLUTION INTRAVENOUS at 05:05

## 2017-05-20 RX ADMIN — AZITHROMYCIN 250 MG: 250 TABLET, FILM COATED ORAL at 08:05

## 2017-05-20 RX ADMIN — BUDESONIDE 0.5 MG: 0.5 SUSPENSION RESPIRATORY (INHALATION) at 07:05

## 2017-05-20 RX ADMIN — FAMOTIDINE 20 MG: 20 TABLET ORAL at 08:05

## 2017-05-20 RX ADMIN — ARFORMOTEROL TARTRATE 15 MCG: 15 SOLUTION RESPIRATORY (INHALATION) at 07:05

## 2017-05-20 RX ADMIN — METHYLPREDNISOLONE SODIUM SUCCINATE 80 MG: 125 INJECTION, POWDER, LYOPHILIZED, FOR SOLUTION INTRAMUSCULAR; INTRAVENOUS at 02:05

## 2017-05-20 RX ADMIN — LISINOPRIL 5 MG: 5 TABLET ORAL at 08:05

## 2017-05-20 RX ADMIN — ENOXAPARIN SODIUM 40 MG: 100 INJECTION SUBCUTANEOUS at 04:05

## 2017-05-20 RX ADMIN — SIMVASTATIN 10 MG: 5 TABLET, FILM COATED ORAL at 09:05

## 2017-05-20 RX ADMIN — ALPRAZOLAM 0.5 MG: 0.5 TABLET ORAL at 04:05

## 2017-05-20 RX ADMIN — TRAZODONE HYDROCHLORIDE 50 MG: 50 TABLET ORAL at 09:05

## 2017-05-20 RX ADMIN — VANCOMYCIN HYDROCHLORIDE 1250 MG: 1 INJECTION, POWDER, LYOPHILIZED, FOR SOLUTION INTRAVENOUS at 12:05

## 2017-05-20 RX ADMIN — METHYLPREDNISOLONE SODIUM SUCCINATE 80 MG: 125 INJECTION, POWDER, LYOPHILIZED, FOR SOLUTION INTRAMUSCULAR; INTRAVENOUS at 09:05

## 2017-05-20 NOTE — CONSULTS
Prior to this patient's 2nd dose of Vancomycin tonight at 2100 the nurse, Yudy Sabillon RN,  identified a significant patient weight error originally reported by ED upon admission. An SOS was sent by Maddy Reyes PharmD describing the original reporting of 206.1 kg, which her first dose of Vancomycin was based off of (I used an adjusted body weight of 113.9 kg for dosing) and the current weight is now reported to be 97.3 kg.    I requested a stat Vanc random level drawn at 2241 and the result was 6.3 mcg/mL  I updated her Vancomycin dose to 1,250 mg every 18 hrs and requested a trough level for 5/20 at 1730 prior to her 3rd dose.  Gonzalez NavaD

## 2017-05-20 NOTE — PROGRESS NOTES
Problem: Patient Care Overview  Goal: Plan of Care Review  Outcome: Ongoing (interventions implemented as appropriate)  Patient is without falls or injury this shift. CTA done today, results pending. Awaiting placement at [facility]. Family at bedside. Bed low and call light within reach.

## 2017-05-20 NOTE — PT/OT/SLP PROGRESS
Physical Therapy  Treatment    Petrona Gonzalez   MRN: [id number]   Admitting Diagnosis: Acute hypercapnic respiratory failure    PT Received On: 17  PT Start Time: 0800     PT Stop Time: 0825    PT Total Time (min): 25 min       Billable Minutes:  Gait [id number] and Therapeutic Exercise 15    Treatment Type: Treatment  PT/PTA: PTA     PTA Visit Number: 1       General Precautions: Standard, respiratory, fall  Orthopedic Precautions: N/A   Braces:           Subjective:  Communicated with epic and nurse Aide prior to session.      Pain Ratin/10              Pain Rating Post-Intervention: 0/10    Objective:   Patient found with: telemetry, oxygen, nuñez catheter    Functional Mobility:  Bed Mobility:        Transfers:  Sit <> Stand Assistance: Contact Guard Assistance  Sit <> Stand Assistive Device: Rolling Walker  Bed <> Chair Technique: Stand Pivot    Gait:   Gait Distance: Pt amb 20' in room w/ RW and CGA  Assistance 1: Contact Guard Assistance  Gait Assistive Device: Rolling walker  Gait Pattern: swing-through gait  Gait Deviation(s): decreased drake, decreased step length, decreased stride length    Stairs:      Balance:   Static Sit: GOOD: Takes MODERATE challenges from all directions  Dynamic Sit: GOOD: Maintains balance through MODERATE excursions of active trunk movement  Static Stand: FAIR: Maintains without assist but unable to take challenges  Dynamic stand: FAIR: Needs CONTACT GUARD during gait     Therapeutic Activities and Exercises:  Pt performed seated exercises and gait in room.      AM-PAC 6 CLICK MOBILITY  How much help from another person does this patient currently need?   1 = Unable, Total/Dependent Assistance  2 = A lot, Maximum/Moderate Assistance  3 = A little, Minimum/Contact Guard/Supervision  4 = None, Modified [place]/Independent    Turning over in bed (including adjusting bedclothes, sheets and blankets)?: 4  Sitting down on and standing up from a chair with arms (e.g.,  wheelchair, bedside commode, etc.): 4  Moving from lying on back to sitting on the side of the bed?: 4  Moving to and from a bed to a chair (including a wheelchair)?: 3  Need to walk in hospital room?: 3  Climbing 3-5 steps with a railing?: 1  Total Score: 19    AM-PAC Raw Score CMS G-Code Modifier Level of Impairment Assistance   6 % Total / Unable   7 - 9 CM 80 - 100% Maximal Assist   10 - 14 CL 60 - 80% Moderate Assist   15 - 19 CK 40 - 60% Moderate Assist   20 - 22 CJ 20 - 40% Minimal Assist   23 CI 1-20% SBA / CGA   24 CH 0% Independent/ Mod I     Patient left seated EOB with all lines intact.    Assessment:  Petrona Gonzalez is a 76 y.o. female with a medical diagnosis of Acute hypercapnic respiratory failure and presents with decreased endurance.    Rehab identified problem list/impairments: Rehab identified problem list/impairments: weakness, impaired endurance, impaired functional mobilty, gait instability, decreased safety awareness    Rehab potential is fair.    Activity tolerance: Fair    Discharge recommendations: Discharge Facility/Level Of Care Needs: nursing facility, skilled     Barriers to discharge: Barriers to Discharge: Decreased caregiver support    Equipment recommendations: Equipment Needed After Discharge: bath bench     GOALS:   Physical Therapy Goals        Problem: Physical Therapy Goal    Goal Priority Disciplines Outcome Goal Variances Interventions   Physical Therapy Goal     PT/OT, PT Ongoing (interventions implemented as appropriate)     Description:  LTG'S TO BE MET IN 7 DAYS (5-26-17)  1. PT WILL BE NESSA WITH BED MOBILITY  2. PT WILL BE NESSA WITH TF'S  3. PT WILL AMB 50' WITH RW AND SBA TO TOLERANCE  4. PT WILL TOLERATE BLE THEREX X 15 REPS WITH REST AS NEEDED  5. PT WILL DEMO F+ DYNAMIC BALANCE DURING GAIT              PLAN:    Patient to be seen 5 x/week  to address the above listed problems via therapeutic exercises, therapeutic activities, gait training  Plan of Care  expires: 05/26/17  Plan of Care reviewed with: patient         Goran Garcia, PT  05/20/2017

## 2017-05-20 NOTE — PROGRESS NOTES
Weight discrepancy noted in chart of 454 lbs. Bed scale showing pt current weight at 214 lbs. Pharmacy notified of pt new weight. Orders given to hold all doses of vancomycin until labs are drawn on patient. Will continue to monitor.

## 2017-05-20 NOTE — PROGRESS NOTES
Progress Note  Pulmonology    Admit Date: 5/17/2017   LOS: 3 days     SUBJECTIVE: still very dyspneic moving from bed to chair     Follow-up For:  Acute on chronic respiratory failure- not improved despite jet nebs, diuresis      Continuous Infusions:   Scheduled Meds:   arformoterol  15 mcg Nebulization BID    aspirin  81 mg Oral Daily    azithromycin  250 mg Oral Daily    budesonide  0.5 mg Nebulization BID    enoxaparin  40 mg Subcutaneous Daily    famotidine  20 mg Oral Daily    lisinopril  5 mg Oral Daily    methylPREDNISolone sod suc(PF)  80 mg Intravenous Q8H    roflumilast  500 mcg Oral Daily    simvastatin  10 mg Oral QHS    trazodone  50 mg Oral QHS    vancomycin (VANCOCIN) IVPB  1,250 mg Intravenous Q18H       Review of Systems:  Constitutional: no fever or chills, positive for fatigue and malaise  Respiratory: positive for cough and dyspnea on exertion  Cardiovascular: no chest pain or palpitations    OBJECTIVE:     Vital Signs Range (Last 24H):  Temp:  [97.8 °F (36.6 °C)-98.8 °F (37.1 °C)]   Pulse:  [57-87]   Resp:  [18-20]   BP: (134-167)/(59-97)   SpO2:  [93 %-100 %]     I & O (Last 24H):  Intake/Output Summary (Last 24 hours) at 05/20/17 1547  Last data filed at 05/20/17 0400   Gross per 24 hour   Intake              250 ml   Output             1800 ml   Net            -1550 ml     Physical Exam:  General: moderate distress  Neck: jugular venous distention - 7 cm above sternal notch  Lungs:  diminished breath sounds bibasilar and bilaterally and wheezes bibasilar and bilaterally  Chest Wall: no tenderness  Heart: regularly irregular rhythm  Abdomen: soft, non-tender non-distended; bowel sounds normal  Extremities: edema +2  Neurologic: alert, oriented, thought content appropriate    Laboratory:  CBC:   Recent Labs  Lab 05/20/17  1130   WBC 13.86*   RBC 4.70   HGB 13.0   HCT 41.4      MCV 88   MCH 27.7   MCHC 31.4*     BMP:   Recent Labs  Lab 05/18/17  0506 05/20/17  1130   *  287*    138   K 4.7 4.8    98   CO2 29 31*   BUN 18 24*   CREATININE 0.8 0.8   CALCIUM 9.0 9.0   MG 2.1  --      CMP:   Recent Labs  Lab 05/18/17  0506 05/20/17  1130   * 287*   CALCIUM 9.0 9.0   ALBUMIN 2.9*  --    PROT 6.0  --     138   K 4.7 4.8   CO2 29 31*    98   BUN 18 24*   CREATININE 0.8 0.8   ALKPHOS 58  --    ALT 19  --    AST 10  --    BILITOT 0.2  --      LFTs:   Recent Labs  Lab 05/18/17  0506   ALT 19   AST 10   ALKPHOS 58   BILITOT 0.2   PROT 6.0   ALBUMIN 2.9*     Results for DAVID DINERO (MRN [id number]) as of 5/20/2017 15:44   Ref. Range 5/17/2017 16:54 5/17/2017 18:25 5/18/2017 05:17   POC PH Latest Ref Range: 7.35 - 7.45  7.281 (LL) 7.345 (L) 7.366   POC PCO2 Latest Ref Range: 35 - 45 mmHg 71.5 (HH) 60.8 (HH) 53.3 (H)   POC PO2 Latest Ref Range: 80 - 100 mmHg 90 89 91   POC BE Latest Ref Range: -2 to 2 mmol/L 7 8 5   POC HCO3 Latest Ref Range: 24 - 28 mmol/L 33.7 (H) 33.2 (H) 30.5 (H)   POC SATURATED O2 Latest Ref Range: 95 - 100 % 95 96 97   FiO2 Unknown 32 32    Flow Unknown 3  3   Sample Unknown ARTERIAL ARTERIAL ARTERIAL   DelSys Unknown Nasal Can CPAP/BiPAP Nasal Can   Allens Test Unknown Pass Pass Pass   Site Unknown LR LR RR   Mode Unknown SPONT BiPAP SPONT         Chest X-Ray: I personally reviewed the films and findings are:, air trapping/emphysema, atelectasis bilaterally    Diagnostic Results:  no new    ASSESSMENT/PLAN:     1.0 Acute on chronic respiratory failure  R/o Pulmonary Embolism     Patient Active Problem List   Diagnosis    Hay fever    Anemia, deficiency    Anxiety    Obesity with body mass index 30 or greater    Cellulitis of elbow    Chest pain    Chronic maxillary sinusitis    Depression    Diabetes mellitus without complication    Diabetic polyneuropathy    Dyslipidemia    Essential (primary) hypertension    Anemia associated with nutritional deficiency    COPD exacerbation    Pneumonia    Shoulder pain    Exomphalos     Chronic respiratory failure with hypoxia    Insomnia    Breath shortness    Type 2 diabetes mellitus with hyperlipidemia    Type 2 diabetes mellitus with diabetic neuropathy, without long-term current use of insulin    Hyperlipidemia    Leukocytosis    Poorly controlled diabetes mellitus    Acute on chronic respiratory failure with hypoxia and hypercapnia    Acute hypercapnic respiratory failure    Bacteremia         Plan: CTA of chest    Juan Carlos Araujo MD  Pulmonary Medicine  .

## 2017-05-20 NOTE — PLAN OF CARE
Problem: Physical Therapy Goal  Goal: Physical Therapy Goal  LTGS TO BE MET IN 7 DAYS (5-26-17)  1. PT WILL BE NESSA WITH BED MOBILITY  2. PT WILL BE NESSA WITH TFS  3. PT WILL AMB 50 WITH RW AND SBA TO TOLERANCE  4. PT WILL TOLERATE BLE THEREX X 15 REPS WITH REST AS NEEDED  5. PT WILL DEMO F+ DYNAMIC BALANCE DURING GAIT   Outcome: Ongoing (interventions implemented as appropriate)  Pt agreeable to receive PT this date. Pt performed seated exercises follow by gait in room with 3L O2 in tow and RW. Pt returned to seated EOB and left with all needs with in reach and nursing assistant present.

## 2017-05-20 NOTE — SUBJECTIVE & OBJECTIVE
Interval History:  No acute overnight events.  Continues to have periodic shortness of breath and cough productive of green sputum.    Review of Systems   Constitutional: Negative for activity change, appetite change, chills, diaphoresis, fatigue, fever and unexpected weight change.   HENT: Negative for congestion, drooling, facial swelling, rhinorrhea, sinus pressure, sneezing and sore throat.    Eyes: Negative for discharge, redness, itching and visual disturbance.   Respiratory: Positive for cough and shortness of breath. Negative for apnea, choking, chest tightness, wheezing and stridor.    Cardiovascular: Negative for chest pain, palpitations and leg swelling.   Gastrointestinal: Negative for abdominal distention, abdominal pain, anal bleeding, blood in stool, constipation, diarrhea, nausea and vomiting.   Genitourinary: Negative for decreased urine volume, difficulty urinating, dysuria, frequency, hematuria, pelvic pain, urgency, vaginal bleeding and vaginal discharge.   Musculoskeletal: Negative for arthralgias, back pain, gait problem, joint swelling, myalgias, neck pain and neck stiffness.   Skin: Negative for color change, pallor, rash and wound.   Neurological: Negative for dizziness, seizures, facial asymmetry, speech difficulty, weakness, light-headedness, numbness and headaches.   Psychiatric/Behavioral: Negative for agitation, confusion, hallucinations and suicidal ideas.   All other systems reviewed and are negative.    Objective:     Vital Signs (Most Recent):  Temp: 98.3 °F (36.8 °C) (05/20/17 0733)  Pulse: 65 (05/20/17 1325)  Resp: 20 (05/20/17 1325)  BP: (!) 156/97 (05/20/17 0733)  SpO2: 97 % (05/20/17 1325) Vital Signs (24h Range):  Temp:  [97.8 °F (36.6 °C)-98.8 °F (37.1 °C)] 98.3 °F (36.8 °C)  Pulse:  [57-87] 65  Resp:  [18-20] 20  SpO2:  [93 %-100 %] 97 %  BP: (134-167)/(59-97) 156/97     Weight: 97.3 kg (214 lb 8 oz)  Body mass index is 38 kg/(m^2).    Intake/Output Summary (Last 24 hours)  at 05/20/17 1646  Last data filed at 05/20/17 0807   Gross per 24 hour   Intake              370 ml   Output             1801 ml   Net            -1431 ml      Physical Exam   Constitutional: She is oriented to person, place, and time. She appears well-developed and well-nourished.   Morbidly obese   HENT:   Head: Normocephalic and atraumatic.   Eyes: Conjunctivae and EOM are normal. Pupils are equal, round, and reactive to light.   Neck: Normal range of motion. Neck supple.   Cardiovascular: Regular rhythm, normal heart sounds and intact distal pulses.    No murmur heard.  Tachycardic     Pulmonary/Chest: Breath sounds normal. No respiratory distress.   Diminished breath sounds on bases. No tachypnea or accessory muscle use.   Abdominal: Soft. Bowel sounds are normal. She exhibits no distension. There is no tenderness.   Musculoskeletal: Normal range of motion. She exhibits no edema, tenderness or deformity.   Neurological: She is alert and oriented to person, place, and time. She has normal reflexes.   Skin: Skin is warm and dry. No erythema.   Psychiatric: She has a normal mood and affect. Her behavior is normal.   Nursing note and vitals reviewed.      Significant Labs:   A1C:     Recent Labs  Lab 01/17/17  0854   HGBA1C 6.6*     ABGs: No results for input(s): PH, PCO2, HCO3, POCSATURATED, BE in the last 48 hours.  Bilirubin:     Recent Labs  Lab 05/17/17  1640 05/18/17  0506   BILITOT 0.3 0.2     Blood Culture: No results for input(s): LABBLOO in the last 48 hours.  BMP:     Recent Labs  Lab 05/20/17  1130   *      K 4.8   CL 98   CO2 31*   BUN 24*   CREATININE 0.8   CALCIUM 9.0     CBC:     Recent Labs  Lab 05/20/17  1130   WBC 13.86*   HGB 13.0   HCT 41.4        CMP:     Recent Labs  Lab 05/20/17  1130      K 4.8   CL 98   CO2 31*   *   BUN 24*   CREATININE 0.8   CALCIUM 9.0   ANIONGAP 9   EGFRNONAA >60     Cardiac Markers: No results for input(s): CKMB, MYOGLOBIN, BNP,  TROPISTAT in the last 48 hours.  Coagulation: No results for input(s): INR, APTT in the last 48 hours.    Invalid input(s): PT  Lactic Acid: No results for input(s): LACTATE in the last 48 hours.  Lipase: No results for input(s): LIPASE in the last 48 hours.  Lipid Panel: No results for input(s): CHOL, HDL, LDLCALC, TRIG, CHOLHDL in the last 48 hours.    Significant Imaging:   Imaging Results         X-Ray Chest AP Portable (Final result) Result time:  05/17/17 17:45:57    Final result by LYDIA Diez Sr., MD (05/17/17 17:45:57)    Impression:        1.  No significant interval change in appearance.  2.  There is no evidence of acute pulmonary process.   3.  The borders of the heart are not well seen.  This may be secondary to pericardial fat pads.  If additional imaging evaluation is clinically indicated, I recommend consideration of a CT examination of the thorax with IV contrast.      Electronically signed by: LYDIA DIEZ MD  Date:     05/17/17  Time:    17:45     Narrative:    One-view chest x-ray    Clinical History: Chest pain    Finding: Comparison was made to a prior examination performed on 4/4/2017.  The borders of the heart are not well seen.  There is no evidence of acute pulmonary process.  There is no pneumothorax.  The right costophrenic angle is sharp.  The left costophrenic angle is not well seen.

## 2017-05-20 NOTE — PLAN OF CARE
Problem: Patient Care Overview  Goal: Plan of Care Review  Outcome: Ongoing (interventions implemented as appropriate)  Patient remains free from falls. Fall precautions remain in place. No c/o pain. IV abx infusing per orders. IV steroids given. Bipap worn at night. VS are stable. No other c/o at this time. Call bell within reach. Reminded to call for assistance.

## 2017-05-20 NOTE — PROGRESS NOTES
Progress Note  Pulmonology    Admit Date: 5/17/2017   LOS: 2 days     SUBJECTIVE: still dyspnic with minimal activity     Follow-up For:  Acute on chronic respiratory failure      Continuous Infusions:   Scheduled Meds:   arformoterol  15 mcg Nebulization BID    aspirin  81 mg Oral Daily    azithromycin  250 mg Oral Daily    budesonide  0.5 mg Nebulization BID    enoxaparin  40 mg Subcutaneous Daily    famotidine  20 mg Oral Daily    lisinopril  5 mg Oral Daily    methylPREDNISolone sod suc(PF)  80 mg Intravenous Q8H    roflumilast  500 mcg Oral Daily    simvastatin  10 mg Oral QHS    trazodone  50 mg Oral QHS    vancomycin (VANCOCIN) IVPB  1,000 mg Intravenous Q18H    vancomycin (VANCOCIN) IVPB  1,000 mg Intravenous Q18H       Review of Systems:  Constitutional: no fever or chills, positive for fatigue and malaise  Respiratory: positive for cough and dyspnea on exertion  Cardiovascular: no chest pain or palpitations    OBJECTIVE:     Vital Signs Range (Last 24H):  Temp:  [97.8 °F (36.6 °C)-98.8 °F (37.1 °C)]   Pulse:  [65-87]   Resp:  [16-20]   BP: (128-167)/(70-97)   SpO2:  [93 %-98 %]     I & O (Last 24H):  Intake/Output Summary (Last 24 hours) at 05/19/17 2125  Last data filed at 05/19/17 1400   Gross per 24 hour   Intake             1020 ml   Output             3026 ml   Net            -2006 ml     Physical Exam:  General: mild distress  Neck: jugular venous distention - 6 cm above sternal notch  Lungs:  diminished breath sounds bibasilar and bilaterally and wheezes bibasilar and bilaterally  Chest Wall: no tenderness  Heart: irregularly irregular rhythm  Abdomen: soft, non-tender non-distended; bowel sounds normal  Extremities: edema +2  Neurologic: alert, oriented, thought content appropriate    Laboratory:  CBC:   Recent Labs  Lab 05/18/17  0506   WBC 9.05   RBC 4.08   HGB 11.4*   HCT 36.7*      MCV 90   MCH 27.9   MCHC 31.1*     BMP:   Recent Labs  Lab 05/18/17  0506   *       K 4.7      CO2 29   BUN 18   CREATININE 0.8   CALCIUM 9.0   MG 2.1     CMP:   Recent Labs  Lab 05/18/17  0506   *   CALCIUM 9.0   ALBUMIN 2.9*   PROT 6.0      K 4.7   CO2 29      BUN 18   CREATININE 0.8   ALKPHOS 58   ALT 19   AST 10   BILITOT 0.2     LFTs:   Recent Labs  Lab 05/18/17  0506   ALT 19   AST 10   ALKPHOS 58   BILITOT 0.2   PROT 6.0   ALBUMIN 2.9*     Coagulation:   Recent Labs  Lab 05/17/17  1640   INR 1.0   APTT 28.0     Microbiology Results (last 7 days)     Procedure Component Value Units Date/Time    Blood Culture #1 **CANNOT BE ORDERED STAT** [[id number]] Collected:  05/17/17 1640    Order Status:  Completed Specimen:  Blood from Peripheral, Antecubital, Right Updated:  05/19/17 0129     Blood Culture, Routine Gram stain aer bottle: Gram positive cocci      Blood Culture, Routine Results called to and read back by:Yudy Hernandez RN  05/19/2017  01:29    Blood Culture #2 **CANNOT BE ORDERED STAT** [[id number]] Collected:  05/17/17 1700    Order Status:  Completed Specimen:  Blood from Peripheral, Antecubital, Right Updated:  05/18/17 2212     Blood Culture, Routine No Growth to date     Blood Culture, Routine No Growth to date    Culture, Respiratory with Gram Stain [[id number]]     Order Status:  No result Specimen:  Respiratory           Chest X-Ray: I personally reviewed the films and findings are:, air trapping/emphysema, cardiomegaly    Diagnostic Results:  Echo: Reviewed    ASSESSMENT/PLAN:     Patient Active Problem List   Diagnosis    Hay fever    Anemia, deficiency    Anxiety    Obesity with body mass index 30 or greater    Cellulitis of elbow    Chest pain    Chronic maxillary sinusitis    Depression    Diabetes mellitus without complication    Diabetic polyneuropathy    Dyslipidemia    Essential (primary) hypertension    Anemia associated with nutritional deficiency    COPD exacerbation    Pneumonia    Shoulder pain    Exomphalos    Chronic  respiratory failure with hypoxia    Insomnia    Breath shortness    Type 2 diabetes mellitus with hyperlipidemia    Type 2 diabetes mellitus with diabetic neuropathy, without long-term current use of insulin    Hyperlipidemia    Leukocytosis    Poorly controlled diabetes mellitus    Acute on chronic respiratory failure with hypoxia and hypercapnia    Acute hypercapnic respiratory failure    Bacteremia         Plan: Continue Current therapy - physical therapy, incentive spirometry and chest cpt      Juan Carlos Araujo MD  Pulmonary / Critical Care Medicine    .

## 2017-05-20 NOTE — ASSESSMENT & PLAN NOTE
Blood cultures grew coagulase negative Staphylococci in one set.  History and course does not suggest true bacteremia and will presume contaminant.  Stop Vancomycin.

## 2017-05-21 PROBLEM — R78.81 BACTEREMIA: Status: RESOLVED | Noted: 2017-05-20 | Resolved: 2017-05-21

## 2017-05-21 LAB
ALBUMIN SERPL BCP-MCNC: 2.8 G/DL
ANION GAP SERPL CALC-SCNC: 9 MMOL/L
BACTERIA BLD CULT: NORMAL
BASOPHILS # BLD AUTO: 0.01 K/UL
BASOPHILS NFR BLD: 0.1 %
BUN SERPL-MCNC: 25 MG/DL
CALCIUM SERPL-MCNC: 8.4 MG/DL
CHLORIDE SERPL-SCNC: 100 MMOL/L
CO2 SERPL-SCNC: 27 MMOL/L
CREAT SERPL-MCNC: 0.7 MG/DL
DIFFERENTIAL METHOD: ABNORMAL
EOSINOPHIL # BLD AUTO: 0 K/UL
EOSINOPHIL NFR BLD: 0 %
ERYTHROCYTE [DISTWIDTH] IN BLOOD BY AUTOMATED COUNT: 15.8 %
EST. GFR  (AFRICAN AMERICAN): >60 ML/MIN/1.73 M^2
EST. GFR  (NON AFRICAN AMERICAN): >60 ML/MIN/1.73 M^2
GLUCOSE SERPL-MCNC: 229 MG/DL
HCT VFR BLD AUTO: 35.1 %
HGB BLD-MCNC: 10.9 G/DL
LYMPHOCYTES # BLD AUTO: 0.8 K/UL
LYMPHOCYTES NFR BLD: 9.4 %
MCH RBC QN AUTO: 27.7 PG
MCHC RBC AUTO-ENTMCNC: 31.1 %
MCV RBC AUTO: 89 FL
MONOCYTES # BLD AUTO: 0.3 K/UL
MONOCYTES NFR BLD: 3.8 %
NEUTROPHILS # BLD AUTO: 7.6 K/UL
NEUTROPHILS NFR BLD: 86.7 %
PHOSPHATE SERPL-MCNC: 3.9 MG/DL
PLATELET # BLD AUTO: 171 K/UL
PMV BLD AUTO: 9.3 FL
POTASSIUM SERPL-SCNC: 4.9 MMOL/L
RBC # BLD AUTO: 3.93 M/UL
SODIUM SERPL-SCNC: 136 MMOL/L
WBC # BLD AUTO: 8.71 K/UL

## 2017-05-21 PROCEDURE — 94761 N-INVAS EAR/PLS OXIMETRY MLT: CPT

## 2017-05-21 PROCEDURE — 63600175 PHARM REV CODE 636 W HCPCS: Performed by: INTERNAL MEDICINE

## 2017-05-21 PROCEDURE — 25000003 PHARM REV CODE 250: Performed by: INTERNAL MEDICINE

## 2017-05-21 PROCEDURE — 27000221 HC OXYGEN, UP TO 24 HOURS

## 2017-05-21 PROCEDURE — 94799 UNLISTED PULMONARY SVC/PX: CPT

## 2017-05-21 PROCEDURE — 63600175 PHARM REV CODE 636 W HCPCS: Performed by: EMERGENCY MEDICINE

## 2017-05-21 PROCEDURE — 85025 COMPLETE CBC W/AUTO DIFF WBC: CPT

## 2017-05-21 PROCEDURE — 94664 DEMO&/EVAL PT USE INHALER: CPT

## 2017-05-21 PROCEDURE — 25000003 PHARM REV CODE 250: Performed by: EMERGENCY MEDICINE

## 2017-05-21 PROCEDURE — 80069 RENAL FUNCTION PANEL: CPT

## 2017-05-21 PROCEDURE — 99232 SBSQ HOSP IP/OBS MODERATE 35: CPT | Mod: ,,, | Performed by: INTERNAL MEDICINE

## 2017-05-21 PROCEDURE — 97116 GAIT TRAINING THERAPY: CPT

## 2017-05-21 PROCEDURE — 36415 COLL VENOUS BLD VENIPUNCTURE: CPT

## 2017-05-21 PROCEDURE — 21400001 HC TELEMETRY ROOM

## 2017-05-21 PROCEDURE — 97110 THERAPEUTIC EXERCISES: CPT

## 2017-05-21 PROCEDURE — 94640 AIRWAY INHALATION TREATMENT: CPT

## 2017-05-21 PROCEDURE — 25000003 PHARM REV CODE 250: Performed by: NURSE PRACTITIONER

## 2017-05-21 RX ORDER — CALCIUM CARBONATE 200(500)MG
500 TABLET,CHEWABLE ORAL DAILY PRN
Status: DISCONTINUED | OUTPATIENT
Start: 2017-05-21 | End: 2017-05-23 | Stop reason: HOSPADM

## 2017-05-21 RX ORDER — GUAIFENESIN 600 MG/1
600 TABLET, EXTENDED RELEASE ORAL 2 TIMES DAILY
Status: DISCONTINUED | OUTPATIENT
Start: 2017-05-21 | End: 2017-05-23 | Stop reason: HOSPADM

## 2017-05-21 RX ADMIN — METHYLPREDNISOLONE SODIUM SUCCINATE 80 MG: 125 INJECTION, POWDER, LYOPHILIZED, FOR SOLUTION INTRAMUSCULAR; INTRAVENOUS at 02:05

## 2017-05-21 RX ADMIN — ROFLUMILAST 500 MCG: 500 TABLET ORAL at 09:05

## 2017-05-21 RX ADMIN — METHYLPREDNISOLONE SODIUM SUCCINATE 40 MG: 40 INJECTION, POWDER, FOR SOLUTION INTRAMUSCULAR; INTRAVENOUS at 09:05

## 2017-05-21 RX ADMIN — ASPIRIN 81 MG: 81 TABLET, COATED ORAL at 09:05

## 2017-05-21 RX ADMIN — LISINOPRIL 5 MG: 5 TABLET ORAL at 09:05

## 2017-05-21 RX ADMIN — AZITHROMYCIN 250 MG: 250 TABLET, FILM COATED ORAL at 09:05

## 2017-05-21 RX ADMIN — BUDESONIDE 0.5 MG: 0.5 SUSPENSION RESPIRATORY (INHALATION) at 07:05

## 2017-05-21 RX ADMIN — PANTOPRAZOLE SODIUM 600 MG: 40 TABLET, DELAYED RELEASE ORAL at 09:05

## 2017-05-21 RX ADMIN — TRAZODONE HYDROCHLORIDE 50 MG: 50 TABLET ORAL at 09:05

## 2017-05-21 RX ADMIN — METHYLPREDNISOLONE SODIUM SUCCINATE 80 MG: 125 INJECTION, POWDER, LYOPHILIZED, FOR SOLUTION INTRAMUSCULAR; INTRAVENOUS at 05:05

## 2017-05-21 RX ADMIN — CALCIUM CARBONATE (ANTACID) CHEW TAB 500 MG 500 MG: 500 CHEW TAB at 07:05

## 2017-05-21 RX ADMIN — FAMOTIDINE 20 MG: 20 TABLET ORAL at 09:05

## 2017-05-21 RX ADMIN — ARFORMOTEROL TARTRATE 15 MCG: 15 SOLUTION RESPIRATORY (INHALATION) at 08:05

## 2017-05-21 RX ADMIN — ENOXAPARIN SODIUM 40 MG: 100 INJECTION SUBCUTANEOUS at 06:05

## 2017-05-21 RX ADMIN — SIMVASTATIN 10 MG: 5 TABLET, FILM COATED ORAL at 09:05

## 2017-05-21 RX ADMIN — ARFORMOTEROL TARTRATE 15 MCG: 15 SOLUTION RESPIRATORY (INHALATION) at 07:05

## 2017-05-21 NOTE — PLAN OF CARE
Problem: Physical Therapy Goal  Goal: Physical Therapy Goal  LTG'S TO BE MET IN 7 DAYS (5-26-17)  1. PT WILL BE NESSA WITH BED MOBILITY  2. PT WILL BE NESSA WITH TF'S  3. PT WILL AMB 50' WITH RW AND SBA TO TOLERANCE  4. PT WILL TOLERATE BLE THEREX X 15 REPS WITH REST AS NEEDED  5. PT WILL DEMO F+ DYNAMIC BALANCE DURING GAIT   Outcome: Ongoing (interventions implemented as appropriate)  Pt increased ambulation distance this date. Pt performed seated exercises 1x15 for BLE. Pt was left sitting EOB with son present.

## 2017-05-21 NOTE — PLAN OF CARE
Problem: Patient Care Overview  Goal: Plan of Care Review  Outcome: Ongoing (interventions implemented as appropriate)  Patient remains free from falls. Fall precautions remain in place. No c/o pain. IV solumedrol given per orders. VS are stable. No other c/o at this time. Call bell within reach. Reminded to call for assistance.

## 2017-05-21 NOTE — SUBJECTIVE & OBJECTIVE
Interval History:  No acute overnight events.  Continues to have periodic shortness of breath and cough productive of green sputum.    Review of Systems   Constitutional: Negative for activity change, appetite change, chills, diaphoresis, fatigue, fever and unexpected weight change.   HENT: Negative for congestion, drooling, facial swelling, rhinorrhea, sinus pressure, sneezing and sore throat.    Eyes: Negative for discharge, redness, itching and visual disturbance.   Respiratory: Positive for cough and shortness of breath. Negative for apnea, choking, chest tightness, wheezing and stridor.    Cardiovascular: Negative for chest pain, palpitations and leg swelling.   Gastrointestinal: Negative for abdominal distention, abdominal pain, anal bleeding, blood in stool, constipation, diarrhea, nausea and vomiting.   Genitourinary: Negative for decreased urine volume, difficulty urinating, dysuria, frequency, hematuria, pelvic pain, urgency, vaginal bleeding and vaginal discharge.   Musculoskeletal: Negative for arthralgias, back pain, gait problem, joint swelling, myalgias, neck pain and neck stiffness.   Skin: Negative for color change, pallor, rash and wound.   Neurological: Negative for dizziness, seizures, facial asymmetry, speech difficulty, weakness, light-headedness, numbness and headaches.   Psychiatric/Behavioral: Negative for agitation, confusion, hallucinations and suicidal ideas.   All other systems reviewed and are negative.    Objective:     Vital Signs (Most Recent):  Temp: 99.5 °F (37.5 °C) (05/21/17 0900)  Pulse: 95 (05/21/17 0900)  Resp: (!) 22 (05/21/17 0900)  BP: 133/77 (05/21/17 0900)  SpO2: 95 % (05/21/17 0900) Vital Signs (24h Range):  Temp:  [98.1 °F (36.7 °C)-99.5 °F (37.5 °C)] 99.5 °F (37.5 °C)  Pulse:  [57-95] 95  Resp:  [18-22] 22  SpO2:  [90 %-99 %] 95 %  BP: (125-167)/() 133/77     Weight: 97.3 kg (214 lb 8 oz)  Body mass index is 38 kg/m².    Intake/Output Summary (Last 24 hours) at  05/21/17 1224  Last data filed at 05/21/17 0600   Gross per 24 hour   Intake                0 ml   Output             2250 ml   Net            -2250 ml      Physical Exam   Constitutional: She is oriented to person, place, and time. She appears well-developed and well-nourished.   Morbidly obese   HENT:   Head: Normocephalic and atraumatic.   Eyes: Conjunctivae and EOM are normal. Pupils are equal, round, and reactive to light.   Neck: Normal range of motion. Neck supple.   Cardiovascular: Regular rhythm, normal heart sounds and intact distal pulses.    No murmur heard.  Tachycardic     Pulmonary/Chest: Breath sounds normal. No respiratory distress.   Diminished breath sounds on bases. No tachypnea or accessory muscle use.   Abdominal: Soft. Bowel sounds are normal. She exhibits no distension. There is no tenderness.   Musculoskeletal: Normal range of motion. She exhibits no edema, tenderness or deformity.   Neurological: She is alert and oriented to person, place, and time. She has normal reflexes.   Skin: Skin is warm and dry. No erythema.   Psychiatric: She has a normal mood and affect. Her behavior is normal.   Nursing note and vitals reviewed.      Significant Labs:   A1C:     Recent Labs  Lab 01/17/17  0854   HGBA1C 6.6*     ABGs: No results for input(s): PH, PCO2, HCO3, POCSATURATED, BE in the last 48 hours.  Bilirubin:     Recent Labs  Lab 05/17/17  1640 05/18/17  0506   BILITOT 0.3 0.2     Blood Culture: No results for input(s): LABBLOO in the last 48 hours.  BMP:     Recent Labs  Lab 05/21/17  0441   *      K 4.9      CO2 27   BUN 25*   CREATININE 0.7   CALCIUM 8.4*     CBC:     Recent Labs  Lab 05/20/17  1130 05/21/17  0441   WBC 13.86* 8.71   HGB 13.0 10.9*   HCT 41.4 35.1*    171     CMP:     Recent Labs  Lab 05/20/17  1130 05/21/17  0441    136   K 4.8 4.9   CL 98 100   CO2 31* 27   * 229*   BUN 24* 25*   CREATININE 0.8 0.7   CALCIUM 9.0 8.4*   ALBUMIN  --  2.8*    ANIONGAP 9 9   EGFRNONAA >60 >60     Cardiac Markers: No results for input(s): CKMB, MYOGLOBIN, BNP, TROPISTAT in the last 48 hours.  Coagulation: No results for input(s): INR, APTT in the last 48 hours.    Invalid input(s): PT  Lactic Acid: No results for input(s): LACTATE in the last 48 hours.  Lipase: No results for input(s): LIPASE in the last 48 hours.  Lipid Panel: No results for input(s): CHOL, HDL, LDLCALC, TRIG, CHOLHDL in the last 48 hours.    Significant Imaging:   Imaging Results          CTA Chest Non Coronary (Final result)  Result time 05/20/17 17:45:46    Final result by Goran Domínguez III, MD (05/20/17 17:45:46)                 Impression:        1. Negative for acute pulmonary emboli. Negative for thoracic aortic aneurysm or dissection.    2. Otherwise as above.      Electronically signed by: GORAN DOMÍNGUEZ MD  Date:     05/20/17  Time:    17:45              Narrative:    Shortness of breath. COPD.    No prior studies for comparison.    Standard and MIPS/3-D images submitted. Multiplanar imaging submitted..    Multiplanar imaging shows no mediastinal mass or bulky adenopathy. There is a moderate volume of mediastinal fat. There is atheromatous change along the aorta without evidence of aneurysm formation or dissection. There is opacification of the pulmonary arterial system. There no filling defects within the major branches.    The lungs show mild coarsening of the interstitial markings with thickening of the interlobular septa. There is no consolidation or effusion. Minimal scarring or atelectasis suggested in the lingula and middle lobe.    Within the very upper portion of the abdomen, no acute abnormalities seen. Probable fatty change of the liver. No adrenal mass or enlargement. No gross pancreatic abnormality is seen.                             X-Ray Chest AP Portable (Final result)  Result time 05/17/17 17:45:57    Final result by LYDIA Diez Sr., MD (05/17/17 17:45:57)                  Impression:        1.  No significant interval change in appearance.  2.  There is no evidence of acute pulmonary process.   3.  The borders of the heart are not well seen.  This may be secondary to pericardial fat pads.  If additional imaging evaluation is clinically indicated, I recommend consideration of a CT examination of the thorax with IV contrast.      Electronically signed by: LYDIA NELSON MD  Date:     05/17/17  Time:    17:45              Narrative:    One-view chest x-ray    Clinical History: Chest pain    Finding: Comparison was made to a prior examination performed on 4/4/2017.  The borders of the heart are not well seen.  There is no evidence of acute pulmonary process.  There is no pneumothorax.  The right costophrenic angle is sharp.  The left costophrenic angle is not well seen.

## 2017-05-21 NOTE — PROGRESS NOTES
[facility] Hospital Medicine  Progress Note    Patient Name: Petrona Gonzalez  MRN: [id number]  Patient Class: IP- Inpatient   Admission Date: 5/17/2017  Length of Stay: 4 days  Attending Physician: Shravan De MD  Primary Care Provider: Katy Arriaga MD        Subjective:     Principal Problem:Acute hypercapnic respiratory failure    HPI:  No notes on file    Hospital Course:  5/18 - Improvement in breathing reported. Patient now on nasal cannula. Used CPAP overnight.  Working SNF placement Monday 22 May.    Interval History:  No acute overnight events.  Continues to have periodic shortness of breath and cough productive of green sputum.    Review of Systems   Constitutional: Negative for activity change, appetite change, chills, diaphoresis, fatigue, fever and unexpected weight change.   HENT: Negative for congestion, drooling, facial swelling, rhinorrhea, sinus pressure, sneezing and sore throat.    Eyes: Negative for discharge, redness, itching and visual disturbance.   Respiratory: Positive for cough and shortness of breath. Negative for apnea, choking, chest tightness, wheezing and stridor.    Cardiovascular: Negative for chest pain, palpitations and leg swelling.   Gastrointestinal: Negative for abdominal distention, abdominal pain, anal bleeding, blood in stool, constipation, diarrhea, nausea and vomiting.   Genitourinary: Negative for decreased urine volume, difficulty urinating, dysuria, frequency, hematuria, pelvic pain, urgency, vaginal bleeding and vaginal discharge.   Musculoskeletal: Negative for arthralgias, back pain, gait problem, joint swelling, myalgias, neck pain and neck stiffness.   Skin: Negative for color change, pallor, rash and wound.   Neurological: Negative for dizziness, seizures, facial asymmetry, speech difficulty, weakness, light-headedness, numbness and headaches.   Psychiatric/Behavioral: Negative for agitation, confusion, hallucinations and suicidal  ideas.   All other systems reviewed and are negative.    Objective:     Vital Signs (Most Recent):  Temp: 99.5 °F (37.5 °C) (05/21/17 0900)  Pulse: 95 (05/21/17 0900)  Resp: (!) 22 (05/21/17 0900)  BP: 133/77 (05/21/17 0900)  SpO2: 95 % (05/21/17 0900) Vital Signs (24h Range):  Temp:  [98.1 °F (36.7 °C)-99.5 °F (37.5 °C)] 99.5 °F (37.5 °C)  Pulse:  [57-95] 95  Resp:  [18-22] 22  SpO2:  [90 %-99 %] 95 %  BP: (125-167)/() 133/77     Weight: 97.3 kg (214 lb 8 oz)  Body mass index is 38 kg/m².    Intake/Output Summary (Last 24 hours) at 05/21/17 1224  Last data filed at 05/21/17 0600   Gross per 24 hour   Intake                0 ml   Output             2250 ml   Net            -2250 ml      Physical Exam   Constitutional: She is oriented to person, place, and time. She appears well-developed and well-nourished.   Morbidly obese   HENT:   Head: Normocephalic and atraumatic.   Eyes: Conjunctivae and EOM are normal. Pupils are equal, round, and reactive to light.   Neck: Normal range of motion. Neck supple.   Cardiovascular: Regular rhythm, normal heart sounds and intact distal pulses.    No murmur heard.  Tachycardic     Pulmonary/Chest: Breath sounds normal. No respiratory distress.   Diminished breath sounds on bases. No tachypnea or accessory muscle use.   Abdominal: Soft. Bowel sounds are normal. She exhibits no distension. There is no tenderness.   Musculoskeletal: Normal range of motion. She exhibits no edema, tenderness or deformity.   Neurological: She is alert and oriented to person, place, and time. She has normal reflexes.   Skin: Skin is warm and dry. No erythema.   Psychiatric: She has a normal mood and affect. Her behavior is normal.   Nursing note and vitals reviewed.      Significant Labs:   A1C:     Recent Labs  Lab 01/17/17  0854   HGBA1C 6.6*     ABGs: No results for input(s): PH, PCO2, HCO3, POCSATURATED, BE in the last 48 hours.  Bilirubin:     Recent Labs  Lab 05/17/17  1640 05/18/17  0507    BILITOT 0.3 0.2     Blood Culture: No results for input(s): LABBLOO in the last 48 hours.  BMP:     Recent Labs  Lab 05/21/17  0441   *      K 4.9      CO2 27   BUN 25*   CREATININE 0.7   CALCIUM 8.4*     CBC:     Recent Labs  Lab 05/20/17  1130 05/21/17  0441   WBC 13.86* 8.71   HGB 13.0 10.9*   HCT 41.4 35.1*    171     CMP:     Recent Labs  Lab 05/20/17  1130 05/21/17  0441    136   K 4.8 4.9   CL 98 100   CO2 31* 27   * 229*   BUN 24* 25*   CREATININE 0.8 0.7   CALCIUM 9.0 8.4*   ALBUMIN  --  2.8*   ANIONGAP 9 9   EGFRNONAA >60 >60     Cardiac Markers: No results for input(s): CKMB, MYOGLOBIN, BNP, TROPISTAT in the last 48 hours.  Coagulation: No results for input(s): INR, APTT in the last 48 hours.    Invalid input(s): PT  Lactic Acid: No results for input(s): LACTATE in the last 48 hours.  Lipase: No results for input(s): LIPASE in the last 48 hours.  Lipid Panel: No results for input(s): CHOL, HDL, LDLCALC, TRIG, CHOLHDL in the last 48 hours.    Significant Imaging:   Imaging Results          CTA Chest Non Coronary (Final result)  Result time 05/20/17 17:45:46    Final result by Goran Domínguez III, MD (05/20/17 17:45:46)                 Impression:        1. Negative for acute pulmonary emboli. Negative for thoracic aortic aneurysm or dissection.    2. Otherwise as above.      Electronically signed by: GORAN DOMÍNGUEZ MD  Date:     05/20/17  Time:    17:45              Narrative:    Shortness of breath. COPD.    No prior studies for comparison.    Standard and MIPS/3-D images submitted. Multiplanar imaging submitted..    Multiplanar imaging shows no mediastinal mass or bulky adenopathy. There is a moderate volume of mediastinal fat. There is atheromatous change along the aorta without evidence of aneurysm formation or dissection. There is opacification of the pulmonary arterial system. There no filling defects within the major branches.    The lungs show mild  coarsening of the interstitial markings with thickening of the interlobular septa. There is no consolidation or effusion. Minimal scarring or atelectasis suggested in the lingula and middle lobe.    Within the very upper portion of the abdomen, no acute abnormalities seen. Probable fatty change of the liver. No adrenal mass or enlargement. No gross pancreatic abnormality is seen.                             X-Ray Chest AP Portable (Final result)  Result time 05/17/17 17:45:57    Final result by LYDIA Diez Sr., MD (05/17/17 17:45:57)                 Impression:        1.  No significant interval change in appearance.  2.  There is no evidence of acute pulmonary process.   3.  The borders of the heart are not well seen.  This may be secondary to pericardial fat pads.  If additional imaging evaluation is clinically indicated, I recommend consideration of a CT examination of the thorax with IV contrast.      Electronically signed by: LYDIA DIEZ MD  Date:     05/17/17  Time:    17:45              Narrative:    One-view chest x-ray    Clinical History: Chest pain    Finding: Comparison was made to a prior examination performed on 4/4/2017.  The borders of the heart are not well seen.  There is no evidence of acute pulmonary process.  There is no pneumothorax.  The right costophrenic angle is sharp.  The left costophrenic angle is not well seen.                                Assessment/Plan:      Poorly controlled diabetes mellitus    - SSI   - diabetic diet        Hyperlipidemia    - Continue statin         COPD exacerbation    - Supplemental O2   - Empirically cover with Azithromycin  - IV steroids  - Neb tx  - Pulse Ox        Essential (primary) hypertension    - monitor VS   - resume home meds        Obesity with body mass index 30 or greater    - Pt counseled on diet/exercise/weight loss        * Acute hypercapnic respiratory failure    - Symptoms have improved with BiPAP on the first day.  - Appreciate  Pulmonology input.  - Supplemental O2 as needed.  - Neb tx PRN  - Continue i/v Solumedrol.          VTE Risk Mitigation         Ordered     enoxaparin injection 40 mg  Daily     Route:  Subcutaneous        05/17/17 2037     Medium Risk of VTE  Once      05/17/17 2037     Place sequential compression device  Until discontinued      05/17/17 2037          Shravan De MD  Department of Hospital Medicine   [facility]

## 2017-05-21 NOTE — PROGRESS NOTES
Progress Note  Pulmonology    Admit Date: 5/17/2017   LOS: 4 days     SUBJECTIVE: still dyspnic     Follow-up For:  Acute on chronic respiratory failure  CT of chest negative for Pulmonary Embolism      Continuous Infusions:   Scheduled Meds:   arformoterol  15 mcg Nebulization BID    aspirin  81 mg Oral Daily    azithromycin  250 mg Oral Daily    budesonide  0.5 mg Nebulization BID    enoxaparin  40 mg Subcutaneous Daily    famotidine  20 mg Oral Daily    guaifenesin  600 mg Oral BID    lisinopril  5 mg Oral Daily    methylPREDNISolone sodium succinate  40 mg Intravenous Q8H    roflumilast  500 mcg Oral Daily    simvastatin  10 mg Oral QHS    trazodone  50 mg Oral QHS       Review of Systems:  Constitutional: no fever or chills, positive for fatigue and malaise  Respiratory: positive for dyspnea on exertion  Cardiovascular: no chest pain or palpitations    OBJECTIVE:     Vital Signs Range (Last 24H):  Temp:  [98.1 °F (36.7 °C)-99.5 °F (37.5 °C)]   Pulse:  [57-95]   Resp:  [18-22]   BP: (125-167)/()   SpO2:  [90 %-99 %]     I & O (Last 24H):  Intake/Output Summary (Last 24 hours) at 05/21/17 1503  Last data filed at 05/21/17 0600   Gross per 24 hour   Intake                0 ml   Output             1400 ml   Net            -1400 ml     Physical Exam:  General: mild distress  Neck: jugular venous distention - 6 cm above sternal notch  Lungs:  diminished breath sounds bibasilar and bilaterally and wheezes bibasilar and bilaterally  Chest Wall: no tenderness  Heart: regular rate and rhythm and no murmur  Abdomen: soft, non-tender non-distended; bowel sounds normal  Extremities: no cyanosis or edema, or clubbing and edema +1  Neurologic: alert, oriented, thought content appropriate    Laboratory:  CBC:   Recent Labs  Lab 05/21/17  0441   WBC 8.71   RBC 3.93*   HGB 10.9*   HCT 35.1*      MCV 89   MCH 27.7   MCHC 31.1*     BMP:   Recent Labs  Lab 05/18/17  0506  05/21/17  0441   *  < > 229*      < > 136   K 4.7  < > 4.9     < > 100   CO2 29  < > 27   BUN 18  < > 25*   CREATININE 0.8  < > 0.7   CALCIUM 9.0  < > 8.4*   MG 2.1  --   --    < > = values in this interval not displayed.  CMP:   Recent Labs  Lab 05/18/17  0506  05/21/17  0441   *  < > 229*   CALCIUM 9.0  < > 8.4*   ALBUMIN 2.9*  --  2.8*   PROT 6.0  --   --      < > 136   K 4.7  < > 4.9   CO2 29  < > 27     < > 100   BUN 18  < > 25*   CREATININE 0.8  < > 0.7   ALKPHOS 58  --   --    ALT 19  --   --    AST 10  --   --    BILITOT 0.2  --   --    < > = values in this interval not displayed.  LFTs:   Recent Labs  Lab 05/18/17  0506 05/21/17  0441   ALT 19  --    AST 10  --    ALKPHOS 58  --    BILITOT 0.2  --    PROT 6.0  --    ALBUMIN 2.9* 2.8*     Coagulation:   Recent Labs  Lab 05/17/17  1640   INR 1.0   APTT 28.0     Cardiac markers:   Recent Labs  Lab 05/18/17  0506   TROPONINI 0.028*       Chest X-Ray: I personally reviewed the films and findings are:, air trapping/emphysema    Diagnostic Results:  CT: Reviewed    ASSESSMENT/PLAN:     1.0 Acute on chronic respiratory failure      Patient Active Problem List   Diagnosis    Hay fever    Anemia, deficiency    Anxiety    Obesity with body mass index 30 or greater    Cellulitis of elbow    Chest pain    Chronic maxillary sinusitis    Depression    Diabetes mellitus without complication    Diabetic polyneuropathy    Dyslipidemia    Essential (primary) hypertension    Anemia associated with nutritional deficiency    COPD exacerbation    Pneumonia    Shoulder pain    Exomphalos    Chronic respiratory failure with hypoxia    Insomnia    Breath shortness    Type 2 diabetes mellitus with hyperlipidemia    Type 2 diabetes mellitus with diabetic neuropathy, without long-term current use of insulin    Hyperlipidemia    Leukocytosis    Poorly controlled diabetes mellitus    Acute on chronic respiratory failure with hypoxia and hypercapnia    Acute  hypercapnic respiratory failure         Plan: Continue Current therapy Add mucinex.    Patient has oxygen and home ventilator  Continue therapy - need referal to pulmonary  disease managment      Juan Carlos Araujo MD  Pulmonary / Critical Care Medicine

## 2017-05-21 NOTE — PT/OT/SLP PROGRESS
Physical Therapy  Treatment    Petrona Gonzalez   MRN: [id number]   Admitting Diagnosis: Acute hypercapnic respiratory failure    PT Received On: 17  PT Start Time: 1020     PT Stop Time: 1044    PT Total Time (min): 24 min       Billable Minutes:  Gait [id number] and Therapeutic Exercise 12    Treatment Type: Treatment  PT/PTA: PTA     PTA Visit Number: 2       General Precautions: Standard, respiratory, fall  Orthopedic Precautions: N/A   Braces:           Subjective:  Communicated with epic and nurse Roger prior to session.      Pain Ratin/10              Pain Rating Post-Intervention: 0/10    Objective:   Patient found with: telemetry, nuñez catheter, oxygen    Functional Mobility:  Bed Mobility:        Transfers:  Sit <> Stand Assistance: Contact Guard Assistance  Sit <> Stand Assistive Device: Rolling Walker    Gait:   Gait Distance: Pt amb36' x2 trials  Assistance 1: Contact Guard Assistance  Gait Assistive Device: Rolling walker  Gait Pattern: swing-through gait  Gait Deviation(s): decreased drake, decreased step length, decreased stride length    Stairs:      Balance:   Static Sit: GOOD: Takes MODERATE challenges from all directions  Dynamic Sit: GOOD: Maintains balance through MODERATE excursions of active trunk movement  Static Stand: FAIR+: Takes MINIMAL challenges from all directions  Dynamic stand: FAIR: Needs CONTACT GUARD during gait     Therapeutic Activities and Exercises:  Pt ambulated in [place] for 36 feet x2 trials and performed seated exercises.      AM-PAC 6 CLICK MOBILITY  How much help from another person does this patient currently need?   1 = Unable, Total/Dependent Assistance  2 = A lot, Maximum/Moderate Assistance  3 = A little, Minimum/Contact Guard/Supervision  4 = None, Modified [place]/Independent    Turning over in bed (including adjusting bedclothes, sheets and blankets)?: 4  Sitting down on and standing up from a chair with arms (e.g., wheelchair, bedside commode,  etc.): 4  Moving from lying on back to sitting on the side of the bed?: 4  Moving to and from a bed to a chair (including a wheelchair)?: 4  Need to walk in hospital room?: 3  Climbing 3-5 steps with a railing?: 1 (NT)  Total Score: 20    AM-PAC Raw Score CMS G-Code Modifier Level of Impairment Assistance   6 % Total / Unable   7 - 9 CM 80 - 100% Maximal Assist   10 - 14 CL 60 - 80% Moderate Assist   15 - 19 CK 40 - 60% Moderate Assist   20 - 22 CJ 20 - 40% Minimal Assist   23 CI 1-20% SBA / CGA   24 CH 0% Independent/ Mod I     Patient left sitting EOB with all lines intact, call button in reach and son present.    Assessment:  Petrona Gonzalez is a 76 y.o. female with a medical diagnosis of Acute hypercapnic respiratory failure and presents with decreased endurance.    Rehab identified problem list/impairments: Rehab identified problem list/impairments: weakness, impaired endurance, impaired functional mobilty, gait instability, impaired balance, decreased safety awareness    Rehab potential is good.    Activity tolerance: Fair    Discharge recommendations: Discharge Facility/Level Of Care Needs: nursing facility, skilled     Barriers to discharge: Barriers to Discharge: Decreased caregiver support    Equipment recommendations: Equipment Needed After Discharge: bath bench, walker, rolling     GOALS:    Physical Therapy Goals        Problem: Physical Therapy Goal    Goal Priority Disciplines Outcome Goal Variances Interventions   Physical Therapy Goal     PT/OT, PT Ongoing (interventions implemented as appropriate)     Description:  LTG'S TO BE MET IN 7 DAYS (5-26-17)  1. PT WILL BE NESSA WITH BED MOBILITY  2. PT WILL BE NESSA WITH TF'S  3. PT WILL AMB 50' WITH RW AND SBA TO TOLERANCE  4. PT WILL TOLERATE BLE THEREX X 15 REPS WITH REST AS NEEDED  5. PT WILL DEMO F+ DYNAMIC BALANCE DURING GAIT                    PLAN:    Patient to be seen 5 x/week  to address the above listed problems via therapeutic  exercises, therapeutic activities, gait training  Plan of Care expires: 05/26/17  Plan of Care reviewed with: patient         Goran Garcia, PT  05/21/2017

## 2017-05-21 NOTE — HOSPITAL COURSE
The patient's breathing improved a day after admission with administration of I/v steroids and antibiotics. She was downtitrated to a nasal cannula and was transferred out of the ICU the next day. Her nebulaizations were continued. Pulmonary was consulted and their input was appreciated. She had begun the process to be transferred to a nursing facility and case management was consulted for the same. She was subsequently transitioned to oral steroids. She was discharged on 5/23/17 to the nursing home. Was seen and examined on that day and was deemed safe for discharge.

## 2017-05-22 LAB
ALBUMIN SERPL BCP-MCNC: 3 G/DL
ANION GAP SERPL CALC-SCNC: 6 MMOL/L
BACTERIA BLD CULT: NORMAL
BUN SERPL-MCNC: 26 MG/DL
CALCIUM SERPL-MCNC: 9.2 MG/DL
CHLORIDE SERPL-SCNC: 95 MMOL/L
CO2 SERPL-SCNC: 40 MMOL/L
CREAT SERPL-MCNC: 0.8 MG/DL
EST. GFR  (AFRICAN AMERICAN): >60 ML/MIN/1.73 M^2
EST. GFR  (NON AFRICAN AMERICAN): >60 ML/MIN/1.73 M^2
GLUCOSE SERPL-MCNC: 241 MG/DL
PHOSPHATE SERPL-MCNC: 3.9 MG/DL
POTASSIUM SERPL-SCNC: 5.2 MMOL/L
SODIUM SERPL-SCNC: 141 MMOL/L

## 2017-05-22 PROCEDURE — 27000221 HC OXYGEN, UP TO 24 HOURS

## 2017-05-22 PROCEDURE — 97530 THERAPEUTIC ACTIVITIES: CPT

## 2017-05-22 PROCEDURE — 94799 UNLISTED PULMONARY SVC/PX: CPT

## 2017-05-22 PROCEDURE — 36415 COLL VENOUS BLD VENIPUNCTURE: CPT

## 2017-05-22 PROCEDURE — 97116 GAIT TRAINING THERAPY: CPT

## 2017-05-22 PROCEDURE — 21400001 HC TELEMETRY ROOM

## 2017-05-22 PROCEDURE — 25000003 PHARM REV CODE 250: Performed by: INTERNAL MEDICINE

## 2017-05-22 PROCEDURE — 94664 DEMO&/EVAL PT USE INHALER: CPT

## 2017-05-22 PROCEDURE — 99232 SBSQ HOSP IP/OBS MODERATE 35: CPT | Mod: ,,, | Performed by: INTERNAL MEDICINE

## 2017-05-22 PROCEDURE — 63600175 PHARM REV CODE 636 W HCPCS: Performed by: INTERNAL MEDICINE

## 2017-05-22 PROCEDURE — 63600175 PHARM REV CODE 636 W HCPCS: Performed by: EMERGENCY MEDICINE

## 2017-05-22 PROCEDURE — 94660 CPAP INITIATION&MGMT: CPT

## 2017-05-22 PROCEDURE — 97535 SELF CARE MNGMENT TRAINING: CPT

## 2017-05-22 PROCEDURE — 25000003 PHARM REV CODE 250: Performed by: NURSE PRACTITIONER

## 2017-05-22 PROCEDURE — 94640 AIRWAY INHALATION TREATMENT: CPT

## 2017-05-22 PROCEDURE — 25000003 PHARM REV CODE 250: Performed by: EMERGENCY MEDICINE

## 2017-05-22 PROCEDURE — 80069 RENAL FUNCTION PANEL: CPT

## 2017-05-22 RX ADMIN — ASPIRIN 81 MG: 81 TABLET, COATED ORAL at 08:05

## 2017-05-22 RX ADMIN — CALCIUM CARBONATE (ANTACID) CHEW TAB 500 MG 500 MG: 500 CHEW TAB at 07:05

## 2017-05-22 RX ADMIN — AZITHROMYCIN 250 MG: 250 TABLET, FILM COATED ORAL at 08:05

## 2017-05-22 RX ADMIN — ENOXAPARIN SODIUM 40 MG: 100 INJECTION SUBCUTANEOUS at 05:05

## 2017-05-22 RX ADMIN — FAMOTIDINE 20 MG: 20 TABLET ORAL at 08:05

## 2017-05-22 RX ADMIN — ARFORMOTEROL TARTRATE 15 MCG: 15 SOLUTION RESPIRATORY (INHALATION) at 09:05

## 2017-05-22 RX ADMIN — PANTOPRAZOLE SODIUM 600 MG: 40 TABLET, DELAYED RELEASE ORAL at 09:05

## 2017-05-22 RX ADMIN — PANTOPRAZOLE SODIUM 600 MG: 40 TABLET, DELAYED RELEASE ORAL at 08:05

## 2017-05-22 RX ADMIN — BUDESONIDE 0.5 MG: 0.5 SUSPENSION RESPIRATORY (INHALATION) at 09:05

## 2017-05-22 RX ADMIN — BUDESONIDE 0.5 MG: 0.5 SUSPENSION RESPIRATORY (INHALATION) at 08:05

## 2017-05-22 RX ADMIN — ALPRAZOLAM 0.5 MG: 0.5 TABLET ORAL at 08:05

## 2017-05-22 RX ADMIN — SIMVASTATIN 10 MG: 5 TABLET, FILM COATED ORAL at 09:05

## 2017-05-22 RX ADMIN — METHYLPREDNISOLONE SODIUM SUCCINATE 40 MG: 40 INJECTION, POWDER, FOR SOLUTION INTRAMUSCULAR; INTRAVENOUS at 02:05

## 2017-05-22 RX ADMIN — LISINOPRIL 5 MG: 5 TABLET ORAL at 08:05

## 2017-05-22 RX ADMIN — METHYLPREDNISOLONE SODIUM SUCCINATE 40 MG: 40 INJECTION, POWDER, FOR SOLUTION INTRAMUSCULAR; INTRAVENOUS at 09:05

## 2017-05-22 RX ADMIN — TRAZODONE HYDROCHLORIDE 50 MG: 50 TABLET ORAL at 09:05

## 2017-05-22 RX ADMIN — METHYLPREDNISOLONE SODIUM SUCCINATE 40 MG: 40 INJECTION, POWDER, FOR SOLUTION INTRAMUSCULAR; INTRAVENOUS at 05:05

## 2017-05-22 RX ADMIN — ROFLUMILAST 500 MCG: 500 TABLET ORAL at 08:05

## 2017-05-22 RX ADMIN — ARFORMOTEROL TARTRATE 15 MCG: 15 SOLUTION RESPIRATORY (INHALATION) at 08:05

## 2017-05-22 NOTE — SUBJECTIVE & OBJECTIVE
Interval History: No acute overnight events. Patient reports that breathing is at baseline.    Review of Systems   Constitutional: Negative for activity change, appetite change, chills, diaphoresis, fatigue, fever and unexpected weight change.   HENT: Negative for congestion, drooling, facial swelling, rhinorrhea, sinus pressure, sneezing and sore throat.    Eyes: Negative for discharge, redness, itching and visual disturbance.   Respiratory: Positive for cough and shortness of breath. Negative for apnea, choking, chest tightness, wheezing and stridor.    Cardiovascular: Negative for chest pain, palpitations and leg swelling.   Gastrointestinal: Negative for abdominal distention, abdominal pain, anal bleeding, blood in stool, constipation, diarrhea, nausea and vomiting.   Genitourinary: Negative for decreased urine volume, difficulty urinating, dysuria, frequency, hematuria, pelvic pain, urgency, vaginal bleeding and vaginal discharge.   Musculoskeletal: Negative for arthralgias, back pain, gait problem, joint swelling, myalgias, neck pain and neck stiffness.   Skin: Negative for color change, pallor, rash and wound.   Neurological: Negative for dizziness, seizures, facial asymmetry, speech difficulty, weakness, light-headedness, numbness and headaches.   Psychiatric/Behavioral: Negative for agitation, confusion, hallucinations and suicidal ideas.   All other systems reviewed and are negative.    Objective:     Vital Signs (Most Recent):  Temp: 98.3 °F (36.8 °C) (05/22/17 1600)  Pulse: 103 (05/22/17 1730)  Resp: 18 (05/22/17 0911)  BP: (!) 163/70 (05/22/17 1600)  SpO2: 95 % (05/22/17 1600) Vital Signs (24h Range):  Temp:  [97.6 °F (36.4 °C)-98.9 °F (37.2 °C)] 98.3 °F (36.8 °C)  Pulse:  [] 103  Resp:  [18-22] 18  SpO2:  [92 %-100 %] 95 %  BP: (122-163)/(63-85) 163/70     Weight: 97.3 kg (214 lb 8 oz)  Body mass index is 38 kg/m².    Intake/Output Summary (Last 24 hours) at 05/22/17 1830  Last data filed at  05/22/17 1704   Gross per 24 hour   Intake              720 ml   Output             2450 ml   Net            -1730 ml      Physical Exam   Constitutional: She is oriented to person, place, and time. She appears well-developed and well-nourished.   Morbidly obese   HENT:   Head: Normocephalic and atraumatic.   Eyes: Conjunctivae and EOM are normal. Pupils are equal, round, and reactive to light.   Neck: Normal range of motion. Neck supple.   Cardiovascular: Regular rhythm, normal heart sounds and intact distal pulses.    No murmur heard.  Tachycardic     Pulmonary/Chest: Breath sounds normal. No respiratory distress.   Diminished breath sounds on bases. No tachypnea or accessory muscle use.   Abdominal: Soft. Bowel sounds are normal. She exhibits no distension. There is no tenderness.   Musculoskeletal: Normal range of motion. She exhibits no edema, tenderness or deformity.   Neurological: She is alert and oriented to person, place, and time. She has normal reflexes.   Skin: Skin is warm and dry. No erythema.   Psychiatric: She has a normal mood and affect. Her behavior is normal.   Nursing note and vitals reviewed.    Significant Labs:   A1C:   Recent Labs  Lab 01/17/17  0854   HGBA1C 6.6*     ABGs: No results for input(s): PH, PCO2, HCO3, POCSATURATED, BE in the last 48 hours.  Bilirubin:   Recent Labs  Lab 05/17/17  1640 05/18/17  0506   BILITOT 0.3 0.2     Blood Culture: No results for input(s): LABBLOO in the last 48 hours.  BMP:   Recent Labs  Lab 05/22/17  0528   *      K 5.2*   CL 95   CO2 40*   BUN 26*   CREATININE 0.8   CALCIUM 9.2     CBC:   Recent Labs  Lab 05/21/17  0441   WBC 8.71   HGB 10.9*   HCT 35.1*        CMP:   Recent Labs  Lab 05/21/17  0441 05/22/17  0528    141   K 4.9 5.2*    95   CO2 27 40*   * 241*   BUN 25* 26*   CREATININE 0.7 0.8   CALCIUM 8.4* 9.2   ALBUMIN 2.8* 3.0*   ANIONGAP 9 6*   EGFRNONAA >60 >60     Cardiac Markers: No results for input(s):  CKMB, MYOGLOBIN, BNP, TROPISTAT in the last 48 hours.  Coagulation: No results for input(s): INR, APTT in the last 48 hours.    Invalid input(s): PT  Lactic Acid: No results for input(s): LACTATE in the last 48 hours.  Lipase: No results for input(s): LIPASE in the last 48 hours.  Lipid Panel: No results for input(s): CHOL, HDL, LDLCALC, TRIG, CHOLHDL in the last 48 hours.    Significant Imaging:   Imaging Results          CTA Chest Non Coronary (Final result)  Result time 05/20/17 17:45:46    Final result by Goran Domínguez III, MD (05/20/17 17:45:46)                 Impression:        1. Negative for acute pulmonary emboli. Negative for thoracic aortic aneurysm or dissection.    2. Otherwise as above.      Electronically signed by: GORAN DOMÍNGUEZ MD  Date:     05/20/17  Time:    17:45              Narrative:    Shortness of breath. COPD.    No prior studies for comparison.    Standard and MIPS/3-D images submitted. Multiplanar imaging submitted..    Multiplanar imaging shows no mediastinal mass or bulky adenopathy. There is a moderate volume of mediastinal fat. There is atheromatous change along the aorta without evidence of aneurysm formation or dissection. There is opacification of the pulmonary arterial system. There no filling defects within the major branches.    The lungs show mild coarsening of the interstitial markings with thickening of the interlobular septa. There is no consolidation or effusion. Minimal scarring or atelectasis suggested in the lingula and middle lobe.    Within the very upper portion of the abdomen, no acute abnormalities seen. Probable fatty change of the liver. No adrenal mass or enlargement. No gross pancreatic abnormality is seen.                             X-Ray Chest AP Portable (Final result)  Result time 05/17/17 17:45:57    Final result by LYDIA Diez Sr., MD (05/17/17 17:45:57)                 Impression:        1.  No significant interval change in  appearance.  2.  There is no evidence of acute pulmonary process.   3.  The borders of the heart are not well seen.  This may be secondary to pericardial fat pads.  If additional imaging evaluation is clinically indicated, I recommend consideration of a CT examination of the thorax with IV contrast.      Electronically signed by: LYDIA NELSON MD  Date:     05/17/17  Time:    17:45              Narrative:    One-view chest x-ray    Clinical History: Chest pain    Finding: Comparison was made to a prior examination performed on 4/4/2017.  The borders of the heart are not well seen.  There is no evidence of acute pulmonary process.  There is no pneumothorax.  The right costophrenic angle is sharp.  The left costophrenic angle is not well seen.

## 2017-05-22 NOTE — PROGRESS NOTES
PATTY spoke with Brandon at [facility] regarding referral.  Brandon confirmed submission for auth was received at 2:30 pm.  Brandon will review request but earliest review will be complete is 5/23/17.

## 2017-05-22 NOTE — PROGRESS NOTES
Progress Note  Pulmonology    Admit Date: 5/17/2017   LOS: 5 days     SUBJECTIVE:     Follow-up For:    Acute on chronic respiratory failure    Interval: Still SOB, phelgm thick, using accapella  Slow improvement    Continuous Infusions:   Scheduled Meds:   arformoterol  15 mcg Nebulization BID    aspirin  81 mg Oral Daily    azithromycin  250 mg Oral Daily    budesonide  0.5 mg Nebulization BID    enoxaparin  40 mg Subcutaneous Daily    famotidine  20 mg Oral Daily    guaifenesin  600 mg Oral BID    lisinopril  5 mg Oral Daily    methylPREDNISolone sodium succinate  40 mg Intravenous Q8H    roflumilast  500 mcg Oral Daily    simvastatin  10 mg Oral QHS    trazodone  50 mg Oral QHS       Review of Systems:  Constitutional: no fever or chills, positive for fatigue  Respiratory: positive for cough and dyspnea on exertion  Cardiovascular: no chest pain or palpitations    OBJECTIVE:     Vital Signs Range (Last 24H):  Temp:  [97.6 °F (36.4 °C)-98.9 °F (37.2 °C)]   Pulse:  []   Resp:  [18-22]   BP: (122-163)/(63-85)   SpO2:  [92 %-100 %]     I & O (Last 24H):  Intake/Output Summary (Last 24 hours) at 05/22/17 1743  Last data filed at 05/22/17 1704   Gross per 24 hour   Intake              720 ml   Output             2450 ml   Net            -1730 ml     Physical Exam:  General: mild distress, mildly obese  Neck: no jugular venous distention  Lungs:  normal respiratory effort, normal percussion bilaterally and diminished breath sounds bibasilar and bilaterally  Chest Wall: no tenderness  Heart: regular rate and rhythm and no murmur  Abdomen: soft, non-tender non-distended; bowel sounds normal  Extremities: edema 2+ and varicose veins noted  Neurologic: alert, oriented, thought content appropriate    Laboratory:  CBC:   Recent Labs  Lab 05/21/17  0441   WBC 8.71   RBC 3.93*   HGB 10.9*   HCT 35.1*      MCV 89   MCH 27.7   MCHC 31.1*     CMP:   Recent Labs  Lab 05/18/17  0506  05/22/17  0528   GLU  279*  < > 241*   CALCIUM 9.0  < > 9.2   ALBUMIN 2.9*  < > 3.0*   PROT 6.0  --   --      < > 141   K 4.7  < > 5.2*   CO2 29  < > 40*     < > 95   BUN 18  < > 26*   CREATININE 0.8  < > 0.8   ALKPHOS 58  --   --    ALT 19  --   --    AST 10  --   --    BILITOT 0.2  --   --    < > = values in this interval not displayed.  ABGs:   Recent Labs  Lab 05/18/17  0517   PH 7.366   PCO2 53.3*   PO2 91   HCO3 30.5*   POCSATURATED 97   BE 5       Chest X-Ray: I personally reviewed the films and findings are:    Diagnostic Results:  CT: Reviewed    ASSESSMENT/PLAN:     Problem   Acute On Chronic Respiratory Failure With Hypoxia and Hypercapnia   Acute Hypercapnic Respiratory Failure   Copd Exacerbation       Plan:   Continue NIPPV  PO Zithromax  Bronchodilators  Wean steriods, continue Daliresp  Anxiolytics judiciously    Thank you for the courtesy of participating in the care of this patient    Coy Taylor MD

## 2017-05-22 NOTE — HPI
Petrona Gonzalez is a 76 y.o. female patient with a PMH of COPD, HTN, who presented to the ER with c/o gradually increasing shortness of breath over 3 days. Associated symptoms included a productive cough with green sputum. The patient denied any modifying factors. Patient denied fever, chills, CP, pnd, orthopnea, palpitations, diaphoresis, headache, blurred vision, numbness, tingling, dizziness, localized weakness, n/v/d, abdominal pain, blood in stools, melena, hematemesis, urinary frequency, urgency, dysuria or hematuria. In the ER the patient was found to have SpO2 75% with a PCO2 of 71.5. The patient reported that she is on 3L of oxygen at home. The patient was admitted to ICU on BiPAP.

## 2017-05-22 NOTE — ASSESSMENT & PLAN NOTE
- Symptoms have improved with BiPAP on the first day.  - Appreciate Pulmonology input.  - Supplemental O2 as needed.  - Neb tx PRN  - Continue i/v Solumedrol. Will switch to oral steroids tomorrow.

## 2017-05-22 NOTE — PT/OT/SLP PROGRESS
Occupational Therapy  Treatment    Petrona Gonzalez   MRN: [id number]   Admitting Diagnosis: Acute hypercapnic respiratory failure    OT Date of Treatment: 17   OT Start Time: 830  OT Stop Time: 842  OT Total Time (min): 12 min    Billable Minutes:  Self Care/Home Management 12    General Precautions: Standard, fall, respiratory  Orthopedic Precautions: N/A  Braces: N/A         Subjective:  Communicated with RN prior to session.      Pain Ratin/10              Pain Rating Post-Intervention: 0/10    Objective:  Patient found with: telemetry, peripheral IV, oxygen     Functional Mobility:  Bed Mobility:       Transfers:   Sit <> Stand Assistance: Supervision  Sit <> Stand Assistive Device: Rolling Walker  Toilet Transfer Technique: Stand Pivot  Toilet Transfer Assistance: Supervision  Toilet Transfer Assistive Device: Rolling Walker    Functional Ambulation: AMBULATED WITH RW INTO HALLWAY WITH CGA.     Activities of Daily Living:  Feeding Level of Assistance: Modified independent     Toileting Where Assessed: Bedside commode  Toileting Level of Assistance: Minimum assistance         Balance:   Static Sit: GOOD: Takes MODERATE challenges from all directions  Dynamic Sit: GOOD-: Maintains balance through MODERATE excursions of active trunk movement,     Static Stand: FAIR+: Takes MINIMAL challenges from all directions  Dynamic stand: FAIR: Needs CONTACT GUARD during gait      AM-PAC 6 CLICK ADL   How much help from another person does this patient currently need?   1 = Unable, Total/Dependent Assistance  2 = A lot, Maximum/Moderate Assistance  3 = A little, Minimum/Contact Guard/Supervision  4 = None, Modified [place]/Independent    Putting on and taking off regular lower body clothing? : 2  Bathing (including washing, rinsing, drying)?: 1 (NOT ASSESSED)  Toileting, which includes using toilet, bedpan, or urinal? : 3  Putting on and taking off regular upper body clothing?: 3  Taking care of personal grooming  such as brushing teeth?: 4  Eating meals?: 4  Total Score: 17     AM-PAC Raw Score CMS G-Code Modifier Level of Impairment Assistance   6 % Total / Unable   7 - 9 CM 80 - 100% Maximal Assist   10 - 14 CL 60 - 80% Moderate Assist   15 - 19 CK 40 - 60% Moderate Assist   20 - 22 CJ 20 - 40% Minimal Assist   23 CI 1-20% SBA / CGA   24 CH 0% Independent/ Mod I     Patient left SEATED ON BSC with all lines intact, call button in reach and NURSE notified    ASSESSMENT:  Petrona Gonzalez is a 76 y.o. female with a medical diagnosis of Acute hypercapnic respiratory failure and presents with DEBILITY, IMPAIRED ACTIVITY TOLERANCE, AND IMPAIRED SAFETY. PT WOULD BENEFIT FROM CONTINUED SKILLED OT SERVICES TO ADDRESS FUNCTIONAL DEFICITS.    Rehab identified problem list/impairments: Rehab identified problem list/impairments: weakness, impaired endurance, impaired self care skills, impaired functional mobilty, gait instability, impaired balance, decreased safety awareness, impaired cardiopulmonary response to activity    Rehab potential is good.    Activity tolerance: Fair    Discharge recommendations: Discharge Facility/Level Of Care Needs: nursing facility, skilled     Barriers to discharge: Barriers to Discharge: Decreased caregiver support    Equipment recommendations: bath bench, walker, rolling     GOALS:    Occupational Therapy Goals        Problem: Occupational Therapy Goal    Goal Priority Disciplines Outcome Interventions   Occupational Therapy Goal     OT, PT/OT Ongoing (interventions implemented as appropriate)    Description:  Goals to be met by: 5/26/17     Patient will increase functional independence with ADLs by performing:    UE Dressing with Set-up Assistance.  LE Dressing with Set-up Assistance.  Grooming while standing at sink with Supervision.  Toileting from toilet with Supervision for hygiene and clothing management.   Toilet transfer to toilet with Stand-by Assistance.  Upper extremity exercise  program x10 reps per handout, with assistance as needed.                      Plan:  Patient to be seen 3 x/week to address the above listed problems via self-care/home management, therapeutic activities, therapeutic exercises  Plan of Care expires: 05/26/17  Plan of Care reviewed with: patient         Macie Laird, OT  05/22/2017

## 2017-05-22 NOTE — PROGRESS NOTES
[facility] Hospital Medicine  Progress Note    Patient Name: Petrona Gonzalez  MRN: [id number]  Patient Class: IP- Inpatient   Admission Date: 5/17/2017  Length of Stay: 5 days  Attending Physician: Bipin Caro MD  Primary Care Provider: Katy Arriaga MD    Subjective:     Principal Problem:Acute hypercapnic respiratory failure    HPI:  Petrona Gonzalez is a 76 y.o. female patient with a PMH of COPD, HTN, who presented to the ER with c/o gradually increasing shortness of breath over 3 days. Associated symptoms include a productive cough with green sputum. The patient denies any modifying factors. Patient denies fever, chills, CP, pnd, orthopnea, palpitations, diaphoresis, headache, blurred vision, numbness, tingling, dizziness, localized weakness, n/v/d, abdominal pain, blood in stools, melena, hematemesis, urinary frequency, urgency, dysuria or hematuria. In the ER the patient was found to have SpO2 75% with a PCO2 of 71.5. The patient reported that she wears 3LNC at home. The patient was admitted to ICU on BiPAP.       Hospital Course:  5/18 - Improvement in breathing reported. Patient now on nasal cannula.    Interval History: No acute overnight events. Patient reports that breathing is at baseline.    Review of Systems   Constitutional: Negative for activity change, appetite change, chills, diaphoresis, fatigue, fever and unexpected weight change.   HENT: Negative for congestion, drooling, facial swelling, rhinorrhea, sinus pressure, sneezing and sore throat.    Eyes: Negative for discharge, redness, itching and visual disturbance.   Respiratory: Positive for cough and shortness of breath. Negative for apnea, choking, chest tightness, wheezing and stridor.    Cardiovascular: Negative for chest pain, palpitations and leg swelling.   Gastrointestinal: Negative for abdominal distention, abdominal pain, anal bleeding, blood in stool, constipation, diarrhea, nausea and vomiting.    Genitourinary: Negative for decreased urine volume, difficulty urinating, dysuria, frequency, hematuria, pelvic pain, urgency, vaginal bleeding and vaginal discharge.   Musculoskeletal: Negative for arthralgias, back pain, gait problem, joint swelling, myalgias, neck pain and neck stiffness.   Skin: Negative for color change, pallor, rash and wound.   Neurological: Negative for dizziness, seizures, facial asymmetry, speech difficulty, weakness, light-headedness, numbness and headaches.   Psychiatric/Behavioral: Negative for agitation, confusion, hallucinations and suicidal ideas.   All other systems reviewed and are negative.    Objective:     Vital Signs (Most Recent):  Temp: 98.3 °F (36.8 °C) (05/22/17 1600)  Pulse: 103 (05/22/17 1730)  Resp: 18 (05/22/17 0911)  BP: (!) 163/70 (05/22/17 1600)  SpO2: 95 % (05/22/17 1600) Vital Signs (24h Range):  Temp:  [97.6 °F (36.4 °C)-98.9 °F (37.2 °C)] 98.3 °F (36.8 °C)  Pulse:  [] 103  Resp:  [18-22] 18  SpO2:  [92 %-100 %] 95 %  BP: (122-163)/(63-85) 163/70     Weight: 97.3 kg (214 lb 8 oz)  Body mass index is 38 kg/m².    Intake/Output Summary (Last 24 hours) at 05/22/17 1830  Last data filed at 05/22/17 1704   Gross per 24 hour   Intake              720 ml   Output             2450 ml   Net            -1730 ml      Physical Exam   Constitutional: She is oriented to person, place, and time. She appears well-developed and well-nourished.   Morbidly obese   HENT:   Head: Normocephalic and atraumatic.   Eyes: Conjunctivae and EOM are normal. Pupils are equal, round, and reactive to light.   Neck: Normal range of motion. Neck supple.   Cardiovascular: Regular rhythm, normal heart sounds and intact distal pulses.    No murmur heard.  Tachycardic     Pulmonary/Chest: Breath sounds normal. No respiratory distress.   Diminished breath sounds on bases. No tachypnea or accessory muscle use.   Abdominal: Soft. Bowel sounds are normal. She exhibits no distension. There is no  tenderness.   Musculoskeletal: Normal range of motion. She exhibits no edema, tenderness or deformity.   Neurological: She is alert and oriented to person, place, and time. She has normal reflexes.   Skin: Skin is warm and dry. No erythema.   Psychiatric: She has a normal mood and affect. Her behavior is normal.   Nursing note and vitals reviewed.    Significant Labs:   A1C:   Recent Labs  Lab 01/17/17  0854   HGBA1C 6.6*     ABGs: No results for input(s): PH, PCO2, HCO3, POCSATURATED, BE in the last 48 hours.  Bilirubin:   Recent Labs  Lab 05/17/17  1640 05/18/17  0506   BILITOT 0.3 0.2     Blood Culture: No results for input(s): LABBLOO in the last 48 hours.  BMP:   Recent Labs  Lab 05/22/17  0528   *      K 5.2*   CL 95   CO2 40*   BUN 26*   CREATININE 0.8   CALCIUM 9.2     CBC:   Recent Labs  Lab 05/21/17  0441   WBC 8.71   HGB 10.9*   HCT 35.1*        CMP:   Recent Labs  Lab 05/21/17  0441 05/22/17  0528    141   K 4.9 5.2*    95   CO2 27 40*   * 241*   BUN 25* 26*   CREATININE 0.7 0.8   CALCIUM 8.4* 9.2   ALBUMIN 2.8* 3.0*   ANIONGAP 9 6*   EGFRNONAA >60 >60     Cardiac Markers: No results for input(s): CKMB, MYOGLOBIN, BNP, TROPISTAT in the last 48 hours.  Coagulation: No results for input(s): INR, APTT in the last 48 hours.    Invalid input(s): PT  Lactic Acid: No results for input(s): LACTATE in the last 48 hours.  Lipase: No results for input(s): LIPASE in the last 48 hours.  Lipid Panel: No results for input(s): CHOL, HDL, LDLCALC, TRIG, CHOLHDL in the last 48 hours.    Significant Imaging:   Imaging Results          CTA Chest Non Coronary (Final result)  Result time 05/20/17 17:45:46    Final result by Goran Domínguez III, MD (05/20/17 17:45:46)                 Impression:        1. Negative for acute pulmonary emboli. Negative for thoracic aortic aneurysm or dissection.    2. Otherwise as above.      Electronically signed by: GORAN DOMÍNGUEZ MD  Date:      05/20/17  Time:    17:45              Narrative:    Shortness of breath. COPD.    No prior studies for comparison.    Standard and MIPS/3-D images submitted. Multiplanar imaging submitted..    Multiplanar imaging shows no mediastinal mass or bulky adenopathy. There is a moderate volume of mediastinal fat. There is atheromatous change along the aorta without evidence of aneurysm formation or dissection. There is opacification of the pulmonary arterial system. There no filling defects within the major branches.    The lungs show mild coarsening of the interstitial markings with thickening of the interlobular septa. There is no consolidation or effusion. Minimal scarring or atelectasis suggested in the lingula and middle lobe.    Within the very upper portion of the abdomen, no acute abnormalities seen. Probable fatty change of the liver. No adrenal mass or enlargement. No gross pancreatic abnormality is seen.                             X-Ray Chest AP Portable (Final result)  Result time 05/17/17 17:45:57    Final result by LYDIA Diez Sr., MD (05/17/17 17:45:57)                 Impression:        1.  No significant interval change in appearance.  2.  There is no evidence of acute pulmonary process.   3.  The borders of the heart are not well seen.  This may be secondary to pericardial fat pads.  If additional imaging evaluation is clinically indicated, I recommend consideration of a CT examination of the thorax with IV contrast.      Electronically signed by: LYDIA DIEZ MD  Date:     05/17/17  Time:    17:45              Narrative:    One-view chest x-ray    Clinical History: Chest pain    Finding: Comparison was made to a prior examination performed on 4/4/2017.  The borders of the heart are not well seen.  There is no evidence of acute pulmonary process.  There is no pneumothorax.  The right costophrenic angle is sharp.  The left costophrenic angle is not well seen.                                 Assessment/Plan:      * Acute hypercapnic respiratory failure    - Symptoms have improved with BiPAP on the first day.  - Appreciate Pulmonology input.  - Supplemental O2 as needed.  - Neb tx PRN  - Continue i/v Solumedrol. Will switch to oral steroids tomorrow.        COPD exacerbation    - Supplemental O2   - Empirically cover with Azithromycin  - IV steroids  - Neb tx  - Pulse Ox        Poorly controlled diabetes mellitus    - SSI   - diabetic diet        Hyperlipidemia    - Continue statin         Essential (primary) hypertension    - monitor VS   - Continue home meds.        Obesity with body mass index 30 or greater    - Pt counseled on diet/exercise/weight loss          VTE Risk Mitigation         Ordered     enoxaparin injection 40 mg  Daily     Route:  Subcutaneous        05/17/17 2037     Medium Risk of VTE  Once      05/17/17 2037     Place sequential compression device  Until discontinued      05/17/17 2037          Bipin Caro MD  Department of Hospital Medicine   [facility]

## 2017-05-22 NOTE — PLAN OF CARE
Problem: Patient Care Overview  Goal: Plan of Care Review  Outcome: Ongoing (interventions implemented as appropriate)  Patient had uneventful shift. Patient awake, alert and oriented x 4. VS stable. Patient denies pain, reports SOB on exertion. Patient on telemetry, NSR on the monitor. Patient ambulates with assistance. Fall precautions in place. Patient free from fall/injury. Plan of care reviewed with patient. Patient has no questions at this time. Will continue to monitor.

## 2017-05-22 NOTE — PLAN OF CARE
Problem: Occupational Therapy Goal  Goal: Occupational Therapy Goal  Goals to be met by: 5/26/17     Patient will increase functional independence with ADLs by performing:    UE Dressing with Set-up Assistance.  LE Dressing with Set-up Assistance.  Grooming while standing at sink with Supervision.  Toileting from toilet with Supervision for hygiene and clothing management.   Toilet transfer to toilet with Stand-by Assistance.  Upper extremity exercise program x10 reps per handout, with assistance as needed.     Outcome: Ongoing (interventions implemented as appropriate)  PT PROGRESSING SLOWLY TOWARDS GOALS WITH THERAPY. VERY LIMITED TO ACTIVITY DUE TO SOB AND ANXIETY. AMBULATED WITH RW INTO HALLWAY, THEN REQUESTED TO HEAD BACK TO ROOM. PT TRANSFERRED TO [facility] WITH RW. CALL LIGHT WITHIN REACH.

## 2017-05-22 NOTE — PLAN OF CARE
Problem: Patient Care Overview  Goal: Plan of Care Review  Outcome: Ongoing (interventions implemented as appropriate)  FAIR TOLERANCE TO P.T. DESPITE VERY SOB WITH MINIMAL EXERTION, SBA FOR BED MOBILITY, CGA FOR TF'S AND GAIT USING RW

## 2017-05-22 NOTE — PROGRESS NOTES
CM followed up with Carlos at [facility], who notified CM that pt cannot be accepted due to facility inability to care for pt due to weight.  Information Carlos had on pt weight was incorrect - CM corrected carlos.  CM re-faxed referral through [facility] with updated clinical information.  Carlos will review and follow up with decision on whether or not they can accept pt.     Carlos reviewed referral and facility has accepted pt.  Carlos submitted request for auth to pt insurance.

## 2017-05-22 NOTE — PT/OT/SLP PROGRESS
Physical Therapy  Treatment    Petrona Gonzalez   MRN: [id number]   Admitting Diagnosis: Acute hypercapnic respiratory failure    PT Received On: 17  PT Start Time: 0800     PT Stop Time: 0825    PT Total Time (min): 25 min       Billable Minutes:  Gait [id number] and Therapeutic Activity 15    Treatment Type: Treatment  PT/PTA: PT          General Precautions: Standard, fall, respiratory  Orthopedic Precautions: N/A   Braces: N/A    Subjective:  Communicated with NURSE SEVERINO prior to session.  Pain Ratin/10    Objective:   Patient found with: telemetry, oxygen    Functional Mobility:  Bed Mobility:   Rolling/Turning to Left: Stand by assistance  Rolling/Turning Right: Stand by assistance  Scooting/Bridging: Stand by Assistance  Supine to Sit: Stand by Assistance    Transfers:  Sit <> Stand Assistance: Contact Guard Assistance  Sit <> Stand Assistive Device: Rolling Walker  Toilet Transfer Technique: Stand Pivot  Toilet Transfer Assistance: Contact Guard Assistance  Toilet Transfer Assistive Device: Rolling Walker    Gait:   Gait Distance: PT AMB 30' X 2 TRIALS WITH RW AND CGA, SLOW PACED GAIT, VERY SOB WITH O2 IN TOW, CUES FOR UPRIGHT POSTURE AND DEEP BREATHING  Assistance 1: Contact Guard Assistance  Gait Assistive Device: Rolling walker  Gait Pattern: swing-through gait  Gait Deviation(s): decreased drake, decreased step length, decreased toe-to-floor clearance    Balance:   Static Sit: GOOD  Dynamic Sit: GOOD  Static Stand: FAIR  Dynamic stand:  FAIR    Therapeutic Activities and Exercises:  PT REQUIRED FREQUENT LONGER REST BREAKS BETWEEN ALL FUNCTIONAL MOBILITY DUE TO SOB.  PT ASSISTED TO [facility] FOR TOILETING AFTER GAIT    AM-PAC 6 CLICK MOBILITY  How much help from another person does this patient currently need?   1 = Unable, Total/Dependent Assistance  2 = A lot, Maximum/Moderate Assistance  3 = A little, Minimum/Contact Guard/Supervision  4 = None, Modified [place]/Independent    Turning over in  bed (including adjusting bedclothes, sheets and blankets)?: 4  Sitting down on and standing up from a chair with arms (e.g., wheelchair, bedside commode, etc.): 4  Moving from lying on back to sitting on the side of the bed?: 4  Moving to and from a bed to a chair (including a wheelchair)?: 3  Need to walk in hospital room?: 3  Climbing 3-5 steps with a railing?: 1  Total Score: 19    AM-PAC Raw Score CMS G-Code Modifier Level of Impairment Assistance   6 % Total / Unable   7 - 9 CM 80 - 100% Maximal Assist   10 - 14 CL 60 - 80% Moderate Assist   15 - 19 CK 40 - 60% Moderate Assist   20 - 22 CJ 20 - 40% Minimal Assist   23 CI 1-20% SBA / CGA   24 CH 0% Independent/ Mod I     Patient left BSC with all lines intact, call button in reach and NURSE notified.    Assessment:  Petrona Gonzalez is a 76 y.o. female with a medical diagnosis of Acute hypercapnic respiratory failure   PT WILL BENEFIT FROM CONT. SKILLED P.T. TO ADDRESS IMPAIRMENTS    Rehab identified problem list/impairments: Rehab identified problem list/impairments: weakness, impaired endurance, impaired functional mobilty, gait instability, impaired balance, decreased safety awareness    Rehab potential is fair.    Activity tolerance: Fair    Discharge recommendations: Discharge Facility/Level Of Care Needs: nursing facility, skilled     Barriers to discharge: Barriers to Discharge: Decreased caregiver support    Equipment recommendations: Equipment Needed After Discharge: walker, rolling, bath bench     GOALS:    Physical Therapy Goals        Problem: Physical Therapy Goal    Goal Priority Disciplines Outcome Goal Variances Interventions   Physical Therapy Goal     PT/OT, PT Ongoing (interventions implemented as appropriate)     Description:  LTG'S TO BE MET IN 7 DAYS (5-26-17)  1. PT WILL BE NESSA WITH BED MOBILITY  2. PT WILL BE NESSA WITH TF'S  3. PT WILL AMB 50' WITH RW AND SBA TO TOLERANCE  4. PT WILL TOLERATE BLE THEREX X 15 REPS WITH REST AS  NEEDED  5. PT WILL DEMO F+ DYNAMIC BALANCE DURING GAIT                    PLAN:    Patient to be seen 5 x/week  to address the above listed problems via gait training, therapeutic activities, therapeutic exercises  Plan of Care expires: 05/26/17  Plan of Care reviewed with: patient    PT ENCOURAGED TO CALL FOR ASSISTANCE WITH ALL NEEDS DUE TO FALL RISK STATUS, PT AGREEABLE.  PT ENCOURAGED TO INCREASE TIME OOB IN CHAIR, ALL MEALS IN CHAIR OOB, PT AGREEABLE    Shu Duarte, PT  05/22/2017

## 2017-05-23 VITALS
HEIGHT: 63 IN | OXYGEN SATURATION: 95 % | BODY MASS INDEX: 38.01 KG/M2 | WEIGHT: 214.5 LBS | RESPIRATION RATE: 20 BRPM | SYSTOLIC BLOOD PRESSURE: 140 MMHG | HEART RATE: 96 BPM | DIASTOLIC BLOOD PRESSURE: 67 MMHG | TEMPERATURE: 99 F

## 2017-05-23 PROCEDURE — 27000221 HC OXYGEN, UP TO 24 HOURS

## 2017-05-23 PROCEDURE — 94664 DEMO&/EVAL PT USE INHALER: CPT

## 2017-05-23 PROCEDURE — 25000003 PHARM REV CODE 250: Performed by: NURSE PRACTITIONER

## 2017-05-23 PROCEDURE — 94640 AIRWAY INHALATION TREATMENT: CPT

## 2017-05-23 PROCEDURE — 63600175 PHARM REV CODE 636 W HCPCS: Performed by: EMERGENCY MEDICINE

## 2017-05-23 PROCEDURE — 63600175 PHARM REV CODE 636 W HCPCS: Performed by: INTERNAL MEDICINE

## 2017-05-23 PROCEDURE — 25000003 PHARM REV CODE 250: Performed by: EMERGENCY MEDICINE

## 2017-05-23 PROCEDURE — 97110 THERAPEUTIC EXERCISES: CPT

## 2017-05-23 PROCEDURE — 63600175 PHARM REV CODE 636 W HCPCS: Performed by: HOSPITALIST

## 2017-05-23 PROCEDURE — 25000003 PHARM REV CODE 250: Performed by: INTERNAL MEDICINE

## 2017-05-23 RX ORDER — ROFLUMILAST 500 UG/1
500 TABLET ORAL DAILY
Qty: 30 TABLET | Refills: 1 | Status: SHIPPED | OUTPATIENT
Start: 2017-05-23

## 2017-05-23 RX ORDER — PREDNISONE 20 MG/1
40 TABLET ORAL DAILY
Qty: 2 TABLET | Refills: 0 | Status: SHIPPED | OUTPATIENT
Start: 2017-05-23 | End: 2017-05-25

## 2017-05-23 RX ORDER — AZITHROMYCIN 250 MG/1
250 TABLET, FILM COATED ORAL DAILY
Qty: 2 TABLET | Refills: 0 | Status: SHIPPED | OUTPATIENT
Start: 2017-05-23 | End: 2017-06-14

## 2017-05-23 RX ORDER — PREDNISONE 20 MG/1
40 TABLET ORAL DAILY
Status: DISCONTINUED | OUTPATIENT
Start: 2017-05-23 | End: 2017-05-23 | Stop reason: HOSPADM

## 2017-05-23 RX ORDER — ARFORMOTEROL TARTRATE 15 UG/2ML
15 SOLUTION RESPIRATORY (INHALATION) 2 TIMES DAILY
Qty: 120 ML | Refills: 11 | Status: SHIPPED | OUTPATIENT
Start: 2017-05-23 | End: 2017-11-22

## 2017-05-23 RX ADMIN — BUDESONIDE 0.5 MG: 0.5 SUSPENSION RESPIRATORY (INHALATION) at 09:05

## 2017-05-23 RX ADMIN — PREDNISONE 40 MG: 20 TABLET ORAL at 12:05

## 2017-05-23 RX ADMIN — LISINOPRIL 5 MG: 5 TABLET ORAL at 08:05

## 2017-05-23 RX ADMIN — ROFLUMILAST 500 MCG: 500 TABLET ORAL at 08:05

## 2017-05-23 RX ADMIN — ALPRAZOLAM 0.5 MG: 0.5 TABLET ORAL at 12:05

## 2017-05-23 RX ADMIN — METHYLPREDNISOLONE SODIUM SUCCINATE 40 MG: 40 INJECTION, POWDER, FOR SOLUTION INTRAMUSCULAR; INTRAVENOUS at 05:05

## 2017-05-23 RX ADMIN — ARFORMOTEROL TARTRATE 15 MCG: 15 SOLUTION RESPIRATORY (INHALATION) at 09:05

## 2017-05-23 RX ADMIN — ASPIRIN 81 MG: 81 TABLET, COATED ORAL at 08:05

## 2017-05-23 RX ADMIN — AZITHROMYCIN 250 MG: 250 TABLET, FILM COATED ORAL at 08:05

## 2017-05-23 RX ADMIN — FAMOTIDINE 20 MG: 20 TABLET ORAL at 08:05

## 2017-05-23 RX ADMIN — PANTOPRAZOLE SODIUM 600 MG: 40 TABLET, DELAYED RELEASE ORAL at 08:05

## 2017-05-23 NOTE — PROGRESS NOTES
PATTY followed up with Brandon at [facility] who requested phone number of carlos at [facility] to call and get verbal request for auth so they could proceed with clinical review.  Carlos phone number given to Brandon.    Carlos followed up with PATTY to notify that auth was received.  PATTY faxed d/c orders and AVS to Carlos, and is waiting for Carlos to follow up with estimated  time.

## 2017-05-23 NOTE — NURSING
Patient discharged per MD order to [facility]. Transport from given to transporter.  IV discontinued, telemetry removed and returned to monitor tech. Patient transported via wheelchair to vehicle by transporter with all personal belongings. Report called to Félix BLANCA at [facility] who will be taking care of patient.

## 2017-05-23 NOTE — PLAN OF CARE
Problem: Patient Care Overview  Goal: Plan of Care Review  Outcome: Ongoing (interventions implemented as appropriate)  Patient remains free from falls. Fall precautions remain in place. No c/o pain. Tums given for indigestion. On 3L NC, tolerating well. Repositioned independently in bed. VS are stable. No other c/o at this time. Call bell within reach. Reminded to call for assistance.

## 2017-05-23 NOTE — PT/OT/SLP PROGRESS
Physical Therapy      Petrona Gonzalez  MRN: [id number]    S: PT AGREEABLE TO BLE THEREX  O: PT FOUND SEATED AT EOB, NO C/O PAIN, PT C/O SOB WITH MINIMAL EXERTION.  PT EDUCATED IN AND PERFORMED BLE THEREX 2 SETS OF 10 REPS WITH REST BREAKS AS NEEDED.  PT LEFT SEATED WITH ALL NEEDS MET.  PT ENCOURAGED TO CALL FOR ASSISTANCE WITH ALL NEEDS DUE TO FALL RISK STATUS, PT AGREEABLE.  PT ENCOURAGED TO INCREASE TIME OOB IN CHAIR, ALL MEALS IN CHAIR OOB, PT AGREEABLE  A: FAIR TOLERANCE TO TX.  P: CONT PER POC    Shu Duarte, PT   5/23/2017  [id number]

## 2017-05-23 NOTE — PLAN OF CARE
Problem: Patient Care Overview  Goal: Plan of Care Review  Outcome: Ongoing (interventions implemented as appropriate)  Pt resting, refused Bipap at night.

## 2017-05-23 NOTE — PT/OT/SLP PROGRESS
Physical Therapy      Petrona Gonzalez  MRN: [id number]    ATTEMPTED P.T. TX THIS AM, PT'S FAMILY PRESENT AND REFUSING TX. AT THIS TIME DUE TO GIVING PT BATH, WILL ASSESS NEXT VISIT    Shu Duarte, PT   5/23/2017  1110

## 2017-05-24 ENCOUNTER — PATIENT OUTREACH (OUTPATIENT)
Dept: ADMINISTRATIVE | Facility: CLINIC | Age: 77
End: 2017-05-24
Payer: MEDICARE

## 2017-05-24 NOTE — PLAN OF CARE
Pt successfully discharged to [facility].         05/24/17 0819   Final Note   Assessment Type Final Discharge Note   Discharge Disposition SNF   Discharge planning education complete? Yes   Discharge plans and expectations educations in teach back method with documentation complete? Yes   Offered [facility] -- Bedside Delivery? n/a   Discharge/Hospital Encounter Summary to (non-[facility]) PCP n/a   Referral to Outpatient Case Management complete? n/a   Referral to / orders for Home Health Complete? n/a   30 day supply of medicines given at discharge, if documented non-compliance / non-adherence? No   Any social issues identified prior to discharge? Yes   Did you assess the readiness or willingness of the family or caregiver to support self management of care? Yes

## 2017-05-24 NOTE — PROGRESS NOTES
C3 nurse attempted to conduct a POST ACUTE 1 call with [facility].  Staff is unavailable at this time.

## 2017-05-29 ENCOUNTER — TELEPHONE (OUTPATIENT)
Dept: PULMONOLOGY | Facility: CLINIC | Age: 77
End: 2017-05-29

## 2017-05-29 NOTE — TELEPHONE ENCOUNTER
----- Message from Janine Lanza sent at 5/29/2017 11:07 AM CDT -----  Contact: Viridiana with [facility]   [phone]  Pt was discharged from the hosp on 5/23 to their facility with a partial script of Zpack with incomplete instructions - please call to clarify - they specifically need a stop date.

## 2017-05-31 ENCOUNTER — TELEPHONE (OUTPATIENT)
Dept: PULMONOLOGY | Facility: CLINIC | Age: 77
End: 2017-05-31

## 2017-05-31 DIAGNOSIS — J18.9 PNEUMONIA DUE TO INFECTIOUS ORGANISM, UNSPECIFIED LATERALITY, UNSPECIFIED PART OF LUNG: Primary | ICD-10-CM

## 2017-05-31 NOTE — TELEPHONE ENCOUNTER
Spoke with Viridiana at [facility], states pt started azithromycin on 5/19 while inpatient at [facility], she was sent to nursing home on Azithromycin with no stop date. She would like to get at stop date on medication. Please advise.

## 2017-05-31 NOTE — TELEPHONE ENCOUNTER
----- Message from Kirsten Bee sent at 5/31/2017  8:40 AM CDT -----  Contact: Viridiana/ Nursing Home  Patient needs a stop date on her antibiotics, Please call her at [phone].    Thanks  td

## 2017-06-14 ENCOUNTER — HOSPITAL ENCOUNTER (INPATIENT)
Facility: HOSPITAL | Age: 77
LOS: 2 days | Discharge: NURSING, REHAB OR LONG-TERM CARE | DRG: 189 | End: 2017-06-16
Attending: EMERGENCY MEDICINE | Admitting: INTERNAL MEDICINE
Payer: MEDICARE

## 2017-06-14 DIAGNOSIS — J44.1 ACUTE EXACERBATION OF CHRONIC OBSTRUCTIVE PULMONARY DISEASE (COPD): ICD-10-CM

## 2017-06-14 DIAGNOSIS — J96.22 ACUTE ON CHRONIC RESPIRATORY FAILURE WITH HYPOXIA AND HYPERCAPNIA: Primary | ICD-10-CM

## 2017-06-14 DIAGNOSIS — J96.21 ACUTE ON CHRONIC RESPIRATORY FAILURE WITH HYPOXIA AND HYPERCAPNIA: Primary | ICD-10-CM

## 2017-06-14 DIAGNOSIS — I48.91 AFIB: ICD-10-CM

## 2017-06-14 DIAGNOSIS — E11.9 DIABETES MELLITUS WITHOUT COMPLICATION: ICD-10-CM

## 2017-06-14 DIAGNOSIS — J44.9 COPD, SEVERE: Chronic | ICD-10-CM

## 2017-06-14 DIAGNOSIS — I48.91 ATRIAL FIBRILLATION: ICD-10-CM

## 2017-06-14 DIAGNOSIS — I50.9 CHF, ACUTE: ICD-10-CM

## 2017-06-14 DIAGNOSIS — I48.91 ATRIAL FIBRILLATION WITH RVR: ICD-10-CM

## 2017-06-14 DIAGNOSIS — R79.89 ELEVATED TROPONIN: ICD-10-CM

## 2017-06-14 LAB
ALBUMIN SERPL BCP-MCNC: 3.6 G/DL
ALLENS TEST: ABNORMAL
ALP SERPL-CCNC: 62 U/L
ALT SERPL W/O P-5'-P-CCNC: 31 U/L
ANION GAP SERPL CALC-SCNC: 18 MMOL/L
APTT BLDCRRT: 22.2 SEC
AST SERPL-CCNC: 25 U/L
BACTERIA #/AREA URNS HPF: ABNORMAL /HPF
BACTERIA #/AREA URNS HPF: NORMAL /HPF
BASOPHILS # BLD AUTO: 0.03 K/UL
BASOPHILS NFR BLD: 0.2 %
BILIRUB SERPL-MCNC: 0.3 MG/DL
BILIRUB UR QL STRIP: NEGATIVE
BILIRUB UR QL STRIP: NEGATIVE
BNP SERPL-MCNC: 191 PG/ML
BUN SERPL-MCNC: 22 MG/DL
CALCIUM SERPL-MCNC: 9.6 MG/DL
CHLORIDE SERPL-SCNC: 96 MMOL/L
CLARITY UR: CLEAR
CLARITY UR: CLEAR
CO2 SERPL-SCNC: 30 MMOL/L
COLOR UR: YELLOW
COLOR UR: YELLOW
CREAT SERPL-MCNC: 0.9 MG/DL
DELSYS: ABNORMAL
DIFFERENTIAL METHOD: ABNORMAL
EOSINOPHIL # BLD AUTO: 0 K/UL
EOSINOPHIL NFR BLD: 0 %
ERYTHROCYTE [DISTWIDTH] IN BLOOD BY AUTOMATED COUNT: 15.8 %
EST. GFR  (AFRICAN AMERICAN): >60 ML/MIN/1.73 M^2
EST. GFR  (NON AFRICAN AMERICAN): >60 ML/MIN/1.73 M^2
FIO2: 36
FLOW: 4
GLUCOSE SERPL-MCNC: 153 MG/DL
GLUCOSE UR QL STRIP: NEGATIVE
GLUCOSE UR QL STRIP: NEGATIVE
HCO3 UR-SCNC: 38.8 MMOL/L (ref 24–28)
HCT VFR BLD AUTO: 44.4 %
HGB BLD-MCNC: 14.1 G/DL
HGB UR QL STRIP: ABNORMAL
HGB UR QL STRIP: ABNORMAL
HYALINE CASTS #/AREA URNS LPF: 0 /LPF
HYALINE CASTS #/AREA URNS LPF: 0 /LPF
INR PPP: 1
KETONES UR QL STRIP: NEGATIVE
KETONES UR QL STRIP: NEGATIVE
LACTATE SERPL-SCNC: 1.7 MMOL/L
LEUKOCYTE ESTERASE UR QL STRIP: NEGATIVE
LEUKOCYTE ESTERASE UR QL STRIP: NEGATIVE
LYMPHOCYTES # BLD AUTO: 0.8 K/UL
LYMPHOCYTES NFR BLD: 6 %
MCH RBC QN AUTO: 28.5 PG
MCHC RBC AUTO-ENTMCNC: 31.8 %
MCV RBC AUTO: 90 FL
MICROSCOPIC COMMENT: ABNORMAL
MICROSCOPIC COMMENT: NORMAL
MODE: ABNORMAL
MONOCYTES # BLD AUTO: 0.5 K/UL
MONOCYTES NFR BLD: 4 %
NEUTROPHILS # BLD AUTO: 11.6 K/UL
NEUTROPHILS NFR BLD: 89.8 %
NITRITE UR QL STRIP: NEGATIVE
NITRITE UR QL STRIP: NEGATIVE
PCO2 BLDA: 72.4 MMHG (ref 35–45)
PH SMN: 7.34 [PH] (ref 7.35–7.45)
PH UR STRIP: 6 [PH] (ref 5–8)
PH UR STRIP: 6 [PH] (ref 5–8)
PLATELET # BLD AUTO: 332 K/UL
PMV BLD AUTO: 8.7 FL
PO2 BLDA: 66 MMHG (ref 80–100)
POC BE: 13 MMOL/L
POC SATURATED O2: 90 % (ref 95–100)
POTASSIUM SERPL-SCNC: 4.2 MMOL/L
PROT SERPL-MCNC: 8 G/DL
PROT UR QL STRIP: ABNORMAL
PROT UR QL STRIP: ABNORMAL
PROTHROMBIN TIME: 10.7 SEC
RBC # BLD AUTO: 4.95 M/UL
RBC #/AREA URNS HPF: 2 /HPF (ref 0–4)
RBC #/AREA URNS HPF: 5 /HPF (ref 0–4)
SAMPLE: ABNORMAL
SITE: ABNORMAL
SODIUM SERPL-SCNC: 144 MMOL/L
SP GR UR STRIP: 1.01 (ref 1–1.03)
SP GR UR STRIP: >=1.03 (ref 1–1.03)
T4 FREE SERPL-MCNC: 1.09 NG/DL
TROPONIN I SERPL DL<=0.01 NG/ML-MCNC: 0.05 NG/ML
TROPONIN I SERPL DL<=0.01 NG/ML-MCNC: 0.05 NG/ML
TSH SERPL DL<=0.005 MIU/L-ACNC: 0.27 UIU/ML
URN SPEC COLLECT METH UR: ABNORMAL
URN SPEC COLLECT METH UR: ABNORMAL
UROBILINOGEN UR STRIP-ACNC: 1 EU/DL
UROBILINOGEN UR STRIP-ACNC: NEGATIVE EU/DL
WBC # BLD AUTO: 12.91 K/UL
WBC #/AREA URNS HPF: 1 /HPF (ref 0–5)
WBC #/AREA URNS HPF: 2 /HPF (ref 0–5)

## 2017-06-14 PROCEDURE — 96365 THER/PROPH/DIAG IV INF INIT: CPT

## 2017-06-14 PROCEDURE — 83880 ASSAY OF NATRIURETIC PEPTIDE: CPT

## 2017-06-14 PROCEDURE — 84443 ASSAY THYROID STIM HORMONE: CPT

## 2017-06-14 PROCEDURE — 80053 COMPREHEN METABOLIC PANEL: CPT

## 2017-06-14 PROCEDURE — 96368 THER/DIAG CONCURRENT INF: CPT

## 2017-06-14 PROCEDURE — 87040 BLOOD CULTURE FOR BACTERIA: CPT

## 2017-06-14 PROCEDURE — 85730 THROMBOPLASTIN TIME PARTIAL: CPT

## 2017-06-14 PROCEDURE — 84484 ASSAY OF TROPONIN QUANT: CPT

## 2017-06-14 PROCEDURE — 83605 ASSAY OF LACTIC ACID: CPT

## 2017-06-14 PROCEDURE — 81000 URINALYSIS NONAUTO W/SCOPE: CPT

## 2017-06-14 PROCEDURE — 99900035 HC TECH TIME PER 15 MIN (STAT)

## 2017-06-14 PROCEDURE — 84484 ASSAY OF TROPONIN QUANT: CPT | Mod: 91

## 2017-06-14 PROCEDURE — 99285 EMERGENCY DEPT VISIT HI MDM: CPT | Mod: 25

## 2017-06-14 PROCEDURE — 96366 THER/PROPH/DIAG IV INF ADDON: CPT

## 2017-06-14 PROCEDURE — 27000190 HC CPAP FULL FACE MASK W/VALVE

## 2017-06-14 PROCEDURE — 36415 COLL VENOUS BLD VENIPUNCTURE: CPT

## 2017-06-14 PROCEDURE — 85025 COMPLETE CBC W/AUTO DIFF WBC: CPT

## 2017-06-14 PROCEDURE — 85610 PROTHROMBIN TIME: CPT

## 2017-06-14 PROCEDURE — 94640 AIRWAY INHALATION TREATMENT: CPT

## 2017-06-14 PROCEDURE — 25000242 PHARM REV CODE 250 ALT 637 W/ HCPCS: Performed by: EMERGENCY MEDICINE

## 2017-06-14 PROCEDURE — 25000003 PHARM REV CODE 250: Performed by: EMERGENCY MEDICINE

## 2017-06-14 PROCEDURE — 84439 ASSAY OF FREE THYROXINE: CPT

## 2017-06-14 PROCEDURE — 82803 BLOOD GASES ANY COMBINATION: CPT

## 2017-06-14 PROCEDURE — 36600 WITHDRAWAL OF ARTERIAL BLOOD: CPT

## 2017-06-14 PROCEDURE — 20000000 HC ICU ROOM

## 2017-06-14 PROCEDURE — 94660 CPAP INITIATION&MGMT: CPT

## 2017-06-14 PROCEDURE — 93010 ELECTROCARDIOGRAM REPORT: CPT | Mod: 76,,, | Performed by: INTERNAL MEDICINE

## 2017-06-14 PROCEDURE — 25000003 PHARM REV CODE 250: Performed by: INTERNAL MEDICINE

## 2017-06-14 PROCEDURE — 96376 TX/PRO/DX INJ SAME DRUG ADON: CPT

## 2017-06-14 PROCEDURE — 63600175 PHARM REV CODE 636 W HCPCS: Performed by: EMERGENCY MEDICINE

## 2017-06-14 PROCEDURE — 93005 ELECTROCARDIOGRAM TRACING: CPT

## 2017-06-14 PROCEDURE — 87086 URINE CULTURE/COLONY COUNT: CPT

## 2017-06-14 PROCEDURE — 51702 INSERT TEMP BLADDER CATH: CPT

## 2017-06-14 PROCEDURE — 81000 URINALYSIS NONAUTO W/SCOPE: CPT | Mod: 91

## 2017-06-14 RX ORDER — FUROSEMIDE 40 MG/1
60 TABLET ORAL 2 TIMES DAILY
COMMUNITY
End: 2018-06-18

## 2017-06-14 RX ORDER — IPRATROPIUM BROMIDE AND ALBUTEROL SULFATE 2.5; .5 MG/3ML; MG/3ML
3 SOLUTION RESPIRATORY (INHALATION)
Status: COMPLETED | OUTPATIENT
Start: 2017-06-14 | End: 2017-06-14

## 2017-06-14 RX ORDER — ALPRAZOLAM 0.5 MG/1
0.5 TABLET ORAL EVERY 8 HOURS PRN
Status: DISCONTINUED | OUTPATIENT
Start: 2017-06-14 | End: 2017-06-16 | Stop reason: HOSPADM

## 2017-06-14 RX ORDER — ARFORMOTEROL TARTRATE 15 UG/2ML
15 SOLUTION RESPIRATORY (INHALATION) 2 TIMES DAILY
Status: DISCONTINUED | OUTPATIENT
Start: 2017-06-14 | End: 2017-06-16 | Stop reason: HOSPADM

## 2017-06-14 RX ORDER — ASPIRIN 81 MG/1
81 TABLET ORAL DAILY
Status: DISCONTINUED | OUTPATIENT
Start: 2017-06-15 | End: 2017-06-16 | Stop reason: HOSPADM

## 2017-06-14 RX ORDER — PREDNISONE 20 MG/1
40 TABLET ORAL DAILY
Status: DISCONTINUED | OUTPATIENT
Start: 2017-06-15 | End: 2017-06-16 | Stop reason: HOSPADM

## 2017-06-14 RX ORDER — IPRATROPIUM BROMIDE AND ALBUTEROL SULFATE 2.5; .5 MG/3ML; MG/3ML
3 SOLUTION RESPIRATORY (INHALATION) EVERY 6 HOURS
Status: DISCONTINUED | OUTPATIENT
Start: 2017-06-15 | End: 2017-06-15

## 2017-06-14 RX ORDER — METFORMIN HYDROCHLORIDE 500 MG/1
500 TABLET ORAL 2 TIMES DAILY WITH MEALS
Status: DISCONTINUED | OUTPATIENT
Start: 2017-06-15 | End: 2017-06-15

## 2017-06-14 RX ORDER — TRAZODONE HYDROCHLORIDE 50 MG/1
50 TABLET ORAL NIGHTLY PRN
Status: DISCONTINUED | OUTPATIENT
Start: 2017-06-14 | End: 2017-06-16 | Stop reason: HOSPADM

## 2017-06-14 RX ORDER — TRAZODONE HYDROCHLORIDE 50 MG/1
50 TABLET ORAL NIGHTLY PRN
COMMUNITY
End: 2018-04-04

## 2017-06-14 RX ORDER — HEPARIN SODIUM,PORCINE/D5W 25000/250
16 INTRAVENOUS SOLUTION INTRAVENOUS CONTINUOUS
Status: DISCONTINUED | OUTPATIENT
Start: 2017-06-14 | End: 2017-06-15

## 2017-06-14 RX ORDER — FLUTICASONE FUROATE AND VILANTEROL 100; 25 UG/1; UG/1
1 POWDER RESPIRATORY (INHALATION) DAILY
Status: DISCONTINUED | OUTPATIENT
Start: 2017-06-15 | End: 2017-06-14

## 2017-06-14 RX ORDER — SIMVASTATIN 5 MG/1
10 TABLET, FILM COATED ORAL NIGHTLY
Status: DISCONTINUED | OUTPATIENT
Start: 2017-06-14 | End: 2017-06-16 | Stop reason: HOSPADM

## 2017-06-14 RX ORDER — DILTIAZEM HYDROCHLORIDE 5 MG/ML
20 INJECTION INTRAVENOUS
Status: COMPLETED | OUTPATIENT
Start: 2017-06-14 | End: 2017-06-14

## 2017-06-14 RX ORDER — BUDESONIDE 0.5 MG/2ML
0.5 INHALANT ORAL 2 TIMES DAILY
Status: DISCONTINUED | OUTPATIENT
Start: 2017-06-15 | End: 2017-06-16

## 2017-06-14 RX ORDER — MONTELUKAST SODIUM 10 MG/1
10 TABLET ORAL NIGHTLY
Status: DISCONTINUED | OUTPATIENT
Start: 2017-06-14 | End: 2017-06-16 | Stop reason: HOSPADM

## 2017-06-14 RX ORDER — CETIRIZINE HYDROCHLORIDE 10 MG/1
10 TABLET ORAL DAILY
Status: DISCONTINUED | OUTPATIENT
Start: 2017-06-15 | End: 2017-06-15

## 2017-06-14 RX ORDER — MONTELUKAST SODIUM 10 MG/1
10 TABLET ORAL NIGHTLY
COMMUNITY
End: 2018-04-04

## 2017-06-14 RX ORDER — DILTIAZEM HCL 1 MG/ML
5 INJECTION, SOLUTION INTRAVENOUS CONTINUOUS
Status: DISCONTINUED | OUTPATIENT
Start: 2017-06-14 | End: 2017-06-15

## 2017-06-14 RX ORDER — DILTIAZEM HCL/D5W 125 MG/125
5 PLASTIC BAG, INJECTION (ML) INTRAVENOUS CONTINUOUS
Status: DISCONTINUED | OUTPATIENT
Start: 2017-06-14 | End: 2017-06-14

## 2017-06-14 RX ORDER — FUROSEMIDE 40 MG/1
40 TABLET ORAL 2 TIMES DAILY
Status: DISCONTINUED | OUTPATIENT
Start: 2017-06-14 | End: 2017-06-16 | Stop reason: HOSPADM

## 2017-06-14 RX ORDER — ALPRAZOLAM 0.5 MG/1
0.5 TABLET ORAL EVERY 6 HOURS PRN
COMMUNITY

## 2017-06-14 RX ORDER — ROFLUMILAST 500 UG/1
500 TABLET ORAL DAILY
Status: DISCONTINUED | OUTPATIENT
Start: 2017-06-15 | End: 2017-06-16 | Stop reason: HOSPADM

## 2017-06-14 RX ORDER — LISINOPRIL 5 MG/1
5 TABLET ORAL DAILY
Status: DISCONTINUED | OUTPATIENT
Start: 2017-06-15 | End: 2017-06-16 | Stop reason: HOSPADM

## 2017-06-14 RX ORDER — SODIUM CHLORIDE 9 MG/ML
1000 INJECTION, SOLUTION INTRAVENOUS
Status: COMPLETED | OUTPATIENT
Start: 2017-06-14 | End: 2017-06-14

## 2017-06-14 RX ADMIN — IPRATROPIUM BROMIDE AND ALBUTEROL SULFATE 3 ML: .5; 3 SOLUTION RESPIRATORY (INHALATION) at 05:06

## 2017-06-14 RX ADMIN — SIMVASTATIN 10 MG: 5 TABLET, FILM COATED ORAL at 10:06

## 2017-06-14 RX ADMIN — DILTIAZEM HYDROCHLORIDE 20 MG: 5 INJECTION INTRAVENOUS at 05:06

## 2017-06-14 RX ADMIN — ARFORMOTEROL TARTRATE 15 MCG: 15 SOLUTION RESPIRATORY (INHALATION) at 10:06

## 2017-06-14 RX ADMIN — HEPARIN SODIUM AND DEXTROSE 16 UNITS/KG/HR: 10000; 5 INJECTION INTRAVENOUS at 08:06

## 2017-06-14 RX ADMIN — IPRATROPIUM BROMIDE AND ALBUTEROL SULFATE 3 ML: .5; 3 SOLUTION RESPIRATORY (INHALATION) at 10:06

## 2017-06-14 RX ADMIN — FUROSEMIDE 40 MG: 40 TABLET ORAL at 10:06

## 2017-06-14 RX ADMIN — SODIUM CHLORIDE 1000 ML: 0.9 INJECTION, SOLUTION INTRAVENOUS at 05:06

## 2017-06-14 RX ADMIN — Medication 5 MG/HR: at 05:06

## 2017-06-14 NOTE — ED PROVIDER NOTES
SCRIBE #1 NOTE: I, Corinne Mack, am scribing for, and in the presence of, Carol Moody MD. I have scribed the entire note.      History      Chief Complaint   Patient presents with    Shortness of Breath     sent from [facility] with shortness of breath        Review of patient's allergies indicates:   Allergen Reactions    Meloxicam Other (See Comments)      Patient stated that she has muscle aches and pains    Naproxen Other (See Comments)     Patient states she has muscle and aches.    Morphine Rash        HPI   HPI    6/14/2017, 5:30 PM   History obtained from the patient and daughter      History of Present Illness: Petrona Gonzalez is a 76 y.o. female patient who presents to the Emergency Department for worsening SOB which onset a few hours ago. Symptoms are constant and moderate in severity. Pt was sent from [facility] for her worsening SOB. Pt was admitted to this facility a few weeks ago for the same complaint. Pt's daughter states that the pt had x-rays performed on Monday that showed fluid on the pt's lungs. Pt has been Tx with IV lasix for this, but does not appear to be improving. No mitigating or exacerbating factors reported. Associated sxs include wheezing. Patient denies any fever, chills, cough, CP, N/V, dysuria, hematuria, HA, dizziness, and all other sxs at this time. Prior Tx includes aspirin and multiple breathing Tx with no improvement. Pt has Hx of COPD and AFib. No further complaints or concerns at this time.       Arrival mode: AASI    PCP: Katy Arriaga MD       Past Medical History:  Past Medical History:   Diagnosis Date    COPD (chronic obstructive pulmonary disease) with emphysema     Diabetes mellitus     Hyperlipidemia     Hypertension     Maxillary sinusitis, chronic        Past Surgical History:  Past Surgical History:   Procedure Laterality Date    BACK SURGERY      HERNIA REPAIR      HYSTERECTOMY           Family History:  Family History    Problem Relation Age of Onset    Arthritis Father     Cancer Father     Hearing loss Father     Cancer Sister     Early death Sister     Cancer Brother     Heart disease Brother        Social History:  Social History     Social History Main Topics    Smoking status: Former Smoker     Years: 30.00    Smokeless tobacco: Never Used    Alcohol use No    Drug use: No    Sexual activity: Not Currently       ROS   Review of Systems   Constitutional: Negative for chills and fever.   Respiratory: Positive for shortness of breath and wheezing. Negative for cough.    Cardiovascular: Negative for chest pain and palpitations.   Gastrointestinal: Negative for abdominal pain, diarrhea, nausea and vomiting.   Genitourinary: Negative for dysuria, flank pain and hematuria.   Musculoskeletal: Negative for back pain, neck pain and neck stiffness.   Skin: Negative for rash and wound.   Neurological: Negative for dizziness, light-headedness, numbness and headaches.   All other systems reviewed and are negative.    Physical Exam      Initial Vitals   BP Pulse Resp Temp SpO2   06/14/17 1715 06/14/17 1715 06/14/17 1715 06/14/17 1727 06/14/17 1715   (!) 152/108 (!) 126 (!) 26 97.8 °F (36.6 °C) 95 %      Physical Exam  Nursing Notes and Vital Signs Reviewed.  Constitutional: Patient is in mild distress. Well-developed and well-nourished. Elderly.  Head: Atraumatic. Normocephalic.  Eyes: PERRL. EOM intact. Conjunctivae are not pale. No scleral icterus.  ENT: Mucous membranes are moist. Oropharynx is clear and symmetric.    Neck: Supple. Full ROM. No lymphadenopathy.  Cardiovascular: Tachycardic. Irregularly irregular rhythm. No murmurs, rubs, or gallops. Distal pulses are 2+ and symmetric.  Pulmonary/Chest: No respiratory distress. Tachypnea. Diminished breath sounds bilaterally. No wheezing, rales, or rhonchi.  Abdominal: Soft and non-distended.  There is no tenderness.  No rebound, guarding, or rigidity.   Musculoskeletal:  Moves all extremities. No obvious deformities. No edema. No calf tenderness.  Skin: Warm and dry.  Neurological:  Alert, awake, and appropriate.  Normal speech.  No acute focal neurological deficits are appreciated.  Psychiatric: Normal affect. Good eye contact. Appropriate in content.    ED Course    Critical Care  Date/Time: 6/14/2017 7:20 PM  Performed by: RENEE CARDONA  Authorized by: RENEE CARDONA   Direct patient critical care time: 19 minutes  Additional history critical care time: 8 minutes  Ordering / reviewing critical care time: 8 minutes  Documentation critical care time: 6 minutes  Consulting other physicians critical care time: 7 minutes  Total critical care time (exclusive of procedural time) : 48 minutes  Critical care was necessary to treat or prevent imminent or life-threatening deterioration of the following conditions: cardiac failure and respiratory failure.  Critical care was time spent personally by me on the following activities: blood draw for specimens, development of treatment plan with patient or surrogate, discussions with consultants, discussions with primary provider, evaluation of patient's response to treatment, examination of patient, obtaining history from patient or surrogate, ordering and performing treatments and interventions, ordering and review of laboratory studies, ordering and review of radiographic studies, pulse oximetry, re-evaluation of patient's condition, review of old charts and ventilator management.        ED Vital Signs:  Vitals:    06/14/17 1715 06/14/17 1726 06/14/17 1727 06/14/17 1730   BP: (!) 152/108      Pulse: (!) 126   (!) 145   Resp: (!) 26   20   Temp:   97.8 °F (36.6 °C)    TempSrc:   Axillary    SpO2: 95%      Weight:  88.5 kg (195 lb)      06/14/17 1740 06/14/17 1748 06/14/17 1749 06/14/17 1800   BP:  139/68  (!) 142/80   Pulse: (!) 120 86 (!) 125 76   Resp: 20 (!) 29 20 19   Temp:       TempSrc:       SpO2: 98% 99%  99%   Weight:         06/14/17 1826 06/14/17 1847   BP: 127/67 126/64   Pulse: 77 77   Resp: 19 18   Temp:     TempSrc:     SpO2: 97% 97%   Weight:         Abnormal Lab Results:  Labs Reviewed   CBC W/ AUTO DIFFERENTIAL - Abnormal; Notable for the following:        Result Value    WBC 12.91 (*)     MCHC 31.8 (*)     RDW 15.8 (*)     MPV 8.7 (*)     Gran # 11.6 (*)     Lymph # 0.8 (*)     Gran% 89.8 (*)     Lymph% 6.0 (*)     All other components within normal limits   COMPREHENSIVE METABOLIC PANEL - Abnormal; Notable for the following:     CO2 30 (*)     Glucose 153 (*)     Anion Gap 18 (*)     All other components within normal limits   TROPONIN I - Abnormal; Notable for the following:     Troponin I 0.053 (*)     All other components within normal limits   B-TYPE NATRIURETIC PEPTIDE - Abnormal; Notable for the following:      (*)     All other components within normal limits   TSH - Abnormal; Notable for the following:     TSH 0.274 (*)     All other components within normal limits   URINALYSIS - Abnormal; Notable for the following:     Protein, UA 1+ (*)     Occult Blood UA Trace (*)     All other components within normal limits   ISTAT PROCEDURE - Abnormal; Notable for the following:     POC PH 7.337 (*)     POC PCO2 72.4 (*)     POC PO2 66 (*)     POC HCO3 38.8 (*)     POC SATURATED O2 90 (*)     All other components within normal limits   CULTURE, BLOOD   CULTURE, BLOOD   APTT   PROTIME-INR   LACTIC ACID, PLASMA   URINALYSIS MICROSCOPIC   T4, FREE        All Lab Results:  Results for orders placed or performed during the hospital encounter of 06/14/17   CBC auto differential   Result Value Ref Range    WBC 12.91 (H) 3.90 - 12.70 K/uL    RBC 4.95 4.00 - 5.40 M/uL    Hemoglobin 14.1 12.0 - 16.0 g/dL    Hematocrit 44.4 37.0 - 48.5 %    MCV 90 82 - 98 fL    MCH 28.5 27.0 - 31.0 pg    MCHC 31.8 (L) 32.0 - 36.0 %    RDW 15.8 (H) 11.5 - 14.5 %    Platelets 332 150 - 350 K/uL    MPV 8.7 (L) 9.2 - 12.9 fL    Gran # 11.6 (H) 1.8 -  7.7 K/uL    Lymph # 0.8 (L) 1.0 - 4.8 K/uL    Mono # 0.5 0.3 - 1.0 K/uL    Eos # 0.0 0.0 - 0.5 K/uL    Baso # 0.03 0.00 - 0.20 K/uL    Gran% 89.8 (H) 38.0 - 73.0 %    Lymph% 6.0 (L) 18.0 - 48.0 %    Mono% 4.0 4.0 - 15.0 %    Eosinophil% 0.0 0.0 - 8.0 %    Basophil% 0.2 0.0 - 1.9 %    Differential Method Automated    Comprehensive metabolic panel   Result Value Ref Range    Sodium 144 136 - 145 mmol/L    Potassium 4.2 3.5 - 5.1 mmol/L    Chloride 96 95 - 110 mmol/L    CO2 30 (H) 23 - 29 mmol/L    Glucose 153 (H) 70 - 110 mg/dL    BUN, Bld 22 8 - 23 mg/dL    Creatinine 0.9 0.5 - 1.4 mg/dL    Calcium 9.6 8.7 - 10.5 mg/dL    Total Protein 8.0 6.0 - 8.4 g/dL    Albumin 3.6 3.5 - 5.2 g/dL    Total Bilirubin 0.3 0.1 - 1.0 mg/dL    Alkaline Phosphatase 62 55 - 135 U/L    AST 25 10 - 40 U/L    ALT 31 10 - 44 U/L    Anion Gap 18 (H) 8 - 16 mmol/L    eGFR if African American >60 >60 mL/min/1.73 m^2    eGFR if non African American >60 >60 mL/min/1.73 m^2   Troponin I #1   Result Value Ref Range    Troponin I 0.053 (H) 0.000 - 0.026 ng/mL   B-Type natriuretic peptide (BNP)   Result Value Ref Range     (H) 0 - 99 pg/mL   APTT   Result Value Ref Range    aPTT 22.2 21.0 - 32.0 sec   Protime-INR   Result Value Ref Range    Prothrombin Time 10.7 9.0 - 12.5 sec    INR 1.0 0.8 - 1.2   TSH   Result Value Ref Range    TSH 0.274 (L) 0.400 - 4.000 uIU/mL   Urinalysis Catheterized   Result Value Ref Range    Specimen UA Urine, Catheterized     Color, UA Yellow Yellow, Straw, Barbara    Appearance, UA Clear Clear    pH, UA 6.0 5.0 - 8.0    Specific Gravity, UA 1.010 1.005 - 1.030    Protein, UA 1+ (A) Negative    Glucose, UA Negative Negative    Ketones, UA Negative Negative    Bilirubin (UA) Negative Negative    Occult Blood UA Trace (A) Negative    Nitrite, UA Negative Negative    Urobilinogen, UA Negative <2.0 EU/dL    Leukocytes, UA Negative Negative   Lactic acid, plasma   Result Value Ref Range    Lactate (Lactic Acid) 1.7 0.5 -  2.2 mmol/L   Urinalysis Microscopic   Result Value Ref Range    RBC, UA 2 0 - 4 /hpf    WBC, UA 1 0 - 5 /hpf    Bacteria, UA Occasional None-Occ /hpf    Hyaline Casts, UA 0 0-1/lpf /lpf    Microscopic Comment SEE COMMENT    T4, free   Result Value Ref Range    Free T4 1.09 0.71 - 1.51 ng/dL   ISTAT PROCEDURE   Result Value Ref Range    POC PH 7.337 (L) 7.35 - 7.45    POC PCO2 72.4 (HH) 35 - 45 mmHg    POC PO2 66 (L) 80 - 100 mmHg    POC HCO3 38.8 (H) 24 - 28 mmol/L    POC BE 13 -2 to 2 mmol/L    POC SATURATED O2 90 (L) 95 - 100 %    Sample ARTERIAL     Site LR     Allens Test Pass     DelSys Nasal Can     Mode SPONT     Flow 4     FiO2 36        Imaging Results:  Imaging Results          X-Ray Chest AP Portable (Final result)  Result time 06/14/17 18:22:30    Final result by Baylee Roe MD (06/14/17 18:22:30)                 Impression:     No acute cardiopulmonary disease.      Electronically signed by: BAYLEE ROE MD  Date:     06/14/17  Time:    18:22              Narrative:    Exam: Portable chest xray    History:    Chest pain    Findings:     The heart is mildly enlarged.  Aortic atherosclerosis.  Scarring right lung base.  No acute infiltrate.  No change from 05/17/2017.                             The EKG was ordered, reviewed, and independently interpreted by the ED provider.  Interpretation time: 1717  Rate: 149 BPM  Rhythm: atrial fibrillation with rapid ventricular response.  Interpretation: Left axis deviation. Anterolateral infarct. Abnormal ECG. No STEMI.    The EKG was ordered, reviewed, and independently interpreted by the ED provider.  Interpretation time: 1826  Rate: 124 BPM  Rhythm: atrial fibrillation with rapid ventricular response.  Interpretation: Left axis deviation. Septal infarct. Abnormal ECG. No STEMI.  When compared to EKG performed, there are no significant changes.           The Emergency Provider reviewed the vital signs and test results, which are outlined above.    ED Discussion      6:55 PM: Dr. Moody discussed the pt's case with Dr. Collins (Cardiology) who recommends pt be placed on a heparin drip and admitted.    7:04 PM: Re-evaluated pt. Pt is feeling much better on BiPAP machine. No respiratory distress noted, I do not think she is septic, I think patient has been using continuous breathing treatments at Nursing home which put her into atrial fib and worsened her respiratory failure. I have discussed test results, shared treatment plan, and the need for admission with patient and family at bedside. Pt and family express understanding at this time and agree with all information. All questions answered. Pt and family have no further questions or concerns at this time. Pt is ready for admit.    7:07 PM: Discussed case with Dr. Donis ([facility]). Dr. Donis agrees with current care and management of pt and accepts admission.   Admitting Service: Hospital medicine   Admitting Physician: Dr. Donis  Admit to: ICU      ED Medication(s):  Medications   diltiaZEM 125 mg in dextrose 5% 125 mL infusion (non-titrating) (10 mg/hr Intravenous Rate/Dose Change 6/14/17 1845)   heparin 25,000 units in dextrose 5% 250 mL (100 units/mL) bolus from bag; PRN BOLUS (not administered)   heparin 25,000 units in dextrose 5% 250 mL (100 units/mL) bolus from bag; PRN BOLUS (not administered)   heparin 25,000 units in dextrose 5% 250 mL (100 units/mL) bolus from bag; INITIAL BOLUS DOSE (not administered)   heparin 25,000 units in dextrose 5% 250 mL (100 units/mL) infusion; FEMALE (not administered)   diltiaZEM injection 20 mg (20 mg Intravenous Given 6/14/17 1737)   0.9%  NaCl infusion (1,000 mLs Intravenous New Bag 6/14/17 1737)   albuterol-ipratropium 2.5mg-0.5mg/3mL nebulizer solution 3 mL ( Nebulization Canceled Entry 6/14/17 1905)       New Prescriptions    No medications on file             Medical Decision Making    Medical Decision Making:   Clinical Tests:   Lab Tests: Reviewed and  Ordered  Radiological Study: Reviewed and Ordered  Medical Tests: Reviewed and Ordered           Scribe Attestation:   Scribe #1: I performed the above scribed service and the documentation accurately describes the services I performed. I attest to the accuracy of the note.    Attending:   Physician Attestation Statement for Scribe #1: I, Carol Moody MD, personally performed the services described in this documentation, as scribed by Corinne Mack, in my presence, and it is both accurate and complete.         Attending Attestation:         Attending Critical Care:   Critical care procedures: BiPAP/breathing Tx; heparin.           Clinical Impression       ICD-10-CM ICD-9-CM   1. Acute on chronic respiratory failure with hypoxia and hypercapnia J96.21 518.84    J96.22 786.09     799.02   2. Atrial fibrillation I48.91 427.31   3. Atrial fibrillation with RVR I48.91 427.31   4. Elevated troponin R74.8 790.6   5. Acute exacerbation of chronic obstructive pulmonary disease (COPD) J44.1 491.21       Disposition:   Disposition: Admitted  Condition: Critical       Carol Moody MD  06/14/17 1921

## 2017-06-15 PROBLEM — E05.90 HYPERTHYROIDISM: Status: ACTIVE | Noted: 2017-06-15

## 2017-06-15 LAB
ALLENS TEST: ABNORMAL
ANION GAP SERPL CALC-SCNC: 13 MMOL/L
APTT BLDCRRT: 50.4 SEC
APTT BLDCRRT: 57.5 SEC
APTT BLDCRRT: 68.1 SEC
APTT BLDCRRT: 72.3 SEC
BUN SERPL-MCNC: 16 MG/DL
CALCIUM SERPL-MCNC: 8.9 MG/DL
CHLORIDE SERPL-SCNC: 95 MMOL/L
CO2 SERPL-SCNC: 36 MMOL/L
CREAT SERPL-MCNC: 0.7 MG/DL
DELSYS: ABNORMAL
EP: 6
ERYTHROCYTE [SEDIMENTATION RATE] IN BLOOD BY WESTERGREN METHOD: 14 MM/H
EST. GFR  (AFRICAN AMERICAN): >60 ML/MIN/1.73 M^2
EST. GFR  (NON AFRICAN AMERICAN): >60 ML/MIN/1.73 M^2
FIO2: 40
GLUCOSE SERPL-MCNC: 126 MG/DL
HCO3 UR-SCNC: 43.1 MMOL/L (ref 24–28)
IP: 12
MAGNESIUM SERPL-MCNC: 1.9 MG/DL
MIN VOL: 11
MODE: ABNORMAL
PCO2 BLDA: 65.2 MMHG (ref 35–45)
PH SMN: 7.43 [PH] (ref 7.35–7.45)
PO2 BLDA: 87 MMHG (ref 80–100)
POC BE: 19 MMOL/L
POC SATURATED O2: 96 % (ref 95–100)
POCT GLUCOSE: 134 MG/DL (ref 70–110)
POCT GLUCOSE: 155 MG/DL (ref 70–110)
POCT GLUCOSE: 275 MG/DL (ref 70–110)
POTASSIUM SERPL-SCNC: 2.9 MMOL/L
RETIRED EF AND QEF - SEE NOTES: 45 (ref 55–65)
SAMPLE: ABNORMAL
SITE: ABNORMAL
SODIUM SERPL-SCNC: 144 MMOL/L
SP02: 99
SPONT RATE: 18
TROPONIN I SERPL DL<=0.01 NG/ML-MCNC: 0.04 NG/ML

## 2017-06-15 PROCEDURE — 63600175 PHARM REV CODE 636 W HCPCS: Performed by: INTERNAL MEDICINE

## 2017-06-15 PROCEDURE — 97116 GAIT TRAINING THERAPY: CPT

## 2017-06-15 PROCEDURE — 85730 THROMBOPLASTIN TIME PARTIAL: CPT | Mod: 91

## 2017-06-15 PROCEDURE — 83735 ASSAY OF MAGNESIUM: CPT

## 2017-06-15 PROCEDURE — 97162 PT EVAL MOD COMPLEX 30 MIN: CPT

## 2017-06-15 PROCEDURE — 25000003 PHARM REV CODE 250: Performed by: NURSE PRACTITIONER

## 2017-06-15 PROCEDURE — 20000000 HC ICU ROOM

## 2017-06-15 PROCEDURE — 93005 ELECTROCARDIOGRAM TRACING: CPT

## 2017-06-15 PROCEDURE — C8923 2D TTE W OR W/O FOL W/CON,CO: HCPCS

## 2017-06-15 PROCEDURE — 25000003 PHARM REV CODE 250: Performed by: EMERGENCY MEDICINE

## 2017-06-15 PROCEDURE — G8978 MOBILITY CURRENT STATUS: HCPCS | Mod: CK

## 2017-06-15 PROCEDURE — 80048 BASIC METABOLIC PNL TOTAL CA: CPT

## 2017-06-15 PROCEDURE — 94660 CPAP INITIATION&MGMT: CPT

## 2017-06-15 PROCEDURE — 97530 THERAPEUTIC ACTIVITIES: CPT

## 2017-06-15 PROCEDURE — 83036 HEMOGLOBIN GLYCOSYLATED A1C: CPT

## 2017-06-15 PROCEDURE — 93307 TTE W/O DOPPLER COMPLETE: CPT | Mod: 26,,, | Performed by: INTERNAL MEDICINE

## 2017-06-15 PROCEDURE — 25000242 PHARM REV CODE 250 ALT 637 W/ HCPCS: Performed by: NURSE PRACTITIONER

## 2017-06-15 PROCEDURE — 82803 BLOOD GASES ANY COMBINATION: CPT

## 2017-06-15 PROCEDURE — 36600 WITHDRAWAL OF ARTERIAL BLOOD: CPT

## 2017-06-15 PROCEDURE — 36415 COLL VENOUS BLD VENIPUNCTURE: CPT

## 2017-06-15 PROCEDURE — 94640 AIRWAY INHALATION TREATMENT: CPT

## 2017-06-15 PROCEDURE — G8979 MOBILITY GOAL STATUS: HCPCS | Mod: CJ

## 2017-06-15 PROCEDURE — 27000221 HC OXYGEN, UP TO 24 HOURS

## 2017-06-15 PROCEDURE — 99291 CRITICAL CARE FIRST HOUR: CPT | Mod: ,,, | Performed by: NURSE PRACTITIONER

## 2017-06-15 PROCEDURE — 99900035 HC TECH TIME PER 15 MIN (STAT)

## 2017-06-15 PROCEDURE — 25000003 PHARM REV CODE 250: Performed by: INTERNAL MEDICINE

## 2017-06-15 PROCEDURE — G8987 SELF CARE CURRENT STATUS: HCPCS | Mod: CM

## 2017-06-15 PROCEDURE — 85730 THROMBOPLASTIN TIME PARTIAL: CPT

## 2017-06-15 PROCEDURE — 99222 1ST HOSP IP/OBS MODERATE 55: CPT | Mod: ,,, | Performed by: INTERNAL MEDICINE

## 2017-06-15 PROCEDURE — G8988 SELF CARE GOAL STATUS: HCPCS | Mod: CJ

## 2017-06-15 PROCEDURE — 25000242 PHARM REV CODE 250 ALT 637 W/ HCPCS: Performed by: INTERNAL MEDICINE

## 2017-06-15 PROCEDURE — 97166 OT EVAL MOD COMPLEX 45 MIN: CPT

## 2017-06-15 PROCEDURE — 84484 ASSAY OF TROPONIN QUANT: CPT

## 2017-06-15 PROCEDURE — 63600175 PHARM REV CODE 636 W HCPCS: Performed by: EMERGENCY MEDICINE

## 2017-06-15 RX ORDER — INSULIN ASPART 100 [IU]/ML
0-5 INJECTION, SOLUTION INTRAVENOUS; SUBCUTANEOUS
Status: DISCONTINUED | OUTPATIENT
Start: 2017-06-15 | End: 2017-06-16 | Stop reason: HOSPADM

## 2017-06-15 RX ORDER — FAMOTIDINE 20 MG/1
20 TABLET, FILM COATED ORAL 2 TIMES DAILY
Status: DISCONTINUED | OUTPATIENT
Start: 2017-06-15 | End: 2017-06-16 | Stop reason: HOSPADM

## 2017-06-15 RX ORDER — IBUPROFEN 200 MG
16 TABLET ORAL
Status: DISCONTINUED | OUTPATIENT
Start: 2017-06-15 | End: 2017-06-16 | Stop reason: HOSPADM

## 2017-06-15 RX ORDER — METOPROLOL TARTRATE 50 MG/1
50 TABLET ORAL 2 TIMES DAILY
Status: DISCONTINUED | OUTPATIENT
Start: 2017-06-15 | End: 2017-06-15

## 2017-06-15 RX ORDER — DOCUSATE SODIUM 100 MG/1
100 CAPSULE, LIQUID FILLED ORAL DAILY
Status: DISCONTINUED | OUTPATIENT
Start: 2017-06-15 | End: 2017-06-16 | Stop reason: HOSPADM

## 2017-06-15 RX ORDER — GLUCAGON 1 MG
1 KIT INJECTION
Status: DISCONTINUED | OUTPATIENT
Start: 2017-06-15 | End: 2017-06-16 | Stop reason: HOSPADM

## 2017-06-15 RX ORDER — DILTIAZEM HYDROCHLORIDE 30 MG/1
30 TABLET, FILM COATED ORAL EVERY 6 HOURS
Status: DISCONTINUED | OUTPATIENT
Start: 2017-06-15 | End: 2017-06-16

## 2017-06-15 RX ORDER — IBUPROFEN 200 MG
24 TABLET ORAL
Status: DISCONTINUED | OUTPATIENT
Start: 2017-06-15 | End: 2017-06-16 | Stop reason: HOSPADM

## 2017-06-15 RX ORDER — BISACODYL 10 MG
10 SUPPOSITORY, RECTAL RECTAL DAILY PRN
Status: DISCONTINUED | OUTPATIENT
Start: 2017-06-15 | End: 2017-06-16 | Stop reason: HOSPADM

## 2017-06-15 RX ORDER — IPRATROPIUM BROMIDE AND ALBUTEROL SULFATE 2.5; .5 MG/3ML; MG/3ML
3 SOLUTION RESPIRATORY (INHALATION) EVERY 6 HOURS PRN
Status: DISCONTINUED | OUTPATIENT
Start: 2017-06-15 | End: 2017-06-16 | Stop reason: HOSPADM

## 2017-06-15 RX ADMIN — Medication 15 MG/HR: at 02:06

## 2017-06-15 RX ADMIN — HEPARIN SODIUM AND DEXTROSE 14 UNITS/KG/HR: 10000; 5 INJECTION INTRAVENOUS at 07:06

## 2017-06-15 RX ADMIN — ROFLUMILAST 500 MCG: 500 TABLET ORAL at 09:06

## 2017-06-15 RX ADMIN — POTASSIUM CHLORIDE 30 MEQ: 1500 TABLET, EXTENDED RELEASE ORAL at 12:06

## 2017-06-15 RX ADMIN — ASPIRIN 81 MG: 81 TABLET, COATED ORAL at 09:06

## 2017-06-15 RX ADMIN — METOPROLOL TARTRATE 50 MG: 50 TABLET ORAL at 07:06

## 2017-06-15 RX ADMIN — LISINOPRIL 5 MG: 5 TABLET ORAL at 09:06

## 2017-06-15 RX ADMIN — PREDNISONE 40 MG: 20 TABLET ORAL at 09:06

## 2017-06-15 RX ADMIN — POTASSIUM CHLORIDE 30 MEQ: 1500 TABLET, EXTENDED RELEASE ORAL at 10:06

## 2017-06-15 RX ADMIN — FAMOTIDINE 20 MG: 20 TABLET ORAL at 09:06

## 2017-06-15 RX ADMIN — ALPRAZOLAM 0.5 MG: 0.5 TABLET ORAL at 04:06

## 2017-06-15 RX ADMIN — ALPRAZOLAM 0.5 MG: 0.5 TABLET ORAL at 02:06

## 2017-06-15 RX ADMIN — DILTIAZEM HYDROCHLORIDE 30 MG: 30 TABLET, FILM COATED ORAL at 11:06

## 2017-06-15 RX ADMIN — SIMVASTATIN 10 MG: 5 TABLET, FILM COATED ORAL at 09:06

## 2017-06-15 RX ADMIN — IPRATROPIUM BROMIDE AND ALBUTEROL SULFATE 3 ML: .5; 3 SOLUTION RESPIRATORY (INHALATION) at 07:06

## 2017-06-15 RX ADMIN — BUDESONIDE 0.5 MG: 0.5 SUSPENSION RESPIRATORY (INHALATION) at 07:06

## 2017-06-15 RX ADMIN — FUROSEMIDE 40 MG: 40 TABLET ORAL at 09:06

## 2017-06-15 RX ADMIN — IPRATROPIUM BROMIDE AND ALBUTEROL SULFATE 3 ML: .5; 3 SOLUTION RESPIRATORY (INHALATION) at 01:06

## 2017-06-15 RX ADMIN — DILTIAZEM HYDROCHLORIDE 30 MG: 30 TABLET, FILM COATED ORAL at 05:06

## 2017-06-15 RX ADMIN — FAMOTIDINE 20 MG: 20 TABLET ORAL at 10:06

## 2017-06-15 RX ADMIN — ARFORMOTEROL TARTRATE 15 MCG: 15 SOLUTION RESPIRATORY (INHALATION) at 07:06

## 2017-06-15 RX ADMIN — POTASSIUM CHLORIDE 30 MEQ: 1500 TABLET, EXTENDED RELEASE ORAL at 09:06

## 2017-06-15 RX ADMIN — TRAZODONE HYDROCHLORIDE 50 MG: 50 TABLET ORAL at 02:06

## 2017-06-15 RX ADMIN — CETIRIZINE HYDROCHLORIDE 10 MG: 10 TABLET, FILM COATED ORAL at 09:06

## 2017-06-15 RX ADMIN — RIVAROXABAN 20 MG: 10 TABLET, FILM COATED ORAL at 11:06

## 2017-06-15 RX ADMIN — INSULIN ASPART 3 UNITS: 100 INJECTION, SOLUTION INTRAVENOUS; SUBCUTANEOUS at 05:06

## 2017-06-15 NOTE — NURSING
Contacted Dr Donis for initial Cardizem order to be modified.  Initial order for 5 mg/hr non-titrating.  Received titration parameters for new order to be keyed in per telephone order.

## 2017-06-15 NOTE — PLAN OF CARE
Problem: Patient Care Overview  Goal: Plan of Care Review  Outcome: Ongoing (interventions implemented as appropriate)  Heparin gtt therapeutic. Heart rate monitor. Pt repositioned q 2 hour. Fall prevention was reviewed

## 2017-06-15 NOTE — NURSING
76 yr old female in ICU with present on admit yesterday from nursing home. Harry score is 18.  She has three areas on the left lower buttocks. Dark red on two areas but blanchable . The third area had abraised open but already covered and dry. Questionable incontinence. Will apply barrier creams and monitor areas .

## 2017-06-15 NOTE — SUBJECTIVE & OBJECTIVE
Past Medical History:   Diagnosis Date    COPD (chronic obstructive pulmonary disease) with emphysema     Diabetes mellitus     Hyperlipidemia     Hypertension     Insomnia     Maxillary sinusitis, chronic        Past Surgical History:   Procedure Laterality Date    BACK SURGERY      HERNIA REPAIR      HYSTERECTOMY         Review of patient's allergies indicates:   Allergen Reactions    Meloxicam Other (See Comments)      Patient stated that she has muscle aches and pains    Morphine Rash    Naproxen Other (See Comments)     Patient states she has muscle and aches.    Pulmicort [budesonide] Other (See Comments)     Burning in chest       No current facility-administered medications on file prior to encounter.      Current Outpatient Prescriptions on File Prior to Encounter   Medication Sig    albuterol 90 mcg/actuation inhaler Inhale 2 puffs into the lungs every 6 (six) hours as needed. Dispense with spacer.    albuterol-ipratropium 2.5mg-0.5mg/3mL (DUO-NEB) 0.5 mg-3 mg(2.5 mg base)/3 mL nebulizer solution Take 3 mLs by nebulization every 6 (six) hours.    arformoterol (BROVANA) 15 mcg/2 mL Nebu Take 2 mLs (15 mcg total) by nebulization 2 (two) times daily. Controller    aspirin (ECOTRIN) 81 MG EC tablet Take 81 mg by mouth once daily.    budesonide-formoterol 160-4.5 mcg (SYMBICORT) 160-4.5 mcg/actuation HFAA Inhale 2 puffs into the lungs every 12 (twelve) hours. Controller    cetirizine (ZYRTEC) 10 MG tablet Take 1 tablet (10 mg total) by mouth once daily.    lisinopril (PRINIVIL,ZESTRIL) 5 MG tablet Take 1 tablet (5 mg total) by mouth once daily.    metformin (GLUCOPHAGE) 500 MG tablet Take 1 tablet (500 mg total) by mouth 2 (two) times daily with meals.    predniSONE (DELTASONE) 5 MG tablet Take 40 mg by mouth once daily.     roflumilast 500 mcg Tab Take 1 tablet (500 mcg total) by mouth once daily.    simvastatin (ZOCOR) 10 MG tablet Take 1 tablet (10 mg total) by mouth every evening.     OXYGEN-AIR DELIVERY SYSTEMS MISC by [facility]Non-Drug; Combo Route) route.    [DISCONTINUED] azithromycin (Z-ERIK) 250 MG tablet Take 1 tablet (250 mg total) by mouth once daily.    [DISCONTINUED] budesonide (PULMICORT) 0.5 mg/2 mL nebulizer solution Take 2 mLs (0.5 mg total) by nebulization 2 (two) times daily. Wash out mouth after using.     Family History     Problem Relation (Age of Onset)    Arthritis Father    Cancer Father, Sister, Brother    Early death Sister    Hearing loss Father    Heart disease Brother        Social History Main Topics    Smoking status: Former Smoker     Years: 30.00    Smokeless tobacco: Never Used    Alcohol use No    Drug use: No    Sexual activity: Not Currently     Review of Systems   Constitutional: Negative.    HENT: Negative.    Eyes: Negative.    Respiratory: Positive for cough, shortness of breath and wheezing. Negative for apnea, choking and chest tightness.    Gastrointestinal: Negative.    Endocrine: Negative.    Genitourinary: Negative.    Musculoskeletal: Negative.    Skin: Negative.    Allergic/Immunologic: Negative.    Neurological: Negative.    Hematological: Negative.    Psychiatric/Behavioral: Negative.      Objective:     Vital Signs (Most Recent):  Temp: 97.8 °F (36.6 °C) (06/14/17 1727)  Pulse: (!) 122 (06/14/17 2130)  Resp: (!) 27 (06/14/17 2130)  BP: (!) 149/74 (06/14/17 2130)  SpO2: 99 % (06/14/17 2130) Vital Signs (24h Range):  Temp:  [97.8 °F (36.6 °C)] 97.8 °F (36.6 °C)  Pulse:  [] 122  Resp:  [14-29] 27  SpO2:  [95 %-99 %] 99 %  BP: (126-152)/() 149/74     Weight: 87.5 kg (192 lb 14.4 oz)  Body mass index is 34.17 kg/m².    Physical Exam   Constitutional: She is oriented to person, place, and time. She appears well-developed and well-nourished. She appears distressed.   HENT:   Head: Normocephalic and atraumatic.   Right Ear: External ear normal.   Left Ear: External ear normal.   Nose: Nose normal.   Mouth/Throat: Oropharynx is clear  and moist. No oropharyngeal exudate.   Eyes: Conjunctivae and EOM are normal. Pupils are equal, round, and reactive to light.   Neck: Normal range of motion. Neck supple. No JVD present. No tracheal deviation present. No thyromegaly present.   Cardiovascular: Exam reveals no gallop and no friction rub.    Ejection systolic murmur.    Pulmonary/Chest: No stridor. She is in respiratory distress. She has wheezes. She has rales. She exhibits no tenderness.   Abdominal: Soft. She exhibits mass. She exhibits no distension. There is no tenderness. There is no rebound and no guarding. A hernia is present.   Musculoskeletal: Normal range of motion. She exhibits no tenderness or deformity.   Lymphadenopathy:     She has no cervical adenopathy.   Neurological: She is alert and oriented to person, place, and time. She displays normal reflexes. No cranial nerve deficit.   Skin: Skin is warm and dry. Capillary refill takes less than 2 seconds. No erythema. No pallor.   Psychiatric: She has a normal mood and affect. Her behavior is normal. Judgment and thought content normal.        Significant Labs:   A1C:   Recent Labs  Lab 01/17/17  0854   HGBA1C 6.6*     ABGs:   Recent Labs  Lab 06/14/17  1721   PH 7.337*   PCO2 72.4*   HCO3 38.8*   POCSATURATED 90*   BE 13     CBC:   Recent Labs  Lab 06/14/17  1727   WBC 12.91*   HGB 14.1   HCT 44.4        CMP:   Recent Labs  Lab 06/14/17  1727      K 4.2   CL 96   CO2 30*   *   BUN 22   CREATININE 0.9   CALCIUM 9.6   PROT 8.0   ALBUMIN 3.6   BILITOT 0.3   ALKPHOS 62   AST 25   ALT 31   ANIONGAP 18*   EGFRNONAA >60     Cardiac Markers:   Recent Labs  Lab 06/14/17  1727   *     Lactic Acid:   Recent Labs  Lab 06/14/17  1810   LACTATE 1.7     Lipid Panel: No results for input(s): CHOL, HDL, LDLCALC, TRIG, CHOLHDL in the last 48 hours.  Respiratory Culture: No results for input(s): GSRESP, RESPIRATORYC in the last 48 hours.  Troponin:   Recent Labs  Lab 06/14/17  1727    TROPONINI 0.053*     TSH:   Recent Labs  Lab 06/14/17  1727   TSH 0.274*       Significant Imaging:   Imaging Results          X-Ray Chest AP Portable (Final result)  Result time 06/14/17 18:22:30    Final result by Baylee Roe MD (06/14/17 18:22:30)                 Impression:     No acute cardiopulmonary disease.      Electronically signed by: BAYLEE ROE MD  Date:     06/14/17  Time:    18:22              Narrative:    Exam: Portable chest xray    History:    Chest pain    Findings:     The heart is mildly enlarged.  Aortic atherosclerosis.  Scarring right lung base.  No acute infiltrate.  No change from 05/17/2017.

## 2017-06-15 NOTE — PLAN OF CARE
CM noted consult for home Bipap. CM waiting for MD order, will fax to [facility] once obtained.

## 2017-06-15 NOTE — HPI
The patient is a 76-year old nursing home patient with a PMH of Afib presenting with severe dyspnea and palpitations. She was brought in and found to be in afib with RVR dyspneic, hypoxic and had to be placed on BiPAP. She denies any chest pain or dizziness. She only complains of chronic abdominal discomfort as a result of a ventral hernia.

## 2017-06-15 NOTE — CONSULTS
Consult Note  Cardiology    Consult Requested By: Dr. Moody  Reason for Consult: A-fib with RVR     SUBJECTIVE:     History of Present Illness:  Patient is a 76 y.o. female who is a resident at [facility]. She has history of end stage COPD (followed by Dr. Araujo), is steroid and oxygen dependent x 10 years, DM, HTN, CHF and HLP. Patient presented to the ED with worsening dyspnea. Noted to be hypoxic and was placed on Bipap. Has now been weaned and is on NC. EKG showed evidence of A-fib with RVR with poor PRN progression in precordial leads. Patient treated with Cardizem in the ED and rate controlled. She has now converted to NSR at time of consult.  She reports that she was admitted in Feb 2017 with COPD exacerbation and developed A-fib during admission. Wasn't started on OAC at that time. Has been on ASA daily. She denies any history of bleeding issues. Has no CNS complaints to suggest TIA or CVA. Has no evidence of decompensated HF. 2D echo shows     Review of patient's allergies indicates:   Allergen Reactions    Meloxicam Other (See Comments)      Patient stated that she has muscle aches and pains    Morphine Rash    Naproxen Other (See Comments)     Patient states she has muscle and aches.    Pulmicort [budesonide] Other (See Comments)     Burning in chest       Past Medical History:   Diagnosis Date    COPD (chronic obstructive pulmonary disease) with emphysema     Diabetes mellitus     Hyperlipidemia     Hypertension     Insomnia     Maxillary sinusitis, chronic      Past Surgical History:   Procedure Laterality Date    BACK SURGERY      HERNIA REPAIR      HYSTERECTOMY       Family History   Problem Relation Age of Onset    Arthritis Father     Cancer Father     Hearing loss Father     Cancer Sister     Early death Sister     Cancer Brother     Heart disease Brother      Social History   Substance Use Topics    Smoking status: Former Smoker     Years: 30.00    Smokeless tobacco:  Never Used    Alcohol use No        Review of Systems:  Constitutional: fatigue. No fever or chills  Eyes: negative  Ears, nose, mouth, throat, and face: negative  Respiratory: +Chronic SOB and cough. +wheezing  Cardiovascular: negative chest pain, orthopnea, pnd or edema   Gastrointestinal: negative N/V/D or abd pain   Genitourinary:negative  Hematologic/lymphatic: negative  Musculoskeletal:negative,   Neurological: negative  Behavioral/Psych: negative  Endocrine: negative  Allergic/Immunologic: negative    OBJECTIVE:     Vital Signs (Most Recent)  Temp: 97.9 °F (36.6 °C) (06/15/17 1105)  Pulse: 93 (06/15/17 1328)  Resp: (!) 21 (06/15/17 1328)  BP: 136/84 (06/15/17 1105)  SpO2: (!) 92 % (06/15/17 1328)    Vital Signs Range (Last 24H):  Temp:  [97.8 °F (36.6 °C)-98.7 °F (37.1 °C)]   Pulse:  []   Resp:  [14-29]   BP: ()/()   SpO2:  [89 %-100 %]     Current Facility-Administered Medications   Medication    albuterol-ipratropium 2.5mg-0.5mg/3mL nebulizer solution 3 mL    alprazolam tablet 0.5 mg    arformoterol nebulizer solution 15 mcg    aspirin EC tablet 81 mg    bisacodyl suppository 10 mg    budesonide nebulizer solution 0.5 mg    dextrose 50% injection 12.5 g    dextrose 50% injection 25 g    diltiaZEM tablet 30 mg    docusate sodium capsule 100 mg    famotidine tablet 20 mg    furosemide tablet 40 mg    glucagon (human recombinant) injection 1 mg    glucose chewable tablet 16 g    glucose chewable tablet 24 g    heparin 25,000 units in dextrose 5% 250 mL (100 units/mL) bolus from bag; PRN BOLUS    heparin 25,000 units in dextrose 5% 250 mL (100 units/mL) bolus from bag; PRN BOLUS    heparin 25,000 units in dextrose 5% 250 mL (100 units/mL) infusion; FEMALE    insulin aspart pen 0-5 Units    lisinopril tablet 5 mg    montelukast tablet 10 mg    predniSONE tablet 40 mg    roflumilast tablet 500 mcg    simvastatin tablet 10 mg    trazodone tablet 50 mg     No current  facility-administered medications on file prior to encounter.      Current Outpatient Prescriptions on File Prior to Encounter   Medication Sig    albuterol 90 mcg/actuation inhaler Inhale 2 puffs into the lungs every 6 (six) hours as needed. Dispense with spacer.    albuterol-ipratropium 2.5mg-0.5mg/3mL (DUO-NEB) 0.5 mg-3 mg(2.5 mg base)/3 mL nebulizer solution Take 3 mLs by nebulization every 6 (six) hours.    arformoterol (BROVANA) 15 mcg/2 mL Nebu Take 2 mLs (15 mcg total) by nebulization 2 (two) times daily. Controller    aspirin (ECOTRIN) 81 MG EC tablet Take 81 mg by mouth once daily.    budesonide-formoterol 160-4.5 mcg (SYMBICORT) 160-4.5 mcg/actuation HFAA Inhale 2 puffs into the lungs every 12 (twelve) hours. Controller    cetirizine (ZYRTEC) 10 MG tablet Take 1 tablet (10 mg total) by mouth once daily.    lisinopril (PRINIVIL,ZESTRIL) 5 MG tablet Take 1 tablet (5 mg total) by mouth once daily.    metformin (GLUCOPHAGE) 500 MG tablet Take 1 tablet (500 mg total) by mouth 2 (two) times daily with meals.    predniSONE (DELTASONE) 5 MG tablet Take 40 mg by mouth once daily.     roflumilast 500 mcg Tab Take 1 tablet (500 mcg total) by mouth once daily.    simvastatin (ZOCOR) 10 MG tablet Take 1 tablet (10 mg total) by mouth every evening.    OXYGEN-AIR DELIVERY SYSTEMS M[place] [facility] [place]; Combo Route) route.       Physical Exam:  General appearance: alert, appears stated age and cooperative  Head: Normocephalic, without obvious abnormality, atraumatic  Neck: no adenopathy, no carotid bruit, no JVD, supple  Lungs:  Diminished to auscultation bilaterally  Chest wall: no tenderness  Heart: regular rate and rhythm, S1, S2 normal, no murmur, click, rub or gallop  Abdomen: obese,  soft, non-tender; bowel sounds normal; no masses,  no organomegaly  Extremities: extremities normal, atraumatic, no cyanosis or edema  Pulses:diminished distal pulses bilaterally   Skin:  Patient with scattered bruising to  BUE  Neurologic: Grossly normal    Laboratory:  Chemistry:   Lab Results   Component Value Date     06/15/2017    K 2.9 (L) 06/15/2017    CL 95 06/15/2017    CO2 36 (H) 06/15/2017    BUN 16 06/15/2017    CREATININE 0.7 06/15/2017    CALCIUM 8.9 06/15/2017     Cardiac Markers:   Lab Results   Component Value Date    TROPONINI 0.045 (H) 06/15/2017     Cardiac Markers (Last 3):   Lab Results   Component Value Date    TROPONINI 0.045 (H) 06/15/2017    TROPONINI 0.048 (H) 06/14/2017    TROPONINI 0.053 (H) 06/14/2017     CBC:   Lab Results   Component Value Date    WBC 12.91 (H) 06/14/2017    HGB 14.1 06/14/2017    HCT 44.4 06/14/2017    MCV 90 06/14/2017     06/14/2017     Lipids:   Lab Results   Component Value Date    CHOL 155 01/17/2017    TRIG 59 01/17/2017    HDL 51 01/17/2017     Coagulation:   Lab Results   Component Value Date    INR 1.0 06/14/2017    APTT 50.4 (H) 06/15/2017       Diagnostic Results:  ECG: Reviewed  X-Ray: Reviewed  Echo: Reviewed      ASSESSMENT/PLAN:     Patient Active Problem List   Diagnosis    Hay fever    Anemia, deficiency    Anxiety    Obesity with body mass index 30 or greater    Cellulitis of elbow    Chest pain    Chronic maxillary sinusitis    Depression    Diabetes mellitus without complication    Diabetic polyneuropathy    Dyslipidemia    Essential (primary) hypertension    Anemia associated with nutritional deficiency    COPD, severe    Pneumonia    Shoulder pain    Exomphalos    Chronic respiratory failure with hypoxia    Insomnia    Breath shortness    Type 2 diabetes mellitus with hyperlipidemia    Uncontrolled type 2 diabetes mellitus with mild nonproliferative retinopathy and macular edema, without long-term current use of insulin    Hyperlipidemia    Leukocytosis    Poorly controlled diabetes mellitus    Acute on chronic respiratory failure with hypoxia and hypercapnia    Acute hypercapnic respiratory failure    Elevated troponin     Atrial fibrillation with RVR    CHF, acute    Hyperthyroidism        Plan:     Troponin leak likely secondary to severe COPD. Has no evidence of decompensated HF. Will treat A-fib conservatively at this time with Diltiazem for rate control and recommend stopping Heparin gtt and starting Xarelto 20mg daily for CVA prophylaxis.  Do not recommend BB for rate control due to severity of her COPD.     Chart reviewed. Patient examined by Dr. Collins and agrees with plan that has been outlined.

## 2017-06-15 NOTE — PLAN OF CARE
Problem: Patient Care Overview  Goal: Plan of Care Review  Outcome: Ongoing (interventions implemented as appropriate)  Patient has remained on the bipap thru the night.  She had an episode of getting anxious panicked feeling this morning. IV accidentally dislodged per patient, discontinued and restarted another one.  See documentation. Patient had a critical PCO2 this morning of 65.2 called to Dr Aguilar at [facility]. Patient remains on cardizem and heparin.  See mar for rates and times administered and adjusted.  Patient is noted to be in AFIB still with a fluctuating heart rate from the upper 70's to 100's.  Will continue to monitor and titrate cardizem accordingly.

## 2017-06-15 NOTE — PT/OT/SLP EVAL
Occupational Therapy  Evaluation    Petrona Gonzalez   MRN: [id number]   Admitting Diagnosis: Acute on chronic respiratory failure with hypoxia and hypercapnia    OT Date of Treatment: 06/15/17   OT Start Time: 0935  OT Stop Time: 1000  OT Total Time (min): 25 min    Billable Minutes:  Evaluation 10 MINUTES  Therapeutic Activity 15 MINUTES    Diagnosis: Acute on chronic respiratory failure with hypoxia and hypercapnia   DEBILITY AND GENERALIZED WEAKNESS      Past Medical History:   Diagnosis Date    COPD (chronic obstructive pulmonary disease) with emphysema     Diabetes mellitus     Hyperlipidemia     Hypertension     Insomnia     Maxillary sinusitis, chronic       Past Surgical History:   Procedure Laterality Date    BACK SURGERY      HERNIA REPAIR      HYSTERECTOMY         Referring physician: DR. ROBLES  Date referred to OT: 6-15-17    General Precautions: Standard, fall  Orthopedic Precautions: N/A  Braces:            Patient History:  Living Environment  Lives With: facility resident  Living Arrangements: residential facility  Home Accessibility: stairs to enter home  Number of Stairs to Enter Home: 6  Living Environment Comment: PT CURRENTLY LIVES IN NURSING HOME ANBD REPORTS (I) WITH UE/LE DRESSING AND WELL AS MOD(I) WITH FUNCTIONAL T/F'S  Equipment Currently Used at Home: walker, rolling, oxygen    Prior level of function:   Bed Mobility/Transfers: independent  Grooming: independent  Bathing: needs device and assist  Upper Body Dressing: independent  Lower Body Dressing: independent  Toileting: needs device  Driving License: No     Dominant hand: right    Subjective:  Communicated with NURSE JJ AND [facility] CHART REVIEW prior to session.    Chief Complaint: DEBILITY AND GENERALIZED WEAKNESS  Patient/Family stated goals:          Objective:  Patient found with: oxygen, peripheral IV, telemetry, blood pressure cuff, nuñez catheter, pulse ox (continuous)    Cognitive Exam:  Oriented to: Person, Place,  Time and Situation  Follows Commands/attention: Follows one-step commands  Communication: clear/fluent  Memory:  No Deficits noted  Safety awareness/insight to disability: intact  Coping skills/emotional control: Appropriate to situation    Visual/perceptual:  Intact    Physical Exam:  Postural examination/scapula alignment: Rounded shoulder and Head forward MILDLY  Skin integrity: Visible skin intact, Bruising of  B UE and Thin  Edema: None noted     Sensation:   Intact    Upper Extremity Range of Motion:  Right Upper Extremity: WFL  Left Upper Extremity: WFL    Upper Extremity Strength:  Right Upper Extremity: MMT: 3+/5 GROSSLY  Left Upper Extremity: MMT: 3+/5 GROSSLY   Strength: MMT: 3+/5 GROSSLY      Fine motor coordination:   Intact    Gross motor coordination: WFL    Functional Mobility:  Bed Mobility:       Transfers:  Sit <> Stand Assistance: Minimum Assistance  Sit <> Stand Assistive Device: Rolling Walker  Bed <> Chair Technique: Stand Pivot  Bed <> Chair Transfer Assistance: Minimum Assistance  Bed <> Chair Assistive Device: Rolling Walker    Functional Ambulation: PT AMBULATED 30 FEET WITH MIN A AND RW AS WELL AS VC'S FOR SAFETY, POSTURE, AND BREATHING TECNHIQUES    Activities of Daily Living:     Feeding adaptive equipment: NA  UE Dressing Level of Assistance: Moderate assistance  UE adaptive equipment: NA  LE Dressing Level of Assistance: Maximum assistance  LE adaptive equipment: NA        Toileting Where Assessed: Bed level  Toileting Level of Assistance: Total assistance (AMEZCUA PLACEMENT)        Bathing adaptive equipment: NA    Balance:   Static Sit: GOOD: Takes MODERATE challenges from all directions  Dynamic Sit: FAIR: Cannot move trunk without losing balance  Static Stand: FAIR: Maintains without assist but unable to take challenges  Dynamic stand: POOR+ MIN A  Therapeutic Activities and Exercises:      AM-PAC 6 CLICK ADL  How much help from another person does this patient currently  "need?  1 = Unable, Total/Dependent Assistance  2 = A lot, Maximum/Moderate Assistance  3 = A little, Minimum/Contact Guard/Supervision  4 = None, Modified [place]/Independent    Putting on and taking off regular lower body clothing? : 1 (NOT TESTED)  Bathing (including washing, rinsing, drying)?: 1 (NOT TESTED)  Toileting, which includes using toilet, bedpan, or urinal? : 1  Putting on and taking off regular upper body clothing?: 3  Taking care of personal grooming such as brushing teeth?: 3  Eating meals?: 3  Total Score: 12    AM-PAC Raw Score CMS "G-Code Modifier Level of Impairment Assistance   6 % Total / Unable   7 - 9 CM 80 - 100% Maximal Assist   10-14 CL 60 - 80% Moderate Assist   15 - 19 CK 40 - 60% Moderate Assist   20 - 22 CJ 20 - 40% Minimal Assist   23 CI 1-20% SBA / CGA   24 CH 0% Independent/ Mod I       Patient left SITTING EOB with all lines intact, call button in reach and NURSE АННА notified    Assessment:  Petrona Gonzalez is a 76 y.o. female with a medical diagnosis of Acute on chronic respiratory failure with hypoxia and hypercapnia and presents with DEBILITY AND GENERALIZED WEAKNESS. PT WILL BENEFIT FROM SKILLED O.T..    Rehab identified problem list/impairments: Rehab identified problem list/impairments: weakness, impaired functional mobilty, impaired balance, decreased upper extremity function, decreased safety awareness, impaired endurance, impaired self care skills, gait instability, decreased coordination, decreased lower extremity function    Rehab potential is good.    Activity tolerance: Good    Discharge recommendations: Discharge Facility/Level Of Care Needs: nursing facility, skilled     Barriers to discharge: Barriers to Discharge: None    Equipment recommendations: bath bench, walker, rolling     GOALS:    Occupational Therapy Goals        Problem: Occupational Therapy Goal    Goal Priority Disciplines Outcome Interventions   Occupational Therapy Goal     OT, PT/OT   "   Description:  OT GOALS TO BE MET BY 6-22-17  1. SBA WITH UE DRESSING  2. PT WILL TOLERATE 1 SET X 10 REPS B UE ROM EXERCISE WITH MIN RESISTANCE  3.  MIN A  WITH LE DRESSING  4 S WITH  T/F'S                    PLAN:  Patient to be seen 3 x/week to address the above listed problems via self-care/home management, therapeutic activities, therapeutic exercises  Plan of Care expires: 06/22/17  Plan of Care reviewed with: patient         Rosalineveronica Cadetzier, OT  06/15/2017

## 2017-06-15 NOTE — H&P
[facility] Hospital Medicine  History & Physical    Patient Name: Petrona Gonzalez  MRN: [id number]  Admission Date: 6/14/2017  Attending Physician: Roz Donis MD   Primary Care Provider: Katy Arriaga MD         Patient information was obtained from patient, relative(s) and ER records.     Subjective:     Principal Problem:Atrial fibrillation with RVR    Chief Complaint:   Chief Complaint   Patient presents with    Shortness of Breath     sent from [place] age with shortness of breath         HPI: The patient is a 76-year old nursing home patient with a PMH of Afib presenting with severe dyspnea and palpitations. She was brought in and found to be in afib with RVR dyspneic, hypoxic and had to be placed on BiPAP. She denies any chest pain or dizziness. She only complains of chronic abdominal discomfort as a result of a ventral hernia.      Past Medical History:   Diagnosis Date    COPD (chronic obstructive pulmonary disease) with emphysema     Diabetes mellitus     Hyperlipidemia     Hypertension     Insomnia     Maxillary sinusitis, chronic        Past Surgical History:   Procedure Laterality Date    BACK SURGERY      HERNIA REPAIR      HYSTERECTOMY         Review of patient's allergies indicates:   Allergen Reactions    Meloxicam Other (See Comments)      Patient stated that she has muscle aches and pains    Morphine Rash    Naproxen Other (See Comments)     Patient states she has muscle and aches.    Pulmicort [budesonide] Other (See Comments)     Burning in chest       No current facility-administered medications on file prior to encounter.      Current Outpatient Prescriptions on File Prior to Encounter   Medication Sig    albuterol 90 mcg/actuation inhaler Inhale 2 puffs into the lungs every 6 (six) hours as needed. Dispense with spacer.    albuterol-ipratropium 2.5mg-0.5mg/3mL (DUO-NEB) 0.5 mg-3 mg(2.5 mg base)/3 mL nebulizer solution Take 3 mLs by nebulization every  6 (six) hours.    arformoterol (BROVANA) 15 mcg/2 mL Nebu Take 2 mLs (15 mcg total) by nebulization 2 (two) times daily. Controller    aspirin (ECOTRIN) 81 MG EC tablet Take 81 mg by mouth once daily.    budesonide-formoterol 160-4.5 mcg (SYMBICORT) 160-4.5 mcg/actuation HFAA Inhale 2 puffs into the lungs every 12 (twelve) hours. Controller    cetirizine (ZYRTEC) 10 MG tablet Take 1 tablet (10 mg total) by mouth once daily.    lisinopril (PRINIVIL,ZESTRIL) 5 MG tablet Take 1 tablet (5 mg total) by mouth once daily.    metformin (GLUCOPHAGE) 500 MG tablet Take 1 tablet (500 mg total) by mouth 2 (two) times daily with meals.    predniSONE (DELTASONE) 5 MG tablet Take 40 mg by mouth once daily.     roflumilast 500 mcg Tab Take 1 tablet (500 mcg total) by mouth once daily.    simvastatin (ZOCOR) 10 MG tablet Take 1 tablet (10 mg total) by mouth every evening.    OXYGEN-AIR DELIVERY SYSTEMS MISC by [facility].(Non-Drug; Combo Route) route.    [DISCONTINUED] azithromycin (Z-ERIK) 250 MG tablet Take 1 tablet (250 mg total) by mouth once daily.    [DISCONTINUED] budesonide (PULMICORT) 0.5 mg/2 mL nebulizer solution Take 2 mLs (0.5 mg total) by nebulization 2 (two) times daily. Wash out mouth after using.     Family History     Problem Relation (Age of Onset)    Arthritis Father    Cancer Father, Sister, Brother    Early death Sister    Hearing loss Father    Heart disease Brother        Social History Main Topics    Smoking status: Former Smoker     Years: 30.00    Smokeless tobacco: Never Used    Alcohol use No    Drug use: No    Sexual activity: Not Currently     Review of Systems   Constitutional: Negative.    HENT: Negative.    Eyes: Negative.    Respiratory: Positive for cough, shortness of breath and wheezing. Negative for apnea, choking and chest tightness.    Gastrointestinal: Negative.    Endocrine: Negative.    Genitourinary: Negative.    Musculoskeletal: Negative.    Skin: Negative.     Allergic/Immunologic: Negative.    Neurological: Negative.    Hematological: Negative.    Psychiatric/Behavioral: Negative.      Objective:     Vital Signs (Most Recent):  Temp: 97.8 °F (36.6 °C) (06/14/17 1727)  Pulse: (!) 122 (06/14/17 2130)  Resp: (!) 27 (06/14/17 2130)  BP: (!) 149/74 (06/14/17 2130)  SpO2: 99 % (06/14/17 2130) Vital Signs (24h Range):  Temp:  [97.8 °F (36.6 °C)] 97.8 °F (36.6 °C)  Pulse:  [] 122  Resp:  [14-29] 27  SpO2:  [95 %-99 %] 99 %  BP: (126-152)/() 149/74     Weight: 87.5 kg (192 lb 14.4 oz)  Body mass index is 34.17 kg/m².    Physical Exam   Constitutional: She is oriented to person, place, and time. She appears well-developed and well-nourished. She appears distressed.   HENT:   Head: Normocephalic and atraumatic.   Right Ear: External ear normal.   Left Ear: External ear normal.   Nose: Nose normal.   Mouth/Throat: Oropharynx is clear and moist. No oropharyngeal exudate.   Eyes: Conjunctivae and EOM are normal. Pupils are equal, round, and reactive to light.   Neck: Normal range of motion. Neck supple. No JVD present. No tracheal deviation present. No thyromegaly present.   Cardiovascular: Exam reveals no gallop and no friction rub.    Ejection systolic murmur.    Pulmonary/Chest: No stridor. She is in respiratory distress. She has wheezes. She has rales. She exhibits no tenderness.   Abdominal: Soft. She exhibits mass. She exhibits no distension. There is no tenderness. There is no rebound and no guarding. A hernia is present.   Musculoskeletal: Normal range of motion. She exhibits no tenderness or deformity.   Lymphadenopathy:     She has no cervical adenopathy.   Neurological: She is alert and oriented to person, place, and time. She displays normal reflexes. No cranial nerve deficit.   Skin: Skin is warm and dry. Capillary refill takes less than 2 seconds. No erythema. No pallor.   Psychiatric: She has a normal mood and affect. Her behavior is normal. Judgment and  thought content normal.        Significant Labs:   A1C:   Recent Labs  Lab 01/17/17  0854   HGBA1C 6.6*     ABGs:   Recent Labs  Lab 06/14/17  1721   PH 7.337*   PCO2 72.4*   HCO3 38.8*   POCSATURATED 90*   BE 13     CBC:   Recent Labs  Lab 06/14/17  1727   WBC 12.91*   HGB 14.1   HCT 44.4        CMP:   Recent Labs  Lab 06/14/17  1727      K 4.2   CL 96   CO2 30*   *   BUN 22   CREATININE 0.9   CALCIUM 9.6   PROT 8.0   ALBUMIN 3.6   BILITOT 0.3   ALKPHOS 62   AST 25   ALT 31   ANIONGAP 18*   EGFRNONAA >60     Cardiac Markers:   Recent Labs  Lab 06/14/17  1727   *     Lactic Acid:   Recent Labs  Lab 06/14/17  1810   LACTATE 1.7     Lipid Panel: No results for input(s): CHOL, HDL, LDLCALC, TRIG, CHOLHDL in the last 48 hours.  Respiratory Culture: No results for input(s): GSRESP, RESPIRATORYC in the last 48 hours.  Troponin:   Recent Labs  Lab 06/14/17 1727   TROPONINI 0.053*     TSH:   Recent Labs  Lab 06/14/17  1727   TSH 0.274*       Significant Imaging:   Imaging Results          X-Ray Chest AP Portable (Final result)  Result time 06/14/17 18:22:30    Final result by Baylee Roe MD (06/14/17 18:22:30)                 Impression:     No acute cardiopulmonary disease.      Electronically signed by: BAYLEE ROE MD  Date:     06/14/17  Time:    18:22              Narrative:    Exam: Portable chest xray    History:    Chest pain    Findings:     The heart is mildly enlarged.  Aortic atherosclerosis.  Scarring right lung base.  No acute infiltrate.  No change from 05/17/2017.                            Assessment/Plan:     CHF, acute    IV Lasix. ECHO. Troponins. Lovenox.           Elevated troponin    Serial troponin evaluations and cardiology consultation.           * Atrial fibrillation with RVR    IV Diltiazem titration to HR less than 90/min. ECHO. IV heparin. Cardiology consultation          Acute on chronic respiratory failure with hypoxia and hypercapnia    BiPAP. Lasix. ABG's. ICU  monitoring. Bronchodilators.             Hyperthyroidism. Will control rate and re evaluate.     Total critical care time spent on he patient excluding any separately billable procedure is 39 minutes  VTE Risk Mitigation         Ordered     Medium Risk of VTE  Once      06/14/17 2128     Place sequential compression device  Until discontinued      06/14/17 2128        Roz Donis MD  Department of Hospital Medicine   [facility]

## 2017-06-15 NOTE — PLAN OF CARE
D/C assessment completed. Met with the patient and the family. Patient stated she will return      06/15/17 1000   Discharge Assessment   Assessment Type Discharge Planning Assessment   Confirmed/corrected address and phone number on facesheet? Yes   Assessment information obtained from? Patient;Medical Record   Expected Length of Stay (days) (TBD)   Communicated expected length of stay with patient/caregiver no   Type of Healthcare Directive Received (Patient has declined info for Advance Directives, Living.)   If Healthcare Directive is received, is it scanned into Epic? no (comment)   Prior to hospitilization cognitive status: Alert/Oriented   Prior to hospitalization functional status: Assistive Equipment   Current cognitive status: Alert/Oriented   Current Functional Status: Assistive Equipment   Arrived From skilled nursing facility   Lives With facility resident   Able to Return to Prior Arrangements yes   Is patient able to care for self after discharge? No   Who are your caregiver(s) and their phone number(s)? ([facility])   Patient's perception of discharge disposition skilled nursing facility   Readmission Within The Last 30 Days other (see comments)  (Exac Afib)   Patient currently being followed by outpatient case management? No   Patient currently receives home health services? No   Does the patient currently use HME? No   Patient currently receives private duty nursing? No   Patient currently receives any other outside agency services? No   Equipment Currently Used at Home walker, rolling;oxygen   Do you have any problems affording any of your prescribed medications? No   Is the patient taking medications as prescribed? yes   Do you have any financial concerns preventing you from receiving the healthcare you need? No   Does the patient have transportation to healthcare appointments? Yes   Transportation Available family or friend will provide;car   On Dialysis? No   Does the patient receive  services at the Coumadin Clinic? No   Are there any open cases? No   Discharge Plan A Skilled Nursing Facility   Discharge Plan B Skilled Nursing Facility   Patient/Family In Agreement With Plan yes   back to [facility] - [place] post d/c.

## 2017-06-15 NOTE — CONSULTS
Critical Care Consult      Consulting Reason:  Resp Failure acute on chronic    HPI:    Petrona Gonzalez is a 76 y.o. female with a PMH of HTN, HLD, DM and severe COPD (steroid, O2 and noct Vent dependent).  She is followed by Dr. Araujo in clinic and was recently placed in NH 3 weeks ago.   She presented to [facility] ED yesterday evening from [facility] of acute onset SOB 2 hours PTA.  Pt was admitted to this facility a few weeks ago for the same complaint. Pt's daughter states that the pt had x-rays performed on Monday that showed fluid on the pt's lungs. Pt has been Tx with IV lasix for this, but does not appear to be improving. No mitigating or exacerbating factors reported. Associated sxs included wheezing.  In ED NC 4 ABG 7.33/72/66/39, CXR no acute process and EKG A-Fib RVR at 149 bpm.  Placed on Cardizem and Heparin infusions as well as BiPAP and clinically improved with admit to ICU.       PMHx:      Past Medical History:   Diagnosis Date    COPD (chronic obstructive pulmonary disease) with emphysema     Diabetes mellitus     Hyperlipidemia     Hypertension     Insomnia     Maxillary sinusitis, chronic         PMSx:      Past Surgical History:   Procedure Laterality Date    BACK SURGERY      HERNIA REPAIR      HYSTERECTOMY          Social Hx:      Social History     Social History    Marital status:      Spouse name: N/A    Number of children: N/A    Years of education: N/A     Occupational History    Not on file.     Social History Main Topics    Smoking status: Former Smoker     Years: 30.00    Smokeless tobacco: Never Used    Alcohol use No    Drug use: No    Sexual activity: Not Currently     Other Topics Concern    Not on file     Social History Narrative    No narrative on file        Family Hx:      Family History   Problem Relation Age of Onset    Arthritis Father     Cancer Father     Hearing loss Father     Cancer Sister     Early death Sister     Cancer Brother      Heart disease Brother         Allergies:      Review of patient's allergies indicates:   Allergen Reactions    Meloxicam Other (See Comments)      Patient stated that she has muscle aches and pains    Morphine Rash    Naproxen Other (See Comments)     Patient states she has muscle and aches.    Pulmicort [budesonide] Other (See Comments)     Burning in chest       Medications:      Current Facility-Administered Medications   Medication    albuterol-ipratropium 2.5mg-0.5mg/3mL nebulizer solution 3 mL    alprazolam tablet 0.5 mg    arformoterol nebulizer solution 15 mcg    aspirin EC tablet 81 mg    budesonide nebulizer solution 0.5 mg    cetirizine tablet 10 mg    dextrose 50% injection 12.5 g    dextrose 50% injection 25 g    diltiaZEM 125 mg in D5W 125 mL infusion    diltiaZEM tablet 30 mg    furosemide tablet 40 mg    glucagon (human recombinant) injection 1 mg    glucose chewable tablet 16 g    glucose chewable tablet 24 g    heparin 25,000 units in dextrose 5% 250 mL (100 units/mL) bolus from bag; PRN BOLUS    heparin 25,000 units in dextrose 5% 250 mL (100 units/mL) bolus from bag; PRN BOLUS    heparin 25,000 units in dextrose 5% 250 mL (100 units/mL) infusion; FEMALE    insulin aspart pen 0-5 Units    lisinopril tablet 5 mg    montelukast tablet 10 mg    potassium chloride SA CR tablet 30 mEq    predniSONE tablet 40 mg    roflumilast tablet 500 mcg    simvastatin tablet 10 mg    trazodone tablet 50 mg       ROS:  Review of Systems   Constitutional: Negative for chills, fever and malaise/fatigue.   HENT: Negative for congestion.    Eyes: Negative for blurred vision.   Respiratory: Positive for shortness of breath (chronic). Negative for cough and sputum production.    Cardiovascular: Negative for chest pain and leg swelling.   Gastrointestinal: Negative for abdominal pain, nausea and vomiting.   Genitourinary: Negative for dysuria.   Musculoskeletal: Negative for myalgias.    Skin: Negative for rash.   Neurological: Positive for weakness. Negative for dizziness, focal weakness and headaches.   Endo/Heme/Allergies: Does not bruise/bleed easily.   Psychiatric/Behavioral: The patient is not nervous/anxious.        Vital Signs (Most Recent)  Temp: 97.8 °F (36.6 °C) (06/15/17 0705)  Pulse: (!) 129 (06/15/17 0745)  Resp: (!) 23 (06/15/17 0745)  BP: (!) 144/61 (06/15/17 0745)  SpO2: 95 % (06/15/17 0745)    Vital Signs Range (Last 24H):  Temp:  [97.8 °F (36.6 °C)-98.7 °F (37.1 °C)]   Pulse:  []   Resp:  [14-29]   BP: ()/()   SpO2:  [92 %-100 %]     I & O (Last 24H):  Intake/Output Summary (Last 24 hours) at 06/15/17 0859  Last data filed at 06/15/17 0800   Gross per 24 hour   Intake            332.4 ml   Output              780 ml   Net           -447.6 ml       Ventilator Data (Last 24H):     Oxygen Concentration (%):  [40-50] 40    Physical Exam:   Physical Exam   Constitutional: She is oriented to person, place, and time and well-developed, well-nourished, and in no distress. She appears not lethargic, to not be writhing in pain and not malnourished. She appears unhealthy.  Non-toxic appearance. She does not have a sickly appearance. No distress.   HENT:   Head: Normocephalic and atraumatic.   Mouth/Throat: Oropharynx is clear and moist.   Eyes: EOM and lids are normal. Pupils are equal, round, and reactive to light.   Neck: Normal range of motion. Carotid bruit is not present.   Obese    Cardiovascular: Normal rate.  An irregular rhythm present.   Pulses:       Radial pulses are 2+ on the right side, and 2+ on the left side.        Dorsalis pedis pulses are 1+ on the right side, and 1+ on the left side.   Pulmonary/Chest: Accessory muscle usage (mild) present. No tachypnea. No respiratory distress. She has decreased breath sounds.   Abdominal: Soft. She exhibits no distension. Bowel sounds are hypoactive. There is no tenderness. A hernia is present. Hernia confirmed  positive in the umbilical area.   Musculoskeletal: Normal range of motion.   +1 bilat pedal edema   Lymphadenopathy:     She has no cervical adenopathy.   Neurological: She is alert and oriented to person, place, and time. She appears not lethargic.   Skin: Skin is warm, dry and intact. Bruising (multiple small areas to arms) noted. She is not diaphoretic. No cyanosis.   Psychiatric: Memory, affect and judgment normal. Her mood appears anxious.         Laboratory (Last 24H):  CBC:    Recent Labs  Lab 06/14/17  1727   WBC 12.91*   HGB 14.1   HCT 44.4        CMP:    Recent Labs  Lab 06/14/17  1727 06/15/17  0625   CALCIUM 9.6 8.9   ALBUMIN 3.6  --    PROT 8.0  --     144   K 4.2 2.9*   CO2 30* 36*   CL 96 95   BUN 22 16   CREATININE 0.9 0.7   ALKPHOS 62  --    ALT 31  --    AST 25  --    BILITOT 0.3  --        ABGs:   Recent Labs  Lab 06/15/17  0449   PH 7.428   PCO2 65.2*   HCO3 43.1*   POCSATURATED 96   BE 19     Microbiology Results (last 7 days)     Procedure Component Value Units Date/Time    Blood Culture #2 **CANNOT BE ORDERED STAT** [[id number]] Collected:  06/14/17 1831    Order Status:  Sent Specimen:  Blood from Peripheral, Antecubital, Left Updated:  06/15/17 0317    Blood Culture #1 **CANNOT BE ORDERED STAT** [[id number]] Collected:  06/14/17 1810    Order Status:  Sent Specimen:  Blood from Peripheral, Antecubital, Right Updated:  06/15/17 0314    Urine culture [[id number]] Collected:  06/14/17 2011    Order Status:  Sent Specimen:  Urine from Urine, Catheterized Updated:  06/14/17 2011          Chest X-Ray:   Film and report reviewed:  Hyperinflated without acute process appreciated    ASSESSMENT/PLAN:     Problem   Acute On Chronic Respiratory Failure With Hypoxia and Hypercapnia   Atrial Fibrillation With Rvr   Copd, Severe   Type 2 Diabetes Mellitus With Hyperlipidemia   Obesity With Body Mass Index 30 Or Greater       PLAN:    1. Neuro: Neuro monitoring    2. Pulmonary: Encourage C&DB and  OOB asap.  Noct BiPAP and cont Formeterol, Singulair, Roflumilast and Budesonide.  Change DN to prn.  Slow wean of oral steroids    3. Cardiac: Cardiac monitoring.  Cont Heparin infusion.  Change IV Cardizem infusion to PO.  Cards consult pending.  ECV vs long term anticoagulation and rate control will defer to Cards.  HR controlled.  Cont Lisinopril, statin, Lasix and ASA.    4. Renal: monitor I/Os     5. Infectious Disease: Follow fever curve    6. Hematology/Oncology: Monitor for bleeding    7. Endocrine: monitor glucose and cont SSI    8. Fluids/Electrolytes/Nutrition/GI: start and encourage ADA diet.    9. Musculoskeletal:  ROM and consult PT/OT    10. Pain Management: no issues     11. Discharge and Palliative Care: Plan return to NH    Preventive Measures and Monitoring:   Stress Ulcer: Pepcid  Nutrition: ADA diet  Glucose control: SSI  Bowel prophylaxis: PRN Dulcolax  DVT prophylaxis: Heparin infusion  Hx CAD on B-Blocker: no hx CAD  Head of Bed/Reposition: Elevate HOB and turn Q1-2 hours   Early Mobility: OOB  Code Status: Full  Pneumonia Vaccine: UTD    Counseling/Consultation:I have discussed the care of this patient in detail with the bedside nursing staff and Dr. Taylor and Dr. De    Critical Care Time greater than: 45 Minutes    Critical care was time spent personally by me on the following activities: development of treatment plan with patient or surrogate and bedside caregivers, discussions with consultants, evaluation of patient's response to treatment, examination of patient, ordering and performing treatments and interventions, ordering and review of laboratory studies, ordering and review of radiographic studies, pulse oximetry, and re-evaluation of patient's condition.  This critical care time did not overlap with that of any other provider.    Thank you Dr. De for this consult and the pleasure of evaluating and caring for this patient.    Will transfer to Tele and sign off,  please call if needed.     EMIL Sultana [facility]

## 2017-06-16 VITALS
WEIGHT: 190.69 LBS | TEMPERATURE: 99 F | HEART RATE: 96 BPM | DIASTOLIC BLOOD PRESSURE: 62 MMHG | HEIGHT: 63 IN | RESPIRATION RATE: 22 BRPM | OXYGEN SATURATION: 95 % | SYSTOLIC BLOOD PRESSURE: 138 MMHG | BODY MASS INDEX: 33.79 KG/M2

## 2017-06-16 LAB
ANION GAP SERPL CALC-SCNC: 13 MMOL/L
BACTERIA UR CULT: NO GROWTH
BUN SERPL-MCNC: 18 MG/DL
CALCIUM SERPL-MCNC: 9.3 MG/DL
CHLORIDE SERPL-SCNC: 98 MMOL/L
CO2 SERPL-SCNC: 35 MMOL/L
CREAT SERPL-MCNC: 0.7 MG/DL
EST. GFR  (AFRICAN AMERICAN): >60 ML/MIN/1.73 M^2
EST. GFR  (NON AFRICAN AMERICAN): >60 ML/MIN/1.73 M^2
ESTIMATED AVG GLUCOSE: 169 MG/DL
GLUCOSE SERPL-MCNC: 118 MG/DL
HBA1C MFR BLD HPLC: 7.5 %
MAGNESIUM SERPL-MCNC: 2 MG/DL
POCT GLUCOSE: 123 MG/DL (ref 70–110)
POCT GLUCOSE: 210 MG/DL (ref 70–110)
POTASSIUM SERPL-SCNC: 3.6 MMOL/L
SODIUM SERPL-SCNC: 146 MMOL/L

## 2017-06-16 PROCEDURE — 97802 MEDICAL NUTRITION INDIV IN: CPT

## 2017-06-16 PROCEDURE — 63600175 PHARM REV CODE 636 W HCPCS: Performed by: EMERGENCY MEDICINE

## 2017-06-16 PROCEDURE — 80048 BASIC METABOLIC PNL TOTAL CA: CPT

## 2017-06-16 PROCEDURE — 97110 THERAPEUTIC EXERCISES: CPT

## 2017-06-16 PROCEDURE — 63600175 PHARM REV CODE 636 W HCPCS: Performed by: INTERNAL MEDICINE

## 2017-06-16 PROCEDURE — 99231 SBSQ HOSP IP/OBS SF/LOW 25: CPT | Mod: ,,, | Performed by: INTERNAL MEDICINE

## 2017-06-16 PROCEDURE — 36415 COLL VENOUS BLD VENIPUNCTURE: CPT

## 2017-06-16 PROCEDURE — 25000242 PHARM REV CODE 250 ALT 637 W/ HCPCS: Performed by: NURSE PRACTITIONER

## 2017-06-16 PROCEDURE — 94660 CPAP INITIATION&MGMT: CPT

## 2017-06-16 PROCEDURE — 93010 ELECTROCARDIOGRAM REPORT: CPT | Mod: ,,, | Performed by: INTERNAL MEDICINE

## 2017-06-16 PROCEDURE — 25000003 PHARM REV CODE 250: Performed by: NURSE PRACTITIONER

## 2017-06-16 PROCEDURE — 83735 ASSAY OF MAGNESIUM: CPT

## 2017-06-16 PROCEDURE — 97116 GAIT TRAINING THERAPY: CPT

## 2017-06-16 PROCEDURE — 93005 ELECTROCARDIOGRAM TRACING: CPT

## 2017-06-16 PROCEDURE — 99900035 HC TECH TIME PER 15 MIN (STAT)

## 2017-06-16 PROCEDURE — 25000003 PHARM REV CODE 250: Performed by: EMERGENCY MEDICINE

## 2017-06-16 PROCEDURE — 27000221 HC OXYGEN, UP TO 24 HOURS

## 2017-06-16 PROCEDURE — 25000003 PHARM REV CODE 250: Performed by: INTERNAL MEDICINE

## 2017-06-16 PROCEDURE — 99291 CRITICAL CARE FIRST HOUR: CPT | Mod: ,,, | Performed by: NURSE PRACTITIONER

## 2017-06-16 PROCEDURE — 94640 AIRWAY INHALATION TREATMENT: CPT

## 2017-06-16 RX ORDER — DILTIAZEM HYDROCHLORIDE 120 MG/1
120 CAPSULE, COATED, EXTENDED RELEASE ORAL DAILY
Status: DISCONTINUED | OUTPATIENT
Start: 2017-06-16 | End: 2017-06-16 | Stop reason: HOSPADM

## 2017-06-16 RX ORDER — DILTIAZEM HYDROCHLORIDE 120 MG/1
120 CAPSULE, COATED, EXTENDED RELEASE ORAL 2 TIMES DAILY
Qty: 60 CAPSULE | Refills: 1 | Status: ON HOLD | OUTPATIENT
Start: 2017-06-16 | End: 2017-12-26 | Stop reason: HOSPADM

## 2017-06-16 RX ADMIN — FUROSEMIDE 40 MG: 40 TABLET ORAL at 08:06

## 2017-06-16 RX ADMIN — BUDESONIDE 0.5 MG: 0.5 SUSPENSION RESPIRATORY (INHALATION) at 08:06

## 2017-06-16 RX ADMIN — ROFLUMILAST 500 MCG: 500 TABLET ORAL at 08:06

## 2017-06-16 RX ADMIN — ASPIRIN 81 MG: 81 TABLET, COATED ORAL at 08:06

## 2017-06-16 RX ADMIN — ARFORMOTEROL TARTRATE 15 MCG: 15 SOLUTION RESPIRATORY (INHALATION) at 08:06

## 2017-06-16 RX ADMIN — INSULIN ASPART 2 UNITS: 100 INJECTION, SOLUTION INTRAVENOUS; SUBCUTANEOUS at 11:06

## 2017-06-16 RX ADMIN — PREDNISONE 40 MG: 20 TABLET ORAL at 08:06

## 2017-06-16 RX ADMIN — DOCUSATE SODIUM 100 MG: 100 CAPSULE, LIQUID FILLED ORAL at 08:06

## 2017-06-16 RX ADMIN — ALPRAZOLAM 0.5 MG: 0.5 TABLET ORAL at 03:06

## 2017-06-16 RX ADMIN — FAMOTIDINE 20 MG: 20 TABLET ORAL at 08:06

## 2017-06-16 RX ADMIN — NIFEDIPINE 120 MG: 10 CAPSULE ORAL at 11:06

## 2017-06-16 RX ADMIN — DILTIAZEM HYDROCHLORIDE 30 MG: 30 TABLET, FILM COATED ORAL at 05:06

## 2017-06-16 RX ADMIN — IPRATROPIUM BROMIDE AND ALBUTEROL SULFATE 3 ML: .5; 3 SOLUTION RESPIRATORY (INHALATION) at 08:06

## 2017-06-16 RX ADMIN — LISINOPRIL 5 MG: 5 TABLET ORAL at 08:06

## 2017-06-16 NOTE — PLAN OF CARE
Problem: Occupational Therapy Goal  Goal: Occupational Therapy Goal  OT GOALS TO BE MET BY 6-22-17  1. SBA WITH UE DRESSING  2. PT WILL TOLERATE 1 SET X 10 REPS B UE ROM EXERCISE WITH MIN RESISTANCE  3.  MIN A  WITH LE DRESSING  4 S WITH  T/F'S   Outcome: Ongoing (interventions implemented as appropriate)  Pt demonstrates improvements with b ue strength/endurance as evidence by completing hep

## 2017-06-16 NOTE — PT/OT/SLP EVAL
Physical Therapy  Evaluation    Petrona Gonzalez   MRN: [id number]   Admitting Diagnosis: Acute on chronic respiratory failure with hypoxia and hypercapnia    PT Received On: 06/15/17  PT Start Time: 1011     PT Stop Time: 1037    PT Total Time (min): 26 min       Billable Minutes:  Evaluation 16 and Gait [id number]    Diagnosis: Acute on chronic respiratory failure with hypoxia and hypercapnia  P.T. DX: GT INSTABILITY     Past Medical History:   Diagnosis Date    COPD (chronic obstructive pulmonary disease) with emphysema     Diabetes mellitus     Hyperlipidemia     Hypertension     Insomnia     Maxillary sinusitis, chronic       Past Surgical History:   Procedure Laterality Date    BACK SURGERY      HERNIA REPAIR      HYSTERECTOMY         Referring physician: TRAVIS  Date referred to PT: 6/15/2017      General Precautions: Standard, fall  Orthopedic Precautions:     Braces:              Patient History:  Lives With: facility resident  Living Arrangements: residential facility  Home Layout: Able to live on 1st floor  Stair Railings at Home: none  Living Environment Comment: PT LIVES AT THE NURSING HOME AND RECIEVES P.T.   Equipment Currently Used at Home: walker, rolling, shower chair  DME owned (not currently used): rolling walker and shower chair    Previous Level of Function:  Ambulation Skills: needs device  Transfer Skills: needs device  ADL Skills: needs device    Subjective:  Communicated with NURSE AND EPIC CHART REVIEW  prior to session.   PT AGREED TO EVAL AND TX   Chief Complaint INC SOB  Patient goals: NONE STATED     Pain/Comfort  Pain Rating 1: 0/10      Objective:   Patient found with: oxygen, pulse ox (continuous), telemetry, peripheral IV     Cognitive Exam:  Oriented to: Person, Place, Time and Situation    Follows Commands/attention: Follows multistep  commands  Communication: clear/fluent  Safety awareness/insight to disability: intact    Physical Exam:  Postural examination/scapula  alignment: Rounded shoulder and Head forward    Skin integrity: Visible skin intact  Edema: None noted     Sensation:   Intact      Lower Extremity Range of Motion:  Right Lower Extremity: WFL  Left Lower Extremity: WFL    Lower Extremity Strength:  Right Lower Extremity: WFL  Left Lower Extremity: WFL    Functional Mobility:  Bed Mobility:  Rolling/Turning Right: Contact guard assistance  Scooting/Bridging: Contact Guard Assistance    Transfers:  Sit <> Stand Assistance: Contact Guard Assistance  Sit <> Stand Assistive Device: Rolling Walker  Bed <> Chair Technique: Stand Pivot  Bed <> Chair Assistance: Contact Guard Assistance  Bed <> Chair Assistive Device: Rolling Walker    Gait:   Gait Distance: PT GT TRAINED WITH RW X 30' WITH STEP TO GT AND O2 IN TOW  Assistance 1: Contact Guard Assistance  Gait Assistive Device: Rolling walker  Gait Pattern: swing-through gait  Gait Deviation(s): decreased drake    Stairs:      Therapeutic Activities and Exercises:  P.T. EVAL COMPLETED. PT RETURNED TO  T/F TO CHAIR AND LEFT SEATED WITH ALL NEEDS MET. PT EDUCATED ON ROLE OF P.T.     AM-[facility] 6 CLICK MOBILITY  How much help from another person does this patient currently need?   1 = Unable, Total/Dependent Assistance  2 = A lot, Maximum/Moderate Assistance  3 = A little, Minimum/Contact Guard/Supervision  4 = None, Modified [place]/Independent    Turning over in bed (including adjusting bedclothes, sheets and blankets)?: 3  Sitting down on and standing up from a chair with arms (e.g., wheelchair, bedside commode, etc.): 3  Moving from lying on back to sitting on the side of the bed?: 3  Moving to and from a bed to a chair (including a wheelchair)?: 3  Need to walk in hospital room?: 3  Climbing 3-5 steps with a railing?: 1  Total Score: 16     AM-PAC Raw Score CMS G-Code Modifier Level of Impairment Assistance   6 % Total / Unable   7 - 9 CM 80 - 100% Maximal Assist   10 - 14 CL 60 - 80% Moderate Assist   15 -  19 CK 40 - 60% Moderate Assist   20 - 22 CJ 20 - 40% Minimal Assist   23 CI 1-20% SBA / CGA   24 CH 0% Independent/ Mod I     Patient left up in chair with call button in reach.    Assessment:   Petrona Gonzalez is a 76 y.o. female with a medical diagnosis of Acute on chronic respiratory failure with hypoxia and hypercapnia and presents with GT INSTABILITY AND POOR ENDURANCE. PT WILL BENEFIT FROM P.T. TO ADDRESS IMPAIRMENTS LISTED.    Rehab identified problem list/impairments: Rehab identified problem list/impairments: weakness, impaired functional mobilty, impaired balance, gait instability, impaired self care skills, impaired endurance, decreased lower extremity function, impaired cardiopulmonary response to activity    Rehab potential is good.    Activity tolerance: Fair    Discharge recommendations: Discharge Facility/Level Of Care Needs: nursing facility, skilled     Barriers to discharge: Barriers to Discharge: None    Equipment recommendations: Equipment Needed After Discharge: walker, rolling     GOALS:    Physical Therapy Goals        Problem: Physical Therapy Goal    Goal Priority Disciplines Outcome Goal Variances Interventions   Physical Therapy Goal     PT/OT, PT      Description:  PT WILL BE SEEN FOR P.T. FOR A MIN OF 5 OUT OF 7 DAYS A WEEK  LT17  1. PT WILL COMPLETE BED MOBILITY WITH S.  2. PT WILL T/F TO CHAIR WITH RW AND S  3. PT WILL GT TRAIN X 100' WITH S  4. PT WILL COMPLETE B LE TE X 20 REPS FOR STRENGTHENING.                    PLAN:    Patient to be seen   to address the above listed problems via gait training, therapeutic activities, therapeutic exercises  Plan of Care expires: 17  Plan of Care reviewed with: patient    Functional Assessment Tool Used: [place] AM PAC  Score: CK  Functional Limitation: Mobility: Walking and moving around  Mobility: Walking and Moving Around Current Status (): CK  Mobility: Walking and Moving Around Goal Status (): REGGIE Morrell  Lambert, PT  06/15/2017

## 2017-06-16 NOTE — PROGRESS NOTES
Pt being discharged via wheelchair to [facility] transport. Pt on 3 L NC at transfer. Monitor, IVs, and Cutler removed.

## 2017-06-16 NOTE — PROGRESS NOTES
Progress Note  Critical Care    Admit Date: 6/14/2017   LOS: 2 days     Follow-up For: Resp Failure     SUBJECTIVE:   Change over last 24 hours: converted to NSR yesterday but back in A-Fib this AM with rate controlled.  Up in chair with baseline REES.     ROS:  Review of Systems   Constitutional: Negative for chills, fever and malaise/fatigue.   HENT: Negative for congestion.    Eyes: Negative for blurred vision.   Respiratory: Positive for cough (chronic) and shortness of breath (chronic). Negative for sputum production.    Cardiovascular: Negative for chest pain and leg swelling.   Gastrointestinal: Negative for abdominal pain, nausea and vomiting.   Genitourinary: Negative for dysuria.   Musculoskeletal: Negative for myalgias.   Skin: Negative for rash.   Neurological: Positive for weakness. Negative for dizziness, focal weakness and headaches.   Endo/Heme/Allergies: Does not bruise/bleed easily.   Psychiatric/Behavioral: The patient is not nervous/anxious.        Continuous Infusions:   Review of patient's allergies indicates:   Allergen Reactions    Meloxicam Other (See Comments)      Patient stated that she has muscle aches and pains    Morphine Rash    Naproxen Other (See Comments)     Patient states she has muscle and aches.    Pulmicort [budesonide] Other (See Comments)     Burning in chest       OBJECTIVE:     Vital Signs (Most Recent)  Temp: 98.9 °F (37.2 °C) (06/16/17 0700)  Pulse: (!) 141 (06/16/17 0900)  Resp: (!) 26 (06/16/17 0900)  BP: 131/75 (06/16/17 0750)  SpO2: 99 % (06/16/17 0820)    Vital Signs Range (Last 24H):  Temp:  [97.7 °F (36.5 °C)-98.9 °F (37.2 °C)]   Pulse:  []   Resp:  [18-27]   BP: ()/(28-84)   SpO2:  [91 %-99 %]     I & O (Last 24H):  Intake/Output Summary (Last 24 hours) at 06/16/17 1036  Last data filed at 06/16/17 0800   Gross per 24 hour   Intake           464.23 ml   Output             1730 ml   Net         -1265.77 ml       Ventilator Data (Last 24H):      Oxygen Concentration (%):  [32] 32    Physical Exam:  Physical Exam   Constitutional: She is oriented to person, place, and time. Vital signs are normal. She appears not lethargic, to not be writhing in pain and not malnourished. She appears unhealthy.  Non-toxic appearance. No distress.   HENT:   Head: Normocephalic and atraumatic.   Mouth/Throat: Oropharynx is clear and moist.   Eyes: EOM and lids are normal. Pupils are equal, round, and reactive to light.   Neck: Normal range of motion. Carotid bruit is not present.   Cardiovascular: Normal rate.  An irregular rhythm present.   Pulses:       Radial pulses are 2+ on the right side, and 2+ on the left side.        Dorsalis pedis pulses are 1+ on the right side, and 1+ on the left side.   Pulmonary/Chest: Accessory muscle usage (mild) present. No tachypnea. No respiratory distress. She has decreased breath sounds.   Abdominal: Soft. She exhibits no distension. Bowel sounds are hypoactive. There is no tenderness.   Musculoskeletal: Normal range of motion.   Mild dependent pedal edema   Lymphadenopathy:     She has no cervical adenopathy.   Neurological: She is alert and oriented to person, place, and time. She appears not lethargic.   Skin: Skin is warm and dry. She is not diaphoretic. No cyanosis.   Psychiatric: Mood, memory, affect and judgment normal.       Laboratory (Last 24H):  CMP:    Recent Labs  Lab 06/16/17  0424   CALCIUM 9.3   *   K 3.6   CO2 35*   CL 98   BUN 18   CREATININE 0.7       Coagulation:   Recent Labs  Lab 06/14/17  1727  06/15/17  1325   INR 1.0  --   --    APTT 22.2  < > 50.4*   < > = values in this interval not displayed.  Microbiology Results (last 7 days)     Procedure Component Value Units Date/Time    Blood Culture #2 **CANNOT BE ORDERED STAT** [[id number]] Collected:  06/14/17 1831    Order Status:  Completed Specimen:  Blood from Peripheral, Antecubital, Left Updated:  06/16/17 0812     Blood Culture, Routine No Growth to date      Blood Culture, Routine No Growth to date    Blood Culture #1 **CANNOT BE ORDERED STAT** [[id number]] Collected:  06/14/17 1810    Order Status:  Completed Specimen:  Blood from Peripheral, Antecubital, Right Updated:  06/16/17 0812     Blood Culture, Routine No Growth to date     Blood Culture, Routine No Growth to date    Urine culture [[id number]] Collected:  06/14/17 2011    Order Status:  Sent Specimen:  Urine from Urine, Catheterized Updated:  06/15/17 1620          Chest X-Ray:         No recent film last 24 hours     ASSESSMENT/PLAN:     Problem   Acute On Chronic Respiratory Failure With Hypoxia and Hypercapnia   Atrial Fibrillation With Rvr   Copd, Severe   Type 2 Diabetes Mellitus With Hyperlipidemia   Obesity With Body Mass Index 30 Or Greater       PLAN:    1. Neuro: Neuro monitoring     2. Pulmonary: Encourage C&DB and OOB.  Noct BiPAP and cont Formeterol, Singulair, and Roflumilast.  Will stop Budesonide at patient request.  Change DN to prn.  Slow wean of oral steroids.  Arrange home BiPAP for use at NH noct and PRN.  F/U outpt Pulm clinic.      3. Cardiac: Cardiac monitoring still in A-Fib with HR controlled.  Cont Cardizem, Lisinopril, statin, Lasix, Xarelto and ASA.     4. Renal: monitor I/Os      5. Infectious Disease: Follow fever curve     6. Hematology/Oncology: Monitor for bleeding     7. Endocrine: monitor glucose and cont SSI     8. Fluids/Electrolytes/Nutrition/GI: encourage ADA diet.     9. Musculoskeletal:  ROM and PT/OT following     10. Pain Management: no issues      11. Discharge and Palliative Care: Plan return to NH     Preventive Measures and Monitoring:   Stress Ulcer: Pepcid  Nutrition: ADA diet  Glucose control: SSI  Bowel prophylaxis: PRN Dulcolax  DVT prophylaxis: Xarelto  Hx CAD on B-Blocker: no hx CAD  Head of Bed/Reposition: Elevate HOB and turn Q1-2 hours   Early Mobility: OOB  Code Status: Full  Pneumonia Vaccine: UTD    Counseling/Consultation: I have discussed the  care of this patient in detail with multidisciplinary team during rounds, bedside nursing staff and Dr. Taylor and Dr. De    Critical Care Time greater than: 45 Minutes    Critical care was time spent personally by me on the following activities: development of treatment plan with patient or surrogate and bedside caregivers, discussions with consultants, evaluation of patient's response to treatment, examination of patient, ordering and performing treatments and interventions, ordering and review of laboratory studies, ordering and review of radiographic studies, pulse oximetry, re-evaluation of patient's condition.  This critical care time did not overlap with that of any other provider or involve time of any procedures.    EMIL Sultana [facility]

## 2017-06-16 NOTE — PHYSICIAN QUERY
PT Name: Petrona Gonzalez  MR #: [id number]     Physician Query Form - Documentation Clarification      CDS/: Norah Beckman               Contact information:    This form is a permanent document in the medical record.     Query Date: June 16, 2017    By submitting this query, we are merely seeking further clarification of documentation. Please utilize your independent clinical judgment when addressing the question(s) below.    The Medical record reflects the following:    Supporting Clinical Findings Location in Medical Record     CHF acute         H&P/PN 6/15     SYE=897     Lasix PO given/NO IV      Echo with EF 60      Has no evidence of decompensated HF. 2D echo shows       Lab 6/14    MAR      ECHO 6/15      Cardiology CN 6/15                                                                              Please specify the type of CHF, and clarify the acuity.  Thank you.            [  X  ]  Acute on Chronic Diastolic heart failure    [    ]  Chronic Diastolic Heart Failure    [   ]  Other________________________                                                                                                                               [  ] Clinically undetermined

## 2017-06-16 NOTE — CONSULTS
"  [facility]  Adult Nutrition  Consult Note    SUMMARY     Recommendations  Recommendation/Intervention: 1. Add Boost Glucose Control 1 can HS     2. Consider once daily multivitamin  Goals: Intake of % of meals  Nutrition Goal Status: new  Communication of RD Recs: discussed on rounds    Nutrition Discharge Plan  Home on 1600 calorie Diabetic Cardiac Diet    Reason for Assessment  Reason for Assessment: identified at risk by screening criteria, nurse/nurse practitioner consult (Stage II pressure ulcer)  Diagnosis:  (Afib)  Relevent Medical History: COPD, DM, HLD, HTN, see H&P   Interdisciplinary Rounds: attended     General Information Comments: Patient with stage II wound to buttock, resident of [facility].    Nutrition Prescription Ordered  Current Diet Order: 1600 calorie Diabetic      Nutrition Risk Screen  Nutrition Risk Screen: no indicators present    Nutrition/Diet History  Food Preferences: none reported including Amish or cultural preferences    Labs/Tests/Procedures/Meds  Pertinent Labs Reviewed: reviewed  Pertinent Labs Comments: Na 146, Gluc 118, HgbA1c 7.5%  Pertinent Medications Reviewed: reviewed    Physical Findings  Overall Physical Appearance: obese  Oral/Mouth Cavity: WDL  Skin: pressure ulcer(s) (Stage II wound to buttock, Harry 18)    Anthropometrics  Height: 5' 3" (160 cm)  Weight Method: Bed Scale  Weight: 86.5 kg (190 lb 11.2 oz)  Ideal Body Weight (IBW), Female: 115 lb  % Ideal Body Weight, Female (lb): 165.83 lb  BMI (Calculated): 33.9  BMI Grade: 30 - 34.9- obesity - grade I    Estimated/Assessed Needs  Weight Used For Calorie Calculations: 86.5 kg (190 lb 11.2 oz)   Energy Need Method: Zamora-St Jeor (x1.2-1.3 = 6078-8059 calories)  Weight Used For Protein Calculations: 86.5 kg (190 lb 11.2 oz)  1.0 gm Protein (gm): 86.68  Fluid Need Method: RDA Method (1ml/felicia or as needed)    Assessment and Plan  Increased nutrition needs  Related to wound " healing  As evidenced by presence of stage 2 wound    Monitor and Evaluation  Food and Nutrient Intake: food and beverage intake  Food and Nutrient Adminstration:  (oral nutrition supplement, vitamin/mineral supplementation)  Biochemical Data, Medical Tests and Procedures: glucose/endocrine profile  Nutrition-Focused Physical Findings: skin    Nutrition Follow-Up  RD Follow-up?: Yes (1x weekly)

## 2017-06-16 NOTE — PT/OT/SLP PROGRESS
Physical Therapy  Treatment    Petrona Gonzalez   MRN: [id number]   Admitting Diagnosis: Acute on chronic respiratory failure with hypoxia and hypercapnia    PT Received On: 06/16/17  PT Start Time: 0846     PT Stop Time: 0910    PT Total Time (min): 24 min       Billable Minutes:  Gait [id number] and Therapeutic Exercise 10    Treatment Type: Treatment  PT/PTA: PT             General Precautions: Standard, fall  Orthopedic Precautions:     Braces:           Subjective:  Communicated with NURSE AND EPIC CHART REVIEW  prior to session.   PT AGREED TO TX     Pain/Comfort  Pain Rating 1: 0/10  Pain Rating Post-Intervention 1: 0/10    Objective:   Patient found with: peripheral IV, telemetry, oxygen, pulse ox (continuous)    Functional Mobility:  Bed Mobility:        Transfers:  Sit <> Stand Assistance: Stand By Assistance  Sit <> Stand Assistive Device: Rolling Walker  Bed <> Chair Technique: Stand Pivot  Bed <> Chair Assistance: Stand By Assistance  Bed <> Chair Assistive Device: Rolling Walker    Gait:   Gait Distance: PT GT TRAINED WITH RW 1X50' AND 1X60' WITH O2 IN TOW  Assistance 1: Stand by Assistance  Gait Assistive Device: Rolling walker  Gait Pattern: swing-through gait  Gait Deviation(s): decreased drake    Therapeutic Activities and Exercises:  PT GT TRAINED WITH ONE SEATED REST BREAK BETWEEN TRIALS. PT SEATED IN CHAIR FOR B LE TE X 10 REPS FOR STRENGTHENING. PT LEFT SEATED WITH ALL NEEDS MET      AM-PAC 6 CLICK MOBILITY  How much help from another person does this patient currently need?   1 = Unable, Total/Dependent Assistance  2 = A lot, Maximum/Moderate Assistance  3 = A little, Minimum/Contact Guard/Supervision  4 = None, Modified [place]/Independent    Turning over in bed (including adjusting bedclothes, sheets and blankets)?: 4  Sitting down on and standing up from a chair with arms (e.g., wheelchair, bedside commode, etc.): 4  Moving from lying on back to sitting on the side of the bed?: 4  Moving  to and from a bed to a chair (including a wheelchair)?: 4  Need to walk in hospital room?: 4  Climbing 3-5 steps with a railing?: 1  Total Score: 21    AM-PAC Raw Score CMS G-Code Modifier Level of Impairment Assistance   6 % Total / Unable   7 - 9 CM 80 - 100% Maximal Assist   10 - 14 CL 60 - 80% Moderate Assist   15 - 19 CK 40 - 60% Moderate Assist   20 - 22 CJ 20 - 40% Minimal Assist   23 CI 1-20% SBA / CGA   24 CH 0% Independent/ Mod I     Patient left up in chair with call button in reach.    Assessment:  PT PROGRESSING WITH GT     Rehab identified problem list/impairments: Rehab identified problem list/impairments: weakness, impaired endurance, impaired balance, impaired functional mobilty, gait instability    Rehab potential is good.    Activity tolerance: Fair    Discharge recommendations: Discharge Facility/Level Of Care Needs: nursing facility, skilled     Barriers to discharge: Barriers to Discharge: None    Equipment recommendations: Equipment Needed After Discharge: walker, rolling     GOALS:    Physical Therapy Goals        Problem: Physical Therapy Goal    Goal Priority Disciplines Outcome Goal Variances Interventions   Physical Therapy Goal     PT/OT, PT      Description:  PT WILL BE SEEN FOR P.T. FOR A MIN OF 5 OUT OF 7 DAYS A WEEK  LT17  1. PT WILL COMPLETE BED MOBILITY WITH S.  2. PT WILL T/F TO CHAIR WITH RW AND S  3. PT WILL GT TRAIN X 100' WITH S  4. PT WILL COMPLETE B LE TE X 20 REPS FOR STRENGTHENING.                    PLAN:    Patient to be seen    to address the above listed problems via gait training, therapeutic activities, therapeutic exercises  Plan of Care expires: 17  Plan of Care reviewed with: patient         Porsche Verdin, PT  2017

## 2017-06-16 NOTE — PROGRESS NOTES
1730 Assumed client care at this time.  Patient resting comfortably.  O2 saturation via pulse oximetry >92% on 3lpm NC.  Pt interacting appropriately, AAO.  Plans for bipap tonight.

## 2017-06-16 NOTE — PLAN OF CARE
06/16/17 0924   Medicare Message   Important Message from Medicare regarding Discharge Appeal Rights Given to patient/caregiver;Explained to patient/caregiver;Signed/date by patient/caregiver   Date IMM was signed 06/16/17   Time IMM was signed 0924

## 2017-06-16 NOTE — PLAN OF CARE
Problem: Patient Care Overview  Goal: Plan of Care Review  PT REQUIRES CGA FOR GT  30' WITH RW AND O2 IN TOW   Outcome: Ongoing (interventions implemented as appropriate)  PT GT TRAINED 1X50' AND 1X60' WITH RW AND O2

## 2017-06-16 NOTE — PROGRESS NOTES
Cardiology Progress Note        SUBJECTIVE:     History of Present Illness:  77 yo female, came in due to COPD exacerbation and PAF with RVR. Now sinus rhythm with occasional Afib.  Chronic dyspnea. No chest pain    OBJECTIVE:     Vital Signs (Most Recent)  Temp: 98.8 °F (37.1 °C) (06/16/17 1200)  Pulse: 107 (06/16/17 1202)  Resp: (!) 24 (06/16/17 1200)  BP: (!) 144/60 (06/16/17 1200)  SpO2: 95 % (06/16/17 1202)    Vital Signs Range (Last 24H):  Temp:  [97.7 °F (36.5 °C)-98.9 °F (37.2 °C)]   Pulse:  []   Resp:  [18-26]   BP: (116-144)/(28-75)   SpO2:  [95 %-99 %]     Intake/Output last 3 shifts:  I/O last 3 completed shifts:  In: 599.1 [P.O.:90; I.V.:509.1]  Out: 2630 [Urine:2630]    Intake/Output this shift:  I/O this shift:  In: 440 [P.O.:440]  Out: 420 [Urine:420]    Review of patient's allergies indicates:   Allergen Reactions    Meloxicam Other (See Comments)      Patient stated that she has muscle aches and pains    Morphine Rash    Naproxen Other (See Comments)     Patient states she has muscle and aches.    Pulmicort [budesonide] Other (See Comments)     Burning in chest       Current Facility-Administered Medications   Medication    albuterol-ipratropium 2.5mg-0.5mg/3mL nebulizer solution 3 mL    alprazolam tablet 0.5 mg    arformoterol nebulizer solution 15 mcg    aspirin EC tablet 81 mg    bisacodyl suppository 10 mg    dextrose 50% injection 12.5 g    dextrose 50% injection 25 g    diltiaZEM 24 hr capsule 120 mg    docusate sodium capsule 100 mg    famotidine tablet 20 mg    furosemide tablet 40 mg    glucagon (human recombinant) injection 1 mg    glucose chewable tablet 16 g    glucose chewable tablet 24 g    insulin aspart pen 0-5 Units    lisinopril tablet 5 mg    montelukast tablet 10 mg    predniSONE tablet 40 mg    rivaroxaban tablet 20 mg    roflumilast tablet 500 mcg    simvastatin tablet 10 mg    trazodone tablet 50 mg     No current facility-administered  medications on file prior to encounter.      Current Outpatient Prescriptions on File Prior to Encounter   Medication Sig    albuterol 90 mcg/actuation inhaler Inhale 2 puffs into the lungs every 6 (six) hours as needed. Dispense with spacer.    albuterol-ipratropium 2.5mg-0.5mg/3mL (DUO-NEB) 0.5 mg-3 mg(2.5 mg base)/3 mL nebulizer solution Take 3 mLs by nebulization every 6 (six) hours.    arformoterol (BROVANA) 15 mcg/2 mL Nebu Take 2 mLs (15 mcg total) by nebulization 2 (two) times daily. Controller    aspirin (ECOTRIN) 81 MG EC tablet Take 81 mg by mouth once daily.    budesonide-formoterol 160-4.5 mcg (SYMBICORT) 160-4.5 mcg/actuation HFAA Inhale 2 puffs into the lungs every 12 (twelve) hours. Controller    cetirizine (ZYRTEC) 10 MG tablet Take 1 tablet (10 mg total) by mouth once daily.    lisinopril (PRINIVIL,ZESTRIL) 5 MG tablet Take 1 tablet (5 mg total) by mouth once daily.    metformin (GLUCOPHAGE) 500 MG tablet Take 1 tablet (500 mg total) by mouth 2 (two) times daily with meals.    predniSONE (DELTASONE) 5 MG tablet Take 40 mg by mouth once daily.     roflumilast 500 mcg Tab Take 1 tablet (500 mcg total) by mouth once daily.    simvastatin (ZOCOR) 10 MG tablet Take 1 tablet (10 mg total) by mouth every evening.    OXYGEN-AIR DELIVERY SYSTEMS MISC by [facility]Non-Drug; Combo Route) route.       Physical Exam:  General appearance: alert, appears stated age and cooperative  Head: Normocephalic, without obvious abnormality, atraumatic  Eyes:  conjunctivae/corneas clear. PERRL, EOM's intact. Fundi benign.  Nose: no discharge  Throat: normal findings: tongue midline and normal  Neck: no adenopathy, no carotid bruit, no JVD, supple, symmetrical, trachea midline and thyroid not enlarged, symmetric, no tenderness/mass/nodules  Back:  no skin lesions, erythema, or scars  Lungs:  clear to auscultation bilaterally  Chest wall: no tenderness  Heart: regular rate and rhythm, S1, S2 normal, no murmur, click,  rub or gallop  Abdomen: soft, non-tender; bowel sounds normal; no masses,  no organomegaly  Extremities: extremities normal, atraumatic, no cyanosis or edema  Pulses: 2+ and symmetric  Skin: Skin color, texture, turgor normal. No rashes or lesions  Neurologic: Grossly normal    Laboratory:  Chemistry:   Lab Results   Component Value Date     (H) 06/16/2017    K 3.6 06/16/2017    CL 98 06/16/2017    CO2 35 (H) 06/16/2017    BUN 18 06/16/2017    CREATININE 0.7 06/16/2017    CALCIUM 9.3 06/16/2017     Cardiac Markers:   Lab Results   Component Value Date    TROPONINI 0.045 (H) 06/15/2017     Cardiac Markers (Last 3):   Lab Results   Component Value Date    TROPONINI 0.045 (H) 06/15/2017    TROPONINI 0.048 (H) 06/14/2017    TROPONINI 0.053 (H) 06/14/2017     CBC:   Lab Results   Component Value Date    WBC 12.91 (H) 06/14/2017    HGB 14.1 06/14/2017    HCT 44.4 06/14/2017    MCV 90 06/14/2017     06/14/2017     Lipids:   Lab Results   Component Value Date    CHOL 155 01/17/2017    TRIG 59 01/17/2017    HDL 51 01/17/2017     Coagulation:   Lab Results   Component Value Date    INR 1.0 06/14/2017    APTT 50.4 (H) 06/15/2017       Diagnostic Results:  ECG: Reviewed  X-Ray: Reviewed  US: Reviewed  CT: Reviewed  Echo: Reviewed      ASSESSMENT/PLAN:     Patient Active Problem List   Diagnosis    Hay fever    Anemia, deficiency    Anxiety    Obesity with body mass index 30 or greater    Cellulitis of elbow    Chest pain    Chronic maxillary sinusitis    Depression    Diabetes mellitus without complication    Diabetic polyneuropathy    Dyslipidemia    Essential (primary) hypertension    Anemia associated with nutritional deficiency    COPD, severe    Pneumonia    Shoulder pain    Exomphalos    Chronic respiratory failure with hypoxia    Insomnia    Breath shortness    Type 2 diabetes mellitus with hyperlipidemia    Uncontrolled type 2 diabetes mellitus with mild nonproliferative retinopathy  and macular edema, without long-term current use of insulin    Hyperlipidemia    Leukocytosis    Poorly controlled diabetes mellitus    Acute on chronic respiratory failure with hypoxia and hypercapnia    Acute hypercapnic respiratory failure    Elevated troponin    Atrial fibrillation with RVR    CHF, acute    Hyperthyroidism        Plan: continue ASA, Xarelto, cardizem, lasix, ACEI and statin.  Continue supportive care

## 2017-06-16 NOTE — ASSESSMENT & PLAN NOTE
IV Diltiazem titration to HR less than 90/min. ECHO. IV heparin. Cardiology consultation recommended transitioning from IV to oral Diltiazem and from Heparin to Xarelto.

## 2017-06-16 NOTE — SUBJECTIVE & OBJECTIVE
Interval History:  Shortness of breath improved.    Review of Systems   Constitutional: Negative for chills and fever.   HENT: Negative for congestion and sore throat.    Eyes: Negative for visual disturbance.   Respiratory: Negative for cough, shortness of breath and wheezing.    Cardiovascular: Negative for chest pain, palpitations and leg swelling.   Gastrointestinal: Negative for abdominal pain, blood in stool, constipation, diarrhea, nausea and vomiting.   Genitourinary: Negative for dysuria and hematuria.   Musculoskeletal: Negative for arthralgias and back pain.   Skin: Negative for rash and wound.   Neurological: Negative for dizziness, weakness, light-headedness and numbness.   Hematological: Negative for adenopathy.     Objective:     Vital Signs (Most Recent):  Temp: 97.7 °F (36.5 °C) (06/15/17 1926)  Pulse: 89 (06/15/17 1926)  Resp: 20 (06/15/17 1926)  BP: (!) 116/41 (06/15/17 1926)  SpO2: 95 % (06/15/17 1926) Vital Signs (24h Range):  Temp:  [97.7 °F (36.5 °C)-98.1 °F (36.7 °C)] 97.7 °F (36.5 °C)  Pulse:  [] 89  Resp:  [16-27] 20  SpO2:  [89 %-100 %] 95 %  BP: ()/() 116/41     Weight: 87.5 kg (192 lb 14.4 oz)  Body mass index is 34.17 kg/m².    Intake/Output Summary (Last 24 hours) at 06/15/17 2244  Last data filed at 06/15/17 2105   Gross per 24 hour   Intake           449.78 ml   Output             1880 ml   Net         -1430.22 ml      Physical Exam   Constitutional: She is oriented to person, place, and time. She appears well-developed and well-nourished. No distress.   HENT:   Head: Normocephalic and atraumatic.   Mouth/Throat: Oropharynx is clear and moist.   Eyes: Conjunctivae and EOM are normal. Pupils are equal, round, and reactive to light.   Neck: Neck supple. No JVD present. No thyromegaly present.   Cardiovascular: Exam reveals no gallop and no friction rub.    No murmur heard.  Irregularly irregular rhythm   Pulmonary/Chest: Effort normal. She has wheezes. She has no  rales.   Reduced breath sounds and faint expiratory wheezes.   Abdominal: Soft. Bowel sounds are normal. She exhibits no distension. There is no tenderness. There is no rebound and no guarding.   Musculoskeletal: Normal range of motion. She exhibits no edema or deformity.   Lymphadenopathy:     She has no cervical adenopathy.   Neurological: She is alert and oriented to person, place, and time. She has normal reflexes.   Skin: Skin is warm and dry. No rash noted.   Psychiatric: She has a normal mood and affect. Her behavior is normal. Judgment and thought content normal.   Nursing note and vitals reviewed.      Significant Labs:   CBC:   Recent Labs  Lab 06/14/17  1727   WBC 12.91*   HGB 14.1   HCT 44.4        CMP:   Recent Labs  Lab 06/14/17  1727 06/15/17  0625    144   K 4.2 2.9*   CL 96 95   CO2 30* 36*   * 126*   BUN 22 16   CREATININE 0.9 0.7   CALCIUM 9.6 8.9   PROT 8.0  --    ALBUMIN 3.6  --    BILITOT 0.3  --    ALKPHOS 62  --    AST 25  --    ALT 31  --    ANIONGAP 18* 13   EGFRNONAA >60 >60       Significant Imaging: I have reviewed all pertinent imaging results/findings within the past 24 hours.

## 2017-06-16 NOTE — PLAN OF CARE
Problem: Patient Care Overview  Goal: Plan of Care Review  PT REQUIRES CGA FOR GT  30' WITH RW AND O2 IN TOW   Outcome: Ongoing (interventions implemented as appropriate)  Recommendation/Intervention: 1. Add Boost Glucose Control 1 can HS     2. Consider once daily multivitamin  Goals: Intake of % of meals  Nutrition Goal Status: new  Communication of RD Recs: discussed on rounds

## 2017-06-16 NOTE — PT/OT/SLP PROGRESS
Occupational Therapy      Petrona Gonzalez  MRN: [id number]  S: PT COOPERATIVE WITH THERAPY SESSION.  O: PT PERFORMED 1 SET X 10 REPS B UE ROM EXERCISE IN ALL AVAILABLE PLANES AND RANGES SEATED IN BEDSIDE CHAIR  A: PT DEMONSTRATES IMPROVEMENTS WITH B UE STRENGTH/ENDURANCE AS EVIDENCE AS COMPLETING HEP. . P: CONTINUE WITH POC    Rosaline Ventura OT   10:45-11:00  1 ADILENE    6/16/2017

## 2017-06-16 NOTE — PLAN OF CARE
Problem: Patient Care Overview  Goal: Plan of Care Review  PT REQUIRES CGA FOR GT  30' WITH RW AND O2 IN TOW   Outcome: Ongoing (interventions implemented as appropriate)  Patient resting well in between care.  O2 sats stable on 3lpm NC.  Afib rate controlled / NSR with PAC's on telemetry monitor.  Transitioned to xarelto and heparin stopped last night appx 2300.  No acute issues overnight, patient turning and repositioning q2 hours independently.  Patient updated to plan of care, no questions at this time.

## 2017-06-16 NOTE — PLAN OF CARE
CM spoke with Reny at [facility] and reported the patient has d/c orders 2-4p. SN inst the staff to pack  the Bipap mask so that she can use it a the facility.

## 2017-06-16 NOTE — PROGRESS NOTES
[facility] Hospital Medicine  Progress Note    Patient Name: Petrona Gonzalez  MRN: [id number]  Patient Class: IP- Inpatient   Admission Date: 6/14/2017  Length of Stay: 1 days  Attending Physician: Shravan De MD  Primary Care Provider: Katy Arriaga MD        Subjective:     Principal Problem:Acute on chronic respiratory failure with hypoxia and hypercapnia    HPI:  The patient is a 76-year old nursing home patient with a PMH of Afib presenting with severe dyspnea and palpitations. She was brought in and found to be in afib with RVR dyspneic, hypoxic and had to be placed on BiPAP. She denies any chest pain or dizziness. She only complains of chronic abdominal discomfort as a result of a ventral hernia.      Hospital Course:  No notes on file    Interval History:  Shortness of breath improved.    Review of Systems   Constitutional: Negative for chills and fever.   HENT: Negative for congestion and sore throat.    Eyes: Negative for visual disturbance.   Respiratory: Negative for cough, shortness of breath and wheezing.    Cardiovascular: Negative for chest pain, palpitations and leg swelling.   Gastrointestinal: Negative for abdominal pain, blood in stool, constipation, diarrhea, nausea and vomiting.   Genitourinary: Negative for dysuria and hematuria.   Musculoskeletal: Negative for arthralgias and back pain.   Skin: Negative for rash and wound.   Neurological: Negative for dizziness, weakness, light-headedness and numbness.   Hematological: Negative for adenopathy.     Objective:     Vital Signs (Most Recent):  Temp: 97.7 °F (36.5 °C) (06/15/17 1926)  Pulse: 89 (06/15/17 1926)  Resp: 20 (06/15/17 1926)  BP: (!) 116/41 (06/15/17 1926)  SpO2: 95 % (06/15/17 1926) Vital Signs (24h Range):  Temp:  [97.7 °F (36.5 °C)-98.1 °F (36.7 °C)] 97.7 °F (36.5 °C)  Pulse:  [] 89  Resp:  [16-27] 20  SpO2:  [89 %-100 %] 95 %  BP: ()/() 116/41     Weight: 87.5 kg (192 lb 14.4  oz)  Body mass index is 34.17 kg/m².    Intake/Output Summary (Last 24 hours) at 06/15/17 2244  Last data filed at 06/15/17 2105   Gross per 24 hour   Intake           449.78 ml   Output             1880 ml   Net         -1430.22 ml      Physical Exam   Constitutional: She is oriented to person, place, and time. She appears well-developed and well-nourished. No distress.   HENT:   Head: Normocephalic and atraumatic.   Mouth/Throat: Oropharynx is clear and moist.   Eyes: Conjunctivae and EOM are normal. Pupils are equal, round, and reactive to light.   Neck: Neck supple. No JVD present. No thyromegaly present.   Cardiovascular: Exam reveals no gallop and no friction rub.    No murmur heard.  Irregularly irregular rhythm   Pulmonary/Chest: Effort normal. She has wheezes. She has no rales.   Reduced breath sounds and faint expiratory wheezes.   Abdominal: Soft. Bowel sounds are normal. She exhibits no distension. There is no tenderness. There is no rebound and no guarding.   Musculoskeletal: Normal range of motion. She exhibits no edema or deformity.   Lymphadenopathy:     She has no cervical adenopathy.   Neurological: She is alert and oriented to person, place, and time. She has normal reflexes.   Skin: Skin is warm and dry. No rash noted.   Psychiatric: She has a normal mood and affect. Her behavior is normal. Judgment and thought content normal.   Nursing note and vitals reviewed.      Significant Labs:   CBC:   Recent Labs  Lab 06/14/17  1727   WBC 12.91*   HGB 14.1   HCT 44.4        CMP:   Recent Labs  Lab 06/14/17  1727 06/15/17  0625    144   K 4.2 2.9*   CL 96 95   CO2 30* 36*   * 126*   BUN 22 16   CREATININE 0.9 0.7   CALCIUM 9.6 8.9   PROT 8.0  --    ALBUMIN 3.6  --    BILITOT 0.3  --    ALKPHOS 62  --    AST 25  --    ALT 31  --    ANIONGAP 18* 13   EGFRNONAA >60 >60       Significant Imaging: I have reviewed all pertinent imaging results/findings within the past 24  hours.    Assessment/Plan:      CHF, acute    IV Lasix. ECHO. Troponins. Lovenox.           Atrial fibrillation with RVR    IV Diltiazem titration to HR less than 90/min. ECHO. IV heparin. Cardiology consultation recommended transitioning from IV to oral Diltiazem and from Heparin to Xarelto.        Elevated troponin    Serial troponin evaluations and cardiology consultation.           * Acute on chronic respiratory failure with hypoxia and hypercapnia    BiPAP. Lasix. ABG's. ICU monitoring. Bronchodilators.             VTE Risk Mitigation         Ordered     rivaroxaban tablet 20 mg  With dinner     Route:  Oral        06/15/17 2243     Medium Risk of VTE  Once      06/14/17 2128     Place sequential compression device  Until discontinued      06/14/17 2128          Shravan De MD  Department of Hospital Medicine   [facility]

## 2017-06-17 NOTE — DISCHARGE SUMMARY
[facility] Hospital Medicine  Discharge Summary      Patient Name: Petrona Gonzalez  MRN: [id number]  Admission Date: 6/14/2017  Hospital Length of Stay: 2 days  Discharge Date and Time: 6/16/2017  6:02 PM  Attending Physician: Bre att. providers found   Discharging Provider: Shravan De MD  Primary Care Provider: Katy Arriaga MD      HPI:   The patient is a 76-year old nursing home patient with a PMH of Afib presenting with severe dyspnea and palpitations. She was brought in and found to be in afib with RVR dyspneic, hypoxic and had to be placed on BiPAP. She denies any chest pain or dizziness. She only complains of chronic abdominal discomfort as a result of a ventral hernia.      * No surgery found *      Indwelling Lines/Drains at time of discharge:   Lines/Drains/Airways     Drain                 Urethral Catheter 06/14/17 1811 Double-lumen;Latex 16 Fr. 2 days          Pressure Ulcer                 Pressure Ulcer 06/14/17 2135 Left lower buttocks Stage II 2 days              Hospital Course:   Admitted for atrial fibrillation with rapid ventricular response.  Rate controlled with Diltiazem initially by push and infusion then scheduled oral.  Also started on heparin drip on admission.  When controlled her symptoms resolved.  Evaluation by Cardiology recommended starting NOAC and we started Rivaroxaban 20 mg daily and Diltiazem  mg twice daily.  Discharge to follow up with Pulmonology as planned and her PCP as needed with order for BiPAP at night.  I have seen and examined Ms. Gonzalez on the day of discharge and deemed her appropriate to return to her nursing home.     Consults:   Consults         Status Ordering Provider     Inpatient consult to Cardiology  Once     Provider:  (Not yet assigned)    Completed RENEE CARDONA     Inpatient consult to Pulmonology  Once     Provider:  (Not yet assigned)    Completed FRANCO RUSSELL     Inpatient consult to Social Work   Once     Provider:  (Not yet assigned)    Completed JON MONTOYA     Inpatient consult to Social Work/Case Management  Once     Provider:  (Not yet assigned)    Completed BAYLEE OSHEA     IP consult to dietary  Once     Provider:  (Not yet assigned)    Completed ALYCE ROBLES          Significant Diagnostic Studies: Labs:   CMP   Recent Labs  Lab 06/15/17  0625 06/16/17  0424    146*   K 2.9* 3.6   CL 95 98   CO2 36* 35*   * 118*   BUN 16 18   CREATININE 0.7 0.7   CALCIUM 8.9 9.3   ANIONGAP 13 13   ESTGFRAFRICA >60 >60   EGFRNONAA >60 >60    and CBC No results for input(s): WBC, HGB, HCT, PLT in the last 48 hours.    Pending Diagnostic Studies:     None        Final Active Diagnoses:    Diagnosis Date Noted POA    PRINCIPAL PROBLEM:  Acute on chronic respiratory failure with hypoxia and hypercapnia [J96.21, J96.22] 05/17/2017 Yes    Hyperthyroidism [E05.90] 06/15/2017 Yes    Atrial fibrillation with RVR [I48.91] 06/14/2017 Yes    CHF, acute [I50.9] 06/14/2017 Yes    Elevated troponin [R74.8] 05/17/2017 Yes    COPD, severe [J44.9] 02/05/2013 Yes     Chronic      Problems Resolved During this Admission:    Diagnosis Date Noted Date Resolved POA      No new Assessment & Plan notes have been filed under this hospital service since the last note was generated.  Service: Hospital Medicine      Discharged Condition: good    Disposition: Skilled Nursing Facility    Follow Up:  Follow-up Information     Juan Carlos Araujo MD On 6/28/2017.    Specialty:  Pulmonary Disease  Why:  Hospital follow up schedule time is at 8 am  Contact information:  [address]  [place] [address] [phone]             Katy Arriaga MD.    Specialty:  Family Medicine  Why:  As needed  Contact information:  [address] [address] [address] [phone]                 Patient Instructions:     BIPAP FOR HOME USE   Order Comments: Oxygen 30%.  Use at night.   Order Specific  Question Answer Comments   Type: BiPap    BiPap setting (cmH20) Inspiratory: 12    BiPap setting (cmH20) Expiratory: 6    Length of need (1-99 months): 99    Humidification: Heated    Type of mask: FFM    BiPap supply refills: 12        Medications:  Reconciled Home Medications:   Discharge Medication List as of 6/16/2017  4:46 PM      START taking these medications    Details   diltiaZEM (CARDIZEM CD) 120 MG Cp24 Take 1 capsule (120 mg total) by mouth 2 (two) times daily., Starting Fri 6/16/2017, Until Tue 8/15/2017, Normal      rivaroxaban (XARELTO) 20 mg Tab Take 1 tablet (20 mg total) by mouth daily with dinner or evening meal., Starting Fri 6/16/2017, Until Tue 8/15/2017, Normal         CONTINUE these medications which have NOT CHANGED    Details   albuterol 90 mcg/actuation inhaler Inhale 2 puffs into the lungs every 6 (six) hours as needed. Dispense with spacer., Starting 3/17/2017, Until Sat 3/17/18, Normal      albuterol-ipratropium 2.5mg-0.5mg/3mL (DUO-NEB) 0.5 mg-3 mg(2.5 mg base)/3 mL nebulizer solution Take 3 mLs by nebulization every 6 (six) hours., Starting Mon 1/23/2017, Until Wed 6/14/2017, Normal      alprazolam (XANAX) 0.5 MG tablet Take 0.5 mg by mouth every 6 (six) hours as needed for Anxiety., Historical Med      arformoterol (BROVANA) 15 mcg/2 mL Nebu Take 2 mLs (15 mcg total) by nebulization 2 (two) times daily. Controller, Starting Tue 5/23/2017, Until Wed 5/23/2018, Normal      aspirin (ECOTRIN) 81 MG EC tablet Take 81 mg by mouth once daily., Until Discontinued, Historical Med      budesonide-formoterol 160-4.5 mcg (SYMBICORT) 160-4.5 mcg/actuation HFAA Inhale 2 puffs into the lungs every 12 (twelve) hours. Controller, Starting 4/12/2017, Until Thu 4/12/18, Normal      cetirizine (ZYRTEC) 10 MG tablet Take 1 tablet (10 mg total) by mouth once daily., Starting 4/4/2017, Until Discontinued, Normal      furosemide (LASIX) 40 MG tablet Take 40 mg by mouth 2 (two) times daily., Historical Med       lisinopril (PRINIVIL,ZESTRIL) 5 MG tablet Take 1 tablet (5 mg total) by mouth once daily., Starting 2/13/2017, Until Discontinued, Normal      metformin (GLUCOPHAGE) 500 MG tablet Take 1 tablet (500 mg total) by mouth 2 (two) times daily with meals., Starting 2/13/2017, Until Tue 2/13/18, Normal      montelukast (SINGULAIR) 10 mg tablet Take 10 mg by mouth every evening., Historical Med      OXYGEN-AIR DELIVERY SYSTEMS MISC by Misc.(Non-Drug; Combo Route) route., Until Discontinued, Historical Med      predniSONE (DELTASONE) 5 MG tablet Take 40 mg by mouth once daily. , Starting Tue 3/28/2017, Historical Med      roflumilast 500 mcg Tab Take 1 tablet (500 mcg total) by mouth once daily., Starting Tue 5/23/2017, Normal      simvastatin (ZOCOR) 10 MG tablet Take 1 tablet (10 mg total) by mouth every evening., Starting 2/13/2017, Until Discontinued, Normal      trazodone (DESYREL) 50 MG tablet Take 50 mg by mouth nightly as needed for Insomnia., Historical Med           Time spent on the discharge of patient: 30 minutes    Shravan De MD  Department of Hospital Medicine  [facility]

## 2017-06-17 NOTE — HOSPITAL COURSE
Admitted for atrial fibrillation with rapid ventricular response.  Rate controlled with Diltiazem initially by push and infusion then scheduled oral.  Also started on heparin drip on admission.  When controlled her symptoms resolved.  Evaluation by Cardiology recommended starting NOAC and we started Rivaroxaban 20 mg daily and Diltiazem  mg twice daily.  Discharge to follow up with Pulmonology as planned and her PCP as needed with order for BiPAP at night.  I have seen and examined Ms. Gonzalez on the day of discharge and deemed her appropriate to return to her nursing home.

## 2017-06-19 ENCOUNTER — PATIENT OUTREACH (OUTPATIENT)
Dept: ADMINISTRATIVE | Facility: CLINIC | Age: 77
End: 2017-06-19
Payer: MEDICARE

## 2017-06-20 LAB
BACTERIA BLD CULT: NORMAL
BACTERIA BLD CULT: NORMAL

## 2017-07-06 ENCOUNTER — TELEPHONE (OUTPATIENT)
Dept: INTERNAL MEDICINE | Facility: CLINIC | Age: 77
End: 2017-07-06

## 2017-07-06 NOTE — TELEPHONE ENCOUNTER
Contacted patient to reschedule 08/04/17 appt with   RENÉE Cheng NP and patient requested to cancel appt

## 2017-07-21 NOTE — DISCHARGE SUMMARY
[facility] Hospital Medicine  Discharge Summary      Patient Name: Petrona Gonzalez  MRN: [id number]  Admission Date: 5/17/2017  Hospital Length of Stay: 6 days  Discharge Date and Time: 5/23/2017  2:02 PM  Attending Physician: Bre att. providers found   Discharging Provider: Bipin Caro MD  Primary Care Provider: Katy Arriaga MD      HPI:   Petrona Gonzalez is a 76 y.o. female patient with a PMH of COPD, HTN, who presented to the ER with c/o gradually increasing shortness of breath over 3 days. Associated symptoms included a productive cough with green sputum. The patient denied any modifying factors. Patient denied fever, chills, CP, pnd, orthopnea, palpitations, diaphoresis, headache, blurred vision, numbness, tingling, dizziness, localized weakness, n/v/d, abdominal pain, blood in stools, melena, hematemesis, urinary frequency, urgency, dysuria or hematuria. In the ER the patient was found to have SpO2 75% with a PCO2 of 71.5. The patient reported that she is on 3L of oxygen at home. The patient was admitted to ICU on BiPAP.       * No surgery found *      Indwelling Lines/Drains at time of discharge:   Lines/Drains/Airways          No matching active lines, drains, or airways        Hospital Course:   The patient's breathing improved a day after admission with administration of I/v steroids and antibiotics. She was downtitrated to a nasal cannula and was transferred out of the ICU the next day. Her nebulaizations were continued. Pulmonary was consulted and their input was appreciated. She had begun the process to be transferred to a nursing facility and case management was consulted for the same. She was subsequently transitioned to oral steroids. She was discharged on 5/23/17 to the nursing home. Was seen and examined on that day and was deemed safe for discharge.      Consults:   Consults         Status Ordering Provider     Inpatient consult to Pulmonology  Once     Provider:  Juan Carlos  RASHEED Araujo MD    Completed RENEE CARDONA     Pharmacy to dose Vancomycin consult  Once     Provider:  (Not yet assigned)    Completed AMBER LINN          Significant Diagnostic Studies: Labs:   BMP: No results for input(s): GLU, NA, K, CL, CO2, BUN, CREATININE, CALCIUM, MG in the last 48 hours., CMP No results for input(s): NA, K, CL, CO2, GLU, BUN, CREATININE, CALCIUM, PROT, ALBUMIN, BILITOT, ALKPHOS, AST, ALT, ANIONGAP, ESTGFRAFRICA, EGFRNONAA in the last 48 hours., CBC No results for input(s): WBC, HGB, HCT, PLT in the last 48 hours., INR   Lab Results   Component Value Date    INR 1.0 06/14/2017    INR 1.0 05/17/2017   , Lipid Panel   Lab Results   Component Value Date    CHOL 155 01/17/2017    HDL 51 01/17/2017    LDLCALC 92.2 01/17/2017    TRIG 59 01/17/2017    CHOLHDL 32.9 01/17/2017    and Troponin No results for input(s): TROPONINI in the last 168 hours.  Microbiology:   Blood Culture   Lab Results   Component Value Date    LABBLOO No growth after 5 days. 06/14/2017   , Sputum Culture No results found for: GSRESP, RESPIRATORYC and Urine Culture    Lab Results   Component Value Date    LABURIN No growth 06/14/2017     Radiology: X-Ray: CXR: X-Ray Chest 1 View (CXR): No results found for this visit on 05/17/17.  CT scan: CT ABDOMEN PELVIS WITH CONTRAST: No results found for this visit on 05/17/17.  Cardiac Graphics: Echocardiogram:   2D echo with color flow doppler:   Results for orders placed or performed during the hospital encounter of 02/13/17   2D echo with color flow doppler   Result Value Ref Range    EF 60 55 - 65    Diastolic Dysfunction No        Pending Diagnostic Studies:     None        Final Active Diagnoses:    Diagnosis Date Noted POA    PRINCIPAL PROBLEM:  Acute hypercapnic respiratory failure [J96.02] 05/17/2017 Yes    COPD, severe [J44.9] 02/05/2013 Yes     Chronic    Elevated troponin [R74.8] 05/17/2017 Unknown    Acute on chronic respiratory failure with hypoxia and  hypercapnia [J96.21, J96.22] 05/17/2017 Yes    Hyperlipidemia [E78.5] 02/13/2017 Yes    Obesity with body mass index 30 or greater [E66.9] 04/16/2014 Yes    Poorly controlled diabetes mellitus [E11.65] 11/21/2013 Yes    Essential (primary) hypertension [I10] 02/05/2013 Yes      Problems Resolved During this Admission:    Diagnosis Date Noted Date Resolved POA    Bacteremia [R78.81] 05/20/2017 05/21/2017 No      No new Assessment & Plan notes have been filed under this hospital service since the last note was generated.  Service: Hospital Medicine      Discharged Condition: good    Disposition: Skilled Nursing Facility    Follow Up:  Follow-up Information     Juan Carlos Araujo MD In 2 weeks.    Specialty:  Pulmonary Disease  Contact information:  [address]  [place] [address] [phone]             Katy Arriaga MD In 3 days.    Specialty:  Family Medicine  Contact information:  [address] [address] [address] [phone]                 Patient Instructions:     Diet general   Order Specific Question Answer Comments   Additional restrictions: Diabetic 1800      Activity as tolerated     Call MD for:  temperature >100.4     Call MD for:  persistent nausea and vomiting or diarrhea     Call MD for:  severe uncontrolled pain     Call MD for:  redness, tenderness, or signs of infection (pain, swelling, redness, odor or green/yellow discharge around incision site)       Medications:  Reconciled Home Medications:   Discharge Medication List as of 5/23/2017 12:28 PM      START taking these medications    Details   arformoterol (BROVANA) 15 mcg/2 mL Nebu Take 2 mLs (15 mcg total) by nebulization 2 (two) times daily. Controller, Starting Tue 5/23/2017, Until Wed 5/23/2018, Normal      roflumilast 500 mcg Tab Take 1 tablet (500 mcg total) by mouth once daily., Starting Tue 5/23/2017, Normal      azithromycin (Z-ERIK) 250 MG tablet Take 1 tablet (250 mg total) by mouth once daily.,  Starting Tue 5/23/2017, Normal         CONTINUE these medications which have CHANGED    Details   !! predniSONE (DELTASONE) 20 MG tablet Take 2 tablets (40 mg total) by mouth once daily., Starting Tue 5/23/2017, Until Thu 5/25/2017, Normal       !! - Potential duplicate medications found. Please discuss with provider.      CONTINUE these medications which have NOT CHANGED    Details   albuterol 90 mcg/actuation inhaler Inhale 2 puffs into the lungs every 6 (six) hours as needed. Dispense with spacer., Starting 3/17/2017, Until Sat 3/17/18, Normal      albuterol-ipratropium 2.5mg-0.5mg/3mL (DUO-NEB) 0.5 mg-3 mg(2.5 mg base)/3 mL nebulizer solution Take 3 mLs by nebulization every 6 (six) hours., Starting 1/23/2017, Until Sun 4/23/17, Normal      aspirin (ECOTRIN) 81 MG EC tablet Take 81 mg by mouth once daily., Until Discontinued, Historical Med      budesonide-formoterol 160-4.5 mcg (SYMBICORT) 160-4.5 mcg/actuation HFAA Inhale 2 puffs into the lungs every 12 (twelve) hours. Controller, Starting 4/12/2017, Until Thu 4/12/18, Normal      cetirizine (ZYRTEC) 10 MG tablet Take 1 tablet (10 mg total) by mouth once daily., Starting 4/4/2017, Until Discontinued, Normal      lisinopril (PRINIVIL,ZESTRIL) 5 MG tablet Take 1 tablet (5 mg total) by mouth once daily., Starting 2/13/2017, Until Discontinued, Normal      metformin (GLUCOPHAGE) 500 MG tablet Take 1 tablet (500 mg total) by mouth 2 (two) times daily with meals., Starting 2/13/2017, Until Tue 2/13/18, Normal      OXYGEN-AIR DELIVERY SYSTEMS MISC by [facility].(Non-Drug; Combo Route) route., Until Discontinued, Historical Med      !! predniSONE (DELTASONE) 5 MG tablet Starting 3/28/2017, Until Discontinued, Historical Med      simvastatin (ZOCOR) 10 MG tablet Take 1 tablet (10 mg total) by mouth every evening., Starting 2/13/2017, Until Discontinued, Normal      budesonide (PULMICORT) 0.5 mg/2 mL nebulizer solution Take 2 mLs (0.5 mg total) by nebulization 2 (two) times  daily. Wash out mouth after using., Starting 4/4/2017, Until Wed 4/4/18, Normal       !! - Potential duplicate medications found. Please discuss with provider.      STOP taking these medications       dextromethorphan-guaifenesin  mg (MUCINEX DM)  mg per 12 hr tablet Comments:   Reason for Stopping:         methylPREDNISolone (MEDROL DOSEPACK) 4 mg tablet Comments:   Reason for Stopping:         trazodone (DESYREL) 50 MG tablet Comments:   Reason for Stopping:             Time spent on the discharge of patient: 35 minutes    Bipin Caro MD  Department of Hospital Medicine  [facility]

## 2017-07-26 ENCOUNTER — HOSPITAL ENCOUNTER (OUTPATIENT)
Dept: RADIOLOGY | Facility: HOSPITAL | Age: 77
Discharge: HOME | End: 2017-07-26
Attending: INTERNAL MEDICINE
Payer: MEDICARE

## 2017-07-26 ENCOUNTER — OFFICE VISIT (OUTPATIENT)
Dept: PULMONOLOGY | Facility: CLINIC | Age: 77
End: 2017-07-26
Payer: MEDICARE

## 2017-07-26 VITALS
OXYGEN SATURATION: 96 % | RESPIRATION RATE: 18 BRPM | SYSTOLIC BLOOD PRESSURE: 120 MMHG | DIASTOLIC BLOOD PRESSURE: 68 MMHG | HEART RATE: 86 BPM | WEIGHT: 179.38 LBS | HEIGHT: 63 IN | BODY MASS INDEX: 31.79 KG/M2

## 2017-07-26 DIAGNOSIS — Z99.81 HYPOXEMIA REQUIRING SUPPLEMENTAL OXYGEN: Chronic | ICD-10-CM

## 2017-07-26 DIAGNOSIS — J96.21 ACUTE ON CHRONIC RESPIRATORY FAILURE WITH HYPOXIA AND HYPERCAPNIA: ICD-10-CM

## 2017-07-26 DIAGNOSIS — J44.9 COPD, SEVERE: Chronic | ICD-10-CM

## 2017-07-26 DIAGNOSIS — R09.02 HYPOXEMIA REQUIRING SUPPLEMENTAL OXYGEN: Chronic | ICD-10-CM

## 2017-07-26 DIAGNOSIS — J96.11 CHRONIC RESPIRATORY FAILURE WITH HYPOXIA: ICD-10-CM

## 2017-07-26 DIAGNOSIS — J96.11 CHRONIC RESPIRATORY FAILURE WITH HYPOXIA: Primary | ICD-10-CM

## 2017-07-26 DIAGNOSIS — J96.22 ACUTE ON CHRONIC RESPIRATORY FAILURE WITH HYPOXIA AND HYPERCAPNIA: ICD-10-CM

## 2017-07-26 PROBLEM — J96.02 ACUTE HYPERCAPNIC RESPIRATORY FAILURE: Status: RESOLVED | Noted: 2017-05-17 | Resolved: 2017-07-26

## 2017-07-26 PROCEDURE — 71020 XR CHEST PA AND LATERAL: CPT | Mod: 26,,, | Performed by: RADIOLOGY

## 2017-07-26 PROCEDURE — 71020 XR CHEST PA AND LATERAL: CPT | Mod: TC

## 2017-07-26 PROCEDURE — 1159F MED LIST DOCD IN RCRD: CPT | Mod: S$GLB,,, | Performed by: INTERNAL MEDICINE

## 2017-07-26 PROCEDURE — 99499 UNLISTED E&M SERVICE: CPT | Mod: S$PBB,,, | Performed by: INTERNAL MEDICINE

## 2017-07-26 PROCEDURE — 99999 PR PBB SHADOW E&M-EST. PATIENT-LVL IV: CPT | Mod: PBBFAC,,, | Performed by: INTERNAL MEDICINE

## 2017-07-26 PROCEDURE — 1157F ADVNC CARE PLAN IN RCRD: CPT | Mod: S$GLB,,, | Performed by: INTERNAL MEDICINE

## 2017-07-26 PROCEDURE — 99214 OFFICE O/P EST MOD 30 MIN: CPT | Mod: S$GLB,,, | Performed by: INTERNAL MEDICINE

## 2017-07-26 NOTE — PROGRESS NOTES
"Subjective:       Patient ID: Petrona Gonzalez is a 76 y.o. female.    Chief Complaint: COPD    Mrs. Petrona Alonsoiesis 76 years old  This a follow-up appointment  Severe end stage COPD  FEV1 0.57( 28%)  Says multiple triggers for exacerbations: smells, sprays  She has nocturnal ventilation at [place] [facility]   BIPAP 12/6 at night  She was in the hospital with respiratory failure She is accompanied by her daughter  Her medications were reviewed Incruse Ellipta, DuoNeb via nebulizer which she may use twice a day, Xarelto, singular, aspirin, Daliresp, Lasix 40 mg, prednisone 40 mg.  We have had had a discussion about the chronic prednisone use and I'm going to taper her down to 30 mg  Symbicort in lieu of Brovana.  She also has Xanax and rescue albuterol  She has oxygen at 3 L/m and BiPAP at night  She is not chronic stable state  She is engaged in exercise activity to nursing home  I will get an x-ray on her today    I'll send instructions to the nursing home to ensure that the Brovana is discontinued from the med card.  She will get a chest x-ray today  Will prednisone is going to be reduced to 30 mg      Review of Systems   Constitutional: Positive for fatigue.   HENT: Negative.    Eyes: Negative.    Respiratory: Positive for shortness of breath. Negative for snoring, sputum production and wheezing.    Cardiovascular: Negative.    Genitourinary: Negative.    Endocrine: endocrine negative   Musculoskeletal: Negative.  Negative for myalgias.   Skin: Negative.         bruising   Gastrointestinal: Negative.    Neurological: Negative.    Psychiatric/Behavioral: Negative.        Objective:       Vitals:    07/26/17 1532   BP: 120/68   Pulse: 86   Resp: 18   SpO2: 96%  Comment: 3LPM O2   Weight: 81.4 kg (179 lb 5.5 oz)   Height: 5' 3" (1.6 m)     Physical Exam   Constitutional: She is oriented to person, place, and time. She appears well-nourished.   HENT:   Head: Normocephalic.   Nose: Nose normal.   Neck: Normal range of " motion. Neck supple.   Cardiovascular: Normal rate, normal heart sounds and intact distal pulses.    No murmur heard.  Pulmonary/Chest: Normal expansion. She has decreased breath sounds.   Abdominal: Soft. Bowel sounds are normal.   Musculoskeletal: Normal range of motion. She exhibits edema.   In wheel chair   Lymphadenopathy:     She has no cervical adenopathy.   Neurological: She is alert and oriented to person, place, and time. Gait normal.   Skin: Skin is warm and dry.   Psychiatric: She has a normal mood and affect.   Nursing note and vitals reviewed.    Personal Diagnostic Review  CXR  Findings:     Stable mild cardiomegaly and bilateral prominence of epicardial fat pads and scattered basilar infiltrates and/or subsegmental atelectasis.   Aorta is tortuous in trachea normal positional I for rotation.    Mild hyperinflation and prominent interstitial markings with faint reticular-nodular interstitial pattern noted.    Generalized osteopenia and spondylosis with mild smooth accentuated kyphosis.  Blunted CP angles bilaterally.     No flowsheet data found.      Assessment:       Problem List Items Addressed This Visit     COPD, severe (Chronic)    Relevant Orders    X-Ray Chest PA And Lateral (Completed)    Spirometry without Bronchodilator    Stress test, pulmonary    X-Ray Chest PA And Lateral    Hypoxemia requiring supplemental oxygen (Chronic)     3.0 LPM         Acute on chronic respiratory failure with hypoxia and hypercapnia     BIPAP 20/12         RESOLVED: Chronic respiratory failure with hypoxia - Primary    Relevant Orders    X-Ray Chest PA And Lateral (Completed)    Spirometry without Bronchodilator    Stress test, pulmonary    X-Ray Chest PA And Lateral      Other Visit Diagnoses    None.       Plan:       Reduce prednisone to 30 mg  Had Dexa in 2014  Letter to Dr Mohamud    Return in about 2 months (around 9/26/2017) for cxr today, , Spirometry and cxr, 6mwd test.    This note was prepared using voice  recognition system and is likely to have sound alike errors that may have been overlooked even after proof reading.  Please call me with any questions    Discussed diagnosis, its evaluation, treatment and usual course. All questions answered.    Thank you for the courtesy of participating in the care of this patient    Coy Taylor MD          Seen Dr Araujo  Severe end stage COPD  FEV1 0.57( 28%)  PCO2 was 67   Says multiple triggers for exacerbations: smells, sprays  Need nocturnal ventilation at [place] [facility]  Will need BIPAP 12/6 at night

## 2017-08-23 NOTE — PROGRESS NOTES
C3 nurse attempted to conduct a POST ACUTE 1 call with [facility].  No answer at facility contact number.   Patient/Caregiver provided printed discharge information.

## 2017-10-01 ENCOUNTER — HOSPITAL ENCOUNTER (EMERGENCY)
Facility: HOSPITAL | Age: 77
Discharge: HOME | End: 2017-10-01
Attending: EMERGENCY MEDICINE
Payer: MEDICARE

## 2017-10-01 VITALS
BODY MASS INDEX: 33.66 KG/M2 | WEIGHT: 190 LBS | RESPIRATION RATE: 20 BRPM | DIASTOLIC BLOOD PRESSURE: 60 MMHG | OXYGEN SATURATION: 96 % | HEIGHT: 63 IN | HEART RATE: 95 BPM | TEMPERATURE: 99 F | SYSTOLIC BLOOD PRESSURE: 161 MMHG

## 2017-10-01 DIAGNOSIS — L03.211 FACIAL CELLULITIS: Primary | ICD-10-CM

## 2017-10-01 PROCEDURE — 99284 EMERGENCY DEPT VISIT MOD MDM: CPT

## 2017-10-01 PROCEDURE — 25000003 PHARM REV CODE 250: Performed by: EMERGENCY MEDICINE

## 2017-10-01 RX ORDER — CLINDAMYCIN HYDROCHLORIDE 150 MG/1
300 CAPSULE ORAL 4 TIMES DAILY
Qty: 56 CAPSULE | Refills: 0 | Status: SHIPPED | OUTPATIENT
Start: 2017-10-01 | End: 2017-10-08

## 2017-10-01 RX ORDER — CLINDAMYCIN HYDROCHLORIDE 150 MG/1
300 CAPSULE ORAL
Status: COMPLETED | OUTPATIENT
Start: 2017-10-01 | End: 2017-10-01

## 2017-10-01 RX ADMIN — CLINDAMYCIN HYDROCHLORIDE 300 MG: 150 CAPSULE ORAL at 12:10

## 2017-10-01 NOTE — ED PROVIDER NOTES
SCRIBE #1 NOTE: I, Shaina Sen, am scribing for, and in the presence of, Aneudy Yeboah MD. I have scribed the entire note.      History      Chief Complaint   Patient presents with    Facial Swelling     right side        Review of patient's allergies indicates:   Allergen Reactions    Meloxicam Other (See Comments)      Patient stated that she has muscle aches and pains    Morphine Rash    Naproxen Other (See Comments)     Patient states she has muscle and aches.    Pulmicort [budesonide] Other (See Comments)     Burning in chest        HPI   HPI    10/1/2017, 12:29 AM   History obtained from the patient      History of Present Illness: Petrona Gonzalez is a 76 y.o. female patient who presents to the Emergency Department for R sided facial swelling which onset gradually tonight. Pt reports recent abx use for a cold, but has been off for 3 days. Pt reports using CPAP tonight. Symptoms are constant and moderate in severity. No mitigating or exacerbating factors reported. Associated sxs include increased erythema to R cheek and R eye watering. Patient denies any fever, chills, R eye pain, blurred vision, HA, and all other sxs at this time. No further complaints or concerns at this time.     Arrival mode: EMS     PCP: Katy Arriaga MD       Past Medical History:  Past Medical History:   Diagnosis Date    COPD (chronic obstructive pulmonary disease) with emphysema     Diabetes mellitus     Hyperlipidemia     Hypertension     Insomnia     Maxillary sinusitis, chronic        Past Surgical History:  Past Surgical History:   Procedure Laterality Date    BACK SURGERY      HERNIA REPAIR      HYSTERECTOMY           Family History:  Family History   Problem Relation Age of Onset    Arthritis Father     Cancer Father     Hearing loss Father     Cancer Sister     Early death Sister     Cancer Brother     Heart disease Brother        Social History:  Social History     Social History Main  Topics    Smoking status: Former Smoker     Years: 30.00    Smokeless tobacco: Never Used    Alcohol use No    Drug use: No    Sexual activity: Not Currently       ROS   Review of Systems   Constitutional: Negative for chills and fever.   HENT: Positive for facial swelling (right sided). Negative for sore throat.    Eyes: Positive for discharge (R eye watering). Negative for pain and visual disturbance.   Respiratory: Negative for shortness of breath.    Cardiovascular: Negative for chest pain.   Gastrointestinal: Negative for nausea.   Genitourinary: Negative for dysuria.   Musculoskeletal: Negative for back pain.   Skin: Negative for rash.        (+) increased erythema R cheek   Neurological: Negative for weakness and headaches.   Hematological: Does not bruise/bleed easily.   All other systems reviewed and are negative.      Physical Exam      Initial Vitals [10/01/17 0020]   BP Pulse Resp Temp SpO2   (!) 161/60 95 20 98.6 °F (37 °C) 96 %      MAP       93.67          Physical Exam  Nursing Notes and Vital Signs Reviewed.  Constitutional: Patient is in no acute distress. Well-developed and well-nourished.  Head: Atraumatic. Normocephalic.  Eyes: PERRL. EOM intact. Conjunctivae are not pale. No scleral icterus.  ENT: Mucous membranes are moist. Oropharynx is clear and symmetric.    Neck: Supple. Full ROM. No lymphadenopathy.  Cardiovascular: Regular rate. Regular rhythm. No murmurs, rubs, or gallops. Distal pulses are 2+ and symmetric.  Pulmonary/Chest: No respiratory distress. Clear to auscultation bilaterally. No wheezing, rales, or rhonchi.  Abdominal: Soft and non-distended.  There is no tenderness.  No rebound, guarding, or rigidity. Good bowel sounds.  Genitourinary: No CVA tenderness  Musculoskeletal: Moves all extremities. No obvious deformities. No edema. No calf tenderness.  Skin: Warm and dry. Erythema R cheek surrounding dry scaly birthmark per pt. No fluctuance or induration.   Neurological:   "Alert, awake, and appropriate.  Normal speech.  No acute focal neurological deficits are appreciated.  Psychiatric: Normal affect. Good eye contact. Appropriate in content.    ED Course    Procedures  ED Vital Signs:  Vitals:    10/01/17 0020   BP: (!) 161/60   Pulse: 95   Resp: 20   Temp: 98.6 °F (37 °C)   TempSrc: Oral   SpO2: 96%   Weight: 86.2 kg (190 lb)   Height: 5' 3" (1.6 m)              The Emergency Provider reviewed the vital signs and test results, which are outlined above.    ED Discussion     12:37 AM: Reassessed pt at this time. Discussed with pt all pertinent ED information and results. Discussed pt dx of facial cellulitis and plan of tx. Gave pt all f/u and return to the ED instructions. All questions and concerns were addressed at this time. Pt expresses understanding of information and instructions, and is comfortable with plan to discharge. Pt is stable for discharge.    I discussed with patient and/or family/caretaker that evaluation in the ED does not suggest any emergent or life threatening medical conditions requiring immediate intervention beyond what was provided in the ED, and I believe patient is safe for discharge.  Regardless, an unremarkable evaluation in the ED does not preclude the development or presence of a serious of life threatening condition. As such, patient was instructed to return immediately for any worsening or change in current symptoms.      ED Medication(s):  Medications   clindamycin capsule 300 mg (not administered)       New Prescriptions    CLINDAMYCIN (CLEOCIN) 150 MG CAPSULE    Take 2 capsules (300 mg total) by mouth 4 (four) times daily.    MUPIROCIN CALCIUM 2% NASL OINT (BACTROBAN) 2 % OINT    by Nasal route 2 (two) times daily.       Follow-up Information     Katy Arriaga MD. Call in 1 day.    Specialty:  Family Medicine  Why:  For wound re-check  Contact information:  [address] [address] [address] [phone]             [facility]    Specialty:  Emergency Medicine  Why:  If symptoms worsen  Contact information:  [address] [address]  [facility] [address] [phone]                   Medical Decision Making              Scribe Attestation:   Scribe #1: I performed the above scribed service and the documentation accurately describes the services I performed. I attest to the accuracy of the note.    Attending:   Physician Attestation Statement for Scribe #1: I, Aneudy Yeboah MD, personally performed the services described in this documentation, as scribed by Shaina Sen, in my presence, and it is both accurate and complete.          Clinical Impression       ICD-10-CM ICD-9-CM   1. Facial cellulitis L03.211 682.0       Disposition:   Disposition: Discharged  Condition: Stable         Aneudy Yeboah MD  10/01/17 0328     none

## 2017-10-01 NOTE — ED NOTES
Called [facility]. Gave report to Joanne. Will send transportation to pick patient up.

## 2017-10-16 ENCOUNTER — TELEPHONE (OUTPATIENT)
Dept: FAMILY MEDICINE | Facility: CLINIC | Age: 77
End: 2017-10-16

## 2017-10-16 NOTE — TELEPHONE ENCOUNTER
Spoke with Elsa at [facility], she stated that she had called to verify pt appt time and was told 2 different times it was 1:15 pm today. Elsa stated that pt does not have a wound and also doubled checked with pt daughter, daughter is unaware of wound as well. appt was cancelled and will call back to schedule a follow up once they speak to the daughter and the pt.

## 2017-10-16 NOTE — TELEPHONE ENCOUNTER
----- Message from Zena Mejía sent at 10/16/2017  9:08 AM CDT -----  Contact: [facility]   Would like to consult with nurse regarding appointment today. Please call back at [phone] and ask for lima. Please ask them to page her if she is not by the phone.        Thanks,  Zena Mejía

## 2017-11-22 ENCOUNTER — TELEPHONE (OUTPATIENT)
Dept: PULMONOLOGY | Facility: CLINIC | Age: 77
End: 2017-11-22

## 2017-11-22 ENCOUNTER — OFFICE VISIT (OUTPATIENT)
Dept: PULMONOLOGY | Facility: CLINIC | Age: 77
End: 2017-11-22
Payer: MEDICARE

## 2017-11-22 ENCOUNTER — PROCEDURE VISIT (OUTPATIENT)
Dept: PULMONOLOGY | Facility: CLINIC | Age: 77
End: 2017-11-22
Payer: MEDICARE

## 2017-11-22 ENCOUNTER — HOSPITAL ENCOUNTER (OUTPATIENT)
Dept: RADIOLOGY | Facility: HOSPITAL | Age: 77
Discharge: HOME | End: 2017-11-22
Attending: INTERNAL MEDICINE
Payer: MEDICARE

## 2017-11-22 VITALS
RESPIRATION RATE: 18 BRPM | HEIGHT: 63 IN | BODY MASS INDEX: 34.91 KG/M2 | WEIGHT: 197.06 LBS | OXYGEN SATURATION: 98 % | SYSTOLIC BLOOD PRESSURE: 130 MMHG | HEART RATE: 98 BPM | DIASTOLIC BLOOD PRESSURE: 68 MMHG

## 2017-11-22 DIAGNOSIS — J96.11 CHRONIC RESPIRATORY FAILURE WITH HYPOXIA: ICD-10-CM

## 2017-11-22 DIAGNOSIS — R09.02 HYPOXEMIA REQUIRING SUPPLEMENTAL OXYGEN: Chronic | ICD-10-CM

## 2017-11-22 DIAGNOSIS — J44.9 COPD, SEVERE: Chronic | ICD-10-CM

## 2017-11-22 DIAGNOSIS — J96.22 ACUTE ON CHRONIC RESPIRATORY FAILURE WITH HYPOXIA AND HYPERCAPNIA: ICD-10-CM

## 2017-11-22 DIAGNOSIS — Z99.81 HYPOXEMIA REQUIRING SUPPLEMENTAL OXYGEN: Chronic | ICD-10-CM

## 2017-11-22 DIAGNOSIS — J96.21 ACUTE ON CHRONIC RESPIRATORY FAILURE WITH HYPOXIA AND HYPERCAPNIA: ICD-10-CM

## 2017-11-22 DIAGNOSIS — J44.9 COPD, SEVERE: Primary | Chronic | ICD-10-CM

## 2017-11-22 LAB
PRE FEV1 FVC: 38.87 % (ref 65.29–84.88)
PRE FEV1: 0.49 L (ref 1.48–2.61)
PRE FVC: 1.27 L (ref 2.06–3.4)
PRE PEF: 1.55 L/S (ref 3.52–6.84)

## 2017-11-22 PROCEDURE — 94010 BREATHING CAPACITY TEST: CPT | Mod: 59,S$GLB,, | Performed by: INTERNAL MEDICINE

## 2017-11-22 PROCEDURE — 71020 XR CHEST PA AND LATERAL: CPT | Mod: 26,,, | Performed by: RADIOLOGY

## 2017-11-22 PROCEDURE — 94761 N-INVAS EAR/PLS OXIMETRY MLT: CPT | Mod: S$GLB,,, | Performed by: INTERNAL MEDICINE

## 2017-11-22 PROCEDURE — 99214 OFFICE O/P EST MOD 30 MIN: CPT | Mod: 25,S$GLB,, | Performed by: INTERNAL MEDICINE

## 2017-11-22 PROCEDURE — 71020 XR CHEST PA AND LATERAL: CPT | Mod: TC

## 2017-11-22 PROCEDURE — 99999 PR PBB SHADOW E&M-EST. PATIENT-LVL IV: CPT | Mod: PBBFAC,,, | Performed by: INTERNAL MEDICINE

## 2017-11-22 PROCEDURE — 99499 UNLISTED E&M SERVICE: CPT | Mod: S$PBB,,, | Performed by: INTERNAL MEDICINE

## 2017-11-22 RX ORDER — AMOXICILLIN AND CLAVULANATE POTASSIUM 875; 125 MG/1; MG/1
TABLET, FILM COATED ORAL
COMMUNITY
Start: 2017-11-21 | End: 2017-12-21

## 2017-11-22 RX ORDER — METOLAZONE 5 MG/1
5 TABLET ORAL DAILY
Qty: 5 TABLET | Refills: 0 | Status: SHIPPED | OUTPATIENT
Start: 2017-11-22 | End: 2018-01-24

## 2017-11-22 NOTE — PROCEDURES
Home Oxygen Evaluation     1) Patient's O2 Sat on room air while at rest: 87% with HR-88. /94.  Liz Dyspnea Scale: 8  If at rest Sat is 89% or above, continue.     2) Patient's O2 Sat on room air while exercising:   If exercise Sat is 88% or below, continue.     3) Patient's O2 Sat while exercising on O2: 95%at 2 LPM with HR-96  (Must show improvement from #2 for patients to qualify)     If exercise Sat on O2 shows improvement from #2, this patient qualifies for portable Oxygen. If not, the patient does not qualify.     Meets criteria for oxygen at rest  improved to 95% SpO2 with supplementation      Coy Taylor MD

## 2017-11-22 NOTE — PROGRESS NOTES
"Subjective:       Patient ID: Petrona Gonzalez is a 76 y.o. female.    Chief Complaint: COPD (rev gopi)    Mrs. Petrona Alonsoiesis 76 years old  This a follow-up appointment  Severe end stage COPD  FEV1 0.49( 24%)  She is currently at [facility]  Recently she has had a respiratory exacerbation [facility] some [facility] her  I note her weight has increased from 179 pounds on the last visit to 197 pounds  Her other maintenance medications remain the same.  Symbicort HFA includes and Brovana  Is on 3 L of oxygen  She has BiPAP at night 12/6  She is on chronic prednisone 30 mg daily  I reviewed her chest x-ray although it is reported clear I do think there may be some small bilateral pleural effusions  BMP will be ordered  I want to add Zaroxolyn in 5 mg daily for the next 5 days  Will check her daily weight  I like her to see me back in 8 weeks  She still have issues in the nursing home from strong smell that trigger her asthma symptoms  Been making some geographical location changes to see whether that improves her symptoms and also be able to get out and get some exercise  Patient was unable to complete 6 minute walk due to recent exacerbation  Room air oxygen was 87%        Review of Systems   Constitutional: Positive for fatigue.   HENT: Negative.    Eyes: Negative.    Respiratory: Positive for shortness of breath and dyspnea on extertion. Negative for use of rescue inhaler.    Cardiovascular: Negative.    Genitourinary: Negative.    Skin: Negative.         Multiple skin bruising on the forearms   Gastrointestinal: Negative.    Neurological: Negative.    Hematological: Excessive bruising.   Psychiatric/Behavioral: Negative.        Objective:       Vitals:    11/22/17 1352   BP: 130/68   Pulse: 98   Resp: 18   SpO2: 98%  Comment: on O2 @ 2L per NC   Weight: 89.4 kg (197 lb 1.5 oz)   Height: 5' 3" (1.6 m)     Physical Exam   Constitutional: She is oriented to person, " place, and time. She appears well-developed and well-nourished. She is obese.   HENT:   Head: Normocephalic.   Nose: Nose normal.   Mouth/Throat: Oropharynx is clear and moist.   Neck: Normal range of motion. Neck supple.   Cardiovascular: Normal rate and regular rhythm.    No murmur heard.  Pulmonary/Chest: Normal expansion. She has decreased breath sounds.   Abdominal: Soft. Bowel sounds are normal.   Musculoskeletal: Normal range of motion. She exhibits edema.   Lymphadenopathy:     She has no cervical adenopathy.   Neurological: She is alert and oriented to person, place, and time.   Patient in wheelchair   Skin: Skin is warm and dry. No cyanosis.   Psychiatric: She has a normal mood and affect. Her behavior is normal.   Nursing note and vitals reviewed.    Personal Diagnostic Review  Chest x-ray:   Findings: Cardiac silhouette remains enlarged.  Prominent bilateral pericardial fat pads again noted.     Chronic blunting of the bilateral costophrenic angles is unchanged and likely related to pleural thickening.  No definite parenchymal consolidation or pleural effusion visualized. Visualized osseous structures demonstrate no acute abnormality    Pulmonary function tests: FEV1: 0.49  (24 % predicted), FVC:  1.27 (47 % predicted), FEV1/FVC:  38  No flowsheet data found.      Home Oxygen Evaluation      1) Patient's O2 Sat on room air while at rest: 87% with HR-88. /94.  Liz Dyspnea Scale: 8  If at rest Sat is 89% or above, continue.      2) Patient's O2 Sat on room air while exercising:   If exercise Sat is 88% or below, continue.      3) Patient's O2 Sat while exercising on O2: 95%at 2 LPM with HR-96  (Must show improvement from #2 for patients to qualify)      If exercise Sat on O2 shows improvement from #2, this patient qualifies for portable Oxygen. If not, the patient does not qualify.   Assessment:       Problem List Items Addressed This Visit     COPD, severe - Primary (Chronic)     COPD ROS: taking  medications as instructed, no medication side effects noted, no significant ongoing wheezing or shortness of breath, using bronchodilator MDI less than twice a week.   CAT score  26  Current flu shot  FEV1 0.48 ( 24%), FEV1/FVc 38  MEd: Symbicort HFA, INCRUSE    New concerns: leg edema, REES. In wheel chair    Exam: appears well, vitals normal, no respiratory distress, acyanotic, normal RR, chest clear, no wheezing, crepitations, rhonchi, normal symmetric air entry.     Assessment: COPD reasonably well controlled.     Plan: current treatment plan is effective, no change in therapy.              Relevant Medications    metOLazone (ZAROXOLYN) 5 MG tablet    Other Relevant Orders    Comprehensive metabolic panel    B-TYPE NATRIURETIC PEPTIDE    X-Ray Chest PA And Lateral    Hypoxemia requiring supplemental oxygen (Chronic)     Portable oxygen 3.0 LPM         Relevant Medications    metOLazone (ZAROXOLYN) 5 MG tablet    Other Relevant Orders    X-Ray Chest PA And Lateral    Acute on chronic respiratory failure with hypoxia and hypercapnia     BIPAP 12/6         Relevant Medications    metOLazone (ZAROXOLYN) 5 MG tablet    Other Relevant Orders    Comprehensive metabolic panel    B-TYPE NATRIURETIC PEPTIDE    X-Ray Chest PA And Lateral        Plan:       CMP and BNP today  Add Zaroxolyn  Watch weight     Return in about 2 months (around 1/22/2018) for CXR, Labs today.    This note was prepared using voice recognition system and is likely to have sound alike errors that may have been overlooked even after proof reading.  Please call me with any questions    Discussed diagnosis, its evaluation, treatment and usual course. All questions answered.    Thank you for the courtesy of participating in the care of this patient    Coy Taylor MD

## 2017-11-22 NOTE — ASSESSMENT & PLAN NOTE
COPD ROS: taking medications as instructed, no medication side effects noted, no significant ongoing wheezing or shortness of breath, using bronchodilator MDI less than twice a week.   CAT score  26  Current flu shot  FEV1 0.48 ( 24%), FEV1/FVc 38  MEd: Symbicort HFA, INCRUSE    New concerns: leg edema, REES. In wheel chair    Exam: appears well, vitals normal, no respiratory distress, acyanotic, normal RR, chest clear, no wheezing, crepitations, rhonchi, normal symmetric air entry.     Assessment: COPD reasonably well controlled.     Plan: current treatment plan is effective, no change in therapy.

## 2017-11-22 NOTE — TELEPHONE ENCOUNTER
----- Message from Mary Cleaning sent at 11/22/2017  3:35 PM CST -----  Jaclyn with [facility] at [phone]//she needs to get clarification on orders sent today//please call//thanks/[facility]

## 2017-11-24 ENCOUNTER — TELEPHONE (OUTPATIENT)
Dept: PULMONOLOGY | Facility: CLINIC | Age: 77
End: 2017-11-24

## 2017-11-24 NOTE — TELEPHONE ENCOUNTER
----- Message from Coy Taylor MD sent at 11/24/2017  8:04 AM CST -----  Please inform labs were Normal    Coy Taylor MD

## 2017-12-04 ENCOUNTER — PATIENT OUTREACH (OUTPATIENT)
Dept: ADMINISTRATIVE | Facility: HOSPITAL | Age: 77
End: 2017-12-04

## 2017-12-21 ENCOUNTER — HOSPITAL ENCOUNTER (INPATIENT)
Facility: HOSPITAL | Age: 77
LOS: 5 days | Discharge: NURSING, REHAB OR LONG-TERM CARE | DRG: 189 | End: 2017-12-26
Attending: EMERGENCY MEDICINE | Admitting: INTERNAL MEDICINE
Payer: MEDICARE

## 2017-12-21 DIAGNOSIS — J44.9 STEROID-DEPENDENT COPD: ICD-10-CM

## 2017-12-21 DIAGNOSIS — J44.1 ACUTE EXACERBATION OF CHRONIC OBSTRUCTIVE PULMONARY DISEASE (COPD): ICD-10-CM

## 2017-12-21 DIAGNOSIS — R07.9 CHEST PAIN: ICD-10-CM

## 2017-12-21 DIAGNOSIS — I48.91 ATRIAL FIBRILLATION WITH RAPID VENTRICULAR RESPONSE: ICD-10-CM

## 2017-12-21 DIAGNOSIS — J96.22 ACUTE ON CHRONIC RESPIRATORY FAILURE WITH HYPOXIA AND HYPERCAPNIA: Primary | ICD-10-CM

## 2017-12-21 DIAGNOSIS — I50.9 CHF (CONGESTIVE HEART FAILURE): ICD-10-CM

## 2017-12-21 DIAGNOSIS — J96.21 ACUTE ON CHRONIC RESPIRATORY FAILURE WITH HYPOXIA AND HYPERCAPNIA: Primary | ICD-10-CM

## 2017-12-21 DIAGNOSIS — I48.91 ATRIAL FIBRILLATION: ICD-10-CM

## 2017-12-21 DIAGNOSIS — Z92.241 STEROID-DEPENDENT COPD: ICD-10-CM

## 2017-12-21 DIAGNOSIS — D72.829 LEUKOCYTOSIS, UNSPECIFIED TYPE: ICD-10-CM

## 2017-12-21 LAB
ALBUMIN SERPL BCP-MCNC: 3.5 G/DL
ALLENS TEST: ABNORMAL
ALP SERPL-CCNC: 85 U/L
ALT SERPL W/O P-5'-P-CCNC: 23 U/L
ANION GAP SERPL CALC-SCNC: 19 MMOL/L
APTT BLDCRRT: 32.9 SEC
AST SERPL-CCNC: 16 U/L
BASOPHILS # BLD AUTO: 0.01 K/UL
BASOPHILS NFR BLD: 0 %
BILIRUB SERPL-MCNC: 0.3 MG/DL
BILIRUB UR QL STRIP: NEGATIVE
BNP SERPL-MCNC: 57 PG/ML
BUN SERPL-MCNC: 17 MG/DL
CALCIUM SERPL-MCNC: 10 MG/DL
CHLORIDE SERPL-SCNC: 97 MMOL/L
CLARITY UR: CLEAR
CO2 SERPL-SCNC: 27 MMOL/L
COLOR UR: YELLOW
CREAT SERPL-MCNC: 1 MG/DL
DELSYS: ABNORMAL
DIFFERENTIAL METHOD: ABNORMAL
EOSINOPHIL # BLD AUTO: 0 K/UL
EOSINOPHIL NFR BLD: 0 %
ERYTHROCYTE [DISTWIDTH] IN BLOOD BY AUTOMATED COUNT: 16.4 %
EST. GFR  (AFRICAN AMERICAN): >60 ML/MIN/1.73 M^2
EST. GFR  (NON AFRICAN AMERICAN): 56 ML/MIN/1.73 M^2
FLOW: 8
GLUCOSE SERPL-MCNC: 279 MG/DL
GLUCOSE UR QL STRIP: ABNORMAL
HCO3 UR-SCNC: 35.8 MMOL/L (ref 24–28)
HCT VFR BLD AUTO: 37.3 %
HGB BLD-MCNC: 11.8 G/DL
HGB UR QL STRIP: ABNORMAL
INR PPP: 1
KETONES UR QL STRIP: NEGATIVE
LACTATE SERPL-SCNC: 2.7 MMOL/L
LACTATE SERPL-SCNC: 2.8 MMOL/L
LEUKOCYTE ESTERASE UR QL STRIP: NEGATIVE
LYMPHOCYTES # BLD AUTO: 1.6 K/UL
LYMPHOCYTES NFR BLD: 6.8 %
MCH RBC QN AUTO: 28 PG
MCHC RBC AUTO-ENTMCNC: 31.6 G/DL
MCV RBC AUTO: 88 FL
MODE: ABNORMAL
MONOCYTES # BLD AUTO: 0.5 K/UL
MONOCYTES NFR BLD: 2.2 %
NEUTROPHILS # BLD AUTO: 21.6 K/UL
NEUTROPHILS NFR BLD: 91.5 %
NITRITE UR QL STRIP: NEGATIVE
OVALOCYTES BLD QL SMEAR: ABNORMAL
PCO2 BLDA: 57.8 MMHG (ref 35–45)
PH SMN: 7.4 [PH] (ref 7.35–7.45)
PH UR STRIP: 6 [PH] (ref 5–8)
PLATELET # BLD AUTO: 314 K/UL
PMV BLD AUTO: 8.6 FL
PO2 BLDA: 297 MMHG (ref 80–100)
POC BE: 11 MMOL/L
POC SATURATED O2: 100 % (ref 95–100)
POCT GLUCOSE: 275 MG/DL (ref 70–110)
POIKILOCYTOSIS BLD QL SMEAR: SLIGHT
POTASSIUM SERPL-SCNC: 3.8 MMOL/L
PROT SERPL-MCNC: 8.2 G/DL
PROT UR QL STRIP: NEGATIVE
PROTHROMBIN TIME: 10.3 SEC
RBC # BLD AUTO: 4.22 M/UL
SAMPLE: ABNORMAL
SITE: ABNORMAL
SODIUM SERPL-SCNC: 143 MMOL/L
SP GR UR STRIP: 1.01 (ref 1–1.03)
TROPONIN I SERPL DL<=0.01 NG/ML-MCNC: 0.01 NG/ML
TROPONIN I SERPL DL<=0.01 NG/ML-MCNC: 0.02 NG/ML
URN SPEC COLLECT METH UR: ABNORMAL
UROBILINOGEN UR STRIP-ACNC: NEGATIVE EU/DL
WBC # BLD AUTO: 23.77 K/UL

## 2017-12-21 PROCEDURE — 99285 EMERGENCY DEPT VISIT HI MDM: CPT | Mod: 25

## 2017-12-21 PROCEDURE — 25000242 PHARM REV CODE 250 ALT 637 W/ HCPCS: Performed by: NURSE PRACTITIONER

## 2017-12-21 PROCEDURE — 96361 HYDRATE IV INFUSION ADD-ON: CPT | Mod: 59

## 2017-12-21 PROCEDURE — 25000003 PHARM REV CODE 250: Performed by: NURSE PRACTITIONER

## 2017-12-21 PROCEDURE — 87070 CULTURE OTHR SPECIMN AEROBIC: CPT

## 2017-12-21 PROCEDURE — 83605 ASSAY OF LACTIC ACID: CPT | Mod: 91

## 2017-12-21 PROCEDURE — 83036 HEMOGLOBIN GLYCOSYLATED A1C: CPT

## 2017-12-21 PROCEDURE — 87040 BLOOD CULTURE FOR BACTERIA: CPT | Mod: 59

## 2017-12-21 PROCEDURE — 63600175 PHARM REV CODE 636 W HCPCS: Performed by: EMERGENCY MEDICINE

## 2017-12-21 PROCEDURE — 25500020 PHARM REV CODE 255: Performed by: EMERGENCY MEDICINE

## 2017-12-21 PROCEDURE — 81003 URINALYSIS AUTO W/O SCOPE: CPT

## 2017-12-21 PROCEDURE — 94660 CPAP INITIATION&MGMT: CPT

## 2017-12-21 PROCEDURE — 85025 COMPLETE CBC W/AUTO DIFF WBC: CPT

## 2017-12-21 PROCEDURE — 85730 THROMBOPLASTIN TIME PARTIAL: CPT

## 2017-12-21 PROCEDURE — 96366 THER/PROPH/DIAG IV INF ADDON: CPT

## 2017-12-21 PROCEDURE — 27000190 HC CPAP FULL FACE MASK W/VALVE

## 2017-12-21 PROCEDURE — 87186 SC STD MICRODIL/AGAR DIL: CPT

## 2017-12-21 PROCEDURE — 83605 ASSAY OF LACTIC ACID: CPT

## 2017-12-21 PROCEDURE — 83880 ASSAY OF NATRIURETIC PEPTIDE: CPT

## 2017-12-21 PROCEDURE — 96365 THER/PROPH/DIAG IV INF INIT: CPT

## 2017-12-21 PROCEDURE — 27000221 HC OXYGEN, UP TO 24 HOURS

## 2017-12-21 PROCEDURE — 63600175 PHARM REV CODE 636 W HCPCS: Performed by: NURSE PRACTITIONER

## 2017-12-21 PROCEDURE — 25000242 PHARM REV CODE 250 ALT 637 W/ HCPCS: Performed by: EMERGENCY MEDICINE

## 2017-12-21 PROCEDURE — 25000003 PHARM REV CODE 250: Performed by: EMERGENCY MEDICINE

## 2017-12-21 PROCEDURE — 87205 SMEAR GRAM STAIN: CPT

## 2017-12-21 PROCEDURE — A4216 STERILE WATER/SALINE, 10 ML: HCPCS | Performed by: NURSE PRACTITIONER

## 2017-12-21 PROCEDURE — 94640 AIRWAY INHALATION TREATMENT: CPT

## 2017-12-21 PROCEDURE — 82803 BLOOD GASES ANY COMBINATION: CPT

## 2017-12-21 PROCEDURE — 93005 ELECTROCARDIOGRAM TRACING: CPT

## 2017-12-21 PROCEDURE — 93010 ELECTROCARDIOGRAM REPORT: CPT | Mod: 76,,, | Performed by: INTERNAL MEDICINE

## 2017-12-21 PROCEDURE — 36600 WITHDRAWAL OF ARTERIAL BLOOD: CPT

## 2017-12-21 PROCEDURE — 84484 ASSAY OF TROPONIN QUANT: CPT | Mod: 91

## 2017-12-21 PROCEDURE — 96372 THER/PROPH/DIAG INJ SC/IM: CPT

## 2017-12-21 PROCEDURE — 51702 INSERT TEMP BLADDER CATH: CPT

## 2017-12-21 PROCEDURE — 87077 CULTURE AEROBIC IDENTIFY: CPT

## 2017-12-21 PROCEDURE — 21400001 HC TELEMETRY ROOM

## 2017-12-21 PROCEDURE — 99900035 HC TECH TIME PER 15 MIN (STAT)

## 2017-12-21 PROCEDURE — 85610 PROTHROMBIN TIME: CPT

## 2017-12-21 PROCEDURE — 80053 COMPREHEN METABOLIC PANEL: CPT

## 2017-12-21 RX ORDER — AZITHROMYCIN 250 MG/1
250 TABLET, FILM COATED ORAL DAILY
Status: ON HOLD | COMMUNITY
End: 2017-12-26 | Stop reason: HOSPADM

## 2017-12-21 RX ORDER — BUDESONIDE 0.5 MG/2ML
0.5 INHALANT ORAL EVERY 12 HOURS
Status: DISCONTINUED | OUTPATIENT
Start: 2017-12-21 | End: 2017-12-26 | Stop reason: HOSPADM

## 2017-12-21 RX ORDER — ALBUTEROL SULFATE 2.5 MG/.5ML
2.5 SOLUTION RESPIRATORY (INHALATION) EVERY 6 HOURS PRN
Status: DISCONTINUED | OUTPATIENT
Start: 2017-12-21 | End: 2017-12-26 | Stop reason: HOSPADM

## 2017-12-21 RX ORDER — ASPIRIN 81 MG/1
81 TABLET ORAL DAILY
Status: DISCONTINUED | OUTPATIENT
Start: 2017-12-22 | End: 2017-12-26 | Stop reason: HOSPADM

## 2017-12-21 RX ORDER — FLUTICASONE FUROATE AND VILANTEROL 100; 25 UG/1; UG/1
1 POWDER RESPIRATORY (INHALATION) DAILY
Status: DISCONTINUED | OUTPATIENT
Start: 2017-12-22 | End: 2017-12-21

## 2017-12-21 RX ORDER — POLYETHYLENE GLYCOL 3350 17 G/17G
17 POWDER, FOR SOLUTION ORAL DAILY
Status: DISCONTINUED | OUTPATIENT
Start: 2017-12-22 | End: 2017-12-26 | Stop reason: HOSPADM

## 2017-12-21 RX ORDER — FAMOTIDINE 20 MG/1
20 TABLET, FILM COATED ORAL 2 TIMES DAILY
Status: DISCONTINUED | OUTPATIENT
Start: 2017-12-21 | End: 2017-12-26 | Stop reason: HOSPADM

## 2017-12-21 RX ORDER — PREDNISONE 20 MG/1
40 TABLET ORAL DAILY
Status: DISCONTINUED | OUTPATIENT
Start: 2017-12-22 | End: 2017-12-26 | Stop reason: HOSPADM

## 2017-12-21 RX ORDER — CETIRIZINE HYDROCHLORIDE 10 MG/1
10 TABLET ORAL DAILY
Status: DISCONTINUED | OUTPATIENT
Start: 2017-12-22 | End: 2017-12-26 | Stop reason: HOSPADM

## 2017-12-21 RX ORDER — TOBRAMYCIN 3 MG/ML
1 SOLUTION/ DROPS OPHTHALMIC EVERY 4 HOURS
Status: DISCONTINUED | OUTPATIENT
Start: 2017-12-21 | End: 2017-12-26 | Stop reason: HOSPADM

## 2017-12-21 RX ORDER — ALBUTEROL SULFATE 90 UG/1
2 AEROSOL, METERED RESPIRATORY (INHALATION) EVERY 6 HOURS PRN
Status: DISCONTINUED | OUTPATIENT
Start: 2017-12-21 | End: 2017-12-21

## 2017-12-21 RX ORDER — DILTIAZEM HYDROCHLORIDE 5 MG/ML
25 INJECTION INTRAVENOUS
Status: COMPLETED | OUTPATIENT
Start: 2017-12-21 | End: 2017-12-21

## 2017-12-21 RX ORDER — FUROSEMIDE 10 MG/ML
40 INJECTION INTRAMUSCULAR; INTRAVENOUS 2 TIMES DAILY
Status: DISCONTINUED | OUTPATIENT
Start: 2017-12-22 | End: 2017-12-26 | Stop reason: HOSPADM

## 2017-12-21 RX ORDER — GUAIFENESIN 600 MG/1
1200 TABLET, EXTENDED RELEASE ORAL 2 TIMES DAILY
COMMUNITY

## 2017-12-21 RX ORDER — GUAIFENESIN 600 MG/1
1200 TABLET, EXTENDED RELEASE ORAL 2 TIMES DAILY
Status: DISCONTINUED | OUTPATIENT
Start: 2017-12-21 | End: 2017-12-26 | Stop reason: HOSPADM

## 2017-12-21 RX ORDER — IBUPROFEN 200 MG
24 TABLET ORAL
Status: DISCONTINUED | OUTPATIENT
Start: 2017-12-21 | End: 2017-12-26 | Stop reason: HOSPADM

## 2017-12-21 RX ORDER — IPRATROPIUM BROMIDE AND ALBUTEROL SULFATE 2.5; .5 MG/3ML; MG/3ML
3 SOLUTION RESPIRATORY (INHALATION) EVERY 6 HOURS
Status: DISCONTINUED | OUTPATIENT
Start: 2017-12-21 | End: 2017-12-26 | Stop reason: HOSPADM

## 2017-12-21 RX ORDER — ARFORMOTEROL TARTRATE 15 UG/2ML
15 SOLUTION RESPIRATORY (INHALATION) 2 TIMES DAILY
Status: DISCONTINUED | OUTPATIENT
Start: 2017-12-21 | End: 2017-12-26 | Stop reason: HOSPADM

## 2017-12-21 RX ORDER — SODIUM CHLORIDE 0.9 % (FLUSH) 0.9 %
3 SYRINGE (ML) INJECTION EVERY 8 HOURS
Status: DISCONTINUED | OUTPATIENT
Start: 2017-12-21 | End: 2017-12-26 | Stop reason: HOSPADM

## 2017-12-21 RX ORDER — DILTIAZEM HCL 1 MG/ML
5 INJECTION, SOLUTION INTRAVENOUS CONTINUOUS
Status: DISCONTINUED | OUTPATIENT
Start: 2017-12-21 | End: 2017-12-21

## 2017-12-21 RX ORDER — DILTIAZEM HCL/D5W 125 MG/125
5 PLASTIC BAG, INJECTION (ML) INTRAVENOUS CONTINUOUS
Status: DISCONTINUED | OUTPATIENT
Start: 2017-12-21 | End: 2017-12-25

## 2017-12-21 RX ORDER — TOBRAMYCIN 3 MG/ML
1 SOLUTION/ DROPS OPHTHALMIC EVERY 4 HOURS
COMMUNITY
End: 2018-01-24

## 2017-12-21 RX ORDER — TRAZODONE HYDROCHLORIDE 50 MG/1
50 TABLET ORAL NIGHTLY PRN
Status: DISCONTINUED | OUTPATIENT
Start: 2017-12-21 | End: 2017-12-26 | Stop reason: HOSPADM

## 2017-12-21 RX ORDER — ROFLUMILAST 500 UG/1
500 TABLET ORAL DAILY
Status: DISCONTINUED | OUTPATIENT
Start: 2017-12-22 | End: 2017-12-26 | Stop reason: HOSPADM

## 2017-12-21 RX ORDER — IBUPROFEN 200 MG
16 TABLET ORAL
Status: DISCONTINUED | OUTPATIENT
Start: 2017-12-21 | End: 2017-12-26 | Stop reason: HOSPADM

## 2017-12-21 RX ORDER — ACETAMINOPHEN 325 MG/1
650 TABLET ORAL EVERY 4 HOURS PRN
Status: DISCONTINUED | OUTPATIENT
Start: 2017-12-21 | End: 2017-12-26 | Stop reason: HOSPADM

## 2017-12-21 RX ORDER — MONTELUKAST SODIUM 10 MG/1
10 TABLET ORAL NIGHTLY
Status: DISCONTINUED | OUTPATIENT
Start: 2017-12-21 | End: 2017-12-26 | Stop reason: HOSPADM

## 2017-12-21 RX ORDER — LEVOCETIRIZINE DIHYDROCHLORIDE 5 MG/1
5 TABLET, FILM COATED ORAL NIGHTLY
COMMUNITY

## 2017-12-21 RX ORDER — ALPRAZOLAM 0.5 MG/1
0.5 TABLET ORAL EVERY 6 HOURS PRN
Status: DISCONTINUED | OUTPATIENT
Start: 2017-12-21 | End: 2017-12-26 | Stop reason: HOSPADM

## 2017-12-21 RX ORDER — LISINOPRIL 5 MG/1
5 TABLET ORAL DAILY
Status: DISCONTINUED | OUTPATIENT
Start: 2017-12-22 | End: 2017-12-26 | Stop reason: HOSPADM

## 2017-12-21 RX ORDER — INSULIN ASPART 100 [IU]/ML
0-5 INJECTION, SOLUTION INTRAVENOUS; SUBCUTANEOUS
Status: DISCONTINUED | OUTPATIENT
Start: 2017-12-21 | End: 2017-12-26 | Stop reason: HOSPADM

## 2017-12-21 RX ORDER — ONDANSETRON 8 MG/1
8 TABLET, ORALLY DISINTEGRATING ORAL EVERY 8 HOURS PRN
Status: DISCONTINUED | OUTPATIENT
Start: 2017-12-21 | End: 2017-12-26 | Stop reason: HOSPADM

## 2017-12-21 RX ORDER — SIMVASTATIN 5 MG/1
10 TABLET, FILM COATED ORAL NIGHTLY
Status: DISCONTINUED | OUTPATIENT
Start: 2017-12-21 | End: 2017-12-26 | Stop reason: HOSPADM

## 2017-12-21 RX ORDER — GLUCAGON 1 MG
1 KIT INJECTION
Status: DISCONTINUED | OUTPATIENT
Start: 2017-12-21 | End: 2017-12-26 | Stop reason: HOSPADM

## 2017-12-21 RX ORDER — SODIUM CHLORIDE 9 MG/ML
INJECTION, SOLUTION INTRAVENOUS CONTINUOUS
Status: DISCONTINUED | OUTPATIENT
Start: 2017-12-21 | End: 2017-12-26 | Stop reason: HOSPADM

## 2017-12-21 RX ADMIN — RIVAROXABAN 20 MG: 20 TABLET, FILM COATED ORAL at 07:12

## 2017-12-21 RX ADMIN — MOXIFLOXACIN HYDROCHLORIDE 400 MG: 400 INJECTION, SOLUTION INTRAVENOUS at 05:12

## 2017-12-21 RX ADMIN — ARFORMOTEROL TARTRATE 15 MCG: 15 SOLUTION RESPIRATORY (INHALATION) at 07:12

## 2017-12-21 RX ADMIN — IOHEXOL 75 ML: 350 INJECTION, SOLUTION INTRAVENOUS at 06:12

## 2017-12-21 RX ADMIN — INSULIN ASPART 3 UNITS: 100 INJECTION, SOLUTION INTRAVENOUS; SUBCUTANEOUS at 08:12

## 2017-12-21 RX ADMIN — GUAIFENESIN 1200 MG: 600 TABLET, EXTENDED RELEASE ORAL at 10:12

## 2017-12-21 RX ADMIN — SODIUM CHLORIDE: 0.9 INJECTION, SOLUTION INTRAVENOUS at 07:12

## 2017-12-21 RX ADMIN — Medication 5 MG/HR: at 04:12

## 2017-12-21 RX ADMIN — SIMVASTATIN 10 MG: 5 TABLET, FILM COATED ORAL at 10:12

## 2017-12-21 RX ADMIN — DILTIAZEM HYDROCHLORIDE 25 MG: 5 INJECTION INTRAVENOUS at 03:12

## 2017-12-21 RX ADMIN — SODIUM CHLORIDE, PRESERVATIVE FREE 3 ML: 5 INJECTION INTRAVENOUS at 10:12

## 2017-12-21 RX ADMIN — SODIUM CHLORIDE 1000 ML: 0.9 INJECTION, SOLUTION INTRAVENOUS at 04:12

## 2017-12-21 RX ADMIN — FAMOTIDINE 20 MG: 20 TABLET ORAL at 10:12

## 2017-12-21 RX ADMIN — MONTELUKAST SODIUM 10 MG: 10 TABLET, COATED ORAL at 10:12

## 2017-12-21 RX ADMIN — BUDESONIDE 0.5 MG: 0.5 SUSPENSION RESPIRATORY (INHALATION) at 07:12

## 2017-12-21 RX ADMIN — TOBRAMYCIN 1 DROP: 3 SOLUTION OPHTHALMIC at 10:12

## 2017-12-21 RX ADMIN — IPRATROPIUM BROMIDE AND ALBUTEROL SULFATE 3 ML: 2.5; .5 SOLUTION RESPIRATORY (INHALATION) at 07:12

## 2017-12-21 NOTE — PROGRESS NOTES
ABG done and results shown to Dr. Conrad Moody. Dr. Moody ordered for BIpap to be started at 16/8. Patient fitted with a medium full face mask with mepilex at the bridge of nose. No breakdown or redness on the face. Patient tolerating well.

## 2017-12-21 NOTE — ED PROVIDER NOTES
SCRIBE #1 NOTE: I, Randolph Mcmanus, am scribing for, and in the presence of, Carol Moody MD. I have scribed the entire note.      History      Chief Complaint   Patient presents with    Shortness of Breath     recent tx for resp inf with abx. Placed on neb with improved of sats from 60s at [facility] to mid/high 90s en route. 125 solumedrol, duoneb, albuterol tx en route.        Review of patient's allergies indicates:   Allergen Reactions    Meloxicam Other (See Comments)      Patient stated that she has muscle aches and pains    Morphine Rash    Naproxen Other (See Comments)     Patient states she has muscle and aches.    Pulmicort [budesonide] Other (See Comments)     Burning in chest        HPI   HPI    12/21/2017, 3:47 PM   History obtained from the patient and [facility]      History of Present Illness: Petrona Gonzalez is a 76 y.o. female patient who presents to the Emergency Department for SOB which onset gradually today. Symptoms are constant and moderate in severity. Sx are exacerbated by nothing and relieved by nothing. Pt was sent to the ED from [facility]. [facility] got a call stating her O2 saturation was in the 90's on 3 L and [facility] reports when they arrived on scene her O2 saturation was 67 and was pale in appearance. [facility] reports pt is on 3 L of O2 all the time at the nursing home and she is on BiPAP at night, 12/6. [facility] reports giving pt duoneb, albuterol, and 125 of solumedrol. Pt also reports productive cough with green mucus. Pt has a Hx of anxiety, severe end stage COPD, she is on chronic steroids. Pt is also currently on abx for a respiratory infection. Pt is also on xarelto for AFIB.  No other sxs reported. Patient denies any fever, N/V/D, chills, CP, dizziness, lightheadedness, weakness/numbness, abd pain and all other sxs at this time. No further complaints or concerns at this time.     Arrival mode: AAS    PCP: Provider Not[facility]       Past Medical History:  Past Medical  History:   Diagnosis Date    COPD (chronic obstructive pulmonary disease) with emphysema     Diabetes mellitus     Hyperlipidemia     Hypertension     Insomnia     Maxillary sinusitis, chronic        Past Surgical History:  Past Surgical History:   Procedure Laterality Date    BACK SURGERY      HERNIA REPAIR      HYSTERECTOMY           Family History:  Family History   Problem Relation Age of Onset    Arthritis Father     Cancer Father     Hearing loss Father     Cancer Sister     Early death Sister     Cancer Brother     Heart disease Brother        Social History:  Social History     Social History Main Topics    Smoking status: Former Smoker     Years: 30.00    Smokeless tobacco: Never Used    Alcohol use No    Drug use: No    Sexual activity: Not Currently       ROS   Review of Systems   Constitutional: Negative for activity change, appetite change, chills and fever.   HENT: Negative for congestion, rhinorrhea, sinus pressure, sore throat and trouble swallowing.    Respiratory: Positive for cough and shortness of breath. Negative for chest tightness.    Cardiovascular: Negative for chest pain, palpitations and leg swelling.   Gastrointestinal: Negative for abdominal pain, constipation, diarrhea, nausea and vomiting.   Genitourinary: Negative for difficulty urinating and dysuria.   Musculoskeletal: Negative for back pain and neck pain.   Skin: Negative for rash.   Neurological: Negative for dizziness, weakness, light-headedness, numbness and headaches.   Hematological: Does not bruise/bleed easily.   Psychiatric/Behavioral: Negative for agitation and confusion.   All other systems reviewed and are negative.      Physical Exam      Initial Vitals [12/21/17 1542]   BP Pulse Resp Temp SpO2   (!) 183/86 (!) 145 (!) 30 -- 100 %      MAP       118.33          Physical Exam  Nursing Notes and Vital Signs Reviewed.  Constitutional: Patient is in moderate distress. Well-developed and well-nourished.    Head: Atraumatic. Normocephalic.  Eyes: PERRL. EOM intact. Conjunctivae are not pale. No scleral icterus.  ENT: Mucous membranes are moist. Oropharynx is clear and symmetric.    Neck: Supple. Full ROM. No lymphadenopathy.  Cardiovascular: Tachycardic. Irregular regular rhythm. No murmurs, rubs, or gallops. Distal pulses are 2+ and symmetric.  Pulmonary/Chest: Moderate respiratory distress, unable to speak in full sentences. Diminished breath sounds noted bilaterally. Breathing treatment is present. No wheezing or rales appreciated.  Abdominal: Soft and non-distended.  There is no tenderness. Reducible umbilical hernia noted. No rebound, guarding, or rigidity. Good bowel sounds.  Musculoskeletal: Moves all extremities. No obvious deformities. No edema. No calf tenderness. Radial pulses are equal and 2+ bilaterally. DP and PT pulses are equal and 2+ bilaterally.   Skin: Warm and dry.  Neurological: Patient is alert and oriented to person, place and time. Pupils ERRL and EOM normal. Cranial nerves II-XII are intact. Strength is full bilaterally; it is equal and 5/5 in bilateral upper and lower extremities. Light touch sense is intact. Speech is clear and normal. No acute focal neurological deficits noted.  Psychiatric: Anxious. Good eye contact. Appropriate in content.    ED Course    Critical Care  Date/Time: 12/21/2017 5:02 PM  Performed by: RENEE CARDONA  Authorized by: RENEE CARDONA   Direct patient critical care time: 15 minutes  Additional history critical care time: 15 minutes  Ordering / reviewing critical care time: 10 minutes  Documentation critical care time: 10 minutes  Consulting other physicians critical care time: 10 minutes  Consult with family critical care time: 5 minutes  Total critical care time (exclusive of procedural time) : 65 minutes  Critical care time was exclusive of separately billable procedures and treating other patients and teaching time.  Critical care was necessary  "to treat or prevent imminent or life-threatening deterioration of the following conditions: respiratory failure (AFIB with RVR).  Critical care was time spent personally by me on the following activities: blood draw for specimens, development of treatment plan with patient or surrogate, discussions with consultants, interpretation of cardiac output measurements, evaluation of patient's response to treatment, examination of patient, obtaining history from patient or surrogate, ordering and performing treatments and interventions, ordering and review of laboratory studies, ordering and review of radiographic studies, pulse oximetry, re-evaluation of patient's condition, review of old charts and ventilator management.  Subsequent provider of critical care: I assumed direction of critical care for this patient from another provider of my specialty.        ED Vital Signs:  Vitals:    12/21/17 1542 12/21/17 1548 12/21/17 1551 12/21/17 1600   BP: (!) 183/86   120/60   Pulse: (!) 145 (!) 148  (!) 127   Resp: (!) 30   (!) 35   Temp:   97.4 °F (36.3 °C)    TempSrc:   Axillary    SpO2: 100%   100%   Weight: 89.4 kg (197 lb 3.2 oz)      Height: 5' 2" (1.575 m)       12/21/17 1652 12/21/17 1710   BP: (!) 126/58    Pulse: 94 76   Resp: (!) 24 (!) 23   Temp:     TempSrc:     SpO2: 99% 100%   Weight:     Height:         Abnormal Lab Results:  Labs Reviewed   CBC W/ AUTO DIFFERENTIAL - Abnormal; Notable for the following:        Result Value    WBC 23.77 (*)     Hemoglobin 11.8 (*)     MCHC 31.6 (*)     RDW 16.4 (*)     MPV 8.6 (*)     Gran # 21.6 (*)     Gran% 91.5 (*)     Lymph% 6.8 (*)     Mono% 2.2 (*)     All other components within normal limits   COMPREHENSIVE METABOLIC PANEL - Abnormal; Notable for the following:     Glucose 279 (*)     Anion Gap 19 (*)     eGFR if non  56 (*)     All other components within normal limits   APTT - Abnormal; Notable for the following:     aPTT 32.9 (*)     All other components " within normal limits   URINALYSIS - Abnormal; Notable for the following:     Glucose, UA 1+ (*)     Occult Blood UA Trace (*)     All other components within normal limits   LACTIC ACID, PLASMA - Abnormal; Notable for the following:     Lactate (Lactic Acid) 2.8 (*)     All other components within normal limits   ISTAT PROCEDURE - Abnormal; Notable for the following:     POC PCO2 57.8 (*)     POC PO2 297 (*)     POC HCO3 35.8 (*)     All other components within normal limits   CULTURE, BLOOD   CULTURE, BLOOD   TROPONIN I   B-TYPE NATRIURETIC PEPTIDE   PROTIME-INR        All Lab Results:  Results for orders placed or performed during the hospital encounter of 12/21/17   CBC auto differential   Result Value Ref Range    WBC 23.77 (H) 3.90 - 12.70 K/uL    RBC 4.22 4.00 - 5.40 M/uL    Hemoglobin 11.8 (L) 12.0 - 16.0 g/dL    Hematocrit 37.3 37.0 - 48.5 %    MCV 88 82 - 98 fL    MCH 28.0 27.0 - 31.0 pg    MCHC 31.6 (L) 32.0 - 36.0 g/dL    RDW 16.4 (H) 11.5 - 14.5 %    Platelets 314 150 - 350 K/uL    MPV 8.6 (L) 9.2 - 12.9 fL    Gran # 21.6 (H) 1.8 - 7.7 K/uL    Lymph # 1.6 1.0 - 4.8 K/uL    Mono # 0.5 0.3 - 1.0 K/uL    Eos # 0.0 0.0 - 0.5 K/uL    Baso # 0.01 0.00 - 0.20 K/uL    Gran% 91.5 (H) 38.0 - 73.0 %    Lymph% 6.8 (L) 18.0 - 48.0 %    Mono% 2.2 (L) 4.0 - 15.0 %    Eosinophil% 0.0 0.0 - 8.0 %    Basophil% 0.0 0.0 - 1.9 %    Poik Slight     Ovalocytes Occasional     Differential Method Automated    Comprehensive metabolic panel   Result Value Ref Range    Sodium 143 136 - 145 mmol/L    Potassium 3.8 3.5 - 5.1 mmol/L    Chloride 97 95 - 110 mmol/L    CO2 27 23 - 29 mmol/L    Glucose 279 (H) 70 - 110 mg/dL    BUN, Bld 17 8 - 23 mg/dL    Creatinine 1.0 0.5 - 1.4 mg/dL    Calcium 10.0 8.7 - 10.5 mg/dL    Total Protein 8.2 6.0 - 8.4 g/dL    Albumin 3.5 3.5 - 5.2 g/dL    Total Bilirubin 0.3 0.1 - 1.0 mg/dL    Alkaline Phosphatase 85 55 - 135 U/L    AST 16 10 - 40 U/L    ALT 23 10 - 44 U/L    Anion Gap 19 (H) 8 - 16 mmol/L     eGFR if African American >60 >60 mL/min/1.73 m^2    eGFR if non African American 56 (A) >60 mL/min/1.73 m^2   Troponin I #1   Result Value Ref Range    Troponin I 0.017 0.000 - 0.026 ng/mL   B-Type natriuretic peptide (BNP)   Result Value Ref Range    BNP 57 0 - 99 pg/mL   Protime-INR   Result Value Ref Range    Prothrombin Time 10.3 9.0 - 12.5 sec    INR 1.0 0.8 - 1.2   APTT   Result Value Ref Range    aPTT 32.9 (H) 21.0 - 32.0 sec   Urinalysis Catheterized   Result Value Ref Range    Specimen UA Urine, Catheterized     Color, UA Yellow Yellow, Straw, Barbara    Appearance, UA Clear Clear    pH, UA 6.0 5.0 - 8.0    Specific Gravity, UA 1.010 1.005 - 1.030    Protein, UA Negative Negative    Glucose, UA 1+ (A) Negative    Ketones, UA Negative Negative    Bilirubin (UA) Negative Negative    Occult Blood UA Trace (A) Negative    Nitrite, UA Negative Negative    Urobilinogen, UA Negative <2.0 EU/dL    Leukocytes, UA Negative Negative   Lactic acid, plasma   Result Value Ref Range    Lactate (Lactic Acid) 2.8 (H) 0.5 - 2.2 mmol/L   ISTAT PROCEDURE   Result Value Ref Range    POC PH 7.399 7.35 - 7.45    POC PCO2 57.8 (HH) 35 - 45 mmHg    POC PO2 297 (H) 80 - 100 mmHg    POC HCO3 35.8 (H) 24 - 28 mmol/L    POC BE 11 -2 to 2 mmol/L    POC SATURATED O2 100 95 - 100 %    Sample ARTERIAL     Site LR     Allens Test Pass     DelSys Aero Mask     Mode SPONT     Flow 8          Imaging Results:  Imaging Results          X-Ray Chest AP Portable (Final result)  Result time 12/21/17 16:26:04    Final result by Goldy Olivia MD (12/21/17 16:26:04)                 Impression:      Stable chest x-ray.  Chronic right middle lobe atelectasis.      Electronically signed by: GOLDY OLIVIA MD  Date:     12/21/17  Time:    16:26              Narrative:    Exam: XR CHEST AP PORTABLE,    Date:  12/21/17 16:07:00    History: Chest Pain    Comparison:  11/22/2017    Findings: Multiple wire leads overlie the chest.  The cardiac size  is enlarged.  Probable right medial lobe atelectasis.  Prior chest CT confirm middle lobe atelectasis from 05/20/2017.  There are also prominent pericardial fat pad.    The lung fields are clear.                               The EKG was ordered, reviewed, and independently interpreted by the ED provider.  Interpretation time: 1548  Rate: 146 BPM  Rhythm: AFIB with RVR with premature ventricular or aberrantly conducted complexes  Interpretation: L axis deviation. Septal infarct. No STEMI.       The EKG was ordered, reviewed, and independently interpreted by the ED provider.  Interpretation time: 1620  Rate: 104 BPM  Rhythm: AFIB with RVR  Interpretation: L axis deviation. Septal infarct. No STEMI.      The Emergency Provider reviewed the vital signs and test results, which are outlined above.    ED Discussion     4:55 PM: Re-evaluated pt. Pt is on BiPAP and feeling better. On Re-evaluation, pt has diminished breath sounds but is no longer in longer in respiratory distress. Pt is on cardizem drip at 5. Pt's heart rate is now controlled, has chronic AFIB. I have discussed test results, shared treatment plan, and the need for admission with patient. Pt express understanding at this time and agree with all information. All questions answered. Pt has no further questions or concerns at this time. Pt is ready for admit.    5:02 PM: Discussed case with Betty (Hospital Medicine) who recommends trying to ween pt off of BiPAP. Dr. Crawley agrees with current care and management of pt and accepts admission.   Admitting Service: Hospital medicine   Admitting Physician: Dr. Crawley  Admit to: Tele    5:20 PM: Pt is off of BiPAP and is doing fine off of BiPAP on 3 liters NC which is her baseline, okay for tele.    ED Medication(s):  Medications   moxifloxacin 400 mg/250 mL IVPB 400 mg (not administered)   diltiaZEM 125 mg in dextrose 5% 125 mL infusion (non-titrating) (5 mg/hr Intravenous Rate/Dose Change 12/21/17 7710)   diltiaZEM  injection 25 mg (25 mg Intravenous Given 12/21/17 4655)   sodium chloride 0.9% bolus 1,000 mL (1,000 mLs Intravenous New Bag 12/21/17 1763)       New Prescriptions    No medications on file             Medical Decision Making    Medical Decision Making:   Clinical Tests:   Lab Tests: Ordered and Reviewed  Radiological Study: Reviewed and Ordered  Medical Tests: Ordered and Reviewed           Scribe Attestation:   Scribe #1: I performed the above scribed service and the documentation accurately describes the services I performed. I attest to the accuracy of the note.    Attending:   Physician Attestation Statement for Scribe #1: I, Carol Moody MD, personally performed the services described in this documentation, as scribed by Randolph Mcmanus, in my presence, and it is both accurate and complete.          Clinical Impression       ICD-10-CM ICD-9-CM   1. Acute on chronic respiratory failure with hypoxia and hypercapnia J96.21 518.84    J96.22 786.09     799.02   2. Chest pain R07.9 786.50   3. Atrial fibrillation I48.91 427.31   4. Acute exacerbation of chronic obstructive pulmonary disease (COPD) J44.1 491.21   5. Atrial fibrillation with rapid ventricular response I48.91 427.31   6. Leukocytosis, unspecified type D72.829 288.60   7. 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

## 2017-12-21 NOTE — HPI
75 y/o female with PMHx of COPD, Dm, HTN, and HLD presented to the ED with c/o SOB that onset gradually yesterday. She reports she started with sinus congestion 2 weeks ago that included sore throat, cough with green sputum (thick), and acid reflux. Symptoms are constant and moderate, exacerbated or mitigated by nothing. Pt denies N/V, diarrhea, fever, chills, night sweats, Chest pain, abd pain, and all other symptoms at present. She saw the NP at [facility] who prescribed zithromax 2 days ago. Patient also takes prednisone daily X 7 years. She reports her breathing got gradually worse and did not improve with nebulizer tx and eventually she was sent to the Ed via [facility] who reports when they arrived on scene her O2 saturation was 67 and was pale in appearance. [facility] reports pt is on 3 L of O2 all the time at the nursing home and she is on BiPAP at night, 12/6. [facility] reports giving pt duoneb, albuterol, and 125 of solumedrol. She was placed on biPap. ED workup includes WBC 23K, Lactate 2.8, PCO2 57.8 urine with neg nitrates, neg leukocytes. CXR: Stable chest x-ray.  Chronic right middle lobe atelectasis. EKG Rate: 104 BPM Rhythm: AFIB with RVR Interpretation: L axis deviation. Septal infarct. No STEMI.    She is a full code and said her sister, Bonnie Carmichael or her son, Jose Nguyễn could make any medical decisions for her.

## 2017-12-21 NOTE — ED NOTES
Pt continued to rest comfortably, tolerating removal of bipap. Family at bedside. Will continue to monitor.

## 2017-12-21 NOTE — ED NOTES
Pt removed from Bipap per RT and placed on 3L NC per home order, pt tolerating well. Will continue to monitor.

## 2017-12-21 NOTE — ED NOTES
Family    Bonnie Bryantniranjan (sister-call first)  [phone]    Jose Nguyễn (son) [phone]    Yanick Delma (son)  [phone]

## 2017-12-22 LAB
ANION GAP SERPL CALC-SCNC: 13 MMOL/L
BASOPHILS # BLD AUTO: 0.01 K/UL
BASOPHILS NFR BLD: 0.1 %
BUN SERPL-MCNC: 15 MG/DL
CALCIUM SERPL-MCNC: 8.9 MG/DL
CHLORIDE SERPL-SCNC: 105 MMOL/L
CO2 SERPL-SCNC: 25 MMOL/L
CREAT SERPL-MCNC: 0.7 MG/DL
DIASTOLIC DYSFUNCTION: NO
DIFFERENTIAL METHOD: ABNORMAL
EOSINOPHIL # BLD AUTO: 0 K/UL
EOSINOPHIL NFR BLD: 0 %
ERYTHROCYTE [DISTWIDTH] IN BLOOD BY AUTOMATED COUNT: 16.3 %
EST. GFR  (AFRICAN AMERICAN): >60 ML/MIN/1.73 M^2
EST. GFR  (NON AFRICAN AMERICAN): >60 ML/MIN/1.73 M^2
ESTIMATED AVG GLUCOSE: 163 MG/DL
GLUCOSE SERPL-MCNC: 221 MG/DL
HBA1C MFR BLD HPLC: 7.3 %
HCT VFR BLD AUTO: 30.7 %
HGB BLD-MCNC: 9.5 G/DL
LACTATE SERPL-SCNC: 1.8 MMOL/L
LYMPHOCYTES # BLD AUTO: 0.6 K/UL
LYMPHOCYTES NFR BLD: 4.6 %
MAGNESIUM SERPL-MCNC: 2.1 MG/DL
MCH RBC QN AUTO: 27.2 PG
MCHC RBC AUTO-ENTMCNC: 30.9 G/DL
MCV RBC AUTO: 88 FL
MONOCYTES # BLD AUTO: 0.3 K/UL
MONOCYTES NFR BLD: 2 %
NEUTROPHILS # BLD AUTO: 12.3 K/UL
NEUTROPHILS NFR BLD: 93.3 %
PHOSPHATE SERPL-MCNC: 3.3 MG/DL
PLATELET # BLD AUTO: 248 K/UL
PMV BLD AUTO: 8.6 FL
POCT GLUCOSE: 208 MG/DL (ref 70–110)
POCT GLUCOSE: 232 MG/DL (ref 70–110)
POCT GLUCOSE: 258 MG/DL (ref 70–110)
POTASSIUM SERPL-SCNC: 3.8 MMOL/L
RBC # BLD AUTO: 3.49 M/UL
RETIRED EF AND QEF - SEE NOTES: 60 (ref 55–65)
SODIUM SERPL-SCNC: 143 MMOL/L
TROPONIN I SERPL DL<=0.01 NG/ML-MCNC: 0.02 NG/ML
WBC # BLD AUTO: 13.2 K/UL

## 2017-12-22 PROCEDURE — 25000242 PHARM REV CODE 250 ALT 637 W/ HCPCS: Performed by: NURSE PRACTITIONER

## 2017-12-22 PROCEDURE — 85025 COMPLETE CBC W/AUTO DIFF WBC: CPT

## 2017-12-22 PROCEDURE — 93306 TTE W/DOPPLER COMPLETE: CPT | Mod: 26,,, | Performed by: INTERNAL MEDICINE

## 2017-12-22 PROCEDURE — 25000003 PHARM REV CODE 250: Performed by: EMERGENCY MEDICINE

## 2017-12-22 PROCEDURE — A4216 STERILE WATER/SALINE, 10 ML: HCPCS | Performed by: NURSE PRACTITIONER

## 2017-12-22 PROCEDURE — 84100 ASSAY OF PHOSPHORUS: CPT

## 2017-12-22 PROCEDURE — 80048 BASIC METABOLIC PNL TOTAL CA: CPT

## 2017-12-22 PROCEDURE — 83735 ASSAY OF MAGNESIUM: CPT

## 2017-12-22 PROCEDURE — 83605 ASSAY OF LACTIC ACID: CPT

## 2017-12-22 PROCEDURE — 25000242 PHARM REV CODE 250 ALT 637 W/ HCPCS: Performed by: EMERGENCY MEDICINE

## 2017-12-22 PROCEDURE — 84484 ASSAY OF TROPONIN QUANT: CPT

## 2017-12-22 PROCEDURE — 63600175 PHARM REV CODE 636 W HCPCS: Performed by: EMERGENCY MEDICINE

## 2017-12-22 PROCEDURE — 21400001 HC TELEMETRY ROOM

## 2017-12-22 PROCEDURE — 93306 TTE W/DOPPLER COMPLETE: CPT

## 2017-12-22 PROCEDURE — 94640 AIRWAY INHALATION TREATMENT: CPT

## 2017-12-22 PROCEDURE — 36415 COLL VENOUS BLD VENIPUNCTURE: CPT

## 2017-12-22 PROCEDURE — 25000003 PHARM REV CODE 250: Performed by: NURSE PRACTITIONER

## 2017-12-22 PROCEDURE — 27000221 HC OXYGEN, UP TO 24 HOURS

## 2017-12-22 PROCEDURE — 63600175 PHARM REV CODE 636 W HCPCS: Performed by: NURSE PRACTITIONER

## 2017-12-22 PROCEDURE — 94761 N-INVAS EAR/PLS OXIMETRY MLT: CPT

## 2017-12-22 RX ADMIN — IPRATROPIUM BROMIDE AND ALBUTEROL SULFATE 3 ML: 2.5; .5 SOLUTION RESPIRATORY (INHALATION) at 01:12

## 2017-12-22 RX ADMIN — ARFORMOTEROL TARTRATE 15 MCG: 15 SOLUTION RESPIRATORY (INHALATION) at 07:12

## 2017-12-22 RX ADMIN — SODIUM CHLORIDE, PRESERVATIVE FREE 3 ML: 5 INJECTION INTRAVENOUS at 01:12

## 2017-12-22 RX ADMIN — PREDNISONE 40 MG: 20 TABLET ORAL at 09:12

## 2017-12-22 RX ADMIN — TOBRAMYCIN 1 DROP: 3 SOLUTION OPHTHALMIC at 01:12

## 2017-12-22 RX ADMIN — GUAIFENESIN 1200 MG: 600 TABLET, EXTENDED RELEASE ORAL at 09:12

## 2017-12-22 RX ADMIN — INSULIN ASPART 3 UNITS: 100 INJECTION, SOLUTION INTRAVENOUS; SUBCUTANEOUS at 05:12

## 2017-12-22 RX ADMIN — RIVAROXABAN 20 MG: 20 TABLET, FILM COATED ORAL at 05:12

## 2017-12-22 RX ADMIN — SODIUM CHLORIDE, PRESERVATIVE FREE 3 ML: 5 INJECTION INTRAVENOUS at 05:12

## 2017-12-22 RX ADMIN — IPRATROPIUM BROMIDE AND ALBUTEROL SULFATE 3 ML: 2.5; .5 SOLUTION RESPIRATORY (INHALATION) at 07:12

## 2017-12-22 RX ADMIN — FAMOTIDINE 20 MG: 20 TABLET ORAL at 09:12

## 2017-12-22 RX ADMIN — ALPRAZOLAM 0.5 MG: 0.5 TABLET ORAL at 05:12

## 2017-12-22 RX ADMIN — SODIUM CHLORIDE, PRESERVATIVE FREE 3 ML: 5 INJECTION INTRAVENOUS at 09:12

## 2017-12-22 RX ADMIN — FUROSEMIDE 40 MG: 10 INJECTION, SOLUTION INTRAMUSCULAR; INTRAVENOUS at 09:12

## 2017-12-22 RX ADMIN — TOBRAMYCIN 1 DROP: 3 SOLUTION OPHTHALMIC at 09:12

## 2017-12-22 RX ADMIN — MONTELUKAST SODIUM 10 MG: 10 TABLET, COATED ORAL at 09:12

## 2017-12-22 RX ADMIN — INSULIN ASPART 2 UNITS: 100 INJECTION, SOLUTION INTRAVENOUS; SUBCUTANEOUS at 11:12

## 2017-12-22 RX ADMIN — LISINOPRIL 5 MG: 5 TABLET ORAL at 09:12

## 2017-12-22 RX ADMIN — ASPIRIN 81 MG: 81 TABLET, COATED ORAL at 09:12

## 2017-12-22 RX ADMIN — SODIUM CHLORIDE: 0.9 INJECTION, SOLUTION INTRAVENOUS at 09:12

## 2017-12-22 RX ADMIN — SODIUM CHLORIDE: 0.9 INJECTION, SOLUTION INTRAVENOUS at 12:12

## 2017-12-22 RX ADMIN — TOBRAMYCIN 1 DROP: 3 SOLUTION OPHTHALMIC at 05:12

## 2017-12-22 RX ADMIN — MOXIFLOXACIN HYDROCHLORIDE 400 MG: 400 INJECTION, SOLUTION INTRAVENOUS at 05:12

## 2017-12-22 RX ADMIN — TOBRAMYCIN 1 DROP: 3 SOLUTION OPHTHALMIC at 02:12

## 2017-12-22 RX ADMIN — FUROSEMIDE 40 MG: 10 INJECTION, SOLUTION INTRAMUSCULAR; INTRAVENOUS at 05:12

## 2017-12-22 RX ADMIN — IPRATROPIUM BROMIDE AND ALBUTEROL SULFATE 3 ML: 2.5; .5 SOLUTION RESPIRATORY (INHALATION) at 12:12

## 2017-12-22 RX ADMIN — BUDESONIDE 0.5 MG: 0.5 SUSPENSION RESPIRATORY (INHALATION) at 07:12

## 2017-12-22 RX ADMIN — INSULIN ASPART 2 UNITS: 100 INJECTION, SOLUTION INTRAVENOUS; SUBCUTANEOUS at 06:12

## 2017-12-22 RX ADMIN — ROFLUMILAST 500 MCG: 500 TABLET ORAL at 09:12

## 2017-12-22 RX ADMIN — SIMVASTATIN 10 MG: 5 TABLET, FILM COATED ORAL at 09:12

## 2017-12-22 RX ADMIN — Medication 5 MG/HR: at 02:12

## 2017-12-22 RX ADMIN — ALPRAZOLAM 0.5 MG: 0.5 TABLET ORAL at 07:12

## 2017-12-22 RX ADMIN — CETIRIZINE HYDROCHLORIDE 10 MG: 10 TABLET, FILM COATED ORAL at 09:12

## 2017-12-22 NOTE — PLAN OF CARE
Problem: Patient Care Overview  Goal: Plan of Care Review  Patient on O2 and tolerating well Pt wears NC 3lpm at home No distress at this time

## 2017-12-22 NOTE — PROGRESS NOTES
Patient arrived to room accompanied by a nurse. Patient is awake, alert, and oriented. Patient has been educated regarding fall risk and food services, rounding sheet, how to use the call light, and educated about intake and output. Heart monitor applied. Vital signs taken.

## 2017-12-22 NOTE — NURSING
Chart reviewed for diabetes  Patient has been seen by this nurse on previous admit  Please consult if any diabetes educational needs are identified

## 2017-12-22 NOTE — SUBJECTIVE & OBJECTIVE
Past Medical History:   Diagnosis Date    COPD (chronic obstructive pulmonary disease) with emphysema     Diabetes mellitus     Hyperlipidemia     Hypertension     Insomnia     Maxillary sinusitis, chronic        Past Surgical History:   Procedure Laterality Date    BACK SURGERY      HERNIA REPAIR      HYSTERECTOMY         Review of patient's allergies indicates:   Allergen Reactions    Meloxicam Other (See Comments)      Patient stated that she has muscle aches and pains    Morphine Rash    Naproxen Other (See Comments)     Patient states she has muscle and aches.    Pulmicort [budesonide] Other (See Comments)     Burning in chest       No current facility-administered medications on file prior to encounter.      Current Outpatient Prescriptions on File Prior to Encounter   Medication Sig    albuterol 90 mcg/actuation inhaler Inhale 2 puffs into the lungs every 6 (six) hours as needed. Dispense with spacer.    albuterol-ipratropium 2.5mg-0.5mg/3mL (DUO-NEB) 0.5 mg-3 mg(2.5 mg base)/3 mL nebulizer solution Take 3 mLs by nebulization every 6 (six) hours.    alprazolam (XANAX) 0.5 MG tablet Take 0.5 mg by mouth every 6 (six) hours as needed for Anxiety.    aspirin (ECOTRIN) 81 MG EC tablet Take 81 mg by mouth once daily.    budesonide-formoterol 160-4.5 mcg (SYMBICORT) 160-4.5 mcg/actuation HFAA Inhale 2 puffs into the lungs every 12 (twelve) hours. Controller    dextromethorphan-guaifenesin  mg (MUCINEX DM)  mg per 12 hr tablet Take 1 tablet by mouth.    diltiaZEM (CARDIZEM CD) 120 MG Cp24 Take 1 capsule (120 mg total) by mouth 2 (two) times daily.    furosemide (LASIX) 40 MG tablet Take 40 mg by mouth 2 (two) times daily.    lisinopril (PRINIVIL,ZESTRIL) 5 MG tablet Take 1 tablet (5 mg total) by mouth once daily.    metformin (GLUCOPHAGE) 500 MG tablet Take 1 tablet (500 mg total) by mouth 2 (two) times daily with meals.    montelukast (SINGULAIR) 10 mg tablet Take 10 mg by mouth  every evening.    predniSONE (DELTASONE) 5 MG tablet Take 40 mg by mouth once daily.     roflumilast 500 mcg Tab Take 1 tablet (500 mcg total) by mouth once daily.    simvastatin (ZOCOR) 10 MG tablet Take 1 tablet (10 mg total) by mouth every evening.    trazodone (DESYREL) 50 MG tablet Take 50 mg by mouth nightly as needed for Insomnia.    metOLazone (ZAROXOLYN) 5 MG tablet Take 1 tablet (5 mg total) by mouth once daily.    OXYGEN-AIR DELIVERY SYSTEMS MISC by [facility].(Non-Drug; Combo Route) route.    umeclidinium (INCRUSE ELLIPTA) 62.5 mcg/actuation DsDv Inhale into the lungs once daily. Controller    [DISCONTINUED] amoxicillin-clavulanate 875-125mg (AUGMENTIN) 875-125 mg per tablet     [DISCONTINUED] cetirizine (ZYRTEC) 10 MG tablet Take 1 tablet (10 mg total) by mouth once daily.     Family History     Problem Relation (Age of Onset)    Arthritis Father    Cancer Father, Sister, Brother    Early death Sister    Hearing loss Father    Heart disease Brother        Social History Main Topics    Smoking status: Former Smoker     Years: 30.00    Smokeless tobacco: Never Used    Alcohol use No    Drug use: No    Sexual activity: Not Currently     Review of Systems   Constitutional: Positive for activity change. Negative for chills, diaphoresis and fever.   HENT: Positive for congestion, ear pain, postnasal drip, sinus pressure and sore throat.    Eyes: Positive for discharge. Negative for pain.   Respiratory: Positive for cough, shortness of breath and wheezing.    Cardiovascular: Positive for leg swelling. Negative for chest pain and palpitations.   Gastrointestinal: Negative for constipation, diarrhea, nausea and vomiting.   Endocrine: Negative.    Genitourinary: Negative for dysuria and hematuria.   Musculoskeletal: Positive for arthralgias, back pain, gait problem and myalgias.   Skin: Negative for color change and wound.   Allergic/Immunologic: Negative.    Neurological: Positive for weakness. Negative  for seizures and speech difficulty.   Psychiatric/Behavioral: Negative for behavioral problems and confusion. The patient is nervous/anxious.      Objective:     Vital Signs (Most Recent):  Temp: 97.4 °F (36.3 °C) (12/21/17 1551)  Pulse: 105 (12/21/17 1742)  Resp: (!) 33 (12/21/17 1742)  BP: (!) 154/64 (12/21/17 1742)  SpO2: 97 % (12/21/17 1742) Vital Signs (24h Range):  Temp:  [97.4 °F (36.3 °C)] 97.4 °F (36.3 °C)  Pulse:  [] 105  Resp:  [23-35] 33  SpO2:  [97 %-100 %] 97 %  BP: (120-183)/(58-86) 154/64     Weight: 89.4 kg (197 lb 3.2 oz)  Body mass index is 36.07 kg/m².    Physical Exam   Constitutional: She is oriented to person, place, and time. She appears well-developed and well-nourished.   HENT:   Head: Normocephalic and atraumatic.   Mouth/Throat: Oropharynx is clear and moist.   Eyes: Conjunctivae and EOM are normal. Pupils are equal, round, and reactive to light.   Neck: Normal range of motion. Neck supple. JVD present.   Cardiovascular: Normal rate, regular rhythm, normal heart sounds and intact distal pulses.    No murmur heard.  Pulmonary/Chest: She is in respiratory distress (mild). She has wheezes.   Diminished BS to bilat bases   Abdominal: Soft. Bowel sounds are normal. She exhibits no distension. There is no tenderness.   Musculoskeletal: She exhibits edema (2+ BLE).   Neurological: She is alert and oriented to person, place, and time.   Skin: Skin is warm and dry. Capillary refill takes 2 to 3 seconds. No erythema.   Psychiatric: She has a normal mood and affect. Her behavior is normal.         CRANIAL NERVES     CN III, IV, VI   Pupils are equal, round, and reactive to light.  Extraocular motions are normal.        Significant Labs:   Recent Lab Results       12/21/17  1642 12/21/17  1641 12/21/17  1558 12/21/17  1550      Albumin    3.5     Alkaline Phosphatase    85     Allens Test   Pass      ALT    23     Anion Gap    19(H)     Appearance, UA  Clear       aPTT    32.9  Comment:  aPTT  therapeutic range = 39-69 seconds(H)     AST    16     Baso #    0.01     Basophil%    0.0     Bilirubin (UA)  Negative       Total Bilirubin    0.3  Comment:  For infants and newborns, interpretation of results should be based  on gestational age, weight and in agreement with clinical  observations.  Premature Infant recommended reference ranges:  Up to 24 hours.............<8.0 mg/dL  Up to 48 hours............<12.0 mg/dL  3-5 days..................<15.0 mg/dL  6-29 days.................<15.0 mg/dL       BNP    57  Comment:  Values of less than 100 pg/ml are consistent with non-CHF populations.     Site   LR      BUN, Bld    17     Calcium    10.0     Chloride    97     CO2    27     Color, UA  Yellow       Creatinine    1.0     DelSys   Aero Mask      Differential Method    Automated     eGFR if     >60     eGFR if non     56  Comment:  Calculation used to obtain the estimated glomerular filtration  rate (eGFR) is the CKD-EPI equation.   (A)     Eos #    0.0     Eosinophil%    0.0     Flow   8      Glucose    279(H)     Glucose, UA  1+(A)       Gran #    21.6(H)     Gran%    91.5(H)     Hematocrit    37.3     Hemoglobin    11.8(L)     Coumadin Monitoring INR    1.0  Comment:  Coumadin Therapy:  2.0 - 3.0 for INR for all indicators except mechanical heart valves  and antiphospholipid syndromes which should use 2.5 - 3.5.       Ketones, UA  Negative       Lactate, Patrick 2.8  Comment:  Falsely low lactic acid results can be found in samples   containing >=13.0 mg/dL total bilirubin and/or >=3.5 mg/dL   direct bilirubin.  (H)        Leukocytes, UA  Negative       Lymph #    1.6     Lymph%    6.8(L)     MCH    28.0     MCHC    31.6(L)     MCV    88     Mode   SPONT      Mono #    0.5     Mono%    2.2(L)     MPV    8.6(L)     Nitrite, UA  Negative       Occult Blood UA  Trace(A)       Ovalocytes    Occasional     pH, UA  6.0       Platelets    314     POC BE   11      POC HCO3   35.8(H)       POC PCO2   57.8(HH)      POC PH   7.399      POC PO2   297(H)      POC SATURATED O2   100      Poik    Slight     Potassium    3.8     Total Protein    8.2     Protein, UA  Negative  Comment:  Recommend a 24 hour urine protein or a urine   protein/creatinine ratio if globulin induced proteinuria is  clinically suspected.         Protime    10.3     RBC    4.22     RDW    16.4(H)     Sample   ARTERIAL      Sodium    143     Specific [place], UA  1.010       Specimen UA  Urine, Catheterized       Troponin I    0.017  Comment:  The reference interval for Troponin I represents the 99th percentile   cutoff   for our facility and is consistent with 3rd generation assay   performance.       Urobilinogen, UA  Negative       WBC    23.77(H)         All pertinent labs within the past 24 hours have been reviewed.    Significant Imaging: I have reviewed all pertinent imaging results/findings within the past 24 hours.

## 2017-12-22 NOTE — ASSESSMENT & PLAN NOTE
--supplemental oxygen to maintain sats  --bree parada  --continue home inhalers    --12/22: improving

## 2017-12-22 NOTE — HOSPITAL COURSE
Patient was admitted to telemetry for COPD exacerbation and acute on chronic CHF under the care of Hospital Medicine.She has a past medical history significant for COPD-emphysema and O2 dependent (3LNC), type II diabetes, recurrent pneumonia, anxiety, hypertension, depression, Afib with RVR. Concerning her COPD she was provided IV Avelox and po Moxifloxacin, breathing treatments, inhalers, xanax and home steroid resume. Concerning her CHF she was provided IV lasix, home meds resumed. Echo on 12/22/17 revealedConcentric hypertrophy, no wall motion abnormalities, normal left ventricular systolic function (EF 60-65%), normal right ventricular systolic function . Patient slowly improved with improvement in SOB, improvement in BBS without wheezing and decreased peripheral edema. Pateint able to speak without SOB. During this hospitalization the patient went into Afib with RVR, cardizem gtt initiated and converted to po cadizem, cardiology assisted in plan of care. Patient now tolerating po cardizem. Vital signs stable. Patient seen and examined today ans was determined stable for discharge. Patient will follow-up with PCP in 3 days, Pulmonolgy x 1 week and cardiology x 1 week. Patient current resident of [facility], will be discharged back to [facility].

## 2017-12-22 NOTE — PLAN OF CARE
Problem: Patient Care Overview  Goal: Plan of Care Review  Pt on 3lpm and tolerating well no distress noted

## 2017-12-22 NOTE — ASSESSMENT & PLAN NOTE
--2D echo 06/2017 showed EF 45%  --2D echo pending  --IV furosemide  --continue home zaroxolyn, lisinopril

## 2017-12-22 NOTE — ASSESSMENT & PLAN NOTE
--sepsis vs medication  --pt afebrile and takes prednisone daily X 7 years and metformin  --Lactate 2.8  --CT chest pending  --BC pending  --IV avelox  --daily CBC    --12/22: improving with tx

## 2017-12-22 NOTE — SUBJECTIVE & OBJECTIVE
Interval History: Patient was given IV Avelox, breathing treatments, and IV lasix. Slowly improving. Decreased edema, BBS improved and without wheezing. Pt able to speak more words with SOB. Continue current plan of care.    Review of Systems   Constitutional: Positive for activity change. Negative for chills, diaphoresis and fever.   HENT: Positive for congestion, postnasal drip and sinus pressure. Negative for ear pain and sore throat.    Eyes: Positive for discharge. Negative for pain.   Respiratory: Positive for cough and shortness of breath. Negative for wheezing.    Cardiovascular: Positive for leg swelling. Negative for chest pain and palpitations.   Gastrointestinal: Negative for constipation, diarrhea, nausea and vomiting.   Endocrine: Negative.    Genitourinary: Negative for dysuria and hematuria.   Musculoskeletal: Positive for arthralgias, back pain, gait problem and myalgias.   Skin: Negative for color change and wound.   Allergic/Immunologic: Negative.    Neurological: Positive for weakness. Negative for seizures and speech difficulty.   Psychiatric/Behavioral: Negative for behavioral problems and confusion. The patient is nervous/anxious.      Objective:     Vital Signs (Most Recent):  Temp: 97.6 °F (36.4 °C) (12/22/17 0825)  Pulse: 107 (12/22/17 0825)  Resp: (!) 22 (12/22/17 0825)  BP: (!) 174/70 (12/22/17 0825)  SpO2: (!) 92 % (12/22/17 0825) Vital Signs (24h Range):  Temp:  [97.4 °F (36.3 °C)-98 °F (36.7 °C)] 97.6 °F (36.4 °C)  Pulse:  [] 107  Resp:  [18-35] 22  SpO2:  [92 %-100 %] 92 %  BP: (108-183)/(54-88) 174/70     Weight: 89.3 kg (196 lb 13.9 oz)  Body mass index is 36.01 kg/m².    Intake/Output Summary (Last 24 hours) at 12/22/17 1152  Last data filed at 12/22/17 0600   Gross per 24 hour   Intake          1609.33 ml   Output              950 ml   Net           659.33 ml      Physical Exam   Constitutional: She is oriented to person, place, and time. She appears well-developed and  well-nourished.   HENT:   Head: Normocephalic and atraumatic.   Mouth/Throat: Oropharynx is clear and moist.   Eyes: Conjunctivae and EOM are normal. Pupils are equal, round, and reactive to light.   Neck: Normal range of motion. Neck supple. No JVD present.   Cardiovascular: Normal rate, regular rhythm, normal heart sounds and intact distal pulses.    No murmur heard.  Pulmonary/Chest: She is in respiratory distress (mild). She has no wheezes.   Diminished BS to bilat bases. Barrel Chest   Abdominal: Soft. Bowel sounds are normal. She exhibits no distension. There is no tenderness.   Musculoskeletal: She exhibits edema (trace).   Neurological: She is alert and oriented to person, place, and time.   Skin: Skin is warm and dry. Capillary refill takes 2 to 3 seconds. No erythema.   Psychiatric: She has a normal mood and affect. Her behavior is normal.       Significant Labs:   Recent Lab Results       12/22/17  0600 12/22/17  0511 12/22/17  0024 12/21/17  1950 12/21/17  1934      Albumin          Alkaline Phosphatase          Allens Test          ALT          Anion Gap  13        Appearance, UA          aPTT          AST          Baso #  0.01        Basophil%  0.1        Bilirubin (UA)          Total Bilirubin          Blood Culture, Routine          BNP          Site          BUN, Bld  15        Calcium  8.9        Chloride  105        CO2  25        Color, UA          Creatinine  0.7        DelSys          Differential Method  Automated        eGFR if   >60        eGFR if non   >60  Comment:  Calculation used to obtain the estimated glomerular filtration  rate (eGFR) is the CKD-EPI equation.           Eos #  0.0        Eosinophil%  0.0        Flow          Glucose  221(H)        Glucose, UA          Gram Stain (Respiratory)          Gran #  12.3(H)        Gran%  93.3(H)        Hematocrit  30.7(L)        Hemoglobin  9.5(L)        Coumadin Monitoring INR          Ketones, UA           Lactate, Patrick    2.7  Comment:  Falsely low lactic acid results can be found in samples   containing >=13.0 mg/dL total bilirubin and/or >=3.5 mg/dL   direct bilirubin.  (H)      Leukocytes, UA          Lymph #  0.6(L)        Lymph%  4.6(L)        Magnesium  2.1        MCH  27.2        MCHC  30.9(L)        MCV  88        Mode          Mono #  0.3        Mono%  2.0(L)        MPV  8.6(L)        Nitrite, UA          Occult Blood UA          Ovalocytes          pH, UA          Phosphorus  3.3        Platelets  248        POC BE          POC HCO3          POC PCO2          POC PH          POC PO2          POC SATURATED O2          POCT Glucose 232(H)    275(H)     Poik          Potassium  3.8        Total Protein          Protein, UA          Protime          RBC  3.49(L)        RDW  16.3(H)        Sample          Sodium  143        Specific Gravity, UA          Specimen UA          Troponin I   0.018  Comment:  The reference interval for Troponin I represents the 99th percentile   cutoff   for our facility and is consistent with 3rd generation assay   performance.   0.015  Comment:  The reference interval for Troponin I represents the 99th percentile   cutoff   for our facility and is consistent with 3rd generation assay   performance.        Urobilinogen, UA          WBC  13.20(H)                    12/21/17  1837 12/21/17  1642 12/21/17  1641 12/21/17  1558 12/21/17  1555      Albumin          Alkaline Phosphatase          Allens Test    Pass      ALT          Anion Gap          Appearance, UA   Clear       aPTT          AST          Baso #          Basophil%          Bilirubin (UA)   Negative       Total Bilirubin          Blood Culture, Routine     No Growth to date[P]     BNP          Site    LR      BUN, Bld          Calcium          Chloride          CO2          Color, UA   Yellow       Creatinine          DelSys    Aero Mask      Differential Method          eGFR if           eGFR if non   American          Eos #          Eosinophil%          Flow    8      Glucose          Glucose, UA   1+(A)       Gram Stain (Respiratory) <10 epithelial cells per low power field.          Many WBC's          Many Gram positive cocci          Many Gram negative rods         Gran #          Gran%          Hematocrit          Hemoglobin          Coumadin Monitoring INR          Ketones, UA   Negative       Lactate, Patrick  2.8  Comment:  Falsely low lactic acid results can be found in samples   containing >=13.0 mg/dL total bilirubin and/or >=3.5 mg/dL   direct bilirubin.  (H)        Leukocytes, UA   Negative       Lymph #          Lymph%          Magnesium          MCH          MCHC          MCV          Mode    SPONT      Mono #          Mono%          MPV          Nitrite, UA   Negative       Occult Blood UA   Trace(A)       Ovalocytes          pH, UA   6.0       Phosphorus          Platelets          POC BE    11      POC HCO3    35.8(H)      POC PCO2    57.8(HH)      POC PH    7.399      POC PO2    297(H)      POC SATURATED O2    100      POCT Glucose          Poik          Potassium          Total Protein          Protein, UA   Negative  Comment:  Recommend a 24 hour urine protein or a urine   protein/creatinine ratio if globulin induced proteinuria is  clinically suspected.         Protime          RBC          RDW          Sample    ARTERIAL      Sodium          Specific [place], UA   1.010       Specimen UA   Urine, Catheterized       Troponin I          Urobilinogen, UA   Negative       WBC                      12/21/17  1550 12/21/17  1540      Albumin 3.5      Alkaline Phosphatase 85      Allens Test       ALT 23      Anion Gap 19(H)      Appearance, UA       aPTT 32.9  Comment:  aPTT therapeutic range = 39-69 seconds(H)      AST 16      Baso # 0.01      Basophil% 0.0      Bilirubin (UA)       Total Bilirubin 0.3  Comment:  For infants and newborns, interpretation of results should be based  on gestational  age, weight and in agreement with clinical  observations.  Premature Infant recommended reference ranges:  Up to 24 hours.............<8.0 mg/dL  Up to 48 hours............<12.0 mg/dL  3-5 days..................<15.0 mg/dL  6-29 days.................<15.0 mg/dL        Blood Culture, Routine  No Growth to date[P]     BNP 57  Comment:  Values of less than 100 pg/ml are consistent with non-CHF populations.      Site       BUN, Bld 17      Calcium 10.0      Chloride 97      CO2 27      Color, UA       Creatinine 1.0      DelSys       Differential Method Automated      eGFR if  >60      eGFR if non  56  Comment:  Calculation used to obtain the estimated glomerular filtration  rate (eGFR) is the CKD-EPI equation.   (A)      Eos # 0.0      Eosinophil% 0.0      Flow       Glucose 279(H)      Glucose, UA       Gram Stain (Respiratory)       Gran # 21.6(H)      Gran% 91.5(H)      Hematocrit 37.3      Hemoglobin 11.8(L)      Coumadin Monitoring INR 1.0  Comment:  Coumadin Therapy:  2.0 - 3.0 for INR for all indicators except mechanical heart valves  and antiphospholipid syndromes which should use 2.5 - 3.5.        Ketones, UA       Lactate, Patrick       Leukocytes, UA       Lymph # 1.6      Lymph% 6.8(L)      Magnesium       MCH 28.0      MCHC 31.6(L)      MCV 88      Mode       Mono # 0.5      Mono% 2.2(L)      MPV 8.6(L)      Nitrite, UA       Occult Blood UA       Ovalocytes Occasional      pH, UA       Phosphorus       Platelets 314      POC BE       POC HCO3       POC PCO2       POC PH       POC PO2       POC SATURATED O2       POCT Glucose       Poik Slight      Potassium 3.8      Total Protein 8.2      Protein, UA       Protime 10.3      RBC 4.22      RDW 16.4(H)      Sample       Sodium 143      Specific Gravity, UA       Specimen UA       Troponin I 0.017  Comment:  The reference interval for Troponin I represents the 99th percentile   cutoff   for our facility and is consistent with 3rd  generation assay   performance.        Urobilinogen, UA       WBC 23.77(H)          All pertinent labs within the past 24 hours have been reviewed.    Significant Imaging: I have reviewed all pertinent imaging results/findings within the past 24 hours.

## 2017-12-22 NOTE — ASSESSMENT & PLAN NOTE
--WBC 23K, SOB at rest, unable to complete sentence d/t SOB  --CXR: Chronic right middle lobe atelectasis.  --CT chest pending  --supplemental oxygen to maintain sats  --IV avelox  --continue home roflumilast  --duonebs  --continue home albuterol inhaler, symbicort, mucinex, xyzal, and singulair    --12/22: WBC improving

## 2017-12-22 NOTE — ASSESSMENT & PLAN NOTE
--WBC 23K, SOB at rest, unable to complete sentence d/t SOB  --CXR: Chronic right middle lobe atelectasis.  --CT chest pending  --supplemental oxygen to maintain sats  --IV avelox  --continue home roflumilast  --duonebs  --continue home albuterol inhaler, symbicort, mucinex, xyzal, and singulair

## 2017-12-22 NOTE — PLAN OF CARE
Met with pt to complete d/c planning assessment. Pt is a resident at [facility]. She plans to return there when ready to d/c.        12/22/17 4885   Discharge Assessment   Assessment Type Discharge Planning Assessment   Confirmed/corrected address and phone number on facesheet? Yes   Assessment information obtained from? Patient   Prior to hospitilization cognitive status: Alert/Oriented   Prior to hospitalization functional status: Assistive Equipment   Current cognitive status: Alert/Oriented   Current Functional Status: Assistive Equipment   Lives With facility resident   Able to Return to Prior Arrangements yes   Who are your caregiver(s) and their phone number(s)? Brittany Jorge [phone]   Equipment Currently Used at Home shower chair;walker, rolling;oxygen   Does the patient have transportation home? Yes   Transportation Available agency transportation   Discharge Plan A Return to nursing home   Patient/Family In Agreement With Plan yes

## 2017-12-22 NOTE — ED NOTES
Frozen meal delivered to pt from tele floor d/t dietary being closed. No pt food in ED. Pt states she will pick around rice.

## 2017-12-22 NOTE — PLAN OF CARE
Problem: Patient Care Overview  Goal: Plan of Care Review  Outcome: Ongoing (interventions implemented as appropriate)  The patient has been AFIB on the monitor. Blood glucose monitored. Cutler catheter intact. Cardizem gtt infusing at 5 mL/hr. Normal saline infusing at 125 mL/hr. Patient refusing BIPAP. Sats  > 95. Pt has had an uneventful night and is resting quietly, will continue to monitor.

## 2017-12-22 NOTE — PROGRESS NOTES
[facility] Hospital Medicine  Progress Note    Patient Name: Petrona Gonzalez  MRN: [id number]  Patient Class: IP- Inpatient   Admission Date: 12/21/2017  Length of Stay: 1 days  Attending Physician: Carlos Crawley MD  Primary Care Provider: Provider Not[facility]        Subjective:     Principal Problem:Acute on chronic respiratory failure with hypoxia and hypercapnia    HPI:  77 y/o female with PMHx of COPD, Dm, HTN, and HLD presented to the ED with c/o SOB that onset gradually yesterday. She reports she started with sinus congestion 2 weeks ago that included sore throat, cough with green sputum (thick), and acid reflux. Symptoms are constant and moderate, exacerbated or mitigated by nothing. Pt denies N/V, diarrhea, fever, chills, night sweats, Chest pain, abd pain, and all other symptoms at present. She saw the NP at [facility] who prescribed zithromax 2 days ago. Patient also takes prednisone daily X 7 years. She reports her breathing got gradually worse and did not improve with nebulizer tx and eventually she was sent to the Ed via [facility] who reports when they arrived on scene her O2 saturation was 67 and was pale in appearance. [facility] reports pt is on 3 L of O2 all the time at the nursing home and she is on BiPAP at night, 12/6. [facility] reports giving pt duoneb, albuterol, and 125 of solumedrol. She was placed on biPap. ED workup includes WBC 23K, Lactate 2.8, PCO2 57.8 urine with neg nitrates, neg leukocytes. CXR: Stable chest x-ray.  Chronic right middle lobe atelectasis. EKG Rate: 104 BPM Rhythm: AFIB with RVR Interpretation: L axis deviation. Septal infarct. No STEMI.    She is a full code and said her sister, Bonnie Carmichael or her son, Jose Nguyễn could make any medical decisions for her.        Interval History: Patient was given IV Avelox, breathing treatments, and IV lasix. Slowly improving. Decreased edema, BBS improved and without wheezing. Pt able to speak more words with SOB. Continue current  plan of care.    Review of Systems   Constitutional: Positive for activity change. Negative for chills, diaphoresis and fever.   HENT: Positive for congestion, postnasal drip and sinus pressure. Negative for ear pain and sore throat.    Eyes: Positive for discharge. Negative for pain.   Respiratory: Positive for cough and shortness of breath. Negative for wheezing.    Cardiovascular: Positive for leg swelling. Negative for chest pain and palpitations.   Gastrointestinal: Negative for constipation, diarrhea, nausea and vomiting.   Endocrine: Negative.    Genitourinary: Negative for dysuria and hematuria.   Musculoskeletal: Positive for arthralgias, back pain, gait problem and myalgias.   Skin: Negative for color change and wound.   Allergic/Immunologic: Negative.    Neurological: Positive for weakness. Negative for seizures and speech difficulty.   Psychiatric/Behavioral: Negative for behavioral problems and confusion. The patient is nervous/anxious.      Objective:     Vital Signs (Most Recent):  Temp: 97.6 °F (36.4 °C) (12/22/17 0825)  Pulse: 107 (12/22/17 0825)  Resp: (!) 22 (12/22/17 0825)  BP: (!) 174/70 (12/22/17 0825)  SpO2: (!) 92 % (12/22/17 0825) Vital Signs (24h Range):  Temp:  [97.4 °F (36.3 °C)-98 °F (36.7 °C)] 97.6 °F (36.4 °C)  Pulse:  [] 107  Resp:  [18-35] 22  SpO2:  [92 %-100 %] 92 %  BP: (108-183)/(54-88) 174/70     Weight: 89.3 kg (196 lb 13.9 oz)  Body mass index is 36.01 kg/m².    Intake/Output Summary (Last 24 hours) at 12/22/17 1152  Last data filed at 12/22/17 0600   Gross per 24 hour   Intake          1609.33 ml   Output              950 ml   Net           659.33 ml      Physical Exam   Constitutional: She is oriented to person, place, and time. She appears well-developed and well-nourished.   HENT:   Head: Normocephalic and atraumatic.   Mouth/Throat: Oropharynx is clear and moist.   Eyes: Conjunctivae and EOM are normal. Pupils are equal, round, and reactive to light.   Neck: Normal  range of motion. Neck supple. No JVD present.   Cardiovascular: Normal rate, regular rhythm, normal heart sounds and intact distal pulses.    No murmur heard.  Pulmonary/Chest: She is in respiratory distress (mild). She has no wheezes.   Diminished BS to bilat bases. Barrel Chest   Abdominal: Soft. Bowel sounds are normal. She exhibits no distension. There is no tenderness.   Musculoskeletal: She exhibits edema (trace).   Neurological: She is alert and oriented to person, place, and time.   Skin: Skin is warm and dry. Capillary refill takes 2 to 3 seconds. No erythema.   Psychiatric: She has a normal mood and affect. Her behavior is normal.       Significant Labs:   Recent Lab Results       12/22/17  0600 12/22/17  0511 12/22/17  0024 12/21/17  1950 12/21/17  1934      Albumin          Alkaline Phosphatase          Allens Test          ALT          Anion Gap  13        Appearance, UA          aPTT          AST          Baso #  0.01        Basophil%  0.1        Bilirubin (UA)          Total Bilirubin          Blood Culture, Routine          BNP          Site          BUN, Bld  15        Calcium  8.9        Chloride  105        CO2  25        Color, UA          Creatinine  0.7        DelSys          Differential Method  Automated        eGFR if   >60        eGFR if non   >60  Comment:  Calculation used to obtain the estimated glomerular filtration  rate (eGFR) is the CKD-EPI equation.           Eos #  0.0        Eosinophil%  0.0        Flow          Glucose  221(H)        Glucose, UA          Gram Stain (Respiratory)          Gran #  12.3(H)        Gran%  93.3(H)        Hematocrit  30.7(L)        Hemoglobin  9.5(L)        Coumadin Monitoring INR          Ketones, UA          Lactate, Patrick    2.7  Comment:  Falsely low lactic acid results can be found in samples   containing >=13.0 mg/dL total bilirubin and/or >=3.5 mg/dL   direct bilirubin.  (H)      Leukocytes, UA          Lymph #   0.6(L)        Lymph%  4.6(L)        Magnesium  2.1        MCH  27.2        MCHC  30.9(L)        MCV  88        Mode          Mono #  0.3        Mono%  2.0(L)        MPV  8.6(L)        Nitrite, UA          Occult Blood UA          Ovalocytes          pH, UA          Phosphorus  3.3        Platelets  248        POC BE          POC HCO3          POC PCO2          POC PH          POC PO2          POC SATURATED O2          POCT Glucose 232(H)    275(H)     Poik          Potassium  3.8        Total Protein          Protein, UA          Protime          RBC  3.49(L)        RDW  16.3(H)        Sample          Sodium  143        Specific Gravity, UA          Specimen UA          Troponin I   0.018  Comment:  The reference interval for Troponin I represents the 99th percentile   cutoff   for our facility and is consistent with 3rd generation assay   performance.   0.015  Comment:  The reference interval for Troponin I represents the 99th percentile   cutoff   for our facility and is consistent with 3rd generation assay   performance.        Urobilinogen, UA          WBC  13.20(H)                    12/21/17  1837 12/21/17  1642 12/21/17  1641 12/21/17  1558 12/21/17  1555      Albumin          Alkaline Phosphatase          Allens Test    Pass      ALT          Anion Gap          Appearance, UA   Clear       aPTT          AST          Baso #          Basophil%          Bilirubin (UA)   Negative       Total Bilirubin          Blood Culture, Routine     No Growth to date[P]     BNP          Site    LR      BUN, Bld          Calcium          Chloride          CO2          Color, UA   Yellow       Creatinine          DelSys    Aero Mask      Differential Method          eGFR if           eGFR if non           Eos #          Eosinophil%          Flow    8      Glucose          Glucose, UA   1+(A)       Gram Stain (Respiratory) <10 epithelial cells per low power field.          Many WBC's           Many Gram positive cocci          Many Gram negative rods         Gran #          Gran%          Hematocrit          Hemoglobin          Coumadin Monitoring INR          Ketones, UA   Negative       Lactate, Patrick  2.8  Comment:  Falsely low lactic acid results can be found in samples   containing >=13.0 mg/dL total bilirubin and/or >=3.5 mg/dL   direct bilirubin.  (H)        Leukocytes, UA   Negative       Lymph #          Lymph%          Magnesium          MCH          MCHC          MCV          Mode    SPONT      Mono #          Mono%          MPV          Nitrite, UA   Negative       Occult Blood UA   Trace(A)       Ovalocytes          pH, UA   6.0       Phosphorus          Platelets          POC BE    11      POC HCO3    35.8(H)      POC PCO2    57.8(HH)      POC PH    7.399      POC PO2    297(H)      POC SATURATED O2    100      POCT Glucose          Poik          Potassium          Total Protein          Protein, UA   Negative  Comment:  Recommend a 24 hour urine protein or a urine   protein/creatinine ratio if globulin induced proteinuria is  clinically suspected.         Protime          RBC          RDW          Sample    ARTERIAL      Sodium          Specific [place], UA   1.010       Specimen UA   Urine, Catheterized       Troponin I          Urobilinogen, UA   Negative       WBC                      12/21/17  1550 12/21/17  1540      Albumin 3.5      Alkaline Phosphatase 85      Allens Test       ALT 23      Anion Gap 19(H)      Appearance, UA       aPTT 32.9  Comment:  aPTT therapeutic range = 39-69 seconds(H)      AST 16      Baso # 0.01      Basophil% 0.0      Bilirubin (UA)       Total Bilirubin 0.3  Comment:  For infants and newborns, interpretation of results should be based  on gestational age, weight and in agreement with clinical  observations.  Premature Infant recommended reference ranges:  Up to 24 hours.............<8.0 mg/dL  Up to 48 hours............<12.0 mg/dL  3-5  days..................<15.0 mg/dL  6-29 days.................<15.0 mg/dL        Blood Culture, Routine  No Growth to date[P]     BNP 57  Comment:  Values of less than 100 pg/ml are consistent with non-CHF populations.      Site       BUN, Bld 17      Calcium 10.0      Chloride 97      CO2 27      Color, UA       Creatinine 1.0      DelSys       Differential Method Automated      eGFR if  >60      eGFR if non  56  Comment:  Calculation used to obtain the estimated glomerular filtration  rate (eGFR) is the CKD-EPI equation.   (A)      Eos # 0.0      Eosinophil% 0.0      Flow       Glucose 279(H)      Glucose, UA       Gram Stain (Respiratory)       Gran # 21.6(H)      Gran% 91.5(H)      Hematocrit 37.3      Hemoglobin 11.8(L)      Coumadin Monitoring INR 1.0  Comment:  Coumadin Therapy:  2.0 - 3.0 for INR for all indicators except mechanical heart valves  and antiphospholipid syndromes which should use 2.5 - 3.5.        Ketones, UA       Lactate, Patrick       Leukocytes, UA       Lymph # 1.6      Lymph% 6.8(L)      Magnesium       MCH 28.0      MCHC 31.6(L)      MCV 88      Mode       Mono # 0.5      Mono% 2.2(L)      MPV 8.6(L)      Nitrite, UA       Occult Blood UA       Ovalocytes Occasional      pH, UA       Phosphorus       Platelets 314      POC BE       POC HCO3       POC PCO2       POC PH       POC PO2       POC SATURATED O2       POCT Glucose       Poik Slight      Potassium 3.8      Total Protein 8.2      Protein, UA       Protime 10.3      RBC 4.22      RDW 16.4(H)      Sample       Sodium 143      Specific Gravity, UA       Specimen UA       Troponin I 0.017  Comment:  The reference interval for Troponin I represents the 99th percentile   cutoff   for our facility and is consistent with 3rd generation assay   performance.        Urobilinogen, UA       WBC 23.77(H)          All pertinent labs within the past 24 hours have been reviewed.    Significant Imaging: I have reviewed  all pertinent imaging results/findings within the past 24 hours.    Assessment/Plan:      * Acute on chronic respiratory failure with hypoxia and hypercapnia    --supplemental oxygen to maintain sats  --duo nebs  --continue home inhalers    --12/22: improving        COPD with acute exacerbation    --WBC 23K, SOB at rest, unable to complete sentence d/t SOB  --CXR: Chronic right middle lobe atelectasis.  --CT chest pending  --supplemental oxygen to maintain sats  --IV avelox  --continue home roflumilast  --duonebs  --continue home albuterol inhaler, symbicort, mucinex, xyzal, and singulair    --12/22: WBC improving            Leukocytosis    --sepsis vs medication  --pt afebrile and takes prednisone daily X 7 years and metformin  --Lactate 2.8  --CT chest pending  --BC pending  --IV avelox  --daily CBC    --12/22: improving with tx        CHF, acute on chronic    --2D echo 06/2017 showed EF 45%  --2D echo pending  --IV furosemide  --continue home zaroxolyn, lisinopril            Atrial fibrillation with RVR    --continue home xarelto  --IV cardizem          Type 2 diabetes mellitus with hyperlipidemia    --SSI and accuchecks  --diabetic diet          Essential (primary) hypertension    --continue home lisinopril  --cardiac diet          Anxiety    --continue home xanax  --provide calm environment with low lighting          VTE Risk Mitigation         Ordered     rivaroxaban tablet 20 mg  With dinner     Route:  Oral        12/21/17 1929     Place sequential compression device  Until discontinued      12/21/17 1929     Reason for No Pharmacological VTE Prophylaxis  Once      12/21/17 1929     Medium Risk of VTE  Once      12/21/17 1929              SHARON Gonsales  Department of Hospital Medicine   [facility]

## 2017-12-22 NOTE — H&P
[facility] Hospital Medicine  History & Physical    Patient Name: Petrona Gonzalez  MRN: [id number]  Admission Date: 12/21/2017  Attending Physician: Carol Moody MD   Primary Care Provider: Provider Not[employer]         Patient information was obtained from patient, relative(s), past medical records and ER records.     Subjective:     Principal Problem:Acute on chronic respiratory failure with hypoxia and hypercapnia    Chief Complaint:   Chief Complaint   Patient presents with    Shortness of Breath     recent tx for resp inf with abx. Placed on neb with improved of sats from 60s at [facility] to mid/high 90s en route. 125 solumedrol, duoneb, albuterol tx en route.         HPI: 77 y/o female with PMHx of COPD, Dm, HTN, and HLD presented to the ED with c/o SOB that onset gradually yesterday. She reports she started with sinus congestion 2 weeks ago that included sore throat, cough with green sputum (thick), and acid reflux. Symptoms are constant and moderate, exacerbated or mitigated by nothing. Pt denies N/V, diarrhea, fever, chills, night sweats, Chest pain, abd pain, and all other symptoms at present. She saw the NP at [facility] who prescribed zithromax 2 days ago. Patient also takes prednisone daily X 7 years. She reports her breathing got gradually worse and did not improve with nebulizer tx and eventually she was sent to the Ed via [facility] who reports when they arrived on scene her O2 saturation was 67 and was pale in appearance. [facility] reports pt is on 3 L of O2 all the time at the nursing home and she is on BiPAP at night, 12/6. [facility] reports giving pt duoneb, albuterol, and 125 of solumedrol. She was placed on biPap. ED workup includes WBC 23K, Lactate 2.8, PCO2 57.8 urine with neg nitrates, neg leukocytes. CXR: Stable chest x-ray.  Chronic right middle lobe atelectasis. EKG Rate: 104 BPM Rhythm: AFIB with RVR Interpretation: L axis deviation. Septal infarct. No STEMI.   She will be  admitted to telemetry with COPD exacerbation and acute on chronic CHF under the care of Hospital Medicine. She is a full code and said her sister, Bonnie Carmichael or her son, Jose Nguyễn could make any medical decisions for her.            Past Medical History:   Diagnosis Date    COPD (chronic obstructive pulmonary disease) with emphysema     Diabetes mellitus     Hyperlipidemia     Hypertension     Insomnia     Maxillary sinusitis, chronic        Past Surgical History:   Procedure Laterality Date    BACK SURGERY      HERNIA REPAIR      HYSTERECTOMY         Review of patient's allergies indicates:   Allergen Reactions    Meloxicam Other (See Comments)      Patient stated that she has muscle aches and pains    Morphine Rash    Naproxen Other (See Comments)     Patient states she has muscle and aches.    Pulmicort [budesonide] Other (See Comments)     Burning in chest       No current facility-administered medications on file prior to encounter.      Current Outpatient Prescriptions on File Prior to Encounter   Medication Sig    albuterol 90 mcg/actuation inhaler Inhale 2 puffs into the lungs every 6 (six) hours as needed. Dispense with spacer.    albuterol-ipratropium 2.5mg-0.5mg/3mL (DUO-NEB) 0.5 mg-3 mg(2.5 mg base)/3 mL nebulizer solution Take 3 mLs by nebulization every 6 (six) hours.    alprazolam (XANAX) 0.5 MG tablet Take 0.5 mg by mouth every 6 (six) hours as needed for Anxiety.    aspirin (ECOTRIN) 81 MG EC tablet Take 81 mg by mouth once daily.    budesonide-formoterol 160-4.5 mcg (SYMBICORT) 160-4.5 mcg/actuation HFAA Inhale 2 puffs into the lungs every 12 (twelve) hours. Controller    dextromethorphan-guaifenesin  mg (MUCINEX DM)  mg per 12 hr tablet Take 1 tablet by mouth.    diltiaZEM (CARDIZEM CD) 120 MG Cp24 Take 1 capsule (120 mg total) by mouth 2 (two) times daily.    furosemide (LASIX) 40 MG tablet Take 40 mg by mouth 2 (two) times daily.    lisinopril  (PRINIVIL,ZESTRIL) 5 MG tablet Take 1 tablet (5 mg total) by mouth once daily.    metformin (GLUCOPHAGE) 500 MG tablet Take 1 tablet (500 mg total) by mouth 2 (two) times daily with meals.    montelukast (SINGULAIR) 10 mg tablet Take 10 mg by mouth every evening.    predniSONE (DELTASONE) 5 MG tablet Take 40 mg by mouth once daily.     roflumilast 500 mcg Tab Take 1 tablet (500 mcg total) by mouth once daily.    simvastatin (ZOCOR) 10 MG tablet Take 1 tablet (10 mg total) by mouth every evening.    trazodone (DESYREL) 50 MG tablet Take 50 mg by mouth nightly as needed for Insomnia.    metOLazone (ZAROXOLYN) 5 MG tablet Take 1 tablet (5 mg total) by mouth once daily.    OXYGEN-AIR DELIVERY SYSTEMS MISC by Misc.(Non-Drug; Combo Route) route.    umeclidinium (INCRUSE ELLIPTA) 62.5 mcg/actuation DsDv Inhale into the lungs once daily. Controller    [DISCONTINUED] amoxicillin-clavulanate 875-125mg (AUGMENTIN) 875-125 mg per tablet     [DISCONTINUED] cetirizine (ZYRTEC) 10 MG tablet Take 1 tablet (10 mg total) by mouth once daily.     Family History     Problem Relation (Age of Onset)    Arthritis Father    Cancer Father, Sister, Brother    Early death Sister    Hearing loss Father    Heart disease Brother        Social History Main Topics    Smoking status: Former Smoker     Years: 30.00    Smokeless tobacco: Never Used    Alcohol use No    Drug use: No    Sexual activity: Not Currently     Review of Systems   Constitutional: Positive for activity change. Negative for chills, diaphoresis and fever.   HENT: Positive for congestion, ear pain, postnasal drip, sinus pressure and sore throat.    Eyes: Positive for discharge. Negative for pain.   Respiratory: Positive for cough, shortness of breath and wheezing.    Cardiovascular: Positive for leg swelling. Negative for chest pain and palpitations.   Gastrointestinal: Negative for constipation, diarrhea, nausea and vomiting.   Endocrine: Negative.     Genitourinary: Negative for dysuria and hematuria.   Musculoskeletal: Positive for arthralgias, back pain, gait problem and myalgias.   Skin: Negative for color change and wound.   Allergic/Immunologic: Negative.    Neurological: Positive for weakness. Negative for seizures and speech difficulty.   Psychiatric/Behavioral: Negative for behavioral problems and confusion. The patient is nervous/anxious.      Objective:     Vital Signs (Most Recent):  Temp: 97.4 °F (36.3 °C) (12/21/17 1551)  Pulse: 105 (12/21/17 1742)  Resp: (!) 33 (12/21/17 1742)  BP: (!) 154/64 (12/21/17 1742)  SpO2: 97 % (12/21/17 1742) Vital Signs (24h Range):  Temp:  [97.4 °F (36.3 °C)] 97.4 °F (36.3 °C)  Pulse:  [] 105  Resp:  [23-35] 33  SpO2:  [97 %-100 %] 97 %  BP: (120-183)/(58-86) 154/64     Weight: 89.4 kg (197 lb 3.2 oz)  Body mass index is 36.07 kg/m².    Physical Exam   Constitutional: She is oriented to person, place, and time. She appears well-developed and well-nourished.   HENT:   Head: Normocephalic and atraumatic.   Mouth/Throat: Oropharynx is clear and moist.   Eyes: Conjunctivae and EOM are normal. Pupils are equal, round, and reactive to light.   Neck: Normal range of motion. Neck supple. JVD present.   Cardiovascular: Normal rate, regular rhythm, normal heart sounds and intact distal pulses.    No murmur heard.  Pulmonary/Chest: She is in respiratory distress (mild). She has wheezes.   Diminished BS to bilat bases   Abdominal: Soft. Bowel sounds are normal. She exhibits no distension. There is no tenderness.   Musculoskeletal: She exhibits edema (2+ BLE).   Neurological: She is alert and oriented to person, place, and time.   Skin: Skin is warm and dry. Capillary refill takes 2 to 3 seconds. No erythema.   Psychiatric: She has a normal mood and affect. Her behavior is normal.         CRANIAL NERVES     CN III, IV, VI   Pupils are equal, round, and reactive to light.  Extraocular motions are normal.        Significant  Labs:   Recent Lab Results       12/21/17  1642 12/21/17  1641 12/21/17  1558 12/21/17  1550      Albumin    3.5     Alkaline Phosphatase    85     Allens Test   Pass      ALT    23     Anion Gap    19(H)     Appearance, UA  Clear       aPTT    32.9  Comment:  aPTT therapeutic range = 39-69 seconds(H)     AST    16     Baso #    0.01     Basophil%    0.0     Bilirubin (UA)  Negative       Total Bilirubin    0.3  Comment:  For infants and newborns, interpretation of results should be based  on gestational age, weight and in agreement with clinical  observations.  Premature Infant recommended reference ranges:  Up to 24 hours.............<8.0 mg/dL  Up to 48 hours............<12.0 mg/dL  3-5 days..................<15.0 mg/dL  6-29 days.................<15.0 mg/dL       BNP    57  Comment:  Values of less than 100 pg/ml are consistent with non-CHF populations.     Site   LR      BUN, Bld    17     Calcium    10.0     Chloride    97     CO2    27     Color, UA  Yellow       Creatinine    1.0     DelSys   Aero Mask      Differential Method    Automated     eGFR if     >60     eGFR if non     56  Comment:  Calculation used to obtain the estimated glomerular filtration  rate (eGFR) is the CKD-EPI equation.   (A)     Eos #    0.0     Eosinophil%    0.0     Flow   8      Glucose    279(H)     Glucose, UA  1+(A)       Gran #    21.6(H)     Gran%    91.5(H)     Hematocrit    37.3     Hemoglobin    11.8(L)     Coumadin Monitoring INR    1.0  Comment:  Coumadin Therapy:  2.0 - 3.0 for INR for all indicators except mechanical heart valves  and antiphospholipid syndromes which should use 2.5 - 3.5.       Ketones, UA  Negative       Lactate, Patrick 2.8  Comment:  Falsely low lactic acid results can be found in samples   containing >=13.0 mg/dL total bilirubin and/or >=3.5 mg/dL   direct bilirubin.  (H)        Leukocytes, UA  Negative       Lymph #    1.6     Lymph%    6.8(L)     MCH    28.0     MCHC     31.6(L)     MCV    88     Mode   SPONT      Mono #    0.5     Mono%    2.2(L)     MPV    8.6(L)     Nitrite, UA  Negative       Occult Blood UA  Trace(A)       Ovalocytes    Occasional     pH, UA  6.0       Platelets    314     POC BE   11      POC HCO3   35.8(H)      POC PCO2   57.8(HH)      POC PH   7.399      POC PO2   297(H)      POC SATURATED O2   100      Poik    Slight     Potassium    3.8     Total Protein    8.2     Protein, UA  Negative  Comment:  Recommend a 24 hour urine protein or a urine   protein/creatinine ratio if globulin induced proteinuria is  clinically suspected.         Protime    10.3     RBC    4.22     RDW    16.4(H)     Sample   ARTERIAL      Sodium    143     Specific [place], UA  1.010       Specimen UA  Urine, Catheterized       Troponin I    0.017  Comment:  The reference interval for Troponin I represents the 99th percentile   cutoff   for our facility and is consistent with 3rd generation assay   performance.       Urobilinogen, UA  Negative       WBC    23.77(H)         All pertinent labs within the past 24 hours have been reviewed.    Significant Imaging: I have reviewed all pertinent imaging results/findings within the past 24 hours.    Assessment/Plan:     * Acute on chronic respiratory failure with hypoxia and hypercapnia    --supplemental oxygen to maintain sats  --duo nebs  --continue home inhalers          COPD with acute exacerbation    --WBC 23K, SOB at rest, unable to complete sentence d/t SOB  --CXR: Chronic right middle lobe atelectasis.  --CT chest pending  --supplemental oxygen to maintain sats  --IV avelox  --continue home roflumilast  --duonebs  --continue home albuterol inhaler, symbicort, mucinex, xyzal, and singulair            Leukocytosis    --sepsis vs medication  --pt afebrile and takes prednisone daily X 7 years and metformin  --Lactate 2.8  --CT chest pending  --BC pending  --IV avelox  --daily CBC          CHF, acute on chronic    --2D echo 06/2017 showed  EF 45%  --2D echo pending  --IV furosemide  --continue home zaroxolyn, lisinopril            Atrial fibrillation with RVR    --continue home xarelto  --IV cardizem          Type 2 diabetes mellitus with hyperlipidemia    --SSI and accuchecks  --diabetic diet          Essential (primary) hypertension    --continue home lisinopril  --cardiac diet          Anxiety    --continue home xanax  --provide calm environment with low lighting          VTE Risk Mitigation     Xarelto 20mg daily             DMITRY Gonsales-C  Department of Hospital Medicine   [facility]

## 2017-12-22 NOTE — ASSESSMENT & PLAN NOTE
--sepsis vs medication  --pt afebrile and takes prednisone daily X 7 years and metformin  --Lactate 2.8  --CT chest pending  --BC pending  --IV avelox  --daily CBC

## 2017-12-23 LAB
ANION GAP SERPL CALC-SCNC: 14 MMOL/L
BASOPHILS # BLD AUTO: 0.01 K/UL
BASOPHILS NFR BLD: 0.1 %
BUN SERPL-MCNC: 18 MG/DL
CALCIUM SERPL-MCNC: 8.6 MG/DL
CHLORIDE SERPL-SCNC: 107 MMOL/L
CO2 SERPL-SCNC: 23 MMOL/L
CREAT SERPL-MCNC: 0.8 MG/DL
DIFFERENTIAL METHOD: ABNORMAL
EOSINOPHIL # BLD AUTO: 0 K/UL
EOSINOPHIL NFR BLD: 0.1 %
ERYTHROCYTE [DISTWIDTH] IN BLOOD BY AUTOMATED COUNT: 16.5 %
EST. GFR  (AFRICAN AMERICAN): >60 ML/MIN/1.73 M^2
EST. GFR  (NON AFRICAN AMERICAN): >60 ML/MIN/1.73 M^2
GLUCOSE SERPL-MCNC: 128 MG/DL
HCT VFR BLD AUTO: 31 %
HGB BLD-MCNC: 9.5 G/DL
LYMPHOCYTES # BLD AUTO: 1.4 K/UL
LYMPHOCYTES NFR BLD: 10.2 %
MAGNESIUM SERPL-MCNC: 2 MG/DL
MCH RBC QN AUTO: 27.5 PG
MCHC RBC AUTO-ENTMCNC: 30.6 G/DL
MCV RBC AUTO: 90 FL
MONOCYTES # BLD AUTO: 1 K/UL
MONOCYTES NFR BLD: 7.2 %
NEUTROPHILS # BLD AUTO: 11.2 K/UL
NEUTROPHILS NFR BLD: 82.4 %
PHOSPHATE SERPL-MCNC: 3 MG/DL
PLATELET # BLD AUTO: 263 K/UL
PMV BLD AUTO: 8.7 FL
POCT GLUCOSE: 159 MG/DL (ref 70–110)
POCT GLUCOSE: 165 MG/DL (ref 70–110)
POCT GLUCOSE: 254 MG/DL (ref 70–110)
POTASSIUM SERPL-SCNC: 3.8 MMOL/L
RBC # BLD AUTO: 3.45 M/UL
SODIUM SERPL-SCNC: 144 MMOL/L
WBC # BLD AUTO: 13.55 K/UL

## 2017-12-23 PROCEDURE — 27000221 HC OXYGEN, UP TO 24 HOURS

## 2017-12-23 PROCEDURE — 85025 COMPLETE CBC W/AUTO DIFF WBC: CPT

## 2017-12-23 PROCEDURE — 36415 COLL VENOUS BLD VENIPUNCTURE: CPT

## 2017-12-23 PROCEDURE — 25000242 PHARM REV CODE 250 ALT 637 W/ HCPCS: Performed by: NURSE PRACTITIONER

## 2017-12-23 PROCEDURE — 25000003 PHARM REV CODE 250: Performed by: NURSE PRACTITIONER

## 2017-12-23 PROCEDURE — 21400001 HC TELEMETRY ROOM

## 2017-12-23 PROCEDURE — A4216 STERILE WATER/SALINE, 10 ML: HCPCS | Performed by: NURSE PRACTITIONER

## 2017-12-23 PROCEDURE — 25000242 PHARM REV CODE 250 ALT 637 W/ HCPCS: Performed by: EMERGENCY MEDICINE

## 2017-12-23 PROCEDURE — 80048 BASIC METABOLIC PNL TOTAL CA: CPT

## 2017-12-23 PROCEDURE — 25000003 PHARM REV CODE 250: Performed by: INTERNAL MEDICINE

## 2017-12-23 PROCEDURE — 94761 N-INVAS EAR/PLS OXIMETRY MLT: CPT

## 2017-12-23 PROCEDURE — 94640 AIRWAY INHALATION TREATMENT: CPT

## 2017-12-23 PROCEDURE — 84100 ASSAY OF PHOSPHORUS: CPT

## 2017-12-23 PROCEDURE — 63600175 PHARM REV CODE 636 W HCPCS: Performed by: NURSE PRACTITIONER

## 2017-12-23 PROCEDURE — 83735 ASSAY OF MAGNESIUM: CPT

## 2017-12-23 RX ORDER — DILTIAZEM HYDROCHLORIDE 120 MG/1
120 CAPSULE, COATED, EXTENDED RELEASE ORAL DAILY
Status: DISCONTINUED | OUTPATIENT
Start: 2017-12-24 | End: 2017-12-24

## 2017-12-23 RX ORDER — MOXIFLOXACIN HYDROCHLORIDE 400 MG/1
400 TABLET ORAL DAILY
Status: DISCONTINUED | OUTPATIENT
Start: 2017-12-23 | End: 2017-12-26 | Stop reason: HOSPADM

## 2017-12-23 RX ADMIN — SODIUM CHLORIDE, PRESERVATIVE FREE 3 ML: 5 INJECTION INTRAVENOUS at 06:12

## 2017-12-23 RX ADMIN — BUDESONIDE 0.5 MG: 0.5 SUSPENSION RESPIRATORY (INHALATION) at 07:12

## 2017-12-23 RX ADMIN — TOBRAMYCIN 1 DROP: 3 SOLUTION OPHTHALMIC at 05:12

## 2017-12-23 RX ADMIN — BUDESONIDE 0.5 MG: 0.5 SUSPENSION RESPIRATORY (INHALATION) at 08:12

## 2017-12-23 RX ADMIN — GUAIFENESIN 1200 MG: 600 TABLET, EXTENDED RELEASE ORAL at 09:12

## 2017-12-23 RX ADMIN — MONTELUKAST SODIUM 10 MG: 10 TABLET, COATED ORAL at 09:12

## 2017-12-23 RX ADMIN — ALBUTEROL SULFATE 2.5 MG: 2.5 SOLUTION RESPIRATORY (INHALATION) at 09:12

## 2017-12-23 RX ADMIN — IPRATROPIUM BROMIDE AND ALBUTEROL SULFATE 3 ML: 2.5; .5 SOLUTION RESPIRATORY (INHALATION) at 01:12

## 2017-12-23 RX ADMIN — FAMOTIDINE 20 MG: 20 TABLET ORAL at 09:12

## 2017-12-23 RX ADMIN — RIVAROXABAN 20 MG: 20 TABLET, FILM COATED ORAL at 05:12

## 2017-12-23 RX ADMIN — TOBRAMYCIN 1 DROP: 3 SOLUTION OPHTHALMIC at 09:12

## 2017-12-23 RX ADMIN — CETIRIZINE HYDROCHLORIDE 10 MG: 10 TABLET, FILM COATED ORAL at 09:12

## 2017-12-23 RX ADMIN — LISINOPRIL 5 MG: 5 TABLET ORAL at 09:12

## 2017-12-23 RX ADMIN — IPRATROPIUM BROMIDE AND ALBUTEROL SULFATE 3 ML: 2.5; .5 SOLUTION RESPIRATORY (INHALATION) at 07:12

## 2017-12-23 RX ADMIN — ASPIRIN 81 MG: 81 TABLET, COATED ORAL at 09:12

## 2017-12-23 RX ADMIN — ALPRAZOLAM 0.5 MG: 0.5 TABLET ORAL at 09:12

## 2017-12-23 RX ADMIN — Medication 10 MG/HR: at 12:12

## 2017-12-23 RX ADMIN — IPRATROPIUM BROMIDE AND ALBUTEROL SULFATE 3 ML: 2.5; .5 SOLUTION RESPIRATORY (INHALATION) at 12:12

## 2017-12-23 RX ADMIN — TOBRAMYCIN 1 DROP: 3 SOLUTION OPHTHALMIC at 06:12

## 2017-12-23 RX ADMIN — SIMVASTATIN 10 MG: 5 TABLET, FILM COATED ORAL at 09:12

## 2017-12-23 RX ADMIN — INSULIN ASPART 3 UNITS: 100 INJECTION, SOLUTION INTRAVENOUS; SUBCUTANEOUS at 11:12

## 2017-12-23 RX ADMIN — FUROSEMIDE 40 MG: 10 INJECTION, SOLUTION INTRAMUSCULAR; INTRAVENOUS at 09:12

## 2017-12-23 RX ADMIN — MOXIFLOXACIN HYDROCHLORIDE 400 MG: 400 TABLET, FILM COATED ORAL at 05:12

## 2017-12-23 RX ADMIN — TOBRAMYCIN 1 DROP: 3 SOLUTION OPHTHALMIC at 01:12

## 2017-12-23 RX ADMIN — ALPRAZOLAM 0.5 MG: 0.5 TABLET ORAL at 05:12

## 2017-12-23 RX ADMIN — ARFORMOTEROL TARTRATE 15 MCG: 15 SOLUTION RESPIRATORY (INHALATION) at 08:12

## 2017-12-23 RX ADMIN — ALPRAZOLAM 0.5 MG: 0.5 TABLET ORAL at 01:12

## 2017-12-23 RX ADMIN — PREDNISONE 40 MG: 20 TABLET ORAL at 09:12

## 2017-12-23 RX ADMIN — INSULIN ASPART 3 UNITS: 100 INJECTION, SOLUTION INTRAVENOUS; SUBCUTANEOUS at 09:12

## 2017-12-23 RX ADMIN — ARFORMOTEROL TARTRATE 15 MCG: 15 SOLUTION RESPIRATORY (INHALATION) at 07:12

## 2017-12-23 RX ADMIN — FUROSEMIDE 40 MG: 10 INJECTION, SOLUTION INTRAMUSCULAR; INTRAVENOUS at 05:12

## 2017-12-23 RX ADMIN — ROFLUMILAST 500 MCG: 500 TABLET ORAL at 09:12

## 2017-12-23 RX ADMIN — SODIUM CHLORIDE, PRESERVATIVE FREE 3 ML: 5 INJECTION INTRAVENOUS at 09:12

## 2017-12-23 RX ADMIN — IPRATROPIUM BROMIDE AND ALBUTEROL SULFATE 3 ML: 2.5; .5 SOLUTION RESPIRATORY (INHALATION) at 08:12

## 2017-12-23 NOTE — PLAN OF CARE
Problem: Patient Care Overview  Goal: Plan of Care Review  Outcome: Ongoing (interventions implemented as appropriate)  Plan of care discussed with patient, and patient verbalized understanding.  Patient remained AOx4, 3L Nc, and afib on the monitor. Patient has NS infusing at 125ml/hr and diltiazem at 5ml/hr. Elevated glucose levels treated.  Patient has had no complaint of pain. Patient remained free of falls, accidents, and trama during the day shift.  Bed is in the lowest position and call light is within reach - Continue to monitor.

## 2017-12-23 NOTE — PROGRESS NOTES
[facility] Hospital Medicine  Progress Note    Patient Name: Petrona Gonzalez  MRN: [id number]  Patient Class: IP- Inpatient   Admission Date: 12/21/2017  Length of Stay: 2 days  Attending Physician: Shravan De MD  Primary Care Provider: Provider [facility]        Subjective:     Principal Problem:Acute on chronic respiratory failure with hypoxia and hypercapnia    HPI:  77 y/o female with PMHx of COPD, Dm, HTN, and HLD presented to the ED with c/o SOB that onset gradually yesterday. She reports she started with sinus congestion 2 weeks ago that included sore throat, cough with green sputum (thick), and acid reflux. Symptoms are constant and moderate, exacerbated or mitigated by nothing. Pt denies N/V, diarrhea, fever, chills, night sweats, Chest pain, abd pain, and all other symptoms at present. She saw the NP at [facility] who prescribed zithromax 2 days ago. Patient also takes prednisone daily X 7 years. She reports her breathing got gradually worse and did not improve with nebulizer tx and eventually she was sent to the Ed via [facility] who reports when they arrived on scene her O2 saturation was 67 and was pale in appearance. [facility] reports pt is on 3 L of O2 all the time at the nursing home and she is on BiPAP at night, 12/6. [facility] reports giving pt duoneb, albuterol, and 125 of solumedrol. She was placed on biPap. ED workup includes WBC 23K, Lactate 2.8, PCO2 57.8 urine with neg nitrates, neg leukocytes. CXR: Stable chest x-ray.  Chronic right middle lobe atelectasis. EKG Rate: 104 BPM Rhythm: AFIB with RVR Interpretation: L axis deviation. Septal infarct. No STEMI.    She is a full code and said her sister, Bonnie Carmichael or her son, Jose Nguyễn could make any medical decisions for her.            Hospital Course:  Patient was admitted to telemetry with COPD exacerbation and acute on chronic CHF under the care of Hospital Medicine. She was given IV Avelox, breathing treatments, and IV lasix.  Slowly improving. Decreased edema, BBS improved and without wheezing. Pt able to speak more words with SOB. Continue current plan of care.    Interval History:  HR increasing to 160's, cardizem gtt increased from 5mg/hr to 10mg/hr to improve rate control. Continue  IV Avelox, breathing treatments, and IV lasix. BBS improved and without wheezing. Slight SOB with speaking, improving. Will continue current plan of care.       Review of Systems   Constitutional: Positive for activity change. Negative for appetite change, chills, diaphoresis, fatigue and fever.   HENT: Negative.  Negative for congestion, rhinorrhea, sinus pain, sinus pressure and sore throat.    Eyes: Negative.  Negative for photophobia, discharge and visual disturbance.   Respiratory: Positive for shortness of breath. Negative for apnea, cough, chest tightness and wheezing.    Cardiovascular: Negative.  Negative for chest pain, palpitations and leg swelling.   Gastrointestinal: Negative.  Negative for abdominal distention, abdominal pain, diarrhea, nausea and vomiting.   Endocrine: Negative.  Negative for cold intolerance, heat intolerance, polydipsia, polyphagia and polyuria.   Genitourinary: Negative.  Negative for difficulty urinating, dysuria, enuresis, frequency and urgency.   Musculoskeletal: Positive for arthralgias. Negative for back pain, myalgias and neck pain.   Skin: Negative.  Negative for color change, pallor and wound.   Allergic/Immunologic: Negative.  Negative for environmental allergies, food allergies and immunocompromised state.   Neurological: Negative.  Negative for dizziness, seizures, syncope, speech difficulty, weakness, light-headedness, numbness and headaches.   Hematological: Negative.  Negative for adenopathy.   Psychiatric/Behavioral: Negative for behavioral problems and confusion. The patient is nervous/anxious.      Objective:     Vital Signs (Most Recent):  Temp: 98.8 °F (37.1 °C) (12/23/17 1142)  Pulse: 67 (12/23/17  1209)  Resp: (!) 26 (12/23/17 1209)  BP: (!) 122/57 (12/23/17 1142)  SpO2: 97 % (12/23/17 1209) Vital Signs (24h Range):  Temp:  [97.7 °F (36.5 °C)-98.8 °F (37.1 °C)] 98.8 °F (37.1 °C)  Pulse:  [] 67  Resp:  [18-26] 26  SpO2:  [92 %-100 %] 97 %  BP: (115-133)/(56-68) 122/57     Weight: 93 kg (205 lb 0.4 oz)  Body mass index is 37.5 kg/m².    Intake/Output Summary (Last 24 hours) at 12/23/17 1241  Last data filed at 12/23/17 1114   Gross per 24 hour   Intake             3506 ml   Output             4050 ml   Net             -544 ml      Physical Exam   Constitutional: She is oriented to person, place, and time. She appears well-developed and well-nourished. She appears distressed.   HENT:   Head: Normocephalic and atraumatic.   Eyes: Conjunctivae and EOM are normal. Pupils are equal, round, and reactive to light. Right eye exhibits no discharge. Left eye exhibits no discharge.   Neck: Normal range of motion. Neck supple.   Cardiovascular: Normal heart sounds and intact distal pulses.  An irregular rhythm present. Exam reveals no gallop and no friction rub.    No murmur heard.  Pulmonary/Chest: Breath sounds normal. She is in respiratory distress. She has no wheezes. She has no rales.   Abdominal: Soft. Bowel sounds are normal. She exhibits no distension. There is no tenderness. There is no guarding.   obese   Genitourinary:   Genitourinary Comments: deferred   Musculoskeletal: Normal range of motion. She exhibits edema (BLE trace edema).   Neurological: She is alert and oriented to person, place, and time.   Skin: Skin is warm and dry. Capillary refill takes less than 2 seconds. Bruising and ecchymosis noted. She is not diaphoretic.   Psychiatric: Her behavior is normal. Judgment and thought content normal. Her mood appears anxious.       Significant Labs:   Recent Lab Results       12/23/17  1110 12/23/17  0404 12/22/17  2133 12/22/17  6947      Anion Gap  14       Baso #  0.01       Basophil%  0.1       BUN,  Bld  18       Calcium  8.6(L)       Chloride  107       CO2  23       Creatinine  0.8       Differential Method  Automated       eGFR if   >60       eGFR if non   >60  Comment:  Calculation used to obtain the estimated glomerular filtration  rate (eGFR) is the CKD-EPI equation.          Eos #  0.0       Eosinophil%  0.1       Glucose  128(H)       Gran #  11.2(H)       Gran%  82.4(H)       Hematocrit  31.0(L)       Hemoglobin  9.5(L)       Lymph #  1.4       Lymph%  10.2(L)       Magnesium  2.0       MCH  27.5       MCHC  30.6(L)       MCV  90       Mono #  1.0       Mono%  7.2       MPV  8.7(L)       Phosphorus  3.0       Platelets  263       POCT Glucose 254(H)  165(H) 258(H)     Potassium  3.8       RBC  3.45(L)       RDW  16.5(H)       Sodium  144       WBC  13.55(H)             Significant Imaging:  Imaging Results          CT Chest With Contrast (Final result)  Result time 12/21/17 19:12:39    Final result by Goran Melo MD (12/21/17 19:12:39)                 Impression:         No acute abnormalities.  All CT scans at this facility use dose modulation, iterative reconstruction and/or weight based dosing when appropriate to reduce radiation dose to as low as reasonably achievable.      Electronically signed by: GORAN MELO MD  Date:     12/21/17  Time:    19:12              Narrative:    CT OF THE CHEST WITH IV CONTRAST:     Technique:  After the intravenous administration of 75 cc of [id number] nonionic contrast, 5 mm axial images were acquired using helical CT technique from the lung apices through costophrenic sulci.    Comparison: 12/21/17     History:  Atelectasis    Findings:    -Lungs: No nodules or infiltrates. Mild atelectasis is seen within the right middle lobe and to lesser extent within the lingula and right lower lobe lateral basilar segment. Bocledeck hernia is seen within the left hemidiaphragm.  -Pleura: No thickening or fluid.  -Mediastinum/Diana:No  significant adenopathy   -Axilla: No adenopathy.  -Thyroid: Normal lower gland.  -Heart/Aorta: Heart size is normal. No pericardial effusion. Aorta normal caliber with moderate atherosclerotic disease.  -Bones/Chest Wall: Intact with mild multilevel spondylosis.   -Upper Abdomen: Unremarkable                             X-Ray Chest AP Portable (Final result)  Result time 12/21/17 16:26:04    Final result by Goldy Olivia MD (12/21/17 16:26:04)                 Impression:      Stable chest x-ray.  Chronic right middle lobe atelectasis.      Electronically signed by: GOLDY OLIVIA MD  Date:     12/21/17  Time:    16:26              Narrative:    Exam: XR CHEST AP PORTABLE,    Date:  12/21/17 16:07:00    History: Chest Pain    Comparison:  11/22/2017    Findings: Multiple wire leads overlie the chest.  The cardiac size is enlarged.  Probable right medial lobe atelectasis.  Prior chest CT confirm middle lobe atelectasis from 05/20/2017.  There are also prominent pericardial fat pad.    The lung fields are clear.                            Assessment/Plan:      * Acute on chronic respiratory failure with hypoxia and hypercapnia    --supplemental oxygen to maintain sats  --duo nebs  --continue home inhalers    --12/22: improving    12/23/17: improving, patient refusing BIPAP        CHF, acute on chronic    --2D echo 06/2017 showed EF 45%  --2D echo pending  --IV furosemide  --continue home zaroxolyn, lisinopril    12/23/17: improving            Atrial fibrillation with RVR    --continue home xarelto  --IV cardizem    12/23/17: HR increasing to 160's, cardizem gtt increased to 10mg/hr          Leukocytosis    --sepsis vs medication  --pt afebrile and takes prednisone daily X 7 years and metformin  --Lactate 2.8  --CT chest pending  --BC pending  --IV avelox  --daily CBC    --12/22: improving with tx    --12/23/17: wbc 13k, unchanged from yesterday, currently on prednisone        Type 2 diabetes mellitus with  hyperlipidemia    --SSI and accuchecks  --diabetic diet          COPD with acute exacerbation    --WBC 23K, SOB at rest, unable to complete sentence d/t SOB  --CXR: Chronic right middle lobe atelectasis.  --CT chest pending  --supplemental oxygen to maintain sats  --IV avelox  --continue home roflumilast  --duonebs  --continue home albuterol inhaler, symbicort, mucinex, xyzal, and singulair    --12/22: WBC improving    -12/23/17: 13K, 13k on yesterday, currently on Prednisone             Essential (primary) hypertension    --continue home lisinopril  --cardiac diet    12/23/17: stable          Anxiety    --continue home xanax  --provide calm environment with low lighting    --12/23/17: anxious this am, improved with xanax          VTE Risk Mitigation         Ordered     rivaroxaban tablet 20 mg  With dinner     Route:  Oral        12/21/17 1929     Place sequential compression device  Until discontinued      12/21/17 1929     Reason for No Pharmacological VTE Prophylaxis  Once      12/21/17 1929     Medium Risk of VTE  Once      12/21/17 1929              Sia Gardner, YAS, ACNP-Bc, CCRN  Department of Hospital Medicine   [facility]

## 2017-12-23 NOTE — PROGRESS NOTES
Pharmacist Intervention IV to PO Note    Petrona Gonzalez is a 76 y.o. female being treated with IV medication moxifloxacin    Patient Data:    Vital Signs (Most Recent):  Temp: 98.8 °F (37.1 °C) (12/23/17 1142)  Pulse: (!) 146 (12/23/17 1142)  Resp: 20 (12/23/17 1142)  BP: (!) 122/57 (12/23/17 1142)  SpO2: (!) 94 % (12/23/17 1142)   Vital Signs (72h Range):  Temp:  [97.4 °F (36.3 °C)-98.8 °F (37.1 °C)]   Pulse:  []   Resp:  [18-35]   BP: (108-183)/(54-88)   SpO2:  [92 %-100 %]      CBC:    Recent Labs     Lab 12/21/17  1550 12/22/17  0511 12/23/17  0404   WBC 23.77* 13.20* 13.55*   RBC 4.22 3.49* 3.45*   HGB 11.8* 9.5* 9.5*   HCT 37.3 30.7* 31.0*    248 263   MCV 88 88 90   MCH 28.0 27.2 27.5   MCHC 31.6* 30.9* 30.6*     CMP:     Recent Labs     Lab 12/21/17  1550 12/22/17  0511 12/23/17  0404   * 221* 128*   CALCIUM 10.0 8.9 8.6*   ALBUMIN 3.5 --  --    PROT 8.2 --  --     143 144   K 3.8 3.8 3.8   CO2 27 25 23   CL 97 105 107   BUN 17 15 18   CREATININE 1.0 0.7 0.8   ALKPHOS 85 --  --    ALT 23 --  --    AST 16 --  --    BILITOT 0.3 --  --        Dietary Orders:  Diet Orders            Diet diabetic 2000 Calorie; Cardiac (Low Na/Chol): Diabetic starting at 12/21 1929            Based on the following criteria, this patient qualifies for intravenous to oral conversion:  [x] The patients gastrointestinal tract is functioning (tolerating medications via oral or enteral route for 24 hours and tolerating food or enteral feeds for 24 hours).  [x] The patient is hemodynamically stable for 24 hours (heart rate <100 beats per minute, systolic blood pressure >99 mm Hg, and respiratory rate <20 breaths per minute).  [x] The patient shows clinical improvement (afebrile for at least 24 hours and white blood cell count downtrending or normalized). Additionally, the patient must be non-neutropenic (absolute neutrophil count >500 cells/mm3).  [x] For antimicrobials, the patient has received IV therapy  for at least 24 hours.    IV medication Avelox will be changed to oral medication Avelox    Pharmacist's Name: Elsa Swartz  Pharmacist's Extension: [phone]

## 2017-12-23 NOTE — ASSESSMENT & PLAN NOTE
--2D echo 06/2017 showed EF 45%  --2D echo pending  --IV furosemide  --continue home zaroxolyn, lisinopril    12/23/17: improving

## 2017-12-23 NOTE — ASSESSMENT & PLAN NOTE
--continue home xarelto  --IV cardizem    12/23/17: HR increasing to 160's, cardizem gtt increased to 10mg/hr

## 2017-12-23 NOTE — ASSESSMENT & PLAN NOTE
--supplemental oxygen to maintain sats  --bree parada  --continue home inhalers    --12/22: improving    12/23/17: improving, patient refusing BIPAP

## 2017-12-23 NOTE — ASSESSMENT & PLAN NOTE
--continue home xanax  --provide calm environment with low lighting    --12/23/17: anxious this am, improved with xanax

## 2017-12-23 NOTE — ASSESSMENT & PLAN NOTE
--sepsis vs medication  --pt afebrile and takes prednisone daily X 7 years and metformin  --Lactate 2.8  --CT chest pending  --BC pending  --IV avelox  --daily CBC    --12/22: improving with tx    --12/23/17: wbc 13k, unchanged from yesterday, currently on prednisone

## 2017-12-23 NOTE — ASSESSMENT & PLAN NOTE
--WBC 23K, SOB at rest, unable to complete sentence d/t SOB  --CXR: Chronic right middle lobe atelectasis.  --CT chest pending  --supplemental oxygen to maintain sats  --IV avelox  --continue home roflumilast  --duonebs  --continue home albuterol inhaler, symbicort, mucinex, xyzal, and singulair    --12/22: WBC improving    -12/23/17: 13K, 13k on yesterday, currently on Prednisone

## 2017-12-23 NOTE — PLAN OF CARE
Problem: Patient Care Overview  Goal: Plan of Care Review  Outcome: Ongoing (interventions implemented as appropriate)  He patient has been AFIB on the monitor. Blood glucose monitored. Cutler catheter intact. Cardizem gtt infusing at 5 ml/hr. Normal saline infusing at 125 ml/hr. Pt experienced a panic attack, relieved with xanax and social support. Pt is resting quietly, will continue to monitor.

## 2017-12-23 NOTE — SUBJECTIVE & OBJECTIVE
Interval History:  HR increasing to 160's, cardizem gtt increased from 5mg/hr to 10mg/hr to improve rate control. Continue  IV Avelox, breathing treatments, and IV lasix. BBS improved and without wheezing. Slight SOB with speaking, improving. Will continue current plan of care.       Review of Systems   Constitutional: Positive for activity change. Negative for appetite change, chills, diaphoresis, fatigue and fever.   HENT: Negative.  Negative for congestion, rhinorrhea, sinus pain, sinus pressure and sore throat.    Eyes: Negative.  Negative for photophobia, discharge and visual disturbance.   Respiratory: Positive for shortness of breath. Negative for apnea, cough, chest tightness and wheezing.    Cardiovascular: Negative.  Negative for chest pain, palpitations and leg swelling.   Gastrointestinal: Negative.  Negative for abdominal distention, abdominal pain, diarrhea, nausea and vomiting.   Endocrine: Negative.  Negative for cold intolerance, heat intolerance, polydipsia, polyphagia and polyuria.   Genitourinary: Negative.  Negative for difficulty urinating, dysuria, enuresis, frequency and urgency.   Musculoskeletal: Positive for arthralgias. Negative for back pain, myalgias and neck pain.   Skin: Negative.  Negative for color change, pallor and wound.   Allergic/Immunologic: Negative.  Negative for environmental allergies, food allergies and immunocompromised state.   Neurological: Negative.  Negative for dizziness, seizures, syncope, speech difficulty, weakness, light-headedness, numbness and headaches.   Hematological: Negative.  Negative for adenopathy.   Psychiatric/Behavioral: Negative for behavioral problems and confusion. The patient is nervous/anxious.      Objective:     Vital Signs (Most Recent):  Temp: 98.8 °F (37.1 °C) (12/23/17 1142)  Pulse: 67 (12/23/17 1209)  Resp: (!) 26 (12/23/17 1209)  BP: (!) 122/57 (12/23/17 1142)  SpO2: 97 % (12/23/17 1209) Vital Signs (24h Range):  Temp:  [97.7 °F (36.5  °C)-98.8 °F (37.1 °C)] 98.8 °F (37.1 °C)  Pulse:  [] 67  Resp:  [18-26] 26  SpO2:  [92 %-100 %] 97 %  BP: (115-133)/(56-68) 122/57     Weight: 93 kg (205 lb 0.4 oz)  Body mass index is 37.5 kg/m².    Intake/Output Summary (Last 24 hours) at 12/23/17 1241  Last data filed at 12/23/17 1114   Gross per 24 hour   Intake             3506 ml   Output             4050 ml   Net             -544 ml      Physical Exam   Constitutional: She is oriented to person, place, and time. She appears well-developed and well-nourished. She appears distressed.   HENT:   Head: Normocephalic and atraumatic.   Eyes: Conjunctivae and EOM are normal. Pupils are equal, round, and reactive to light. Right eye exhibits no discharge. Left eye exhibits no discharge.   Neck: Normal range of motion. Neck supple.   Cardiovascular: Normal heart sounds and intact distal pulses.  An irregular rhythm present. Exam reveals no gallop and no friction rub.    No murmur heard.  Pulmonary/Chest: Breath sounds normal. She is in respiratory distress. She has no wheezes. She has no rales.   Abdominal: Soft. Bowel sounds are normal. She exhibits no distension. There is no tenderness. There is no guarding.   obese   Genitourinary:   Genitourinary Comments: deferred   Musculoskeletal: Normal range of motion. She exhibits edema (BLE trace edema).   Neurological: She is alert and oriented to person, place, and time.   Skin: Skin is warm and dry. Capillary refill takes less than 2 seconds. Bruising and ecchymosis noted. She is not diaphoretic.   Psychiatric: Her behavior is normal. Judgment and thought content normal. Her mood appears anxious.       Significant Labs:   Recent Lab Results       12/23/17  1110 12/23/17  0404 12/22/17  2133 12/22/17  1754      Anion Gap  14       Baso #  0.01       Basophil%  0.1       BUN, Bld  18       Calcium  8.6(L)       Chloride  107       CO2  23       Creatinine  0.8       Differential Method  Automated       eGFR if   American  >60       eGFR if non   >60  Comment:  Calculation used to obtain the estimated glomerular filtration  rate (eGFR) is the CKD-EPI equation.          Eos #  0.0       Eosinophil%  0.1       Glucose  128(H)       Gran #  11.2(H)       Gran%  82.4(H)       Hematocrit  31.0(L)       Hemoglobin  9.5(L)       Lymph #  1.4       Lymph%  10.2(L)       Magnesium  2.0       MCH  27.5       MCHC  30.6(L)       MCV  90       Mono #  1.0       Mono%  7.2       MPV  8.7(L)       Phosphorus  3.0       Platelets  263       POCT Glucose 254(H)  165(H) 258(H)     Potassium  3.8       RBC  3.45(L)       RDW  16.5(H)       Sodium  144       WBC  13.55(H)             Significant Imaging:  Imaging Results          CT Chest With Contrast (Final result)  Result time 12/21/17 19:12:39    Final result by Goran Melo MD (12/21/17 19:12:39)                 Impression:         No acute abnormalities.  All CT scans at this facility use dose modulation, iterative reconstruction and/or weight based dosing when appropriate to reduce radiation dose to as low as reasonably achievable.      Electronically signed by: GORAN MELO MD  Date:     12/21/17  Time:    19:12              Narrative:    CT OF THE CHEST WITH IV CONTRAST:     Technique:  After the intravenous administration of 75 cc of [id number] nonionic contrast, 5 mm axial images were acquired using helical CT technique from the lung apices through costophrenic sulci.    Comparison: 12/21/17     History:  Atelectasis    Findings:    -Lungs: No nodules or infiltrates. Mild atelectasis is seen within the right middle lobe and to lesser extent within the lingula and right lower lobe lateral basilar segment. Bocledeck hernia is seen within the left hemidiaphragm.  -Pleura: No thickening or fluid.  -Mediastinum/Diana:No significant adenopathy   -Axilla: No adenopathy.  -Thyroid: Normal lower gland.  -Heart/Aorta: Heart size is normal. No pericardial effusion. Aorta normal  caliber with moderate atherosclerotic disease.  -Bones/Chest Wall: Intact with mild multilevel spondylosis.   -Upper Abdomen: Unremarkable                             X-Ray Chest AP Portable (Final result)  Result time 12/21/17 16:26:04    Final result by Goldy Olivia MD (12/21/17 16:26:04)                 Impression:      Stable chest x-ray.  Chronic right middle lobe atelectasis.      Electronically signed by: GOLDY OLIVIA MD  Date:     12/21/17  Time:    16:26              Narrative:    Exam: XR CHEST AP PORTABLE,    Date:  12/21/17 16:07:00    History: Chest Pain    Comparison:  11/22/2017    Findings: Multiple wire leads overlie the chest.  The cardiac size is enlarged.  Probable right medial lobe atelectasis.  Prior chest CT confirm middle lobe atelectasis from 05/20/2017.  There are also prominent pericardial fat pad.    The lung fields are clear.

## 2017-12-23 NOTE — PROGRESS NOTES
"PT states she "sob and everything feels hard to do right now. "RT encouraged pt to wear bipap at this time to help with wob and sob for daytime napping. Pt  States she does not want wear at this time. RT to follow.   "

## 2017-12-24 LAB
ANION GAP SERPL CALC-SCNC: 11 MMOL/L
BASOPHILS # BLD AUTO: 0.02 K/UL
BASOPHILS NFR BLD: 0.2 %
BUN SERPL-MCNC: 17 MG/DL
CALCIUM SERPL-MCNC: 8.7 MG/DL
CHLORIDE SERPL-SCNC: 107 MMOL/L
CO2 SERPL-SCNC: 29 MMOL/L
CREAT SERPL-MCNC: 0.8 MG/DL
DIFFERENTIAL METHOD: ABNORMAL
EOSINOPHIL # BLD AUTO: 0.1 K/UL
EOSINOPHIL NFR BLD: 0.5 %
ERYTHROCYTE [DISTWIDTH] IN BLOOD BY AUTOMATED COUNT: 16.4 %
EST. GFR  (AFRICAN AMERICAN): >60 ML/MIN/1.73 M^2
EST. GFR  (NON AFRICAN AMERICAN): >60 ML/MIN/1.73 M^2
GLUCOSE SERPL-MCNC: 128 MG/DL
HCT VFR BLD AUTO: 33.6 %
HGB BLD-MCNC: 10 G/DL
LYMPHOCYTES # BLD AUTO: 1.8 K/UL
LYMPHOCYTES NFR BLD: 14.5 %
MAGNESIUM SERPL-MCNC: 2.4 MG/DL
MCH RBC QN AUTO: 27 PG
MCHC RBC AUTO-ENTMCNC: 29.8 G/DL
MCV RBC AUTO: 91 FL
MONOCYTES # BLD AUTO: 0.9 K/UL
MONOCYTES NFR BLD: 7.3 %
NEUTROPHILS # BLD AUTO: 9.6 K/UL
NEUTROPHILS NFR BLD: 77.5 %
PHOSPHATE SERPL-MCNC: 2.7 MG/DL
PLATELET # BLD AUTO: 279 K/UL
PMV BLD AUTO: 8.7 FL
POCT GLUCOSE: 133 MG/DL (ref 70–110)
POCT GLUCOSE: 311 MG/DL (ref 70–110)
POCT GLUCOSE: 346 MG/DL (ref 70–110)
POCT GLUCOSE: 364 MG/DL (ref 70–110)
POTASSIUM SERPL-SCNC: 3.1 MMOL/L
RBC # BLD AUTO: 3.71 M/UL
SODIUM SERPL-SCNC: 147 MMOL/L
WBC # BLD AUTO: 12.41 K/UL

## 2017-12-24 PROCEDURE — 25000242 PHARM REV CODE 250 ALT 637 W/ HCPCS: Performed by: NURSE PRACTITIONER

## 2017-12-24 PROCEDURE — 99222 1ST HOSP IP/OBS MODERATE 55: CPT | Mod: ,,, | Performed by: INTERNAL MEDICINE

## 2017-12-24 PROCEDURE — 84100 ASSAY OF PHOSPHORUS: CPT

## 2017-12-24 PROCEDURE — 36415 COLL VENOUS BLD VENIPUNCTURE: CPT

## 2017-12-24 PROCEDURE — 94640 AIRWAY INHALATION TREATMENT: CPT

## 2017-12-24 PROCEDURE — 63600175 PHARM REV CODE 636 W HCPCS: Performed by: NURSE PRACTITIONER

## 2017-12-24 PROCEDURE — 27000221 HC OXYGEN, UP TO 24 HOURS

## 2017-12-24 PROCEDURE — 83735 ASSAY OF MAGNESIUM: CPT

## 2017-12-24 PROCEDURE — 25000003 PHARM REV CODE 250: Performed by: NURSE PRACTITIONER

## 2017-12-24 PROCEDURE — A4216 STERILE WATER/SALINE, 10 ML: HCPCS | Performed by: NURSE PRACTITIONER

## 2017-12-24 PROCEDURE — 25000003 PHARM REV CODE 250: Performed by: EMERGENCY MEDICINE

## 2017-12-24 PROCEDURE — 94761 N-INVAS EAR/PLS OXIMETRY MLT: CPT

## 2017-12-24 PROCEDURE — 25000242 PHARM REV CODE 250 ALT 637 W/ HCPCS: Performed by: EMERGENCY MEDICINE

## 2017-12-24 PROCEDURE — 80048 BASIC METABOLIC PNL TOTAL CA: CPT

## 2017-12-24 PROCEDURE — 85025 COMPLETE CBC W/AUTO DIFF WBC: CPT

## 2017-12-24 PROCEDURE — 25000003 PHARM REV CODE 250: Performed by: INTERNAL MEDICINE

## 2017-12-24 PROCEDURE — 21400001 HC TELEMETRY ROOM

## 2017-12-24 RX ORDER — POTASSIUM CHLORIDE 20 MEQ/15ML
40 SOLUTION ORAL ONCE
Status: COMPLETED | OUTPATIENT
Start: 2017-12-24 | End: 2017-12-24

## 2017-12-24 RX ORDER — DILTIAZEM HYDROCHLORIDE 30 MG/1
90 TABLET, FILM COATED ORAL EVERY 8 HOURS
Status: DISCONTINUED | OUTPATIENT
Start: 2017-12-24 | End: 2017-12-26 | Stop reason: HOSPADM

## 2017-12-24 RX ADMIN — Medication 5 MG/HR: at 04:12

## 2017-12-24 RX ADMIN — ASPIRIN 81 MG: 81 TABLET, COATED ORAL at 09:12

## 2017-12-24 RX ADMIN — TOBRAMYCIN 1 DROP: 3 SOLUTION OPHTHALMIC at 01:12

## 2017-12-24 RX ADMIN — ROFLUMILAST 500 MCG: 500 TABLET ORAL at 09:12

## 2017-12-24 RX ADMIN — IPRATROPIUM BROMIDE AND ALBUTEROL SULFATE 3 ML: 2.5; .5 SOLUTION RESPIRATORY (INHALATION) at 07:12

## 2017-12-24 RX ADMIN — TOBRAMYCIN 1 DROP: 3 SOLUTION OPHTHALMIC at 06:12

## 2017-12-24 RX ADMIN — ARFORMOTEROL TARTRATE 15 MCG: 15 SOLUTION RESPIRATORY (INHALATION) at 07:12

## 2017-12-24 RX ADMIN — POTASSIUM CHLORIDE 40 MEQ: 20 SOLUTION ORAL at 09:12

## 2017-12-24 RX ADMIN — FAMOTIDINE 20 MG: 20 TABLET ORAL at 09:12

## 2017-12-24 RX ADMIN — INSULIN ASPART 3 UNITS: 100 INJECTION, SOLUTION INTRAVENOUS; SUBCUTANEOUS at 05:12

## 2017-12-24 RX ADMIN — BUDESONIDE 0.5 MG: 0.5 SUSPENSION RESPIRATORY (INHALATION) at 07:12

## 2017-12-24 RX ADMIN — MONTELUKAST SODIUM 10 MG: 10 TABLET, COATED ORAL at 09:12

## 2017-12-24 RX ADMIN — INSULIN ASPART 1 UNITS: 100 INJECTION, SOLUTION INTRAVENOUS; SUBCUTANEOUS at 09:12

## 2017-12-24 RX ADMIN — SODIUM CHLORIDE, PRESERVATIVE FREE 3 ML: 5 INJECTION INTRAVENOUS at 05:12

## 2017-12-24 RX ADMIN — GUAIFENESIN 1200 MG: 600 TABLET, EXTENDED RELEASE ORAL at 09:12

## 2017-12-24 RX ADMIN — INSULIN ASPART 4 UNITS: 100 INJECTION, SOLUTION INTRAVENOUS; SUBCUTANEOUS at 11:12

## 2017-12-24 RX ADMIN — IPRATROPIUM BROMIDE AND ALBUTEROL SULFATE 3 ML: 2.5; .5 SOLUTION RESPIRATORY (INHALATION) at 12:12

## 2017-12-24 RX ADMIN — SODIUM CHLORIDE, PRESERVATIVE FREE 3 ML: 5 INJECTION INTRAVENOUS at 09:12

## 2017-12-24 RX ADMIN — RIVAROXABAN 20 MG: 20 TABLET, FILM COATED ORAL at 04:12

## 2017-12-24 RX ADMIN — DILTIAZEM HYDROCHLORIDE 90 MG: 30 TABLET, FILM COATED ORAL at 09:12

## 2017-12-24 RX ADMIN — MOXIFLOXACIN HYDROCHLORIDE 400 MG: 400 TABLET, FILM COATED ORAL at 04:12

## 2017-12-24 RX ADMIN — SODIUM CHLORIDE: 0.9 INJECTION, SOLUTION INTRAVENOUS at 02:12

## 2017-12-24 RX ADMIN — SODIUM CHLORIDE, PRESERVATIVE FREE 3 ML: 5 INJECTION INTRAVENOUS at 01:12

## 2017-12-24 RX ADMIN — SODIUM CHLORIDE: 0.9 INJECTION, SOLUTION INTRAVENOUS at 08:12

## 2017-12-24 RX ADMIN — DILTIAZEM HYDROCHLORIDE 90 MG: 30 TABLET, FILM COATED ORAL at 01:12

## 2017-12-24 RX ADMIN — TOBRAMYCIN 1 DROP: 3 SOLUTION OPHTHALMIC at 09:12

## 2017-12-24 RX ADMIN — LISINOPRIL 5 MG: 5 TABLET ORAL at 09:12

## 2017-12-24 RX ADMIN — SIMVASTATIN 10 MG: 5 TABLET, FILM COATED ORAL at 09:12

## 2017-12-24 RX ADMIN — FUROSEMIDE 40 MG: 10 INJECTION, SOLUTION INTRAMUSCULAR; INTRAVENOUS at 06:12

## 2017-12-24 RX ADMIN — Medication 10 MG/HR: at 04:12

## 2017-12-24 RX ADMIN — ALPRAZOLAM 0.5 MG: 0.5 TABLET ORAL at 01:12

## 2017-12-24 RX ADMIN — PREDNISONE 40 MG: 20 TABLET ORAL at 09:12

## 2017-12-24 RX ADMIN — TOBRAMYCIN 1 DROP: 3 SOLUTION OPHTHALMIC at 05:12

## 2017-12-24 RX ADMIN — CETIRIZINE HYDROCHLORIDE 10 MG: 10 TABLET, FILM COATED ORAL at 09:12

## 2017-12-24 RX ADMIN — FUROSEMIDE 40 MG: 10 INJECTION, SOLUTION INTRAMUSCULAR; INTRAVENOUS at 09:12

## 2017-12-24 RX ADMIN — ACETAMINOPHEN 650 MG: 325 TABLET ORAL at 10:12

## 2017-12-24 NOTE — SUBJECTIVE & OBJECTIVE
Past Medical History:   Diagnosis Date    COPD (chronic obstructive pulmonary disease) with emphysema     Diabetes mellitus     Hyperlipidemia     Hypertension     Insomnia     Maxillary sinusitis, chronic        Past Surgical History:   Procedure Laterality Date    BACK SURGERY      HERNIA REPAIR      HYSTERECTOMY         Review of patient's allergies indicates:   Allergen Reactions    Meloxicam Other (See Comments)      Patient stated that she has muscle aches and pains    Morphine Rash    Naproxen Other (See Comments)     Patient states she has muscle and aches.    Pulmicort [budesonide] Other (See Comments)     Burning in chest       No current facility-administered medications on file prior to encounter.      Current Outpatient Prescriptions on File Prior to Encounter   Medication Sig    albuterol 90 mcg/actuation inhaler Inhale 2 puffs into the lungs every 6 (six) hours as needed. Dispense with spacer.    albuterol-ipratropium 2.5mg-0.5mg/3mL (DUO-NEB) 0.5 mg-3 mg(2.5 mg base)/3 mL nebulizer solution Take 3 mLs by nebulization every 6 (six) hours.    alprazolam (XANAX) 0.5 MG tablet Take 0.5 mg by mouth every 6 (six) hours as needed for Anxiety.    aspirin (ECOTRIN) 81 MG EC tablet Take 81 mg by mouth once daily.    budesonide-formoterol 160-4.5 mcg (SYMBICORT) 160-4.5 mcg/actuation HFAA Inhale 2 puffs into the lungs every 12 (twelve) hours. Controller    dextromethorphan-guaifenesin  mg (MUCINEX DM)  mg per 12 hr tablet Take 1 tablet by mouth.    diltiaZEM (CARDIZEM CD) 120 MG Cp24 Take 1 capsule (120 mg total) by mouth 2 (two) times daily.    furosemide (LASIX) 40 MG tablet Take 40 mg by mouth 2 (two) times daily.    lisinopril (PRINIVIL,ZESTRIL) 5 MG tablet Take 1 tablet (5 mg total) by mouth once daily.    metformin (GLUCOPHAGE) 500 MG tablet Take 1 tablet (500 mg total) by mouth 2 (two) times daily with meals.    montelukast (SINGULAIR) 10 mg tablet Take 10 mg by mouth  every evening.    predniSONE (DELTASONE) 5 MG tablet Take 40 mg by mouth once daily.     roflumilast 500 mcg Tab Take 1 tablet (500 mcg total) by mouth once daily.    simvastatin (ZOCOR) 10 MG tablet Take 1 tablet (10 mg total) by mouth every evening.    trazodone (DESYREL) 50 MG tablet Take 50 mg by mouth nightly as needed for Insomnia.    metOLazone (ZAROXOLYN) 5 MG tablet Take 1 tablet (5 mg total) by mouth once daily.    OXYGEN-AIR DELIVERY SYSTEMS MISC by Misc.(Non-Drug; Combo Route) route.    umeclidinium (INCRUSE ELLIPTA) 62.5 mcg/actuation DsDv Inhale into the lungs once daily. Controller     Family History     Problem Relation (Age of Onset)    Arthritis Father    Cancer Father, Sister, Brother    Early death Sister    Hearing loss Father    Heart disease Brother        Social History Main Topics    Smoking status: Former Smoker     Years: 30.00    Smokeless tobacco: Never Used    Alcohol use No    Drug use: No    Sexual activity: Not Currently     Review of Systems   Constitution: Negative. Negative for weight gain.   HENT: Negative.    Eyes: Negative.    Cardiovascular: Negative.  Negative for chest pain, leg swelling and palpitations.   Respiratory: Negative.  Negative for shortness of breath.    Endocrine: Negative.    Hematologic/Lymphatic: Negative.    Skin: Negative.    Musculoskeletal: Negative for muscle weakness.   Gastrointestinal: Negative.    Genitourinary: Negative.    Neurological: Negative.  Negative for dizziness.   Psychiatric/Behavioral: Negative.    Allergic/Immunologic: Negative.      Objective:     Vital Signs (Most Recent):  Temp: 98.1 °F (36.7 °C) (12/24/17 0735)  Pulse: 61 (12/24/17 0753)  Resp: (!) 24 (12/24/17 0753)  BP: 131/86 (12/24/17 0735)  SpO2: 96 % (12/24/17 0753) Vital Signs (24h Range):  Temp:  [98.1 °F (36.7 °C)-98.8 °F (37.1 °C)] 98.1 °F (36.7 °C)  Pulse:  [] 61  Resp:  [17-26] 24  SpO2:  [93 %-98 %] 96 %  BP: (122-150)/(56-86) 131/86     Weight: 92.6  kg (204 lb 2.3 oz)  Body mass index is 37.34 kg/m².    SpO2: 96 %  O2 Device (Oxygen Therapy): nasal cannula      Intake/Output Summary (Last 24 hours) at 12/24/17 0900  Last data filed at 12/24/17 0800   Gross per 24 hour   Intake          4055.83 ml   Output             5150 ml   Net         -1094.17 ml       Lines/Drains/Airways     Drain                 Urethral Catheter 12/21/17 1610 Latex;Double-lumen 16 Fr. 2 days          Peripheral Intravenous Line                 Peripheral IV - Single Lumen 12/21/17 Left Antecubital 3 days         Peripheral IV - Single Lumen 12/21/17 1546 Right Forearm 2 days                Physical Exam   Constitutional: She is oriented to person, place, and time. She appears well-developed and well-nourished.   HENT:   Head: Normocephalic and atraumatic.   Eyes: Conjunctivae and EOM are normal. Pupils are equal, round, and reactive to light.   Neck: Normal range of motion. Neck supple.   Cardiovascular: Normal rate, normal heart sounds and intact distal pulses.  An irregularly irregular rhythm present.   Pulmonary/Chest: Effort normal and breath sounds normal.   Abdominal: Soft. Bowel sounds are normal.   Musculoskeletal: Normal range of motion.   Neurological: She is alert and oriented to person, place, and time.   Skin: Skin is warm and dry.   Psychiatric: She has a normal mood and affect.   Nursing note and vitals reviewed.      Significant Labs: All pertinent lab results from the last 24 hours have been reviewed.    Significant Imaging: X-Ray: CXR: X-Ray Chest 1 View (CXR): No results found for this visit on 12/21/17.

## 2017-12-24 NOTE — CONSULTS
[facility]  Cardiology  Consult Note    Patient Name: Petrona Gonzalez  MRN: [id number]  Admission Date: 12/21/2017  Hospital Length of Stay: 3 days  Code Status: Full Code   Attending Provider: Shravan De MD   Consulting Provider: Sky Houston MD  Primary Care Physician: Provider Not[facility]  Principal Problem:Acute on chronic respiratory failure with hypoxia and hypercapnia    Patient information was obtained from patient and ER records.     Consults  Subjective:     Chief Complaint:  afib     HPI:   Consult Note  Cardiology    Consult Requested By:  Reason for Consult:     SUBJECTIVE:     History of Present Illness:  Patient is a 76 y.o. female presents with afib with RVR(hx of PAF)  77 y/o female with PMHx for COPD, HTN, HLP afib since 6-17 presented with acute on chronic respiratory failure- COPD and CHF, ? Sepsis with episode of afib with RVR responded to IV cardizem. Pt denies CP , sob improved.  Recommend continue home xarelto increase po cardizem , dc Iv cardizem.       Review of patient's allergies indicates:   Allergen Reactions    Meloxicam Other (See Comments)      Patient stated that she has muscle aches and pains    Morphine Rash    Naproxen Other (See Comments)     Patient states she has muscle and aches.    Pulmicort [budesonide] Other (See Comments)     Burning in chest       Past Medical History:   Diagnosis Date    COPD (chronic obstructive pulmonary disease) with emphysema     Diabetes mellitus     Hyperlipidemia     Hypertension     Insomnia     Maxillary sinusitis, chronic      Past Surgical History:   Procedure Laterality Date    BACK SURGERY      HERNIA REPAIR      HYSTERECTOMY       Family History   Problem Relation Age of Onset    Arthritis Father     Cancer Father     Hearing loss Father     Cancer Sister     Early death Sister     Cancer Brother     Heart disease Brother      Social History   Substance Use Topics    Smoking status: Former  Smoker     Years: 30.00    Smokeless tobacco: Never Used    Alcohol use No        Review of Systems:  Constitutional: negative  Eyes: negative  Ears, nose, mouth, throat, and face: negative  Respiratory: negative  Cardiovascular: negative  Gastrointestinal: negative  Genitourinary:negative  Hematologic/lymphatic: negative  Musculoskeletal:negative,   Neurological: negative  Behavioral/Psych: negative  Endocrine: negative  Allergic/Immunologic: negative    OBJECTIVE:     Vital Signs (Most Recent)  Temp: 98.1 °F (36.7 °C) (12/24/17 0735)  Pulse: 61 (12/24/17 0753)  Resp: (!) 24 (12/24/17 0753)  BP: 131/86 (12/24/17 0735)  SpO2: 96 % (12/24/17 0753)    Vital Signs Range (Last 24H):  Temp:  [98.1 °F (36.7 °C)-98.8 °F (37.1 °C)]   Pulse:  []   Resp:  [17-26]   BP: (122-150)/(56-86)   SpO2:  [93 %-98 %]     Current Facility-Administered Medications   Medication    0.9%  NaCl infusion    acetaminophen tablet 650 mg    albuterol sulfate nebulizer solution 2.5 mg    albuterol-ipratropium 2.5mg-0.5mg/3mL nebulizer solution 3 mL    ALPRAZolam tablet 0.5 mg    arformoterol nebulizer solution 15 mcg    aspirin EC tablet 81 mg    budesonide nebulizer solution 0.5 mg    cetirizine tablet 10 mg    dextrose 50% injection 12.5 g    dextrose 50% injection 25 g    diltiaZEM 125 mg in dextrose 5% 125 mL infusion (non-titrating)    diltiaZEM tablet 90 mg    famotidine tablet 20 mg    furosemide injection 40 mg    glucagon (human recombinant) injection 1 mg    glucose chewable tablet 16 g    glucose chewable tablet 24 g    guaiFENesin 12 hr tablet 1,200 mg    insulin aspart pen 0-5 Units    lisinopril tablet 5 mg    montelukast tablet 10 mg    moxifloxacin tablet 400 mg    ondansetron disintegrating tablet 8 mg    polyethylene glycol packet 17 g    potassium chloride 10% solution 40 mEq    predniSONE tablet 40 mg    rivaroxaban tablet 20 mg    roflumilast tablet 500 mcg    simvastatin tablet 10 mg     sodium chloride 0.9% flush 3 mL    tobramycin sulfate 0.3% ophthalmic solution 1 drop    traZODone tablet 50 mg     No current facility-administered medications on file prior to encounter.      Current Outpatient Prescriptions on File Prior to Encounter   Medication Sig    albuterol 90 mcg/actuation inhaler Inhale 2 puffs into the lungs every 6 (six) hours as needed. Dispense with spacer.    albuterol-ipratropium 2.5mg-0.5mg/3mL (DUO-NEB) 0.5 mg-3 mg(2.5 mg base)/3 mL nebulizer solution Take 3 mLs by nebulization every 6 (six) hours.    alprazolam (XANAX) 0.5 MG tablet Take 0.5 mg by mouth every 6 (six) hours as needed for Anxiety.    aspirin (ECOTRIN) 81 MG EC tablet Take 81 mg by mouth once daily.    budesonide-formoterol 160-4.5 mcg (SYMBICORT) 160-4.5 mcg/actuation HFAA Inhale 2 puffs into the lungs every 12 (twelve) hours. Controller    dextromethorphan-guaifenesin  mg (MUCINEX DM)  mg per 12 hr tablet Take 1 tablet by mouth.    diltiaZEM (CARDIZEM CD) 120 MG Cp24 Take 1 capsule (120 mg total) by mouth 2 (two) times daily.    furosemide (LASIX) 40 MG tablet Take 40 mg by mouth 2 (two) times daily.    lisinopril (PRINIVIL,ZESTRIL) 5 MG tablet Take 1 tablet (5 mg total) by mouth once daily.    metformin (GLUCOPHAGE) 500 MG tablet Take 1 tablet (500 mg total) by mouth 2 (two) times daily with meals.    montelukast (SINGULAIR) 10 mg tablet Take 10 mg by mouth every evening.    predniSONE (DELTASONE) 5 MG tablet Take 40 mg by mouth once daily.     roflumilast 500 mcg Tab Take 1 tablet (500 mcg total) by mouth once daily.    simvastatin (ZOCOR) 10 MG tablet Take 1 tablet (10 mg total) by mouth every evening.    trazodone (DESYREL) 50 MG tablet Take 50 mg by mouth nightly as needed for Insomnia.    metOLazone (ZAROXOLYN) 5 MG tablet Take 1 tablet (5 mg total) by mouth once daily.    OXYGEN-AIR DELIVERY SYSTEMS MISC by Misc.(Non-Drug; Combo Route) route.    umeclidinium (INCRUSE ELLIPTA)  62.5 mcg/actuation DsDv Inhale into the lungs once daily. Controller       Physical Exam:  General appearance: alert, appears stated age and cooperative  Head: Normocephalic, without obvious abnormality, atraumatic  Eyes:  conjunctivae/corneas clear. PERRL, EOM's intact. Fundi benign.  Nose: no discharge  Throat: normal findings: tongue midline and normal  Neck: no adenopathy, no carotid bruit, no JVD, supple, symmetrical, trachea midline and thyroid not enlarged, symmetric, no tenderness/mass/nodules  Back:  no skin lesions, erythema, or scars  Lungs:  clear to auscultation bilaterally  Chest wall: no tenderness  Heart: regular rate and rhythm, S1, S2 normal, no murmur, click, rub or gallop  Abdomen: soft, non-tender; bowel sounds normal; no masses,  no organomegaly  Extremities: extremities normal, atraumatic, no cyanosis or edema  Pulses: 2+ and symmetric  Skin: Skin color, texture, turgor normal. No rashes or lesions  Neurologic: Grossly normal    Laboratory:  Chemistry:   Lab Results   Component Value Date     (H) 12/24/2017    K 3.1 (L) 12/24/2017     12/24/2017    CO2 29 12/24/2017    BUN 17 12/24/2017    CREATININE 0.8 12/24/2017    CALCIUM 8.7 12/24/2017     Cardiac Markers:   Lab Results   Component Value Date    TROPONINI 0.018 12/22/2017     Cardiac Markers (Last 3):   Lab Results   Component Value Date    TROPONINI 0.018 12/22/2017    TROPONINI 0.015 12/21/2017    TROPONINI 0.017 12/21/2017     CBC:   Lab Results   Component Value Date    WBC 12.41 12/24/2017    HGB 10.0 (L) 12/24/2017    HCT 33.6 (L) 12/24/2017    MCV 91 12/24/2017     12/24/2017     Lipids:   Lab Results   Component Value Date    CHOL 155 01/17/2017    TRIG 59 01/17/2017    HDL 51 01/17/2017     Coagulation:   Lab Results   Component Value Date    INR 1.0 12/21/2017    APTT 32.9 (H) 12/21/2017       Diagnostic Results:  ECG: Reviewed  X-Ray: Reviewed  US: Reviewed  CT: Reviewed  Echo: Reviewed      ASSESSMENT/PLAN:      Patient Active Problem List   Diagnosis    Hay fever    Anemia, deficiency    Anxiety    Obesity with body mass index 30 or greater    Cellulitis of elbow    Chest pain    Chronic maxillary sinusitis    Depression    Diabetes mellitus without complication    Diabetic polyneuropathy    Dyslipidemia    Essential (primary) hypertension    Anemia associated with nutritional deficiency    COPD with acute exacerbation    Pneumonia    Shoulder pain    Exomphalos    Insomnia    Type 2 diabetes mellitus with hyperlipidemia    Uncontrolled type 2 diabetes mellitus with mild nonproliferative retinopathy and macular edema, without long-term current use of insulin    Hyperlipidemia    Leukocytosis    Poorly controlled diabetes mellitus    Acute on chronic respiratory failure with hypoxia and hypercapnia    Elevated troponin    Atrial fibrillation with RVR    CHF, acute on chronic    Hyperthyroidism    Hypoxemia requiring supplemental oxygen        Plan: Recommend continue home xarelto increase po cardizem , dc Iv cardizem.         Past Medical History:   Diagnosis Date    COPD (chronic obstructive pulmonary disease) with emphysema     Diabetes mellitus     Hyperlipidemia     Hypertension     Insomnia     Maxillary sinusitis, chronic        Past Surgical History:   Procedure Laterality Date    BACK SURGERY      HERNIA REPAIR      HYSTERECTOMY         Review of patient's allergies indicates:   Allergen Reactions    Meloxicam Other (See Comments)      Patient stated that she has muscle aches and pains    Morphine Rash    Naproxen Other (See Comments)     Patient states she has muscle and aches.    Pulmicort [budesonide] Other (See Comments)     Burning in chest       No current facility-administered medications on file prior to encounter.      Current Outpatient Prescriptions on File Prior to Encounter   Medication Sig    albuterol 90 mcg/actuation inhaler Inhale 2 puffs into the lungs  every 6 (six) hours as needed. Dispense with spacer.    albuterol-ipratropium 2.5mg-0.5mg/3mL (DUO-NEB) 0.5 mg-3 mg(2.5 mg base)/3 mL nebulizer solution Take 3 mLs by nebulization every 6 (six) hours.    alprazolam (XANAX) 0.5 MG tablet Take 0.5 mg by mouth every 6 (six) hours as needed for Anxiety.    aspirin (ECOTRIN) 81 MG EC tablet Take 81 mg by mouth once daily.    budesonide-formoterol 160-4.5 mcg (SYMBICORT) 160-4.5 mcg/actuation HFAA Inhale 2 puffs into the lungs every 12 (twelve) hours. Controller    dextromethorphan-guaifenesin  mg (MUCINEX DM)  mg per 12 hr tablet Take 1 tablet by mouth.    diltiaZEM (CARDIZEM CD) 120 MG Cp24 Take 1 capsule (120 mg total) by mouth 2 (two) times daily.    furosemide (LASIX) 40 MG tablet Take 40 mg by mouth 2 (two) times daily.    lisinopril (PRINIVIL,ZESTRIL) 5 MG tablet Take 1 tablet (5 mg total) by mouth once daily.    metformin (GLUCOPHAGE) 500 MG tablet Take 1 tablet (500 mg total) by mouth 2 (two) times daily with meals.    montelukast (SINGULAIR) 10 mg tablet Take 10 mg by mouth every evening.    predniSONE (DELTASONE) 5 MG tablet Take 40 mg by mouth once daily.     roflumilast 500 mcg Tab Take 1 tablet (500 mcg total) by mouth once daily.    simvastatin (ZOCOR) 10 MG tablet Take 1 tablet (10 mg total) by mouth every evening.    trazodone (DESYREL) 50 MG tablet Take 50 mg by mouth nightly as needed for Insomnia.    metOLazone (ZAROXOLYN) 5 MG tablet Take 1 tablet (5 mg total) by mouth once daily.    OXYGEN-AIR DELIVERY SYSTEMS MISC by [employer].(Non-Drug; Combo Route) route.    umeclidinium (INCRUSE ELLIPTA) 62.5 mcg/actuation DsDv Inhale into the lungs once daily. Controller     Family History     Problem Relation (Age of Onset)    Arthritis Father    Cancer Father, Sister, Brother    Early death Sister    Hearing loss Father    Heart disease Brother        Social History Main Topics    Smoking status: Former Smoker     Years: 30.00     Smokeless tobacco: Never Used    Alcohol use No    Drug use: No    Sexual activity: Not Currently     Review of Systems   Constitution: Negative. Negative for weight gain.   HENT: Negative.    Eyes: Negative.    Cardiovascular: Negative.  Negative for chest pain, leg swelling and palpitations.   Respiratory: Negative.  Negative for shortness of breath.    Endocrine: Negative.    Hematologic/Lymphatic: Negative.    Skin: Negative.    Musculoskeletal: Negative for muscle weakness.   Gastrointestinal: Negative.    Genitourinary: Negative.    Neurological: Negative.  Negative for dizziness.   Psychiatric/Behavioral: Negative.    Allergic/Immunologic: Negative.      Objective:     Vital Signs (Most Recent):  Temp: 98.1 °F (36.7 °C) (12/24/17 0735)  Pulse: 61 (12/24/17 0753)  Resp: (!) 24 (12/24/17 0753)  BP: 131/86 (12/24/17 0735)  SpO2: 96 % (12/24/17 0753) Vital Signs (24h Range):  Temp:  [98.1 °F (36.7 °C)-98.8 °F (37.1 °C)] 98.1 °F (36.7 °C)  Pulse:  [] 61  Resp:  [17-26] 24  SpO2:  [93 %-98 %] 96 %  BP: (122-150)/(56-86) 131/86     Weight: 92.6 kg (204 lb 2.3 oz)  Body mass index is 37.34 kg/m².    SpO2: 96 %  O2 Device (Oxygen Therapy): nasal cannula      Intake/Output Summary (Last 24 hours) at 12/24/17 0900  Last data filed at 12/24/17 0800   Gross per 24 hour   Intake          4055.83 ml   Output             5150 ml   Net         -1094.17 ml       Lines/Drains/Airways     Drain                 Urethral Catheter 12/21/17 1610 Latex;Double-lumen 16 Fr. 2 days          Peripheral Intravenous Line                 Peripheral IV - Single Lumen 12/21/17 Left Antecubital 3 days         Peripheral IV - Single Lumen 12/21/17 1546 Right Forearm 2 days                Physical Exam   Constitutional: She is oriented to person, place, and time. She appears well-developed and well-nourished.   HENT:   Head: Normocephalic and atraumatic.   Eyes: Conjunctivae and EOM are normal. Pupils are equal, round, and reactive to  light.   Neck: Normal range of motion. Neck supple.   Cardiovascular: Normal rate, normal heart sounds and intact distal pulses.  An irregularly irregular rhythm present.   Pulmonary/Chest: Effort normal and breath sounds normal.   Abdominal: Soft. Bowel sounds are normal.   Musculoskeletal: Normal range of motion.   Neurological: She is alert and oriented to person, place, and time.   Skin: Skin is warm and dry.   Psychiatric: She has a normal mood and affect.   Nursing note and vitals reviewed.      Significant Labs: All pertinent lab results from the last 24 hours have been reviewed.    Significant Imaging: X-Ray: CXR: X-Ray Chest 1 View (CXR): No results found for this visit on 12/21/17.    Assessment and Plan:     Atrial fibrillation with RVR    Increase po cardizem, wean IV cardizem off, continue xarelto            VTE Risk Mitigation         Ordered     rivaroxaban tablet 20 mg  With dinner     Route:  Oral        12/21/17 1929     Place sequential compression device  Until discontinued      12/21/17 1929     Reason for No Pharmacological VTE Prophylaxis  Once      12/21/17 1929     Medium Risk of VTE  Once      12/21/17 1929          Thank you for your consult. I will follow-up with patient. Please contact us if you have any additional questions.    Sky Houston MD  Cardiology   [facility]

## 2017-12-24 NOTE — ASSESSMENT & PLAN NOTE
--2D echo 06/2017 showed EF 45%  --2D echo pending  --IV furosemide  --continue home zaroxolyn, lisinopril    12/23/17: improving  12/24/17: improving

## 2017-12-24 NOTE — ASSESSMENT & PLAN NOTE
--sepsis vs medication  --pt afebrile and takes prednisone daily X 7 years and metformin  --Lactate 2.8  --CT chest pending  --BC pending  --IV avelox  --daily CBC    --12/22: improving, wbc 12K    --12/23/17: wbc 13k, unchanged from yesterday, currently on prednisone

## 2017-12-24 NOTE — PLAN OF CARE
Problem: Patient Care Overview  Goal: Plan of Care Review  Outcome: Ongoing (interventions implemented as appropriate)  The patient has been AFIB on the monitor. Rate not controlled. Cardizem gtt infusing at 10 mL/hr. Normal saline infusing at 125 mL/hr. Cutler catheter intact. Pt has had an uneventful night and is resting quietly. Will continue to monitor.

## 2017-12-24 NOTE — SUBJECTIVE & OBJECTIVE
Interval History: HR stable, will decrease cardizem gtt to 5mg/hr, continue cardizem po. Improvement in breathing with talking. WBC 12. Afebrile. Will continue current plan of care.    Review of Systems   Constitutional: Negative.  Negative for activity change, appetite change, chills, diaphoresis, fatigue and fever.   HENT: Negative.  Negative for congestion, rhinorrhea, sinus pain, sinus pressure and sore throat.    Eyes: Negative.  Negative for photophobia, discharge and visual disturbance.   Respiratory: Positive for cough and shortness of breath. Negative for apnea, chest tightness and wheezing.    Cardiovascular: Negative.  Negative for chest pain, palpitations and leg swelling.   Gastrointestinal: Negative.  Negative for abdominal distention, abdominal pain, diarrhea, nausea and vomiting.   Endocrine: Negative.  Negative for cold intolerance, heat intolerance, polydipsia, polyphagia and polyuria.   Genitourinary: Negative.  Negative for difficulty urinating, dysuria, enuresis, frequency and urgency.   Musculoskeletal: Negative.  Negative for arthralgias, back pain, myalgias and neck pain.   Skin: Negative.  Negative for color change, pallor and wound.   Allergic/Immunologic: Negative.  Negative for environmental allergies, food allergies and immunocompromised state.   Neurological: Negative.  Negative for dizziness, seizures, syncope, speech difficulty, weakness, light-headedness, numbness and headaches.   Hematological: Negative.  Negative for adenopathy.   Psychiatric/Behavioral: Negative.  Negative for behavioral problems and confusion. The patient is not nervous/anxious.      Objective:     Vital Signs (Most Recent):  Temp: 98.4 °F (36.9 °C) (12/24/17 1550)  Pulse: 67 (12/24/17 1550)  Resp: 20 (12/24/17 1550)  BP: 121/63 (12/24/17 1550)  SpO2: 97 % (12/24/17 1550) Vital Signs (24h Range):  Temp:  [98.1 °F (36.7 °C)-98.4 °F (36.9 °C)] 98.4 °F (36.9 °C)  Pulse:  [] 67  Resp:  [17-24] 20  SpO2:  [93  %-98 %] 97 %  BP: (121-150)/(56-86) 121/63     Weight: 92.6 kg (204 lb 2.3 oz)  Body mass index is 37.34 kg/m².    Intake/Output Summary (Last 24 hours) at 12/24/17 1610  Last data filed at 12/24/17 1300   Gross per 24 hour   Intake          3815.83 ml   Output             5900 ml   Net         -2084.17 ml      Physical Exam   Constitutional: She is oriented to person, place, and time. She appears well-developed and well-nourished.   HENT:   Head: Normocephalic and atraumatic.   Eyes: Conjunctivae and EOM are normal. Pupils are equal, round, and reactive to light. Right eye exhibits no discharge. Left eye exhibits no discharge.   Neck: Normal range of motion. Neck supple.   Cardiovascular: Normal rate, regular rhythm, normal heart sounds and intact distal pulses.  Exam reveals no gallop and no friction rub.    No murmur heard.  Pulmonary/Chest: Effort normal and breath sounds normal. No respiratory distress. She has no wheezes.   Abdominal: Soft. Bowel sounds are normal. She exhibits no distension. There is no tenderness. There is no guarding.   obese   Genitourinary:   Genitourinary Comments: deferred   Musculoskeletal: Normal range of motion. She exhibits edema (trace ).   Neurological: She is alert and oriented to person, place, and time.   Skin: Skin is warm and dry. Capillary refill takes less than 2 seconds.   Psychiatric: She has a normal mood and affect. Her behavior is normal. Judgment and thought content normal.       Significant Labs:   Recent Lab Results       12/24/17  1101 12/24/17  0603 12/24/17  0541 12/23/17  2109 12/23/17  1710      Anion Gap  11        Baso #  0.02        Basophil%  0.2        BUN, Bld  17        Calcium  8.7        Chloride  107        CO2  29        Creatinine  0.8        Differential Method  Automated        eGFR if   >60        eGFR if non   >60  Comment:  Calculation used to obtain the estimated glomerular filtration  rate (eGFR) is the CKD-EPI  equation.           Eos #  0.1        Eosinophil%  0.5        Glucose  128(H)        Gran #  9.6(H)        Gran%  77.5(H)        Hematocrit  33.6(L)        Hemoglobin  10.0(L)        Lymph #  1.8        Lymph%  14.5(L)        Magnesium  2.4        MCH  27.0        MCHC  29.8(L)        MCV  91        Mono #  0.9        Mono%  7.3        MPV  8.7(L)        Phosphorus  2.7        Platelets  279        POCT Glucose 311(H)  133(H) 364(H) 346(H)     Potassium  3.1(L)        RBC  3.71(L)        RDW  16.4(H)        Sodium  147(H)        WBC  12.41                        Significant Imaging: Internal Medicine

## 2017-12-24 NOTE — ASSESSMENT & PLAN NOTE
--continue home xarelto  --IV cardizem    12/23/17: HR increasing to 160's, cardizem gtt increased to 10mg/hr  12/24/17: HR controlled, decreased cardizem gtt to 5mg/hr. Continue po cardizem per cardiology.

## 2017-12-24 NOTE — PROGRESS NOTES
[facility] Hospital Medicine  Progress Note    Patient Name: Petrona Gonzalez  MRN: [id number]  Patient Class: IP- Inpatient   Admission Date: 12/21/2017  Length of Stay: 3 days  Attending Physician: Shravan De MD  Primary Care Provider: Provider Not[facility]ystem        Subjective:     Principal Problem:Acute on chronic respiratory failure with hypoxia and hypercapnia    HPI:  75 y/o female with PMHx of COPD, Dm, HTN, and HLD presented to the ED with c/o SOB that onset gradually yesterday. She reports she started with sinus congestion 2 weeks ago that included sore throat, cough with green sputum (thick), and acid reflux. Symptoms are constant and moderate, exacerbated or mitigated by nothing. Pt denies N/V, diarrhea, fever, chills, night sweats, Chest pain, abd pain, and all other symptoms at present. She saw the NP at [facility] who prescribed zithromax 2 days ago. Patient also takes prednisone daily X 7 years. She reports her breathing got gradually worse and did not improve with nebulizer tx and eventually she was sent to the Ed via [facility] who reports when they arrived on scene her O2 saturation was 67 and was pale in appearance. [facility] reports pt is on 3 L of O2 all the time at the nursing home and she is on BiPAP at night, 12/6. [facility] reports giving pt duoneb, albuterol, and 125 of solumedrol. She was placed on biPap. ED workup includes WBC 23K, Lactate 2.8, PCO2 57.8 urine with neg nitrates, neg leukocytes. CXR: Stable chest x-ray.  Chronic right middle lobe atelectasis. EKG Rate: 104 BPM Rhythm: AFIB with RVR Interpretation: L axis deviation. Septal infarct. No STEMI.    She is a full code and said her sister, Bonnie Carmichael or her son, Jose Nguyễn could make any medical decisions for her.            Hospital Course:  Patient was admitted to telemetry with COPD exacerbation and acute on chronic CHF under the care of Hospital Medicine. She was given IV Avelox, breathing treatments, and IV lasix.  Slowly improving. Decreased edema, BBS improved and without wheezing. Pt able to speak more words with SOB. Continue current plan of care.    Interval History: HR stable, will decrease cardizem gtt to 5mg/hr, continue cardizem po. Improvement in breathing with talking. WBC 12. Afebrile. Will continue current plan of care.    Review of Systems   Constitutional: Negative.  Negative for activity change, appetite change, chills, diaphoresis, fatigue and fever.   HENT: Negative.  Negative for congestion, rhinorrhea, sinus pain, sinus pressure and sore throat.    Eyes: Negative.  Negative for photophobia, discharge and visual disturbance.   Respiratory: Positive for cough and shortness of breath. Negative for apnea, chest tightness and wheezing.    Cardiovascular: Negative.  Negative for chest pain, palpitations and leg swelling.   Gastrointestinal: Negative.  Negative for abdominal distention, abdominal pain, diarrhea, nausea and vomiting.   Endocrine: Negative.  Negative for cold intolerance, heat intolerance, polydipsia, polyphagia and polyuria.   Genitourinary: Negative.  Negative for difficulty urinating, dysuria, enuresis, frequency and urgency.   Musculoskeletal: Negative.  Negative for arthralgias, back pain, myalgias and neck pain.   Skin: Negative.  Negative for color change, pallor and wound.   Allergic/Immunologic: Negative.  Negative for environmental allergies, food allergies and immunocompromised state.   Neurological: Negative.  Negative for dizziness, seizures, syncope, speech difficulty, weakness, light-headedness, numbness and headaches.   Hematological: Negative.  Negative for adenopathy.   Psychiatric/Behavioral: Negative.  Negative for behavioral problems and confusion. The patient is not nervous/anxious.      Objective:     Vital Signs (Most Recent):  Temp: 98.4 °F (36.9 °C) (12/24/17 1550)  Pulse: 67 (12/24/17 1550)  Resp: 20 (12/24/17 1550)  BP: 121/63 (12/24/17 1550)  SpO2: 97 % (12/24/17 1550)  Vital Signs (24h Range):  Temp:  [98.1 °F (36.7 °C)-98.4 °F (36.9 °C)] 98.4 °F (36.9 °C)  Pulse:  [] 67  Resp:  [17-24] 20  SpO2:  [93 %-98 %] 97 %  BP: (121-150)/(56-86) 121/63     Weight: 92.6 kg (204 lb 2.3 oz)  Body mass index is 37.34 kg/m².    Intake/Output Summary (Last 24 hours) at 12/24/17 1610  Last data filed at 12/24/17 1300   Gross per 24 hour   Intake          3815.83 ml   Output             5900 ml   Net         -2084.17 ml      Physical Exam   Constitutional: She is oriented to person, place, and time. She appears well-developed and well-nourished.   HENT:   Head: Normocephalic and atraumatic.   Eyes: Conjunctivae and EOM are normal. Pupils are equal, round, and reactive to light. Right eye exhibits no discharge. Left eye exhibits no discharge.   Neck: Normal range of motion. Neck supple.   Cardiovascular: Normal rate, regular rhythm, normal heart sounds and intact distal pulses.  Exam reveals no gallop and no friction rub.    No murmur heard.  Pulmonary/Chest: Effort normal and breath sounds normal. No respiratory distress. She has no wheezes.   Abdominal: Soft. Bowel sounds are normal. She exhibits no distension. There is no tenderness. There is no guarding.   obese   Genitourinary:   Genitourinary Comments: deferred   Musculoskeletal: Normal range of motion. She exhibits edema (trace ).   Neurological: She is alert and oriented to person, place, and time.   Skin: Skin is warm and dry. Capillary refill takes less than 2 seconds.   Psychiatric: She has a normal mood and affect. Her behavior is normal. Judgment and thought content normal.       Significant Labs:   Recent Lab Results       12/24/17  1101 12/24/17  0603 12/24/17  0541 12/23/17  2109 12/23/17  1710      Anion Gap  11        Baso #  0.02        Basophil%  0.2        BUN, Bld  17        Calcium  8.7        Chloride  107        CO2  29        Creatinine  0.8        Differential Method  Automated        eGFR if   >60         eGFR if non   >60  Comment:  Calculation used to obtain the estimated glomerular filtration  rate (eGFR) is the CKD-EPI equation.           Eos #  0.1        Eosinophil%  0.5        Glucose  128(H)        Gran #  9.6(H)        Gran%  77.5(H)        Hematocrit  33.6(L)        Hemoglobin  10.0(L)        Lymph #  1.8        Lymph%  14.5(L)        Magnesium  2.4        MCH  27.0        MCHC  29.8(L)        MCV  91        Mono #  0.9        Mono%  7.3        MPV  8.7(L)        Phosphorus  2.7        Platelets  279        POCT Glucose 311(H)  133(H) 364(H) 346(H)     Potassium  3.1(L)        RBC  3.71(L)        RDW  16.4(H)        Sodium  147(H)        WBC  12.41                        Significant Imaging: Internal Medicine    Assessment/Plan:      * Acute on chronic respiratory failure with hypoxia and hypercapnia    --supplemental oxygen to maintain sats  --duo nebs  --continue home inhalers    --12/22: improving    12/23/17: improving, patient refusing BIPAP        CHF, acute on chronic    --2D echo 06/2017 showed EF 45%  --2D echo pending  --IV furosemide  --continue home zaroxolyn, lisinopril    12/23/17: improving  12/24/17: improving            Atrial fibrillation with RVR    --continue home xarelto  --IV cardizem    12/23/17: HR increasing to 160's, cardizem gtt increased to 10mg/hr  12/24/17: HR controlled, decreased cardizem gtt to 5mg/hr. Continue po cardizem per cardiology.           Leukocytosis    --sepsis vs medication  --pt afebrile and takes prednisone daily X 7 years and metformin  --Lactate 2.8  --CT chest pending  --BC pending  --IV avelox  --daily CBC    --12/22: improving, wbc 12K    --12/23/17: wbc 13k, unchanged from yesterday, currently on prednisone        Type 2 diabetes mellitus with hyperlipidemia    --SSI and accuchecks  --diabetic diet          COPD with acute exacerbation    --WBC 23K, SOB at rest, unable to complete sentence d/t SOB  --CXR: Chronic right middle lobe  atelectasis.  --CT chest pending  --supplemental oxygen to maintain sats  --IV avelox  --continue home roflumilast  --duonebs  --continue home albuterol inhaler, symbicort, mucinex, xyzal, and singulair    --12/22: WBC improving    -12/23/17: 13K, 13k on yesterday, currently on Prednisone   -12/24/17: slight SOB with talking, no SOB at rest. WBC 12, afebrile,                   Will continue current plan of care            Essential (primary) hypertension    --continue home lisinopril  --cardiac diet    12/23/17: stable  12/24/17: stable        Anxiety    --continue home xanax  --provide calm environment with low lighting    --12/23/17: anxious this am, improved with xanax          VTE Risk Mitigation         Ordered     rivaroxaban tablet 20 mg  With dinner     Route:  Oral        12/21/17 1929     Place sequential compression device  Until discontinued      12/21/17 1929     Reason for No Pharmacological VTE Prophylaxis  Once      12/21/17 1929     Medium Risk of VTE  Once      12/21/17 1929              Sia Gardner, YAS, ACNP-BC  Department of Hospital Medicine   [facility]

## 2017-12-24 NOTE — HPI
Consult Note  Cardiology    Consult Requested By:  Reason for Consult:     SUBJECTIVE:     History of Present Illness:  Patient is a 76 y.o. female presents with afib with RVR(hx of PAF)  77 y/o female with PMHx for COPD, HTN, HLP afib since 6-17 presented with acute on chronic respiratory failure- COPD and CHF, ? Sepsis with episode of afib with RVR responded to IV cardizem. Pt denies CP , sob improved.  Recommend continue home xarelto increase po cardizem , dc Iv cardizem.       Review of patient's allergies indicates:   Allergen Reactions    Meloxicam Other (See Comments)      Patient stated that she has muscle aches and pains    Morphine Rash    Naproxen Other (See Comments)     Patient states she has muscle and aches.    Pulmicort [budesonide] Other (See Comments)     Burning in chest       Past Medical History:   Diagnosis Date    COPD (chronic obstructive pulmonary disease) with emphysema     Diabetes mellitus     Hyperlipidemia     Hypertension     Insomnia     Maxillary sinusitis, chronic      Past Surgical History:   Procedure Laterality Date    BACK SURGERY      HERNIA REPAIR      HYSTERECTOMY       Family History   Problem Relation Age of Onset    Arthritis Father     Cancer Father     Hearing loss Father     Cancer Sister     Early death Sister     Cancer Brother     Heart disease Brother      Social History   Substance Use Topics    Smoking status: Former Smoker     Years: 30.00    Smokeless tobacco: Never Used    Alcohol use No        Review of Systems:  Constitutional: negative  Eyes: negative  Ears, nose, mouth, throat, and face: negative  Respiratory: negative  Cardiovascular: negative  Gastrointestinal: negative  Genitourinary:negative  Hematologic/lymphatic: negative  Musculoskeletal:negative,   Neurological: negative  Behavioral/Psych: negative  Endocrine: negative  Allergic/Immunologic: negative    OBJECTIVE:     Vital Signs (Most Recent)  Temp: 98.1 °F (36.7 °C)  (12/24/17 0735)  Pulse: 61 (12/24/17 0753)  Resp: (!) 24 (12/24/17 0753)  BP: 131/86 (12/24/17 0735)  SpO2: 96 % (12/24/17 0753)    Vital Signs Range (Last 24H):  Temp:  [98.1 °F (36.7 °C)-98.8 °F (37.1 °C)]   Pulse:  []   Resp:  [17-26]   BP: (122-150)/(56-86)   SpO2:  [93 %-98 %]     Current Facility-Administered Medications   Medication    0.9%  NaCl infusion    acetaminophen tablet 650 mg    albuterol sulfate nebulizer solution 2.5 mg    albuterol-ipratropium 2.5mg-0.5mg/3mL nebulizer solution 3 mL    ALPRAZolam tablet 0.5 mg    arformoterol nebulizer solution 15 mcg    aspirin EC tablet 81 mg    budesonide nebulizer solution 0.5 mg    cetirizine tablet 10 mg    dextrose 50% injection 12.5 g    dextrose 50% injection 25 g    diltiaZEM 125 mg in dextrose 5% 125 mL infusion (non-titrating)    diltiaZEM tablet 90 mg    famotidine tablet 20 mg    furosemide injection 40 mg    glucagon (human recombinant) injection 1 mg    glucose chewable tablet 16 g    glucose chewable tablet 24 g    guaiFENesin 12 hr tablet 1,200 mg    insulin aspart pen 0-5 Units    lisinopril tablet 5 mg    montelukast tablet 10 mg    moxifloxacin tablet 400 mg    ondansetron disintegrating tablet 8 mg    polyethylene glycol packet 17 g    potassium chloride 10% solution 40 mEq    predniSONE tablet 40 mg    rivaroxaban tablet 20 mg    roflumilast tablet 500 mcg    simvastatin tablet 10 mg    sodium chloride 0.9% flush 3 mL    tobramycin sulfate 0.3% ophthalmic solution 1 drop    traZODone tablet 50 mg     No current facility-administered medications on file prior to encounter.      Current Outpatient Prescriptions on File Prior to Encounter   Medication Sig    albuterol 90 mcg/actuation inhaler Inhale 2 puffs into the lungs every 6 (six) hours as needed. Dispense with spacer.    albuterol-ipratropium 2.5mg-0.5mg/3mL (DUO-NEB) 0.5 mg-3 mg(2.5 mg base)/3 mL nebulizer solution Take 3 mLs by nebulization  every 6 (six) hours.    alprazolam (XANAX) 0.5 MG tablet Take 0.5 mg by mouth every 6 (six) hours as needed for Anxiety.    aspirin (ECOTRIN) 81 MG EC tablet Take 81 mg by mouth once daily.    budesonide-formoterol 160-4.5 mcg (SYMBICORT) 160-4.5 mcg/actuation HFAA Inhale 2 puffs into the lungs every 12 (twelve) hours. Controller    dextromethorphan-guaifenesin  mg (MUCINEX DM)  mg per 12 hr tablet Take 1 tablet by mouth.    diltiaZEM (CARDIZEM CD) 120 MG Cp24 Take 1 capsule (120 mg total) by mouth 2 (two) times daily.    furosemide (LASIX) 40 MG tablet Take 40 mg by mouth 2 (two) times daily.    lisinopril (PRINIVIL,ZESTRIL) 5 MG tablet Take 1 tablet (5 mg total) by mouth once daily.    metformin (GLUCOPHAGE) 500 MG tablet Take 1 tablet (500 mg total) by mouth 2 (two) times daily with meals.    montelukast (SINGULAIR) 10 mg tablet Take 10 mg by mouth every evening.    predniSONE (DELTASONE) 5 MG tablet Take 40 mg by mouth once daily.     roflumilast 500 mcg Tab Take 1 tablet (500 mcg total) by mouth once daily.    simvastatin (ZOCOR) 10 MG tablet Take 1 tablet (10 mg total) by mouth every evening.    trazodone (DESYREL) 50 MG tablet Take 50 mg by mouth nightly as needed for Insomnia.    metOLazone (ZAROXOLYN) 5 MG tablet Take 1 tablet (5 mg total) by mouth once daily.    OXYGEN-AIR DELIVERY SYSTEMS MISC by [facility] [place]Non-Drug; Combo Route) route.    umeclidinium (INCRUSE ELLIPTA) 62.5 mcg/actuation DsDv Inhale into the lungs once daily. Controller       Physical Exam:  General appearance: alert, appears stated age and cooperative  Head: Normocephalic, without obvious abnormality, atraumatic  Eyes:  conjunctivae/corneas clear. PERRL, EOM's intact. Fundi benign.  Nose: no discharge  Throat: normal findings: tongue midline and normal  Neck: no adenopathy, no carotid bruit, no JVD, supple, symmetrical, trachea midline and thyroid not enlarged, symmetric, no tenderness/mass/nodules  Back:  no skin  lesions, erythema, or scars  Lungs:  clear to auscultation bilaterally  Chest wall: no tenderness  Heart: regular rate and rhythm, S1, S2 normal, no murmur, click, rub or gallop  Abdomen: soft, non-tender; bowel sounds normal; no masses,  no organomegaly  Extremities: extremities normal, atraumatic, no cyanosis or edema  Pulses: 2+ and symmetric  Skin: Skin color, texture, turgor normal. No rashes or lesions  Neurologic: Grossly normal    Laboratory:  Chemistry:   Lab Results   Component Value Date     (H) 12/24/2017    K 3.1 (L) 12/24/2017     12/24/2017    CO2 29 12/24/2017    BUN 17 12/24/2017    CREATININE 0.8 12/24/2017    CALCIUM 8.7 12/24/2017     Cardiac Markers:   Lab Results   Component Value Date    TROPONINI 0.018 12/22/2017     Cardiac Markers (Last 3):   Lab Results   Component Value Date    TROPONINI 0.018 12/22/2017    TROPONINI 0.015 12/21/2017    TROPONINI 0.017 12/21/2017     CBC:   Lab Results   Component Value Date    WBC 12.41 12/24/2017    HGB 10.0 (L) 12/24/2017    HCT 33.6 (L) 12/24/2017    MCV 91 12/24/2017     12/24/2017     Lipids:   Lab Results   Component Value Date    CHOL 155 01/17/2017    TRIG 59 01/17/2017    HDL 51 01/17/2017     Coagulation:   Lab Results   Component Value Date    INR 1.0 12/21/2017    APTT 32.9 (H) 12/21/2017       Diagnostic Results:  ECG: Reviewed  X-Ray: Reviewed  US: Reviewed  CT: Reviewed  Echo: Reviewed      ASSESSMENT/PLAN:     Patient Active Problem List   Diagnosis    Hay fever    Anemia, deficiency    Anxiety    Obesity with body mass index 30 or greater    Cellulitis of elbow    Chest pain    Chronic maxillary sinusitis    Depression    Diabetes mellitus without complication    Diabetic polyneuropathy    Dyslipidemia    Essential (primary) hypertension    Anemia associated with nutritional deficiency    COPD with acute exacerbation    Pneumonia    Shoulder pain    Exomphalos    Insomnia    Type 2 diabetes mellitus  with hyperlipidemia    Uncontrolled type 2 diabetes mellitus with mild nonproliferative retinopathy and macular edema, without long-term current use of insulin    Hyperlipidemia    Leukocytosis    Poorly controlled diabetes mellitus    Acute on chronic respiratory failure with hypoxia and hypercapnia    Elevated troponin    Atrial fibrillation with RVR    CHF, acute on chronic    Hyperthyroidism    Hypoxemia requiring supplemental oxygen        Plan: Recommend continue home xarelto increase po cardizem , dc Iv cardizem.

## 2017-12-24 NOTE — HOSPITAL COURSE
12-24 Pt without c/o on IV cardizem IV with controlled rate    12-25 respiratory status improved, tolerating po cardizem, afib with controlled rate

## 2017-12-24 NOTE — PROGRESS NOTES
[facility] Hospital Medicine  Progress Note    Patient Name: Petrona Gonzalez  MRN: [id number]  Patient Class: IP- Inpatient   Admission Date: 12/21/2017  Length of Stay: 3 days  Attending Physician: Shravan De MD  Primary Care Provider: Provider [facility]        Subjective:     Principal Problem:Acute on chronic respiratory failure with hypoxia and hypercapnia    HPI:  75 y/o female with PMHx of COPD, Dm, HTN, and HLD presented to the ED with c/o SOB that onset gradually yesterday. She reports she started with sinus congestion 2 weeks ago that included sore throat, cough with green sputum (thick), and acid reflux. Symptoms are constant and moderate, exacerbated or mitigated by nothing. Pt denies N/V, diarrhea, fever, chills, night sweats, Chest pain, abd pain, and all other symptoms at present. She saw the NP at [facility] who prescribed zithromax 2 days ago. Patient also takes prednisone daily X 7 years. She reports her breathing got gradually worse and did not improve with nebulizer tx and eventually she was sent to the Ed via [facility] who reports when they arrived on scene her O2 saturation was 67 and was pale in appearance. [facility] reports pt is on 3 L of O2 all the time at the nursing home and she is on BiPAP at night, 12/6. [facility] reports giving pt duoneb, albuterol, and 125 of solumedrol. She was placed on biPap. ED workup includes WBC 23K, Lactate 2.8, PCO2 57.8 urine with neg nitrates, neg leukocytes. CXR: Stable chest x-ray.  Chronic right middle lobe atelectasis. EKG Rate: 104 BPM Rhythm: AFIB with RVR Interpretation: L axis deviation. Septal infarct. No STEMI.    She is a full code and said her sister, Bonnie Carmichael or her son, Jose Nguyễn could make any medical decisions for her.            Hospital Course:  Patient was admitted to telemetry with COPD exacerbation and acute on chronic CHF under the care of Hospital Medicine. She was given IV Avelox, breathing treatments, and IV lasix.  Slowly improving. Decreased edema, BBS improved and without wheezing. Pt able to speak more words with SOB. Continue current plan of care.    No new subjective & objective note has been filed under this hospital service since the last note was generated.    Assessment/Plan:      * Acute on chronic respiratory failure with hypoxia and hypercapnia    --supplemental oxygen to maintain sats  --duo nebs  --continue home inhalers    --12/22: improving    12/23/17: improving, patient refusing BIPAP        CHF, acute on chronic    --2D echo 06/2017 showed EF 45%  --2D echo pending  --IV furosemide  --continue home zaroxolyn, lisinopril    12/23/17: improving  12/24/17: improving            Atrial fibrillation with RVR    --continue home xarelto  --IV cardizem    12/23/17: HR increasing to 160's, cardizem gtt increased to 10mg/hr  12/24/17: HR controlled, decreased cardizem gtt to 5mg/hr. Continue po cardizem per cardiology.           Leukocytosis    --sepsis vs medication  --pt afebrile and takes prednisone daily X 7 years and metformin  --Lactate 2.8  --CT chest pending  --BC pending  --IV avelox  --daily CBC    --12/22: improving, wbc 12K    --12/23/17: wbc 13k, unchanged from yesterday, currently on prednisone        Type 2 diabetes mellitus with hyperlipidemia    --SSI and accuchecks  --diabetic diet          COPD with acute exacerbation    --WBC 23K, SOB at rest, unable to complete sentence d/t SOB  --CXR: Chronic right middle lobe atelectasis.  --CT chest pending  --supplemental oxygen to maintain sats  --IV avelox  --continue home roflumilast  --duonebs  --continue home albuterol inhaler, symbicort, mucinex, xyzal, and singulair    --12/22: WBC improving    -12/23/17: 13K, 13k on yesterday, currently on Prednisone   -12/24/17: slight SOB with talking, no SOB at rest. WBC 12, afebrile,                   Will continue current plan of care            Essential (primary) hypertension    --continue home  lisinopril  --cardiac diet    12/23/17: stable  12/24/17: stable        Anxiety    --continue home xanax  --provide calm environment with low lighting    --12/23/17: anxious this am, improved with xanax          VTE Risk Mitigation         Ordered     rivaroxaban tablet 20 mg  With dinner     Route:  Oral        12/21/17 1929     Place sequential compression device  Until discontinued      12/21/17 1929     Reason for No Pharmacological VTE Prophylaxis  Once      12/21/17 1929     Medium Risk of VTE  Once      12/21/17 1929              Sia Gardner, YAS, ACNP-BC  Department of Hospital Medicine   [facility]

## 2017-12-24 NOTE — CONSULTS
Preliminary consult note               75 y/o female with PMHx for COPD, HTN, HLP afib since 6-17 presented with acute on chronic respiratory failure- COPD and CHF, ? Sepsis with episode of afib with RVR responded to IV cardizem. Pt denies CP , sob improved.  Recommend continue home xarelto increase po cardizem , dc Iv cardizem.

## 2017-12-24 NOTE — ASSESSMENT & PLAN NOTE
--WBC 23K, SOB at rest, unable to complete sentence d/t SOB  --CXR: Chronic right middle lobe atelectasis.  --CT chest pending  --supplemental oxygen to maintain sats  --IV avelox  --continue home roflumilast  --duonebs  --continue home albuterol inhaler, symbicort, mucinex, xyzal, and singulair    --12/22: WBC improving    -12/23/17: 13K, 13k on yesterday, currently on Prednisone   -12/24/17: slight SOB with talking, no SOB at rest. WBC 12, afebrile,                   Will continue current plan of care

## 2017-12-24 NOTE — PLAN OF CARE
Problem: Patient Care Overview  Goal: Plan of Care Review  Outcome: Ongoing (interventions implemented as appropriate)  Pt free of falls during shift. VSS.  PIV intact and infusing NS and cardizem. Afib uncontrolled on tele monitor- cardizem increased from 5 to 10 during shift. Pt on 3 L NC, no SOB noted. Call light in reach, hourly rounding made, will continue to monitor.

## 2017-12-25 PROBLEM — D72.829 LEUKOCYTOSIS: Status: RESOLVED | Noted: 2017-02-13 | Resolved: 2017-12-25

## 2017-12-25 LAB
ANION GAP SERPL CALC-SCNC: 10 MMOL/L
BASOPHILS # BLD AUTO: 0.03 K/UL
BASOPHILS NFR BLD: 0.3 %
BUN SERPL-MCNC: 14 MG/DL
CALCIUM SERPL-MCNC: 8.6 MG/DL
CHLORIDE SERPL-SCNC: 105 MMOL/L
CO2 SERPL-SCNC: 30 MMOL/L
CREAT SERPL-MCNC: 0.7 MG/DL
DIFFERENTIAL METHOD: ABNORMAL
EOSINOPHIL # BLD AUTO: 0.1 K/UL
EOSINOPHIL NFR BLD: 0.5 %
ERYTHROCYTE [DISTWIDTH] IN BLOOD BY AUTOMATED COUNT: 16.1 %
EST. GFR  (AFRICAN AMERICAN): >60 ML/MIN/1.73 M^2
EST. GFR  (NON AFRICAN AMERICAN): >60 ML/MIN/1.73 M^2
GLUCOSE SERPL-MCNC: 145 MG/DL
HCT VFR BLD AUTO: 32.2 %
HGB BLD-MCNC: 9.7 G/DL
LYMPHOCYTES # BLD AUTO: 1.9 K/UL
LYMPHOCYTES NFR BLD: 17.7 %
MAGNESIUM SERPL-MCNC: 2.3 MG/DL
MCH RBC QN AUTO: 27 PG
MCHC RBC AUTO-ENTMCNC: 30.1 G/DL
MCV RBC AUTO: 90 FL
MONOCYTES # BLD AUTO: 0.8 K/UL
MONOCYTES NFR BLD: 8 %
NEUTROPHILS # BLD AUTO: 7.7 K/UL
NEUTROPHILS NFR BLD: 75.9 %
PHOSPHATE SERPL-MCNC: 2.4 MG/DL
PLATELET # BLD AUTO: 258 K/UL
PMV BLD AUTO: 8.5 FL
POCT GLUCOSE: 128 MG/DL (ref 70–110)
POCT GLUCOSE: 218 MG/DL (ref 70–110)
POCT GLUCOSE: 225 MG/DL (ref 70–110)
POCT GLUCOSE: 248 MG/DL (ref 70–110)
POCT GLUCOSE: 272 MG/DL (ref 70–110)
POCT GLUCOSE: 288 MG/DL (ref 70–110)
POTASSIUM SERPL-SCNC: 3.5 MMOL/L
RBC # BLD AUTO: 3.59 M/UL
SODIUM SERPL-SCNC: 145 MMOL/L
WBC # BLD AUTO: 10.51 K/UL

## 2017-12-25 PROCEDURE — 27000221 HC OXYGEN, UP TO 24 HOURS

## 2017-12-25 PROCEDURE — 80048 BASIC METABOLIC PNL TOTAL CA: CPT

## 2017-12-25 PROCEDURE — 84100 ASSAY OF PHOSPHORUS: CPT

## 2017-12-25 PROCEDURE — 25000003 PHARM REV CODE 250: Performed by: INTERNAL MEDICINE

## 2017-12-25 PROCEDURE — 63600175 PHARM REV CODE 636 W HCPCS: Performed by: NURSE PRACTITIONER

## 2017-12-25 PROCEDURE — 21400001 HC TELEMETRY ROOM

## 2017-12-25 PROCEDURE — 36415 COLL VENOUS BLD VENIPUNCTURE: CPT

## 2017-12-25 PROCEDURE — 99232 SBSQ HOSP IP/OBS MODERATE 35: CPT | Mod: ,,, | Performed by: INTERNAL MEDICINE

## 2017-12-25 PROCEDURE — 25000242 PHARM REV CODE 250 ALT 637 W/ HCPCS: Performed by: NURSE PRACTITIONER

## 2017-12-25 PROCEDURE — A4216 STERILE WATER/SALINE, 10 ML: HCPCS | Performed by: NURSE PRACTITIONER

## 2017-12-25 PROCEDURE — 83735 ASSAY OF MAGNESIUM: CPT

## 2017-12-25 PROCEDURE — 25000242 PHARM REV CODE 250 ALT 637 W/ HCPCS: Performed by: EMERGENCY MEDICINE

## 2017-12-25 PROCEDURE — 25000003 PHARM REV CODE 250: Performed by: NURSE PRACTITIONER

## 2017-12-25 PROCEDURE — 85025 COMPLETE CBC W/AUTO DIFF WBC: CPT

## 2017-12-25 PROCEDURE — 94640 AIRWAY INHALATION TREATMENT: CPT

## 2017-12-25 RX ADMIN — INSULIN ASPART 3 UNITS: 100 INJECTION, SOLUTION INTRAVENOUS; SUBCUTANEOUS at 11:12

## 2017-12-25 RX ADMIN — BUDESONIDE 0.5 MG: 0.5 SUSPENSION RESPIRATORY (INHALATION) at 06:12

## 2017-12-25 RX ADMIN — SODIUM CHLORIDE, PRESERVATIVE FREE 3 ML: 5 INJECTION INTRAVENOUS at 06:12

## 2017-12-25 RX ADMIN — FUROSEMIDE 40 MG: 10 INJECTION, SOLUTION INTRAMUSCULAR; INTRAVENOUS at 05:12

## 2017-12-25 RX ADMIN — RIVAROXABAN 20 MG: 20 TABLET, FILM COATED ORAL at 05:12

## 2017-12-25 RX ADMIN — FAMOTIDINE 20 MG: 20 TABLET ORAL at 09:12

## 2017-12-25 RX ADMIN — LISINOPRIL 5 MG: 5 TABLET ORAL at 08:12

## 2017-12-25 RX ADMIN — INSULIN ASPART 2 UNITS: 100 INJECTION, SOLUTION INTRAVENOUS; SUBCUTANEOUS at 04:12

## 2017-12-25 RX ADMIN — IPRATROPIUM BROMIDE AND ALBUTEROL SULFATE 3 ML: 2.5; .5 SOLUTION RESPIRATORY (INHALATION) at 12:12

## 2017-12-25 RX ADMIN — SODIUM CHLORIDE, PRESERVATIVE FREE 3 ML: 5 INJECTION INTRAVENOUS at 09:12

## 2017-12-25 RX ADMIN — TOBRAMYCIN 1 DROP: 3 SOLUTION OPHTHALMIC at 09:12

## 2017-12-25 RX ADMIN — ROFLUMILAST 500 MCG: 500 TABLET ORAL at 08:12

## 2017-12-25 RX ADMIN — PREDNISONE 40 MG: 20 TABLET ORAL at 08:12

## 2017-12-25 RX ADMIN — TOBRAMYCIN 1 DROP: 3 SOLUTION OPHTHALMIC at 01:12

## 2017-12-25 RX ADMIN — SODIUM CHLORIDE, PRESERVATIVE FREE 3 ML: 5 INJECTION INTRAVENOUS at 01:12

## 2017-12-25 RX ADMIN — CETIRIZINE HYDROCHLORIDE 10 MG: 10 TABLET, FILM COATED ORAL at 08:12

## 2017-12-25 RX ADMIN — POLYETHYLENE GLYCOL (3350) 17 G: 17 POWDER, FOR SOLUTION ORAL at 08:12

## 2017-12-25 RX ADMIN — ARFORMOTEROL TARTRATE 15 MCG: 15 SOLUTION RESPIRATORY (INHALATION) at 06:12

## 2017-12-25 RX ADMIN — IPRATROPIUM BROMIDE AND ALBUTEROL SULFATE 3 ML: 2.5; .5 SOLUTION RESPIRATORY (INHALATION) at 06:12

## 2017-12-25 RX ADMIN — FUROSEMIDE 40 MG: 10 INJECTION, SOLUTION INTRAMUSCULAR; INTRAVENOUS at 08:12

## 2017-12-25 RX ADMIN — GUAIFENESIN 1200 MG: 600 TABLET, EXTENDED RELEASE ORAL at 09:12

## 2017-12-25 RX ADMIN — SIMVASTATIN 10 MG: 5 TABLET, FILM COATED ORAL at 09:12

## 2017-12-25 RX ADMIN — GUAIFENESIN 1200 MG: 600 TABLET, EXTENDED RELEASE ORAL at 08:12

## 2017-12-25 RX ADMIN — BUDESONIDE 0.5 MG: 0.5 SUSPENSION RESPIRATORY (INHALATION) at 07:12

## 2017-12-25 RX ADMIN — IPRATROPIUM BROMIDE AND ALBUTEROL SULFATE 3 ML: 2.5; .5 SOLUTION RESPIRATORY (INHALATION) at 07:12

## 2017-12-25 RX ADMIN — TOBRAMYCIN 1 DROP: 3 SOLUTION OPHTHALMIC at 06:12

## 2017-12-25 RX ADMIN — ACETAMINOPHEN 650 MG: 325 TABLET ORAL at 10:12

## 2017-12-25 RX ADMIN — MONTELUKAST SODIUM 10 MG: 10 TABLET, COATED ORAL at 09:12

## 2017-12-25 RX ADMIN — DILTIAZEM HYDROCHLORIDE 90 MG: 30 TABLET, FILM COATED ORAL at 06:12

## 2017-12-25 RX ADMIN — TOBRAMYCIN 1 DROP: 3 SOLUTION OPHTHALMIC at 05:12

## 2017-12-25 RX ADMIN — DILTIAZEM HYDROCHLORIDE 90 MG: 30 TABLET, FILM COATED ORAL at 01:12

## 2017-12-25 RX ADMIN — ALPRAZOLAM 0.5 MG: 0.5 TABLET ORAL at 10:12

## 2017-12-25 RX ADMIN — ASPIRIN 81 MG: 81 TABLET, COATED ORAL at 08:12

## 2017-12-25 RX ADMIN — ARFORMOTEROL TARTRATE 15 MCG: 15 SOLUTION RESPIRATORY (INHALATION) at 07:12

## 2017-12-25 RX ADMIN — DILTIAZEM HYDROCHLORIDE 90 MG: 30 TABLET, FILM COATED ORAL at 09:12

## 2017-12-25 RX ADMIN — FAMOTIDINE 20 MG: 20 TABLET ORAL at 08:12

## 2017-12-25 RX ADMIN — MOXIFLOXACIN HYDROCHLORIDE 400 MG: 400 TABLET, FILM COATED ORAL at 04:12

## 2017-12-25 NOTE — SUBJECTIVE & OBJECTIVE
Interval History: No acute events overnight, Afib with HR 60's-80's overnight with cardizem gtt at 5mg/hr. Cardizem gtt discontinued this am, continue scheduled diltiazem po. Patient reports improvement with breathing and shortness of breath.     Review of Systems   Constitutional: Positive for activity change. Negative for appetite change, chills, diaphoresis, fatigue and fever.   HENT: Negative.  Negative for congestion, rhinorrhea, sinus pain, sinus pressure and sore throat.    Eyes: Negative.  Negative for photophobia, discharge and visual disturbance.   Respiratory: Positive for cough and shortness of breath. Negative for apnea, chest tightness and wheezing.    Cardiovascular: Negative.  Negative for chest pain, palpitations and leg swelling.   Gastrointestinal: Negative.  Negative for abdominal distention, abdominal pain, diarrhea, nausea and vomiting.   Endocrine: Negative.  Negative for cold intolerance, heat intolerance, polydipsia, polyphagia and polyuria.   Genitourinary: Negative.  Negative for difficulty urinating, dysuria, enuresis, frequency and urgency.   Musculoskeletal: Negative.  Negative for arthralgias, back pain, myalgias and neck pain.   Skin: Negative.  Negative for color change, pallor and wound.   Allergic/Immunologic: Negative.  Negative for environmental allergies, food allergies and immunocompromised state.   Neurological: Negative.  Negative for dizziness, seizures, syncope, speech difficulty, weakness, light-headedness, numbness and headaches.   Hematological: Negative.  Negative for adenopathy.   Psychiatric/Behavioral: Negative.  Negative for behavioral problems and confusion. The patient is not nervous/anxious.      Objective:     Vital Signs (Most Recent):  Temp: 98.4 °F (36.9 °C) (12/25/17 0327)  Pulse: 64 (12/25/17 0720)  Resp: 20 (12/25/17 0720)  BP: (!) 140/61 (12/25/17 0327)  SpO2: 99 % (12/25/17 0720) Vital Signs (24h Range):  Temp:  [98.2 °F (36.8 °C)-98.4 °F (36.9 °C)] 98.4  °F (36.9 °C)  Pulse:  [62-88] 64  Resp:  [18-22] 20  SpO2:  [96 %-100 %] 99 %  BP: (121-140)/(57-67) 140/61     Weight: 89.9 kg (198 lb 3.1 oz)  Body mass index is 36.25 kg/m².    Intake/Output Summary (Last 24 hours) at 12/25/17 0939  Last data filed at 12/25/17 0619   Gross per 24 hour   Intake              486 ml   Output             3800 ml   Net            -3314 ml      Physical Exam   Constitutional: She is oriented to person, place, and time. She appears well-developed and well-nourished.   HENT:   Head: Normocephalic and atraumatic.   Eyes: Conjunctivae and EOM are normal. Pupils are equal, round, and reactive to light.   Neck: Normal range of motion. Neck supple.   Cardiovascular: Normal rate, regular rhythm, normal heart sounds and intact distal pulses.  Exam reveals no gallop and no friction rub.    No murmur heard.  Pulmonary/Chest: Effort normal and breath sounds normal. No respiratory distress. She has no wheezes. She has no rales.   Abdominal: Soft. Bowel sounds are normal. She exhibits no distension. There is no tenderness. There is no guarding.   Genitourinary:   Genitourinary Comments: deferred   Musculoskeletal: Normal range of motion.   Neurological: She is alert and oriented to person, place, and time.   Skin: Skin is warm and dry. Capillary refill takes 2 to 3 seconds.        Psychiatric: She has a normal mood and affect. Her behavior is normal. Judgment and thought content normal.       Significant Labs:   Recent Lab Results       12/25/17  0625 12/25/17  0407 12/24/17  1701 12/24/17  1101      Anion Gap  10       Baso #  0.03       Basophil%  0.3       BUN, Bld  14       Calcium  8.6(L)       Chloride  105       CO2  30(H)       Creatinine  0.7       Differential Method  Automated       eGFR if   >60       eGFR if non   >60  Comment:  Calculation used to obtain the estimated glomerular filtration  rate (eGFR) is the CKD-EPI equation.          Eos #  0.1        Eosinophil%  0.5       Glucose  145(H)       Gran #  7.7       Gran%  75.9(H)       Hematocrit  32.2(L)       Hemoglobin  9.7(L)       Lymph #  1.9       Lymph%  17.7(L)       Magnesium  2.3       MCH  27.0       MCHC  30.1(L)       MCV  90       Mono #  0.8       Mono%  8.0       MPV  8.5(L)       Phosphorus  2.4(L)       Platelets  258       POCT Glucose 128(H)  288(H) 311(H)     Potassium  3.5       RBC  3.59(L)       RDW  16.1(H)       Sodium  145       WBC  10.51             Significant Imaging:  Imaging Results          CT Chest With Contrast (Final result)  Result time 12/21/17 19:12:39    Final result by Goran Melo MD (12/21/17 19:12:39)                 Impression:         No acute abnormalities.  All CT scans at this facility use dose modulation, iterative reconstruction and/or weight based dosing when appropriate to reduce radiation dose to as low as reasonably achievable.      Electronically signed by: GORAN MELO MD  Date:     12/21/17  Time:    19:12              Narrative:    CT OF THE CHEST WITH IV CONTRAST:     Technique:  After the intravenous administration of 75 cc of [id number] nonionic contrast, 5 mm axial images were acquired using helical CT technique from the lung apices through costophrenic sulci.    Comparison: 12/21/17     History:  Atelectasis    Findings:    -Lungs: No nodules or infiltrates. Mild atelectasis is seen within the right middle lobe and to lesser extent within the lingula and right lower lobe lateral basilar segment. Bocledeck hernia is seen within the left hemidiaphragm.  -Pleura: No thickening or fluid.  -Mediastinum/Diana:No significant adenopathy   -Axilla: No adenopathy.  -Thyroid: Normal lower gland.  -Heart/Aorta: Heart size is normal. No pericardial effusion. Aorta normal caliber with moderate atherosclerotic disease.  -Bones/Chest Wall: Intact with mild multilevel spondylosis.   -Upper Abdomen: Unremarkable                             X-Ray Chest AP Portable  (Final result)  Result time 12/21/17 16:26:04    Final result by Goldy Olivia MD (12/21/17 16:26:04)                 Impression:      Stable chest x-ray.  Chronic right middle lobe atelectasis.      Electronically signed by: GOLDY OLIVIA MD  Date:     12/21/17  Time:    16:26              Narrative:    Exam: XR CHEST AP PORTABLE,    Date:  12/21/17 16:07:00    History: Chest Pain    Comparison:  11/22/2017    Findings: Multiple wire leads overlie the chest.  The cardiac size is enlarged.  Probable right medial lobe atelectasis.  Prior chest CT confirm middle lobe atelectasis from 05/20/2017.  There are also prominent pericardial fat pad.    The lung fields are clear.

## 2017-12-25 NOTE — ASSESSMENT & PLAN NOTE
--continue home xarelto  --IV cardizem    12/23/17: HR increasing to 160's, cardizem gtt increased to 10mg/hr  12/24/17: HR controlled, decreased cardizem gtt to 5mg/hr. Continue po cardizem per cardiology.   12/25/17: HR 60's-80's overnight with cardizem gtt 5mg/hr, cardizem discontinued this am, will continue                   cardizem po, telemetry monitoring

## 2017-12-25 NOTE — ASSESSMENT & PLAN NOTE
--supplemental oxygen to maintain sats  --duo nebs  --continue home inhalers    --12/22: improving    12/23/17: improving, patient refusing BIPAP    12/25/17: improving, patient reports improvement with breathing,

## 2017-12-25 NOTE — PLAN OF CARE
Problem: Patient Care Overview  Goal: Plan of Care Review  Outcome: Ongoing (interventions implemented as appropriate)  Plan of care discussed with patient, and patient verbalized understanding.  Patient remained AOx4, 3L Nc, and A-fib on the monitor.  Patient's IV diltiazem is infusing at 5mL/hr, and NS at 125ml/hr.  Elevated glucose levels - coverage given.   Patient remained free of falls, accidents, and trama during the day shift.  Bed is in the lowest position and call light is within reach - Continue to monitor.

## 2017-12-25 NOTE — PLAN OF CARE
Problem: Patient Care Overview  Goal: Plan of Care Review  Outcome: Ongoing (interventions implemented as appropriate)  The patient has been afib on the monitor. Blood glucose monitored. Cutler catheter came out. Cardizem infusing at 5 mL/hr. Normal saline infusing at 125 mL/hr. Pt has had an uneventful night and is resting quietly, will continue to monitor.

## 2017-12-25 NOTE — ASSESSMENT & PLAN NOTE
--continue home lisinopril  --cardiac diet    12/23/17: stable  12/24/17: stable  12/25/17: BP controlled, continue current plan of care

## 2017-12-25 NOTE — PROGRESS NOTES
[facility] Hospital Medicine  Progress Note    Patient Name: Petrona Gonzalez  MRN: [id number]  Patient Class: IP- Inpatient   Admission Date: 12/21/2017  Length of Stay: 4 days  Attending Physician: Shravan De MD  Primary Care Provider: Provider [facility]        Subjective:     Principal Problem:Acute on chronic respiratory failure with hypoxia and hypercapnia    HPI:  77 y/o female with PMHx of COPD, Dm, HTN, and HLD presented to the ED with c/o SOB that onset gradually yesterday. She reports she started with sinus congestion 2 weeks ago that included sore throat, cough with green sputum (thick), and acid reflux. Symptoms are constant and moderate, exacerbated or mitigated by nothing. Pt denies N/V, diarrhea, fever, chills, night sweats, Chest pain, abd pain, and all other symptoms at present. She saw the NP at [facility] who prescribed zithromax 2 days ago. Patient also takes prednisone daily X 7 years. She reports her breathing got gradually worse and did not improve with nebulizer tx and eventually she was sent to the Ed via [facility] who reports when they arrived on scene her O2 saturation was 67 and was pale in appearance. [facility] reports pt is on 3 L of O2 all the time at the nursing home and she is on BiPAP at night, 12/6. [facility] reports giving pt duoneb, albuterol, and 125 of solumedrol. She was placed on biPap. ED workup includes WBC 23K, Lactate 2.8, PCO2 57.8 urine with neg nitrates, neg leukocytes. CXR: Stable chest x-ray.  Chronic right middle lobe atelectasis. EKG Rate: 104 BPM Rhythm: AFIB with RVR Interpretation: L axis deviation. Septal infarct. No STEMI.    She is a full code and said her sister, Bonnie Carmichael or her son, Jose Nguyễn could make any medical decisions for her.            Hospital Course:  Patient was admitted to telemetry with COPD exacerbation and acute on chronic CHF under the care of Hospital Medicine. She was given IV Avelox, breathing treatments, and IV lasix.  Slowly improving. Decreased edema, BBS improved and without wheezing. Pt able to speak more words with SOB. Patient went into Afib with RVR, cardizem gtt initiated, cardiology assisted in plan of care. Continue current plan of care.    Interval History: No acute events overnight, Afib with HR 60's-80's overnight with cardizem gtt at 5mg/hr. Cardizem gtt discontinued this am, continue scheduled diltiazem po. Patient reports improvement with breathing and shortness of breath.     Review of Systems   Constitutional: Positive for activity change. Negative for appetite change, chills, diaphoresis, fatigue and fever.   HENT: Negative.  Negative for congestion, rhinorrhea, sinus pain, sinus pressure and sore throat.    Eyes: Negative.  Negative for photophobia, discharge and visual disturbance.   Respiratory: Positive for cough and shortness of breath. Negative for apnea, chest tightness and wheezing.    Cardiovascular: Negative.  Negative for chest pain, palpitations and leg swelling.   Gastrointestinal: Negative.  Negative for abdominal distention, abdominal pain, diarrhea, nausea and vomiting.   Endocrine: Negative.  Negative for cold intolerance, heat intolerance, polydipsia, polyphagia and polyuria.   Genitourinary: Negative.  Negative for difficulty urinating, dysuria, enuresis, frequency and urgency.   Musculoskeletal: Negative.  Negative for arthralgias, back pain, myalgias and neck pain.   Skin: Negative.  Negative for color change, pallor and wound.   Allergic/Immunologic: Negative.  Negative for environmental allergies, food allergies and immunocompromised state.   Neurological: Negative.  Negative for dizziness, seizures, syncope, speech difficulty, weakness, light-headedness, numbness and headaches.   Hematological: Negative.  Negative for adenopathy.   Psychiatric/Behavioral: Negative.  Negative for behavioral problems and confusion. The patient is not nervous/anxious.      Objective:     Vital Signs (Most  Recent):  Temp: 98.4 °F (36.9 °C) (12/25/17 0327)  Pulse: 64 (12/25/17 0720)  Resp: 20 (12/25/17 0720)  BP: (!) 140/61 (12/25/17 0327)  SpO2: 99 % (12/25/17 0720) Vital Signs (24h Range):  Temp:  [98.2 °F (36.8 °C)-98.4 °F (36.9 °C)] 98.4 °F (36.9 °C)  Pulse:  [62-88] 64  Resp:  [18-22] 20  SpO2:  [96 %-100 %] 99 %  BP: (121-140)/(57-67) 140/61     Weight: 89.9 kg (198 lb 3.1 oz)  Body mass index is 36.25 kg/m².    Intake/Output Summary (Last 24 hours) at 12/25/17 0939  Last data filed at 12/25/17 0619   Gross per 24 hour   Intake              486 ml   Output             3800 ml   Net            -3314 ml      Physical Exam   Constitutional: She is oriented to person, place, and time. She appears well-developed and well-nourished.   HENT:   Head: Normocephalic and atraumatic.   Eyes: Conjunctivae and EOM are normal. Pupils are equal, round, and reactive to light.   Neck: Normal range of motion. Neck supple.   Cardiovascular: Normal rate, regular rhythm, normal heart sounds and intact distal pulses.  Exam reveals no gallop and no friction rub.    No murmur heard.  Pulmonary/Chest: Effort normal and breath sounds normal. No respiratory distress. She has no wheezes. She has no rales.   Abdominal: Soft. Bowel sounds are normal. She exhibits no distension. There is no tenderness. There is no guarding.   Genitourinary:   Genitourinary Comments: deferred   Musculoskeletal: Normal range of motion.   Neurological: She is alert and oriented to person, place, and time.   Skin: Skin is warm and dry. Capillary refill takes 2 to 3 seconds.        Psychiatric: She has a normal mood and affect. Her behavior is normal. Judgment and thought content normal.       Significant Labs:   Recent Lab Results       12/25/17  0625 12/25/17  0407 12/24/17  1701 12/24/17  1101      Anion Gap  10       Baso #  0.03       Basophil%  0.3       BUN, Bld  14       Calcium  8.6(L)       Chloride  105       CO2  30(H)       Creatinine  0.7        Differential Method  Automated       eGFR if   >60       eGFR if non   >60  Comment:  Calculation used to obtain the estimated glomerular filtration  rate (eGFR) is the CKD-EPI equation.          Eos #  0.1       Eosinophil%  0.5       Glucose  145(H)       Gran #  7.7       Gran%  75.9(H)       Hematocrit  32.2(L)       Hemoglobin  9.7(L)       Lymph #  1.9       Lymph%  17.7(L)       Magnesium  2.3       MCH  27.0       MCHC  30.1(L)       MCV  90       Mono #  0.8       Mono%  8.0       MPV  8.5(L)       Phosphorus  2.4(L)       Platelets  258       POCT Glucose 128(H)  288(H) 311(H)     Potassium  3.5       RBC  3.59(L)       RDW  16.1(H)       Sodium  145       WBC  10.51             Significant Imaging:  Imaging Results          CT Chest With Contrast (Final result)  Result time 12/21/17 19:12:39    Final result by Goran Melo MD (12/21/17 19:12:39)                 Impression:         No acute abnormalities.  All CT scans at this facility use dose modulation, iterative reconstruction and/or weight based dosing when appropriate to reduce radiation dose to as low as reasonably achievable.      Electronically signed by: GORAN MELO MD  Date:     12/21/17  Time:    19:12              Narrative:    CT OF THE CHEST WITH IV CONTRAST:     Technique:  After the intravenous administration of 75 cc of [id number] nonionic contrast, 5 mm axial images were acquired using helical CT technique from the lung apices through costophrenic sulci.    Comparison: 12/21/17     History:  Atelectasis    Findings:    -Lungs: No nodules or infiltrates. Mild atelectasis is seen within the right middle lobe and to lesser extent within the lingula and right lower lobe lateral basilar segment. Bocledeck hernia is seen within the left hemidiaphragm.  -Pleura: No thickening or fluid.  -Mediastinum/Diana:No significant adenopathy   -Axilla: No adenopathy.  -Thyroid: Normal lower gland.  -Heart/Aorta: Heart size  is normal. No pericardial effusion. Aorta normal caliber with moderate atherosclerotic disease.  -Bones/Chest Wall: Intact with mild multilevel spondylosis.   -Upper Abdomen: Unremarkable                             X-Ray Chest AP Portable (Final result)  Result time 12/21/17 16:26:04    Final result by Goldy Olivia MD (12/21/17 16:26:04)                 Impression:      Stable chest x-ray.  Chronic right middle lobe atelectasis.      Electronically signed by: GOLDY OLIVIA MD  Date:     12/21/17  Time:    16:26              Narrative:    Exam: XR CHEST AP PORTABLE,    Date:  12/21/17 16:07:00    History: Chest Pain    Comparison:  11/22/2017    Findings: Multiple wire leads overlie the chest.  The cardiac size is enlarged.  Probable right medial lobe atelectasis.  Prior chest CT confirm middle lobe atelectasis from 05/20/2017.  There are also prominent pericardial fat pad.    The lung fields are clear.                            Assessment/Plan:      * Acute on chronic respiratory failure with hypoxia and hypercapnia    --supplemental oxygen to maintain sats  --duo nebs  --continue home inhalers    --12/22: improving    12/23/17: improving, patient refusing BIPAP    12/25/17: improving, patient reports improvement with breathing,         CHF, acute on chronic    --2D echo 06/2017 showed EF 45%  --2D echo pending  --IV furosemide  --continue home zaroxolyn, lisinopril    12/23/17: improving  12/24/17: improving            Atrial fibrillation with RVR    --continue home xarelto  --IV cardizem    12/23/17: HR increasing to 160's, cardizem gtt increased to 10mg/hr  12/24/17: HR controlled, decreased cardizem gtt to 5mg/hr. Continue po cardizem per cardiology.   12/25/17: HR 60's-80's overnight with cardizem gtt 5mg/hr, cardizem discontinued this am, will continue                   cardizem po, telemetry monitoring          Type 2 diabetes mellitus with hyperlipidemia    --SSI and accuchecks  --diabetic  diet          COPD with acute exacerbation    --WBC 23K, SOB at rest, unable to complete sentence d/t SOB  --CXR: Chronic right middle lobe atelectasis.  --CT chest pending  --supplemental oxygen to maintain sats  --IV avelox  --continue home roflumilast  --duonebs  --continue home albuterol inhaler, symbicort, mucinex, xyzal, and singulair    --12/22: WBC improving    -12/23/17: 13K, 13k on yesterday, currently on Prednisone   -12/24/17: slight SOB with talking, no SOB at rest. WBC 12, afebrile,                   Will continue current plan of care  -12/25/17: improvement in SOB with talking, WBC 10, afebrile            Essential (primary) hypertension    --continue home lisinopril  --cardiac diet    12/23/17: stable  12/24/17: stable  12/25/17: BP controlled, continue current plan of care        Anxiety    --continue home xanax  --provide calm environment with low lighting    --12/23/17: anxious this am, improved with xanax          VTE Risk Mitigation         Ordered     rivaroxaban tablet 20 mg  With dinner     Route:  Oral        12/21/17 1929     Place sequential compression device  Until discontinued      12/21/17 1929     Reason for No Pharmacological VTE Prophylaxis  Once      12/21/17 1929     Medium Risk of VTE  Once      12/21/17 1929              Sia Gardner, YAS, ACNP-Bc, CCRN  Department of Hospital Medicine   [facility]

## 2017-12-25 NOTE — ASSESSMENT & PLAN NOTE
--WBC 23K, SOB at rest, unable to complete sentence d/t SOB  --CXR: Chronic right middle lobe atelectasis.  --CT chest pending  --supplemental oxygen to maintain sats  --IV avelox  --continue home roflumilast  --duonebs  --continue home albuterol inhaler, symbicort, mucinex, xyzal, and singulair    --12/22: WBC improving    -12/23/17: 13K, 13k on yesterday, currently on Prednisone   -12/24/17: slight SOB with talking, no SOB at rest. WBC 12, afebrile,                   Will continue current plan of care  -12/25/17: improvement in SOB with talking, WBC 10, afebrile

## 2017-12-25 NOTE — PLAN OF CARE
"Problem: Patient Care Overview  Goal: Plan of Care Review  Outcome: Ongoing (interventions implemented as appropriate)  Pt remains free of falls and free of injury thus far this shift. Pt in good spirits this morning - d/t today being her birthday & [place]. Pt verbalizes that she realizes that she will never be completely better and will always have breathing problems. Blood glucose checked as directed - discussed factors such as prednisone that makes blood glucose increase. Pt indicates that she will have many visitors and many visitors today from family, and that she will be "worn out by this afternoon."  Bed in low position, side rails up x2, call light in reach.       "

## 2017-12-26 VITALS
TEMPERATURE: 98 F | SYSTOLIC BLOOD PRESSURE: 151 MMHG | RESPIRATION RATE: 20 BRPM | WEIGHT: 192.25 LBS | HEART RATE: 92 BPM | HEIGHT: 62 IN | DIASTOLIC BLOOD PRESSURE: 66 MMHG | OXYGEN SATURATION: 94 % | BODY MASS INDEX: 35.38 KG/M2

## 2017-12-26 PROBLEM — J96.21 ACUTE ON CHRONIC RESPIRATORY FAILURE WITH HYPOXIA AND HYPERCAPNIA: Status: RESOLVED | Noted: 2017-05-17 | Resolved: 2017-12-26

## 2017-12-26 PROBLEM — J96.22 ACUTE ON CHRONIC RESPIRATORY FAILURE WITH HYPOXIA AND HYPERCAPNIA: Status: RESOLVED | Noted: 2017-05-17 | Resolved: 2017-12-26

## 2017-12-26 LAB
ANION GAP SERPL CALC-SCNC: 10 MMOL/L
BACTERIA BLD CULT: NORMAL
BACTERIA BLD CULT: NORMAL
BASOPHILS # BLD AUTO: 0.03 K/UL
BASOPHILS NFR BLD: 0.3 %
BUN SERPL-MCNC: 14 MG/DL
CALCIUM SERPL-MCNC: 8.5 MG/DL
CHLORIDE SERPL-SCNC: 104 MMOL/L
CO2 SERPL-SCNC: 29 MMOL/L
CREAT SERPL-MCNC: 0.8 MG/DL
DIFFERENTIAL METHOD: ABNORMAL
EOSINOPHIL # BLD AUTO: 0.1 K/UL
EOSINOPHIL NFR BLD: 0.6 %
ERYTHROCYTE [DISTWIDTH] IN BLOOD BY AUTOMATED COUNT: 16.4 %
EST. GFR  (AFRICAN AMERICAN): >60 ML/MIN/1.73 M^2
EST. GFR  (NON AFRICAN AMERICAN): >60 ML/MIN/1.73 M^2
GLUCOSE SERPL-MCNC: 243 MG/DL
HCT VFR BLD AUTO: 30.7 %
HGB BLD-MCNC: 9.2 G/DL
LYMPHOCYTES # BLD AUTO: 1.9 K/UL
LYMPHOCYTES NFR BLD: 17.8 %
MAGNESIUM SERPL-MCNC: 2.3 MG/DL
MCH RBC QN AUTO: 26.7 PG
MCHC RBC AUTO-ENTMCNC: 30 G/DL
MCV RBC AUTO: 89 FL
MONOCYTES # BLD AUTO: 0.7 K/UL
MONOCYTES NFR BLD: 6.1 %
NEUTROPHILS # BLD AUTO: 8.1 K/UL
NEUTROPHILS NFR BLD: 79 %
PHOSPHATE SERPL-MCNC: 2.3 MG/DL
PLATELET # BLD AUTO: 281 K/UL
PMV BLD AUTO: 8.3 FL
POCT GLUCOSE: 234 MG/DL (ref 70–110)
POCT GLUCOSE: 239 MG/DL (ref 70–110)
POTASSIUM SERPL-SCNC: 3.2 MMOL/L
RBC # BLD AUTO: 3.45 M/UL
SODIUM SERPL-SCNC: 143 MMOL/L
WBC # BLD AUTO: 10.75 K/UL

## 2017-12-26 PROCEDURE — 27000221 HC OXYGEN, UP TO 24 HOURS

## 2017-12-26 PROCEDURE — 80048 BASIC METABOLIC PNL TOTAL CA: CPT

## 2017-12-26 PROCEDURE — 96372 THER/PROPH/DIAG INJ SC/IM: CPT

## 2017-12-26 PROCEDURE — 85025 COMPLETE CBC W/AUTO DIFF WBC: CPT

## 2017-12-26 PROCEDURE — 25000242 PHARM REV CODE 250 ALT 637 W/ HCPCS: Performed by: EMERGENCY MEDICINE

## 2017-12-26 PROCEDURE — 25000003 PHARM REV CODE 250: Performed by: NURSE PRACTITIONER

## 2017-12-26 PROCEDURE — A4216 STERILE WATER/SALINE, 10 ML: HCPCS | Performed by: NURSE PRACTITIONER

## 2017-12-26 PROCEDURE — 84100 ASSAY OF PHOSPHORUS: CPT

## 2017-12-26 PROCEDURE — 63600175 PHARM REV CODE 636 W HCPCS: Performed by: NURSE PRACTITIONER

## 2017-12-26 PROCEDURE — 25000242 PHARM REV CODE 250 ALT 637 W/ HCPCS: Performed by: NURSE PRACTITIONER

## 2017-12-26 PROCEDURE — 83735 ASSAY OF MAGNESIUM: CPT

## 2017-12-26 PROCEDURE — 94640 AIRWAY INHALATION TREATMENT: CPT

## 2017-12-26 PROCEDURE — 36415 COLL VENOUS BLD VENIPUNCTURE: CPT

## 2017-12-26 PROCEDURE — 25000003 PHARM REV CODE 250: Performed by: INTERNAL MEDICINE

## 2017-12-26 RX ORDER — IPRATROPIUM BROMIDE AND ALBUTEROL SULFATE 2.5; .5 MG/3ML; MG/3ML
3 SOLUTION RESPIRATORY (INHALATION)
Qty: 1 BOX | Refills: 0 | Status: SHIPPED | OUTPATIENT
Start: 2017-12-26 | End: 2018-06-18 | Stop reason: SDUPTHER

## 2017-12-26 RX ORDER — DILTIAZEM HYDROCHLORIDE 90 MG/1
90 TABLET, FILM COATED ORAL EVERY 8 HOURS
Qty: 90 TABLET | Refills: 11 | Status: SHIPPED | OUTPATIENT
Start: 2017-12-26 | End: 2018-12-26

## 2017-12-26 RX ORDER — ALBUTEROL SULFATE 2.5 MG/.5ML
2.5 SOLUTION RESPIRATORY (INHALATION) EVERY 6 HOURS PRN
Qty: 1 EACH | Refills: 3 | Status: SHIPPED | OUTPATIENT
Start: 2017-12-26 | End: 2018-06-18 | Stop reason: SDUPTHER

## 2017-12-26 RX ORDER — MOXIFLOXACIN HYDROCHLORIDE 400 MG/1
400 TABLET ORAL DAILY
Qty: 5 TABLET | Refills: 0 | Status: SHIPPED | OUTPATIENT
Start: 2017-12-26 | End: 2017-12-31

## 2017-12-26 RX ADMIN — ROFLUMILAST 500 MCG: 500 TABLET ORAL at 09:12

## 2017-12-26 RX ADMIN — DILTIAZEM HYDROCHLORIDE 90 MG: 30 TABLET, FILM COATED ORAL at 06:12

## 2017-12-26 RX ADMIN — BUDESONIDE 0.5 MG: 0.5 SUSPENSION RESPIRATORY (INHALATION) at 08:12

## 2017-12-26 RX ADMIN — INSULIN ASPART 2 UNITS: 100 INJECTION, SOLUTION INTRAVENOUS; SUBCUTANEOUS at 11:12

## 2017-12-26 RX ADMIN — FUROSEMIDE 40 MG: 10 INJECTION, SOLUTION INTRAMUSCULAR; INTRAVENOUS at 09:12

## 2017-12-26 RX ADMIN — FAMOTIDINE 20 MG: 20 TABLET ORAL at 09:12

## 2017-12-26 RX ADMIN — TOBRAMYCIN 1 DROP: 3 SOLUTION OPHTHALMIC at 09:12

## 2017-12-26 RX ADMIN — SODIUM CHLORIDE, PRESERVATIVE FREE 3 ML: 5 INJECTION INTRAVENOUS at 06:12

## 2017-12-26 RX ADMIN — IPRATROPIUM BROMIDE AND ALBUTEROL SULFATE 3 ML: 2.5; .5 SOLUTION RESPIRATORY (INHALATION) at 12:12

## 2017-12-26 RX ADMIN — INSULIN ASPART 2 UNITS: 100 INJECTION, SOLUTION INTRAVENOUS; SUBCUTANEOUS at 06:12

## 2017-12-26 RX ADMIN — LISINOPRIL 5 MG: 5 TABLET ORAL at 09:12

## 2017-12-26 RX ADMIN — ASPIRIN 81 MG: 81 TABLET, COATED ORAL at 09:12

## 2017-12-26 RX ADMIN — TOBRAMYCIN 1 DROP: 3 SOLUTION OPHTHALMIC at 06:12

## 2017-12-26 RX ADMIN — ALPRAZOLAM 0.5 MG: 0.5 TABLET ORAL at 02:12

## 2017-12-26 RX ADMIN — TOBRAMYCIN 1 DROP: 3 SOLUTION OPHTHALMIC at 02:12

## 2017-12-26 RX ADMIN — ACETAMINOPHEN 650 MG: 325 TABLET ORAL at 02:12

## 2017-12-26 RX ADMIN — ONDANSETRON 8 MG: 8 TABLET, ORALLY DISINTEGRATING ORAL at 06:12

## 2017-12-26 RX ADMIN — CETIRIZINE HYDROCHLORIDE 10 MG: 10 TABLET, FILM COATED ORAL at 09:12

## 2017-12-26 RX ADMIN — GUAIFENESIN 1200 MG: 600 TABLET, EXTENDED RELEASE ORAL at 09:12

## 2017-12-26 RX ADMIN — ARFORMOTEROL TARTRATE 15 MCG: 15 SOLUTION RESPIRATORY (INHALATION) at 08:12

## 2017-12-26 RX ADMIN — IPRATROPIUM BROMIDE AND ALBUTEROL SULFATE 3 ML: 2.5; .5 SOLUTION RESPIRATORY (INHALATION) at 01:12

## 2017-12-26 RX ADMIN — PREDNISONE 40 MG: 20 TABLET ORAL at 09:12

## 2017-12-26 RX ADMIN — IPRATROPIUM BROMIDE AND ALBUTEROL SULFATE 3 ML: 2.5; .5 SOLUTION RESPIRATORY (INHALATION) at 08:12

## 2017-12-26 NOTE — PROGRESS NOTES
[facility]  Cardiology  Progress Note    Patient Name: Petrona Gonzalez  MRN: [id number]  Admission Date: 12/21/2017  Hospital Length of Stay: 4 days  Code Status: Full Code   Attending Physician: Shravan De MD   Primary Care Physician: Provider Notinsystem  Expected Discharge Date:   Principal Problem:Acute on chronic respiratory failure with hypoxia and hypercapnia    Subjective:     Hospital Course:   12-24 Pt without c/o on IV cardizem IV with controlled rate    12-25 respiratory status improved, tolerating po cardizem, afib with controlled rate    Interval History: afib    Review of Systems   Constitution: Negative. Negative for weight gain.   HENT: Negative.    Eyes: Negative.    Cardiovascular: Negative.  Negative for chest pain, leg swelling and palpitations.   Respiratory: Negative.  Negative for shortness of breath.    Endocrine: Negative.    Hematologic/Lymphatic: Negative.    Skin: Negative.    Musculoskeletal: Negative for muscle weakness.   Gastrointestinal: Negative.    Genitourinary: Negative.    Neurological: Negative.  Negative for dizziness.   Psychiatric/Behavioral: Negative.    Allergic/Immunologic: Negative.      Objective:     Vital Signs (Most Recent):  Temp: 98.1 °F (36.7 °C) (12/25/17 1951)  Pulse: 93 (12/25/17 1951)  Resp: 20 (12/25/17 1951)  BP: (!) 149/82 (12/25/17 1951)  SpO2: 95 % (12/25/17 1951) Vital Signs (24h Range):  Temp:  [97.8 °F (36.6 °C)-98.4 °F (36.9 °C)] 98.1 °F (36.7 °C)  Pulse:  [] 93  Resp:  [18-22] 20  SpO2:  [92 %-100 %] 95 %  BP: (132-149)/(58-82) 149/82     Weight: 89.9 kg (198 lb 3.1 oz)  Body mass index is 36.25 kg/m².     SpO2: 95 %  O2 Device (Oxygen Therapy): nasal cannula w/ humidification      Intake/Output Summary (Last 24 hours) at 12/25/17 2128  Last data filed at 12/25/17 1800   Gross per 24 hour   Intake             2458 ml   Output             3000 ml   Net             -542 ml       Lines/Drains/Airways     Peripheral  Intravenous Line                 Peripheral IV - Single Lumen 12/21/17 1546 Right Forearm 4 days                Physical Exam   Constitutional: She is oriented to person, place, and time. She appears well-developed and well-nourished.   HENT:   Head: Normocephalic and atraumatic.   Eyes: Conjunctivae and EOM are normal. Pupils are equal, round, and reactive to light.   Neck: Normal range of motion. Neck supple.   Cardiovascular: Normal rate, regular rhythm, normal heart sounds and intact distal pulses.    Pulmonary/Chest: Effort normal and breath sounds normal.   Abdominal: Soft. Bowel sounds are normal.   Musculoskeletal: Normal range of motion.   Neurological: She is alert and oriented to person, place, and time.   Skin: Skin is warm and dry.   Psychiatric: She has a normal mood and affect.   Nursing note and vitals reviewed.      Significant Labs: All pertinent lab results from the last 24 hours have been reviewed.    Significant Imaging: X-Ray: CXR: X-Ray Chest 1 View (CXR): No results found for this visit on 12/21/17.    Assessment and Plan:     Brief HPI: afib    Atrial fibrillation with RVR    12-24 Increase po cardizem, wean IV cardizem off, continue xarelto    12-25 iv cardizem dc, tolerating po            VTE Risk Mitigation         Ordered     rivaroxaban tablet 20 mg  With dinner     Route:  Oral        12/21/17 1929     Place sequential compression device  Until discontinued      12/21/17 1929     Reason for No Pharmacological VTE Prophylaxis  Once      12/21/17 1929     Medium Risk of VTE  Once      12/21/17 1929          Sky Houston MD  Cardiology  [facility]

## 2017-12-26 NOTE — PLAN OF CARE
Problem: Patient Care Overview  Goal: Plan of Care Review  Outcome: Ongoing (interventions implemented as appropriate)  No problems or issues over night. O2 @ 3 liters nasal cannula. Dyspnea on exertion. A fib noted on monitor. No falls or injury, safety maintained. Call bell placed within reach, bed low, will monitor.

## 2017-12-26 NOTE — PLAN OF CARE
Contacted [facility], informed them that patient will be returning to them later this afternoon. Will fax orders once laced in Epic. # for report is [phone] , primary nurse is to ask for Félix. Transportation to be arranged after dc orders are reviewed.

## 2017-12-26 NOTE — NURSING
Patient discharged per MD order. Discharge instructions given to patient. Patient verbalizes understanding and has no questions at this time. IV discontinued, telemetry removed and returned to monitor tech. Patient awaiting transport back to [facility].

## 2017-12-26 NOTE — ASSESSMENT & PLAN NOTE
--supplemental oxygen to maintain sats  --duo nebs  --continue home inhalers    --12/22: improving    12/23/17: improving, patient refusing BIPAP    12/25/17: improving, patient reports improvement with breathing,  12/26/17 resolved

## 2017-12-26 NOTE — PLAN OF CARE
Discharge orders faxed to [facility] via [facility]    @ [phone] spoke with Linda Grider Admissions Director at [facility]. Transportation has been arranged for 230. Update to Ramandeep primary nurse.

## 2017-12-26 NOTE — ASSESSMENT & PLAN NOTE
--WBC 23K, SOB at rest, unable to complete sentence d/t SOB  --CXR: Chronic right middle lobe atelectasis.  --CT chest pending  --supplemental oxygen to maintain sats  --IV avelox  --continue home roflumilast  --duonebs  --continue home albuterol inhaler, symbicort, mucinex, xyzal, and singulair    --12/22: WBC improving    -12/23/17: 13K, 13k on yesterday, currently on Prednisone   -12/24/17: slight SOB with talking, no SOB at rest. WBC 12, afebrile,                   Will continue current plan of care  -12/25/17: improvement in SOB with talking, WBC 10, afebrile  -12/26/17: Improvement in SOB, no SOB with talking

## 2017-12-26 NOTE — ASSESSMENT & PLAN NOTE
--continue home lisinopril  --cardiac diet    12/23/17: stable  12/24/17: stable  12/25/17: BP controlled, continue current plan of care  12/16/17: BP stable

## 2017-12-26 NOTE — ASSESSMENT & PLAN NOTE
12-24 Increase po cardizem, wean IV cardizem off, continue xarelto    12-25 iv cardizem dc, tolerating po

## 2017-12-26 NOTE — ASSESSMENT & PLAN NOTE
--2D echo 06/2017 showed EF 45%  --2D echo pending  --IV furosemide  --continue home zaroxolyn, lisinopril    12/23/17: improving  12/24/17: improving  12/26/17: asympotmatic, Echo on 12/22/17 normal

## 2017-12-26 NOTE — DISCHARGE SUMMARY
[facility] Hospital Medicine  Discharge Summary      Patient Name: Petrona Gonzalez  MRN: [id number]  Admission Date: 12/21/2017  Hospital Length of Stay: 5 days  Discharge Date and Time:  12/26/2017 12:11 PM  Attending Physician: Shravan De MD   Discharging Provider: Sia Gardner DNP, ACNP-Bc, CCRN  Primary Care Provider: Provider Notinsystem      HPI:   77 y/o female with PMHx of COPD, Dm, HTN, and HLD presented to the ED with c/o SOB that onset gradually yesterday. She reports she started with sinus congestion 2 weeks ago that included sore throat, cough with green sputum (thick), and acid reflux. Symptoms are constant and moderate, exacerbated or mitigated by nothing. Pt denies N/V, diarrhea, fever, chills, night sweats, Chest pain, abd pain, and all other symptoms at present. She saw the NP at [facility] who prescribed zithromax 2 days ago. Patient also takes prednisone daily X 7 years. She reports her breathing got gradually worse and did not improve with nebulizer tx and eventually she was sent to the Ed via [facility] who reports when they arrived on scene her O2 saturation was 67 and was pale in appearance. [facility] reports pt is on 3 L of O2 all the time at the nursing home and she is on BiPAP at night, 12/6. [facility] reports giving pt duoneb, albuterol, and 125 of solumedrol. She was placed on biPap. ED workup includes WBC 23K, Lactate 2.8, PCO2 57.8 urine with neg nitrates, neg leukocytes. CXR: Stable chest x-ray.  Chronic right middle lobe atelectasis. EKG Rate: 104 BPM Rhythm: AFIB with RVR Interpretation: L axis deviation. Septal infarct. No STEMI.    She is a full code and said her sister, Bonnie Carmichael or her son, Jose Nguyễn could make any medical decisions for her.            Hospital Course:   Patient was admitted to telemetry for COPD exacerbation and acute on chronic CHF under the care of Hospital Medicine.She has a past medical history significant for COPD-emphysema and O2  dependent (3LNC), type II diabetes, recurrent pneumonia, anxiety, hypertension, depression, Afib with RVR. Concerning her COPD she was provided IV Avelox and po Moxifloxacin, breathing treatments, inhalers, xanax and home steroid resume. Concerning her CHF she was provided IV lasix, home meds resumed. Echo on 12/22/17 revealedConcentric hypertrophy, no wall motion abnormalities, normal left ventricular systolic function (EF 60-65%), normal right ventricular systolic function . Patient slowly improved with improvement in SOB, improvement in BBS without wheezing and decreased peripheral edema. Pateint able to speak without SOB. During this hospitalization the patient went into Afib with RVR, cardizem gtt initiated and converted to po cadizem, cardiology assisted in plan of care. Patient now tolerating po cardizem. Vital signs stable. Patient seen and examined today ans was determined stable for discharge. Patient will follow-up with PCP in 3 days, Pulmonolgy x 1 week and cardiology x 1 week. Patient current resident of [facility], will be discharged back to [facility].       Consults:   Consults         Status Ordering Provider     Inpatient consult to Cardiology  Once     Provider:  Sky Houston MD    Completed BRIANNA VAZQUEZ     Inpatient consult to Hospitalist  Once     Provider:  (Not yet assigned)    Acknowledged RENEE CARDONA     Inpatient consult to Respiratory Care  Once     Provider:  (Not yet assigned)    Acknowledged FRANSISCO HOLLOWAY     Inpatient consult to Social Work  Once     Provider:  (Not yet assigned)    Completed BRIANNA VAZQUEZ          * Acute on chronic respiratory failure with hypoxia and hypercapnia-resolved as of 12/26/2017    --supplemental oxygen to maintain sats  --duo nebs  --continue home inhalers    --12/22: improving    12/23/17: improving, patient refusing BIPAP    12/25/17: improving, patient reports improvement with breathing,  12/26/17 resolved         CHF, acute on chronic    --2D echo 06/2017 showed EF 45%  --2D echo pending  --IV furosemide  --continue home zaroxolyn, lisinopril    12/23/17: improving  12/24/17: improving  12/26/17: asympotmatic, Echo on 12/22/17 normal            Atrial fibrillation with RVR    --continue home xarelto  --IV cardizem    12/23/17: HR increasing to 160's, cardizem gtt increased to 10mg/hr  12/24/17: HR controlled, decreased cardizem gtt to 5mg/hr. Continue po cardizem per cardiology.   12/25/17: HR 60's-80's overnight with cardizem gtt 5mg/hr, cardizem discontinued this am, will continue                   cardizem po, telemetry monitoring  12/26/17: HR stable on po cardizem, continue to monitor          Type 2 diabetes mellitus with hyperlipidemia    --SSI and accuchecks  --diabetic diet          COPD with acute exacerbation    --WBC 23K, SOB at rest, unable to complete sentence d/t SOB  --CXR: Chronic right middle lobe atelectasis.  --CT chest pending  --supplemental oxygen to maintain sats  --IV avelox  --continue home roflumilast  --duonebs  --continue home albuterol inhaler, symbicort, mucinex, xyzal, and singulair    --12/22: WBC improving    -12/23/17: 13K, 13k on yesterday, currently on Prednisone   -12/24/17: slight SOB with talking, no SOB at rest. WBC 12, afebrile,                   Will continue current plan of care  -12/25/17: improvement in SOB with talking, WBC 10, afebrile  -12/26/17: Improvement in SOB, no SOB with talking            Essential (primary) hypertension    --continue home lisinopril  --cardiac diet    12/23/17: stable  12/24/17: stable  12/25/17: BP controlled, continue current plan of care  12/16/17: BP stable        Anxiety    --continue home xanax  --provide calm environment with low lighting    --12/23/17: anxious this am, improved with xanax  --12/26/17: anxiety improved with xanax          Final Active Diagnoses:    Diagnosis Date Noted POA    Atrial fibrillation with RVR [I48.91]  06/14/2017 Yes    CHF, acute on chronic [I50.9] 06/14/2017 Yes    Type 2 diabetes mellitus with hyperlipidemia [E11.69, E78.5] 02/13/2017 Yes    Anxiety [F41.9] 04/16/2014 Yes    Essential (primary) hypertension [I10] 02/05/2013 Yes    COPD with acute exacerbation [J44.1] 02/05/2013 Yes      Problems Resolved During this Admission:    Diagnosis Date Noted Date Resolved POA    PRINCIPAL PROBLEM:  Acute on chronic respiratory failure with hypoxia and hypercapnia [J96.21, J96.22] 05/17/2017 12/26/2017 Yes    Leukocytosis [D72.829] 02/13/2017 12/25/2017 Yes       Discharged Condition: stable    Disposition: Skilled Nursing Facility    Follow Up:  Follow-up Information     [facility].    Why:  Return to [facility] today  Contact information:  [address]  [facility] [address]           Provider Notinsystem In 3 days.           Coy Taylor MD In 1 week.    Specialty:  Pulmonary Disease  Contact information:  [address]  [place] [address]  [phone]             Sky Houston MD In 1 week.    Specialties:  Cardiology, INTERVENTIONAL CARDIOLOGY  Contact information:  [address] [id number] [phone]                 Patient Instructions:     Diet Adult Regular   Scheduling Instructions: cardiac/diabetic     Activity as tolerated     Notify your health care provider if you experience any of the following:  difficulty breathing or increased cough         Significant Diagnostic Studies: Labs:   BMP:   Recent Labs  Lab 12/25/17 0407 12/26/17  0527   * 243*    143   K 3.5 3.2*    104   CO2 30* 29   BUN 14 14   CREATININE 0.7 0.8   CALCIUM 8.6* 8.5*   MG 2.3 2.3   , CMP   Recent Labs  Lab 12/25/17  0407 12/26/17  0527    143   K 3.5 3.2*    104   CO2 30* 29   * 243*   BUN 14 14   CREATININE 0.7 0.8   CALCIUM 8.6* 8.5*   ANIONGAP 10 10   ESTGFRAFRICA >60 >60   EGFRNONAA >60 >60   , CBC   Recent  Labs  Lab 12/25/17  0407 12/26/17  0527   WBC 10.51 10.75   HGB 9.7* 9.2*   HCT 32.2* 30.7*    281   , INR   Lab Results   Component Value Date    INR 1.0 12/21/2017    INR 1.0 06/14/2017    INR 1.0 05/17/2017   , Lipid Panel   Lab Results   Component Value Date    CHOL 155 01/17/2017    HDL 51 01/17/2017    LDLCALC 92.2 01/17/2017    TRIG 59 01/17/2017    CHOLHDL 32.9 01/17/2017   , Troponin   Recent Labs  Lab 12/22/17  0024   TROPONINI 0.018    and A1C:   Recent Labs  Lab 12/21/17  1950   HGBA1C 7.3*       Pending Diagnostic Studies:     None         Medications:  Reconciled Home Medications:   Current Discharge Medication List      START taking these medications    Details   albuterol sulfate 2.5 mg/0.5 mL Nebu Take 2.5 mg by nebulization every 6 (six) hours as needed (wheezing). Rescue  Qty: 1 each, Refills: 3      diltiaZEM (CARDIZEM) 90 MG tablet Take 1 tablet (90 mg total) by mouth every 8 (eight) hours.  Qty: 90 tablet, Refills: 11      moxifloxacin (AVELOX) 400 mg tablet Take 1 tablet (400 mg total) by mouth once daily.  Qty: 5 tablet, Refills: 0         CONTINUE these medications which have CHANGED    Details   albuterol-ipratropium 2.5mg-0.5mg/3mL (DUO-NEB) 0.5 mg-3 mg(2.5 mg base)/3 mL nebulizer solution Take 3 mLs by nebulization every 6 (six) hours while awake. Rescue  Qty: 1 Box, Refills: 0         CONTINUE these medications which have NOT CHANGED    Details   alprazolam (XANAX) 0.5 MG tablet Take 0.5 mg by mouth every 6 (six) hours as needed for Anxiety.      aspirin (ECOTRIN) 81 MG EC tablet Take 81 mg by mouth once daily.      budesonide-formoterol 160-4.5 mcg (SYMBICORT) 160-4.5 mcg/actuation HFAA Inhale 2 puffs into the lungs every 12 (twelve) hours. Controller  Qty: 1 Inhaler, Refills: 11      dextromethorphan-guaifenesin  mg (MUCINEX DM)  mg per 12 hr tablet Take 1 tablet by mouth.      furosemide (LASIX) 40 MG tablet Take 40 mg by mouth 2 (two) times daily.      guaiFENesin  (MUCINEX) 600 mg 12 hr tablet Take 1,200 mg by mouth 2 (two) times daily.      levocetirizine (XYZAL) 5 MG tablet Take 5 mg by mouth every evening.      lisinopril (PRINIVIL,ZESTRIL) 5 MG tablet Take 1 tablet (5 mg total) by mouth once daily.  Qty: 30 tablet, Refills: 5    Associated Diagnoses: Type 2 diabetes mellitus with diabetic neuropathy, without long-term current use of insulin; Essential hypertension      metformin (GLUCOPHAGE) 500 MG tablet Take 1 tablet (500 mg total) by mouth 2 (two) times daily with meals.  Qty: 60 tablet, Refills: 5    Associated Diagnoses: Type 2 diabetes mellitus with diabetic neuropathy, without long-term current use of insulin      montelukast (SINGULAIR) 10 mg tablet Take 10 mg by mouth every evening.      predniSONE (DELTASONE) 5 MG tablet Take 40 mg by mouth once daily.       rivaroxaban (XARELTO) 20 mg Tab Take 20 mg by mouth daily with dinner or evening meal.      roflumilast 500 mcg Tab Take 1 tablet (500 mcg total) by mouth once daily.  Qty: 30 tablet, Refills: 1      simvastatin (ZOCOR) 10 MG tablet Take 1 tablet (10 mg total) by mouth every evening.  Qty: 30 tablet, Refills: 11    Associated Diagnoses: Hyperlipidemia, unspecified hyperlipidemia type      tobramycin sulfate 0.3% (TOBREX) 0.3 % ophthalmic solution 1 drop every 4 (four) hours.      trazodone (DESYREL) 50 MG tablet Take 50 mg by mouth nightly as needed for Insomnia.      metOLazone (ZAROXOLYN) 5 MG tablet Take 1 tablet (5 mg total) by mouth once daily.  Qty: 5 tablet, Refills: 0    Associated Diagnoses: COPD, severe; Acute on chronic respiratory failure with hypoxia and hypercapnia; Hypoxemia requiring supplemental oxygen      OXYGEN-AIR DELIVERY SYSTEMS MISC by Misc.(Non-Drug; Combo Route) route.      umeclidinium (INCRUSE ELLIPTA) 62.5 mcg/actuation DsDv Inhale into the lungs once daily. Controller         STOP taking these medications       albuterol 90 mcg/actuation inhaler Comments:   Reason for Stopping:          azithromycin (Z-ERIK) 250 MG tablet Comments:   Reason for Stopping:         diltiaZEM (CARDIZEM CD) 120 MG Cp24 Comments:   Reason for Stopping:               Indwelling Lines/Drains at time of discharge:   Lines/Drains/Airways          No matching active lines, drains, or airways          Time spent on the discharge of patient: 40 minutes  Patient was seen and examined on the date of discharge and determined to be suitable for discharge.         Sia Gardner DNP, ACNP-Bc, CCRN  Department of Hospital Medicine  [facility]

## 2017-12-26 NOTE — ASSESSMENT & PLAN NOTE
--continue home xarelto  --IV cardizem    12/23/17: HR increasing to 160's, cardizem gtt increased to 10mg/hr  12/24/17: HR controlled, decreased cardizem gtt to 5mg/hr. Continue po cardizem per cardiology.   12/25/17: HR 60's-80's overnight with cardizem gtt 5mg/hr, cardizem discontinued this am, will continue                   cardizem po, telemetry monitoring  12/26/17: HR stable on po cardizem, continue to monitor

## 2017-12-26 NOTE — PLAN OF CARE
Problem: Patient Care Overview  Goal: Plan of Care Review  Patient on oxygen tolerating well no distress

## 2017-12-26 NOTE — SUBJECTIVE & OBJECTIVE
Interval History: afib    Review of Systems   Constitution: Negative. Negative for weight gain.   HENT: Negative.    Eyes: Negative.    Cardiovascular: Negative.  Negative for chest pain, leg swelling and palpitations.   Respiratory: Negative.  Negative for shortness of breath.    Endocrine: Negative.    Hematologic/Lymphatic: Negative.    Skin: Negative.    Musculoskeletal: Negative for muscle weakness.   Gastrointestinal: Negative.    Genitourinary: Negative.    Neurological: Negative.  Negative for dizziness.   Psychiatric/Behavioral: Negative.    Allergic/Immunologic: Negative.      Objective:     Vital Signs (Most Recent):  Temp: 98.1 °F (36.7 °C) (12/25/17 1951)  Pulse: 93 (12/25/17 1951)  Resp: 20 (12/25/17 1951)  BP: (!) 149/82 (12/25/17 1951)  SpO2: 95 % (12/25/17 1951) Vital Signs (24h Range):  Temp:  [97.8 °F (36.6 °C)-98.4 °F (36.9 °C)] 98.1 °F (36.7 °C)  Pulse:  [] 93  Resp:  [18-22] 20  SpO2:  [92 %-100 %] 95 %  BP: (132-149)/(58-82) 149/82     Weight: 89.9 kg (198 lb 3.1 oz)  Body mass index is 36.25 kg/m².     SpO2: 95 %  O2 Device (Oxygen Therapy): nasal cannula w/ humidification      Intake/Output Summary (Last 24 hours) at 12/25/17 2128  Last data filed at 12/25/17 1800   Gross per 24 hour   Intake             2458 ml   Output             3000 ml   Net             -542 ml       Lines/Drains/Airways     Peripheral Intravenous Line                 Peripheral IV - Single Lumen 12/21/17 1546 Right Forearm 4 days                Physical Exam   Constitutional: She is oriented to person, place, and time. She appears well-developed and well-nourished.   HENT:   Head: Normocephalic and atraumatic.   Eyes: Conjunctivae and EOM are normal. Pupils are equal, round, and reactive to light.   Neck: Normal range of motion. Neck supple.   Cardiovascular: Normal rate, regular rhythm, normal heart sounds and intact distal pulses.    Pulmonary/Chest: Effort normal and breath sounds normal.   Abdominal: Soft.  Bowel sounds are normal.   Musculoskeletal: Normal range of motion.   Neurological: She is alert and oriented to person, place, and time.   Skin: Skin is warm and dry.   Psychiatric: She has a normal mood and affect.   Nursing note and vitals reviewed.      Significant Labs: All pertinent lab results from the last 24 hours have been reviewed.    Significant Imaging: X-Ray: CXR: X-Ray Chest 1 View (CXR): No results found for this visit on 12/21/17.

## 2017-12-26 NOTE — ASSESSMENT & PLAN NOTE
--continue home xanax  --provide calm environment with low lighting    --12/23/17: anxious this am, improved with xanax  --12/26/17: anxiety improved with xanax

## 2017-12-27 ENCOUNTER — PATIENT OUTREACH (OUTPATIENT)
Dept: ADMINISTRATIVE | Facility: CLINIC | Age: 77
End: 2017-12-27

## 2017-12-27 LAB
BACTERIA SPEC AEROBE CULT: NORMAL
GRAM STN SPEC: NORMAL

## 2017-12-27 NOTE — PLAN OF CARE
12/27/17 0913   Final Note   Assessment Type Final Discharge Note   Discharge Disposition Intermedia  ([facility])

## 2017-12-28 NOTE — PHYSICIAN QUERY
"PT Name: Petrona Gonzalez  MR #: [id number]    Physician Query Form - Heart  Condition Clarification     CDS/: Barbara Mata               Contact information:[email or web address]  This form is a permanent document in the medical record.     Query Date: December 28, 2017    By submitting this query, we are merely seeking further clarification of documentation. Please utilize your independent clinical judgment when addressing the question(s) below.    The medical record contains the following   Indicators     Supporting Clinical Findings Location in Medical Record   x BNP BNP = 57 Labs 12/21     x EF EF 60-65%   Echo results 12/22   x Radiology findings Stable chest x-ray.  Chronic right middle lobe atelectasis   CXR 12/21   x Echo Results  1 - Concentric hypertrophy.     2 - No wall motion abnormalities.     3 - Normal left ventricular systolic function (EF 60-65%).     4 - Normal right ventricular systolic function .  Echo results 12/22    "Ascites" documented     x "SOB" or "REES" documented presented to the ED with c/o SOB that onset gradually yesterday   H&P 12/21   x "Hypoxia" documented Acute on chronic respiratory failure with hypoxia and hypercapnia   H&P 12/21   x Heart Failure documented CHF, acute on chronic    --2D echo 06/2017 showed EF 45%  --2D echo pending  --IV furosemide  --continue home zaroxolyn, lisinopril H&P 12/21   x "Edema" documented She exhibits edema (2+ BLE).    H&P 12/21   x Diuretics/Meds Lasix IV MAR 12/22-12/26    Treatment:      Other:          Provider, please specify diagnosis or diagnoses associated with above clinical findings.    [  ] Acute Diastolic Heart Failure ( EF > 40)*  [  ] Acute on Chronic Diastolic Heart Failure( EF > 40)*  [ X ] Chronic Diastolic Heart Failure (EF > 40)*  [  ] Other Type of Heart Failure (please specify type): _________________________  [  ] Heart Failure Ruled Out  [  ] Other (please specify): ___________________________________  [  ] " Clinically Undetermined            *American Heart Association                                                                                                          Please document in your progress notes daily for the duration of treatment until resolved and include in your discharge summary.

## 2018-01-24 ENCOUNTER — HOSPITAL ENCOUNTER (OUTPATIENT)
Dept: RADIOLOGY | Facility: HOSPITAL | Age: 78
Discharge: HOME | End: 2018-01-24
Attending: INTERNAL MEDICINE
Payer: MEDICARE

## 2018-01-24 ENCOUNTER — OFFICE VISIT (OUTPATIENT)
Dept: PULMONOLOGY | Facility: CLINIC | Age: 78
End: 2018-01-24
Payer: MEDICARE

## 2018-01-24 ENCOUNTER — OFFICE VISIT (OUTPATIENT)
Dept: CARDIOLOGY | Facility: CLINIC | Age: 78
End: 2018-01-24
Payer: MEDICARE

## 2018-01-24 VITALS
DIASTOLIC BLOOD PRESSURE: 74 MMHG | HEART RATE: 63 BPM | SYSTOLIC BLOOD PRESSURE: 130 MMHG | WEIGHT: 185.44 LBS | HEIGHT: 62 IN | BODY MASS INDEX: 34.12 KG/M2 | RESPIRATION RATE: 17 BRPM | OXYGEN SATURATION: 96 %

## 2018-01-24 VITALS
DIASTOLIC BLOOD PRESSURE: 50 MMHG | SYSTOLIC BLOOD PRESSURE: 108 MMHG | BODY MASS INDEX: 34.04 KG/M2 | HEIGHT: 62 IN | HEART RATE: 76 BPM | WEIGHT: 185 LBS

## 2018-01-24 DIAGNOSIS — R07.9 CHEST PAIN, UNSPECIFIED TYPE: Primary | ICD-10-CM

## 2018-01-24 DIAGNOSIS — I10 ESSENTIAL (PRIMARY) HYPERTENSION: ICD-10-CM

## 2018-01-24 DIAGNOSIS — I50.9 ACUTE ON CHRONIC CONGESTIVE HEART FAILURE, UNSPECIFIED CONGESTIVE HEART FAILURE TYPE: Primary | ICD-10-CM

## 2018-01-24 DIAGNOSIS — E11.69 TYPE 2 DIABETES MELLITUS WITH HYPERLIPIDEMIA: ICD-10-CM

## 2018-01-24 DIAGNOSIS — Z99.81 HYPOXEMIA REQUIRING SUPPLEMENTAL OXYGEN: Chronic | ICD-10-CM

## 2018-01-24 DIAGNOSIS — E78.00 PURE HYPERCHOLESTEROLEMIA: ICD-10-CM

## 2018-01-24 DIAGNOSIS — J44.9 STAGE 3 SEVERE COPD BY GOLD CLASSIFICATION: ICD-10-CM

## 2018-01-24 DIAGNOSIS — I48.91 ATRIAL FIBRILLATION WITH RVR: ICD-10-CM

## 2018-01-24 DIAGNOSIS — E78.5 DYSLIPIDEMIA: ICD-10-CM

## 2018-01-24 DIAGNOSIS — J44.9 STEROID-DEPENDENT COPD: ICD-10-CM

## 2018-01-24 DIAGNOSIS — J44.9 COPD, SEVERE: Chronic | ICD-10-CM

## 2018-01-24 DIAGNOSIS — Z92.241 STEROID-DEPENDENT COPD: ICD-10-CM

## 2018-01-24 DIAGNOSIS — J96.11 CHRONIC RESPIRATORY FAILURE WITH HYPOXIA: ICD-10-CM

## 2018-01-24 DIAGNOSIS — R09.02 HYPOXEMIA REQUIRING SUPPLEMENTAL OXYGEN: Chronic | ICD-10-CM

## 2018-01-24 DIAGNOSIS — J96.22 ACUTE ON CHRONIC RESPIRATORY FAILURE WITH HYPOXIA AND HYPERCAPNIA: ICD-10-CM

## 2018-01-24 DIAGNOSIS — E78.5 TYPE 2 DIABETES MELLITUS WITH HYPERLIPIDEMIA: ICD-10-CM

## 2018-01-24 DIAGNOSIS — J96.21 ACUTE ON CHRONIC RESPIRATORY FAILURE WITH HYPOXIA AND HYPERCAPNIA: ICD-10-CM

## 2018-01-24 PROCEDURE — 99214 OFFICE O/P EST MOD 30 MIN: CPT | Mod: S$GLB,,, | Performed by: INTERNAL MEDICINE

## 2018-01-24 PROCEDURE — 99999 PR PBB SHADOW E&M-EST. PATIENT-LVL V: CPT | Mod: PBBFAC,,, | Performed by: INTERNAL MEDICINE

## 2018-01-24 PROCEDURE — 99999 PR PBB SHADOW E&M-EST. PATIENT-LVL II: CPT | Mod: PBBFAC,,, | Performed by: INTERNAL MEDICINE

## 2018-01-24 PROCEDURE — 99499 UNLISTED E&M SERVICE: CPT | Mod: S$GLB,,, | Performed by: INTERNAL MEDICINE

## 2018-01-24 PROCEDURE — 71046 X-RAY EXAM CHEST 2 VIEWS: CPT | Mod: TC

## 2018-01-24 PROCEDURE — 71046 X-RAY EXAM CHEST 2 VIEWS: CPT | Mod: 26,,, | Performed by: RADIOLOGY

## 2018-01-24 NOTE — PROGRESS NOTES
Subjective:       Patient ID: Petrona Gonzalez is a 77 y.o. female.    Chief Complaint: COPD, severe and chronic respiratory failure with hypoxia    Mrs. Petrona Alonsoiesis 76 years old  This a follow-up appointment: was in hospital recently  12/23 with A fib, COPD exacerbation Acute on chronic respiratory failure.  Meds: Oxygen 3.0 LPM  Daughter present  Discharge summary reveiwed    HPI:   75 y/o female with PMHx of COPD, Dm, HTN, and HLD presented to the ED with c/o SOB that onset gradually yesterday. She reports she started with sinus congestion 2 weeks ago that included sore throat, cough with green sputum (thick), and acid reflux. Symptoms are constant and moderate, exacerbated or mitigated by nothing. Pt denies N/V, diarrhea, fever, chills, night sweats, Chest pain, abd pain, and all other symptoms at present. She saw the NP at [facility] who prescribed zithromax 2 days ago. Patient also takes prednisone daily X 7 years. She reports her breathing got gradually worse and did not improve with nebulizer tx and eventually she was sent to the Ed via [facility] who reports when they arrived on scene her O2 saturation was 67 and was pale in appearance. [facility] reports pt is on 3 L of O2 all the time at the nursing home and she is on BiPAP at night, 12/6. [facility] reports giving pt duoneb, albuterol, and 125 of solumedrol. She was placed on biPap. ED workup includes WBC 23K, Lactate 2.8, PCO2 57.8 urine with neg nitrates, neg leukocytes. CXR: Stable chest x-ray.  Chronic right middle lobe atelectasis. EKG Rate: 104 BPM Rhythm: AFIB with RVR Interpretation: L axis deviation. Septal infarct. No STEMI.    She is a full code and said her sister, Bonnie Carmichael or her son, Jose Nguyễn could make any medical decisions for her.  Hospital Course:   Patient was admitted to telemetry for COPD exacerbation and acute on chronic CHF under the care of Hospital Medicine.She has a past medical history significant for COPD-emphysema and O2 dependent  (3LNC), type II diabetes, recurrent pneumonia, anxiety, hypertension, depression, Afib with RVR. Concerning her COPD she was provided IV Avelox and po Moxifloxacin, breathing treatments, inhalers, xanax and home steroid resume. Concerning her CHF she was provided IV lasix, home meds resumed. Echo on 12/22/17 revealedConcentric hypertrophy, no wall motion abnormalities, normal left ventricular systolic function (EF 60-65%), normal right ventricular systolic function . Patient slowly improved with improvement in SOB, improvement in BBS without wheezing and decreased peripheral edema. Pateint able to speak without SOB. During this hospitalization the patient went into Afib with RVR, cardizem gtt initiated and converted to po cadizem, cardiology assisted in plan of care. Patient now tolerating po cardizem. Vital signs stable. Patient seen and examined today ans was determined stable for discharge. Patient will follow-up with PCP in 3 days, Pulmonolgy x 1 week and cardiology x 1 week. Patient current resident of [facility], will be discharged back to [facility].         Severe End stage COPD  FEV1 0.49( 24%)  She is currently at [facility]  COPD group d: high risk more symptoms  Her other maintenance medications remain the same.  Symbicort HFA , INCRUSE,dalirsp  and Ventolin HFA  Is on 3.0 L of oxygen  She has BiPAP at night 12/6  She is on chronic prednisone 30 mg daily, Xanax 0.5 mg for anxiety, trazodone for insomnia,   She has lots of skin bruises: on Xarelto.  Wheel chair bound.   She is most frustrated about easy fatigability, dyspnea at rest.  She feels that her asthma gets triggered by anxiety attacks exposure to strong smells.          Review of Systems   Constitutional: Positive for fatigue.   HENT: Negative.    Eyes: Negative.    Respiratory: Positive for shortness of breath and dyspnea on extertion. Negative for use of rescue inhaler.    Cardiovascular: Negative.    Genitourinary:  "Negative.    Skin: Negative.         Multiple skin bruising on the forearms   Gastrointestinal: Negative.    Neurological: Negative.    Hematological: Excessive bruising.   Psychiatric/Behavioral: Negative.        Objective:       Vitals:    01/24/18 0939   BP: 130/74   Pulse: 63   Resp: 17   SpO2: 96%  Comment: 3.0 LPm   Weight: 84.1 kg (185 lb 6.5 oz)   Height: 5' 2" (1.575 m)     Physical Exam   Constitutional: She is oriented to person, place, and time. She appears well-developed and well-nourished. She is obese.   HENT:   Head: Normocephalic.   Nose: Nose normal.   Mouth/Throat: Oropharynx is clear and moist.   Neck: Normal range of motion. Neck supple.   Cardiovascular: Normal rate and regular rhythm.    No murmur heard.  Pulmonary/Chest: Normal expansion. She has decreased breath sounds.   Abdominal: Soft. Bowel sounds are normal.   Musculoskeletal: Normal range of motion. She exhibits edema.   Lymphadenopathy:     She has no cervical adenopathy.   Neurological: She is alert and oriented to person, place, and time.   Patient in wheelchair   Skin: Skin is warm and dry. No cyanosis.   Psychiatric: She has a normal mood and affect. Her behavior is normal.   Nursing note and vitals reviewed.    Personal Diagnostic Review  Chest x-ray:   Findings: The lungs are clear and free of infiltrate.  No pleural effusion or pneumothorax is identified.  The heart is enlarged.  The lungs are hyperexpanded.    Assessment:       Problem List Items Addressed This Visit     Hypoxemia requiring supplemental oxygen (Chronic)     Portable oxygen 3.0 LPM         Stage 3 severe COPD by GOLD classification     COPD ROS: taking medications as instructed, no medication side effects noted, no significant ongoing wheezing or shortness of breath, using bronchodilator MDI less than twice a week.   COPD score 28  mRC 3-4  Group D  New concerns: Dyspnea at minimal activity      Exam: appears well, vitals normal, no respiratory distress, " acyanotic, normal RR, chest clear, no wheezing, crepitations, rhonchi, normal symmetric air entry.     Assessment: COPD reasonably  controlled.     Plan: current treatment plan is effective, no change in therapy.              Relevant Orders    X-Ray Chest PA And Lateral    Spirometry with/without bronchodilator    Chronic respiratory failure     BIPAP 12/6         CHF, acute on chronic - Primary    Steroid-dependent COPD     On Prednisone 40 mg per [facility] med card             Plan:       Goals of care  Major issues are anxiety, deconditioning  Brought up issue of COPD palliative care :  On oxygen, FEV1 24%, heart failure.  Overall poor prognosis    Follow-up in about 3 months (around 4/24/2018) for Spirometry and CXR next visit.    This note was prepared using voice recognition system and is likely to have sound alike errors that may have been overlooked even after proof reading.  Please call me with any questions    Discussed diagnosis, its evaluation, treatment and usual course. All questions answered.    Thank you for the courtesy of participating in the care of this patient    Coy Tayolr MD

## 2018-01-24 NOTE — PROGRESS NOTES
Subjective:   Patient ID:  Petrona Gonzalez is a 77 y.o. female who presents for follow-up of Shortness of Breath (c/o enlarged heart, shortness of breath not from lungs per Dr Taylor)  Pt with hospitilization for CHF, afib. Pt with chronic SOB unchanged.    Atrial Fibrillation   Presents for follow-up visit. Symptoms are negative for bradycardia, chest pain, dizziness, hypotension, palpitations, shortness of breath, syncope, tachycardia and weakness. The symptoms have been stable. Past medical history includes atrial fibrillation. There are no medication compliance problems.   Hypertension   This is a chronic problem. The current episode started more than 1 year ago. The problem has been gradually improving since onset. Pertinent negatives include no chest pain, palpitations or shortness of breath. Past treatments include calcium channel blockers. The current treatment provides moderate improvement. Compliance problems include exercise.        Review of Systems   Constitution: Negative. Negative for weakness and weight gain.   HENT: Negative.    Eyes: Negative.    Cardiovascular: Negative.  Negative for chest pain, leg swelling, palpitations and syncope.   Respiratory: Negative.  Negative for shortness of breath.    Endocrine: Negative.    Hematologic/Lymphatic: Negative.    Skin: Negative.    Musculoskeletal: Negative for muscle weakness.   Gastrointestinal: Negative.    Genitourinary: Negative.    Neurological: Negative.  Negative for dizziness.   Psychiatric/Behavioral: Negative.    Allergic/Immunologic: Negative.      Family History   Problem Relation Age of Onset    Arthritis Father     Cancer Father     Hearing loss Father     Cancer Sister     Early death Sister     Cancer Brother     Heart disease Brother      Past Medical History:   Diagnosis Date    COPD (chronic obstructive pulmonary disease) with emphysema     Diabetes mellitus     Hyperlipidemia     Hypertension     Insomnia     Maxillary  sinusitis, chronic      Current Outpatient Prescriptions on File Prior to Visit   Medication Sig Dispense Refill    albuterol sulfate 2.5 mg/0.5 mL Nebu Take 2.5 mg by nebulization every 6 (six) hours as needed (wheezing). Rescue 1 each 3    albuterol-ipratropium 2.5mg-0.5mg/3mL (DUO-NEB) 0.5 mg-3 mg(2.5 mg base)/3 mL nebulizer solution Take 3 mLs by nebulization every 6 (six) hours while awake. Rescue 1 Box 0    alprazolam (XANAX) 0.5 MG tablet Take 0.5 mg by mouth every 6 (six) hours as needed for Anxiety.      aspirin (ECOTRIN) 81 MG EC tablet Take 81 mg by mouth once daily.      budesonide-formoterol 160-4.5 mcg (SYMBICORT) 160-4.5 mcg/actuation HFAA Inhale 2 puffs into the lungs every 12 (twelve) hours. Controller 1 Inhaler 11    dextromethorphan-guaifenesin  mg (MUCINEX DM)  mg per 12 hr tablet Take 1 tablet by mouth.      diltiaZEM (CARDIZEM) 90 MG tablet Take 1 tablet (90 mg total) by mouth every 8 (eight) hours. 90 tablet 11    furosemide (LASIX) 40 MG tablet Take 40 mg by mouth 2 (two) times daily.      levocetirizine (XYZAL) 5 MG tablet Take 5 mg by mouth every evening.      lisinopril (PRINIVIL,ZESTRIL) 5 MG tablet Take 1 tablet (5 mg total) by mouth once daily. 30 tablet 5    metformin (GLUCOPHAGE) 500 MG tablet Take 1 tablet (500 mg total) by mouth 2 (two) times daily with meals. 60 tablet 5    metOLazone (ZAROXOLYN) 5 MG tablet Take 1 tablet (5 mg total) by mouth once daily. 5 tablet 0    montelukast (SINGULAIR) 10 mg tablet Take 10 mg by mouth every evening.      OXYGEN-AIR DELIVERY SYSTEMS MISC by [facility].(Non-Drug; Combo Route) route.      predniSONE (DELTASONE) 5 MG tablet Take 40 mg by mouth once daily.       rivaroxaban (XARELTO) 20 mg Tab Take 20 mg by mouth daily with dinner or evening meal.      roflumilast 500 mcg Tab Take 1 tablet (500 mcg total) by mouth once daily. 30 tablet 1    simvastatin (ZOCOR) 10 MG tablet Take 1 tablet (10 mg total) by mouth every  evening. 30 tablet 11    trazodone (DESYREL) 50 MG tablet Take 50 mg by mouth nightly as needed for Insomnia.      umeclidinium (INCRUSE ELLIPTA) 62.5 mcg/actuation DsDv Inhale into the lungs once daily. Controller      guaiFENesin (MUCINEX) 600 mg 12 hr tablet Take 1,200 mg by mouth 2 (two) times daily.      [DISCONTINUED] tobramycin sulfate 0.3% (TOBREX) 0.3 % ophthalmic solution 1 drop every 4 (four) hours.       No current facility-administered medications on file prior to visit.      Review of patient's allergies indicates:   Allergen Reactions    Meloxicam Other (See Comments)      Patient stated that she has muscle aches and pains    Morphine Rash    Naproxen Other (See Comments)     Patient states she has muscle and aches.    Pulmicort [budesonide] Other (See Comments)     Burning in chest       Objective:     Physical Exam   Constitutional: She is oriented to person, place, and time. She appears well-developed and well-nourished.   HENT:   Head: Normocephalic and atraumatic.   Eyes: Conjunctivae and EOM are normal. Pupils are equal, round, and reactive to light.   Neck: Normal range of motion. Neck supple.   Cardiovascular: Normal rate, normal heart sounds and intact distal pulses.  An irregularly irregular rhythm present.   Pulmonary/Chest: Effort normal and breath sounds normal.   Abdominal: Soft. Bowel sounds are normal.   Musculoskeletal: Normal range of motion.   Neurological: She is alert and oriented to person, place, and time.   Skin: Skin is warm and dry.   Psychiatric: She has a normal mood and affect.   Nursing note and vitals reviewed.      Assessment:     1. Chest pain, unspecified type    2. Dyslipidemia    3. Essential (primary) hypertension    4. Stage 3 severe COPD by GOLD classification    5. Type 2 diabetes mellitus with hyperlipidemia    6. Atrial fibrillation with RVR    7. Pure hypercholesterolemia        Plan:     Chest pain, unspecified type    Dyslipidemia    Essential  (primary) hypertension    Stage 3 severe COPD by GOLD classification    Type 2 diabetes mellitus with hyperlipidemia    Atrial fibrillation with RVR    Pure hypercholesterolemia    continue diltiazem, xarelto- afib  Continue diuretics and BP control - chf-diastolic  Change asa- qod.  Increase lasix to tid x 3 days

## 2018-01-25 NOTE — ASSESSMENT & PLAN NOTE
COPD ROS: taking medications as instructed, no medication side effects noted, no significant ongoing wheezing or shortness of breath, using bronchodilator MDI less than twice a week.   COPD score 28  mRC 3-4  Group D  New concerns: Dyspnea at minimal activity      Exam: appears well, vitals normal, no respiratory distress, acyanotic, normal RR, chest clear, no wheezing, crepitations, rhonchi, normal symmetric air entry.     Assessment: COPD reasonably  controlled.     Plan: current treatment plan is effective, no change in therapy.

## 2018-04-04 ENCOUNTER — HOSPITAL ENCOUNTER (OUTPATIENT)
Dept: RADIOLOGY | Facility: HOSPITAL | Age: 78
Discharge: HOME | End: 2018-04-04
Attending: INTERNAL MEDICINE
Payer: MEDICARE

## 2018-04-04 ENCOUNTER — PROCEDURE VISIT (OUTPATIENT)
Dept: PULMONOLOGY | Facility: CLINIC | Age: 78
End: 2018-04-04
Payer: MEDICARE

## 2018-04-04 ENCOUNTER — OFFICE VISIT (OUTPATIENT)
Dept: PULMONOLOGY | Facility: CLINIC | Age: 78
End: 2018-04-04
Payer: MEDICARE

## 2018-04-04 VITALS
WEIGHT: 196 LBS | OXYGEN SATURATION: 96 % | HEART RATE: 105 BPM | RESPIRATION RATE: 18 BRPM | DIASTOLIC BLOOD PRESSURE: 58 MMHG | HEIGHT: 63 IN | SYSTOLIC BLOOD PRESSURE: 110 MMHG | BODY MASS INDEX: 34.73 KG/M2

## 2018-04-04 DIAGNOSIS — J44.0 BRONCHITIS, CHRONIC OBSTRUCTIVE W ACUTE BRONCHITIS: Primary | ICD-10-CM

## 2018-04-04 DIAGNOSIS — J44.9 STEROID-DEPENDENT COPD: ICD-10-CM

## 2018-04-04 DIAGNOSIS — J44.9 STAGE 3 SEVERE COPD BY GOLD CLASSIFICATION: ICD-10-CM

## 2018-04-04 DIAGNOSIS — Z92.241 STEROID-DEPENDENT COPD: ICD-10-CM

## 2018-04-04 DIAGNOSIS — J20.9 BRONCHITIS, CHRONIC OBSTRUCTIVE W ACUTE BRONCHITIS: Primary | ICD-10-CM

## 2018-04-04 DIAGNOSIS — R09.02 HYPOXEMIA REQUIRING SUPPLEMENTAL OXYGEN: Chronic | ICD-10-CM

## 2018-04-04 DIAGNOSIS — Z99.81 HYPOXEMIA REQUIRING SUPPLEMENTAL OXYGEN: Chronic | ICD-10-CM

## 2018-04-04 DIAGNOSIS — J44.9 CHRONIC OBSTRUCTIVE PULMONARY DISEASE, UNSPECIFIED COPD TYPE: Primary | ICD-10-CM

## 2018-04-04 LAB
PRE FEV1 FVC: 40.02 % (ref 65.08–84.67)
PRE FEV1: 0.43 L (ref 1.45–2.58)
PRE FVC: 1.08 L (ref 2.02–3.36)
PRE PEF: 1.14 L/S (ref 3.43–6.75)

## 2018-04-04 PROCEDURE — 99214 OFFICE O/P EST MOD 30 MIN: CPT | Mod: 25,S$GLB,, | Performed by: INTERNAL MEDICINE

## 2018-04-04 PROCEDURE — 71046 X-RAY EXAM CHEST 2 VIEWS: CPT | Mod: 26,,, | Performed by: RADIOLOGY

## 2018-04-04 PROCEDURE — 71046 X-RAY EXAM CHEST 2 VIEWS: CPT | Mod: TC

## 2018-04-04 PROCEDURE — 99999 PR PBB SHADOW E&M-EST. PATIENT-LVL III: CPT | Mod: PBBFAC,,, | Performed by: INTERNAL MEDICINE

## 2018-04-04 PROCEDURE — 94010 BREATHING CAPACITY TEST: CPT | Mod: S$GLB,,, | Performed by: INTERNAL MEDICINE

## 2018-04-04 PROCEDURE — 3078F DIAST BP <80 MM HG: CPT | Mod: CPTII,S$GLB,, | Performed by: INTERNAL MEDICINE

## 2018-04-04 PROCEDURE — 99499 UNLISTED E&M SERVICE: CPT | Mod: S$GLB,,, | Performed by: INTERNAL MEDICINE

## 2018-04-04 PROCEDURE — 3074F SYST BP LT 130 MM HG: CPT | Mod: CPTII,S$GLB,, | Performed by: INTERNAL MEDICINE

## 2018-04-04 RX ORDER — AZITHROMYCIN 250 MG/1
TABLET, FILM COATED ORAL
Qty: 6 TABLET | Refills: 0 | Status: SHIPPED | OUTPATIENT
Start: 2018-04-04 | End: 2018-04-18 | Stop reason: ALTCHOICE

## 2018-04-04 RX ORDER — AZITHROMYCIN 250 MG/1
TABLET, FILM COATED ORAL
Qty: 6 TABLET | Refills: 0 | Status: SHIPPED | OUTPATIENT
Start: 2018-04-04 | End: 2018-04-04 | Stop reason: SDUPTHER

## 2018-04-04 RX ORDER — HUMAN INSULIN 100 [IU]/ML
INJECTION, SOLUTION SUBCUTANEOUS
COMMUNITY
Start: 2018-03-20

## 2018-04-04 NOTE — ASSESSMENT & PLAN NOTE
COPD ROS: taking medications as instructed, no medication side effects noted, no significant ongoing wheezing or shortness of breath, using bronchodilator MDI less than twice a week.   COPD score 14  mRC 3-4  Group D  New concerns: Dyspnea at minimal activity  Oxygen 2.0 LPm  Cough, grayish sputum  Restricted to wheel chair     Exam: appears well, vitals normal, no respiratory distress, acyanotic, normal RR, chest clear, no wheezing, crepitations, rhonchi, normal symmetric air entry.      Assessment: COPD reasonably  controlled.Gold 4: Group D     Meds: Daliresp, Symbicort HFA, Mucinex DM, Duoneb nebulizer, Umeclidinuim    Plan: current treatment plan is effective, no change in therapy.

## 2018-04-04 NOTE — PROGRESS NOTES
"Subjective:       Patient ID: Petrona Gonzalez is a 77 y.o. female.    Chief Complaint: COPD (rev cxr gopi)    Mrs. Petrona Alonsoiesis 77 years old  Last visit 01/24/2018: did not bring NH med card with her  Resident at  [facility]  Meds: Oxygen 2.0-3.0 LPM  Her other maintenance medications remain the same.    Symbicort HFA , INCRUSE,dalirsp  and Ventolin HFA  Severe End stage COPD  FEV1 0.43( 21%)  COPD group d: high risk more symptoms  She has BiPAP at night 12/6  She is on chronic prednisone 30 mg daily, Xanax 0.5 mg for anxiety, trazodone for insomnia,   She has lots of skin bruises: on Xarelto.  Wheel chair bound. Frustrated about leg edema; weight increased from 185lb to 195 lb  She is most frustrated about easy fatigability, dyspnea at rest.    Asks for B12 shot, will defer toSNF  Has increased symptoms of cough, increased sputum, gray cough  Abx given          Review of Systems   Constitutional: Positive for fatigue.   HENT: Negative.    Eyes: Negative.    Respiratory: Positive for shortness of breath and dyspnea on extertion. Negative for use of rescue inhaler.    Cardiovascular: Positive for leg swelling.   Genitourinary: Negative.    Skin: Negative.         Multiple skin bruising on the forearms   Gastrointestinal: Negative.    Neurological: Negative.    Hematological: Excessive bruising.   Psychiatric/Behavioral: Negative.        Objective:       Vitals:    04/04/18 0929   BP: (!) 110/58   Pulse: 105   Resp: 18   SpO2: 96%  Comment: O2 @ 2 L per NC   Weight: 88.9 kg (195 lb 15.8 oz)   Height: 5' 2.99" (1.6 m)     Physical Exam   Constitutional: She is oriented to person, place, and time. She appears well-developed and well-nourished. She is obese.   HENT:   Head: Normocephalic.   Nose: Nose normal.   Mouth/Throat: Oropharynx is clear and moist.   Neck: Normal range of motion. Neck supple.   Cardiovascular: Normal rate and regular rhythm.    No murmur heard.  Pulmonary/Chest: Normal expansion. She has " decreased breath sounds.   Abdominal: Soft. Bowel sounds are normal.   Musculoskeletal: Normal range of motion. She exhibits edema.   Lymphadenopathy:     She has no cervical adenopathy.   Neurological: She is alert and oriented to person, place, and time.   Patient in wheelchair   Skin: Skin is warm and dry. No cyanosis.   Psychiatric: She has a normal mood and affect. Her behavior is normal.   Nursing note and vitals reviewed.    Personal Diagnostic Review  Chest x-ray:   Findings: The lungs are clear and free of infiltrate.  No pleural effusion or pneumothorax is identified.  The heart is enlarged.  The lungs are hyperexpanded.    Assessment:       Problem List Items Addressed This Visit     Hypoxemia requiring supplemental oxygen (Chronic)    Stage 3 severe COPD by GOLD classification     COPD ROS: taking medications as instructed, no medication side effects noted, no significant ongoing wheezing or shortness of breath, using bronchodilator MDI less than twice a week.   COPD score 14  mRC 3-4  Group D  New concerns: Dyspnea at minimal activity  Oxygen 2.0 LPm  Cough, grayish sputum  Restricted to wheel chair     Exam: appears well, vitals normal, no respiratory distress, acyanotic, normal RR, chest clear, no wheezing, crepitations, rhonchi, normal symmetric air entry.      Assessment: COPD reasonably  controlled.Gold 4: Group D     Meds: Daliresp, Symbicort HFA, Mucinex DM, Duoneb nebulizer, Umeclidinuim    Plan: current treatment plan is effective, no change in therapy.         Steroid-dependent COPD     On prednisone             Other Visit Diagnoses     Bronchitis, chronic obstructive w acute bronchitis    -  Primary    Relevant Medications    azithromycin (Z-ERIK) 250 MG tablet    Other Relevant Orders    Culture, Respiratory with Gram Stain        Plan:       Goals of care  Major issues are anxiety, deconditioning  Brought up issue of COPD palliative care :  On oxygen, FEV1 21%, heart failure.  Overall poor  prognosis  Asks for B12 shots, will ask NH to check her B12 level and treat appropriately  Adehrence with diuretics    Follow-up in about 3 months (around 7/4/2018) for Spirometry and CXR next visit, Sputum culture.    This note was prepared using voice recognition system and is likely to have sound alike errors that may have been overlooked even after proof reading.  Please call me with any questions    Discussed diagnosis, its evaluation, treatment and usual course. All questions answered.    Thank you for the courtesy of participating in the care of this patient    Coy Taylor MD

## 2018-04-09 DIAGNOSIS — B96.5 PSEUDOMONAS RESPIRATORY INFECTION: Primary | ICD-10-CM

## 2018-04-09 DIAGNOSIS — J98.8 PSEUDOMONAS RESPIRATORY INFECTION: Primary | ICD-10-CM

## 2018-04-09 RX ORDER — LEVOFLOXACIN 500 MG/1
500 TABLET, FILM COATED ORAL DAILY
Qty: 10 TABLET | Refills: 0 | Status: SHIPPED | OUTPATIENT
Start: 2018-04-09 | End: 2018-04-10 | Stop reason: SDUPTHER

## 2018-04-09 NOTE — PROGRESS NOTES
"Pseudomonas aeruginosa       CULTURE, RESPIRATORY     Amikacin <=16 mcg/mL"><=16 mcg/mL Sensitive     Cefepime <=8 mcg/mL"><=8 mcg/mL Sensitive     Ciprofloxacin <=1 mcg/mL"><=1 mcg/mL Sensitive     Gentamicin <=4 mcg/mL"><=4 mcg/mL Sensitive     Meropenem <=4 mcg/mL"><=4 mcg/mL Sensitive     Piperacillin/Tazo <=16 mcg/mL"><=16 mcg/mL Sensitive     Tobramycin <=4 mcg/mL"><=4 mcg/mL Sensitive      Sent Levaquin  "

## 2018-04-10 DIAGNOSIS — J98.8 PSEUDOMONAS RESPIRATORY INFECTION: ICD-10-CM

## 2018-04-10 DIAGNOSIS — B96.5 PSEUDOMONAS RESPIRATORY INFECTION: ICD-10-CM

## 2018-04-10 RX ORDER — LEVOFLOXACIN 500 MG/1
500 TABLET, FILM COATED ORAL DAILY
Qty: 10 TABLET | Refills: 0 | Status: SHIPPED | OUTPATIENT
Start: 2018-04-10 | End: 2018-04-10 | Stop reason: SDUPTHER

## 2018-04-10 RX ORDER — LEVOFLOXACIN 500 MG/1
500 TABLET, FILM COATED ORAL DAILY
Qty: 10 TABLET | Refills: 0 | Status: SHIPPED | OUTPATIENT
Start: 2018-04-10 | End: 2018-04-20

## 2018-04-18 ENCOUNTER — OFFICE VISIT (OUTPATIENT)
Dept: CARDIOLOGY | Facility: CLINIC | Age: 78
End: 2018-04-18
Payer: MEDICARE

## 2018-04-18 VITALS
DIASTOLIC BLOOD PRESSURE: 60 MMHG | HEIGHT: 63 IN | BODY MASS INDEX: 34.88 KG/M2 | SYSTOLIC BLOOD PRESSURE: 114 MMHG | TEMPERATURE: 121 F | WEIGHT: 196.88 LBS

## 2018-04-18 DIAGNOSIS — I10 ESSENTIAL (PRIMARY) HYPERTENSION: Primary | ICD-10-CM

## 2018-04-18 DIAGNOSIS — I50.33 ACUTE ON CHRONIC DIASTOLIC CONGESTIVE HEART FAILURE: ICD-10-CM

## 2018-04-18 DIAGNOSIS — E78.00 PURE HYPERCHOLESTEROLEMIA: ICD-10-CM

## 2018-04-18 PROCEDURE — 99999 PR PBB SHADOW E&M-EST. PATIENT-LVL III: CPT | Mod: PBBFAC,,, | Performed by: INTERNAL MEDICINE

## 2018-04-18 PROCEDURE — 3074F SYST BP LT 130 MM HG: CPT | Mod: CPTII,S$GLB,, | Performed by: INTERNAL MEDICINE

## 2018-04-18 PROCEDURE — 3078F DIAST BP <80 MM HG: CPT | Mod: CPTII,S$GLB,, | Performed by: INTERNAL MEDICINE

## 2018-04-18 PROCEDURE — 99214 OFFICE O/P EST MOD 30 MIN: CPT | Mod: S$GLB,,, | Performed by: INTERNAL MEDICINE

## 2018-04-18 RX ORDER — METOLAZONE 2.5 MG/1
2.5 TABLET ORAL
Qty: 12 TABLET | Refills: 11 | Status: SHIPPED | OUTPATIENT
Start: 2018-04-18 | End: 2018-05-17 | Stop reason: SDUPTHER

## 2018-04-18 NOTE — PROGRESS NOTES
Subjective:   Patient ID:  Petrona Gonzalez is a 77 y.o. female who presents for follow-up of Edema (skin tight and bursting)  Pt with increased LE edema. Pt denies weight gain and chronic SOB- no changes    Hypertension   This is a chronic problem. The problem has been gradually improving since onset. The problem is controlled. Pertinent negatives include no chest pain, palpitations or shortness of breath. Past treatments include calcium channel blockers, ACE inhibitors and diuretics. The current treatment provides moderate improvement. Compliance problems include medication side effects.    Hyperlipidemia   This is a chronic problem. The current episode started more than 1 year ago. The problem is controlled. Recent lipid tests were reviewed and are variable. Pertinent negatives include no chest pain or shortness of breath. Current antihyperlipidemic treatment includes statins. The current treatment provides moderate improvement of lipids. Compliance problems include medication side effects.        Review of Systems   Constitution: Negative. Negative for weight gain.   HENT: Negative.    Eyes: Negative.    Cardiovascular: Negative.  Negative for chest pain, leg swelling and palpitations.   Respiratory: Negative.  Negative for shortness of breath.    Endocrine: Negative.    Hematologic/Lymphatic: Negative.    Skin: Negative.    Musculoskeletal: Negative for muscle weakness.   Gastrointestinal: Negative.    Genitourinary: Negative.    Neurological: Negative.  Negative for dizziness.   Psychiatric/Behavioral: Negative.    Allergic/Immunologic: Negative.      Family History   Problem Relation Age of Onset    Arthritis Father     Cancer Father     Hearing loss Father     Cancer Sister     Early death Sister     Cancer Brother     Heart disease Brother      Past Medical History:   Diagnosis Date    COPD (chronic obstructive pulmonary disease) with emphysema     Diabetes mellitus     Hyperlipidemia      Hypertension     Insomnia     Maxillary sinusitis, chronic      Current Outpatient Prescriptions on File Prior to Visit   Medication Sig Dispense Refill    albuterol sulfate 2.5 mg/0.5 mL Nebu Take 2.5 mg by nebulization every 6 (six) hours as needed (wheezing). Rescue 1 each 3    albuterol-ipratropium 2.5mg-0.5mg/3mL (DUO-NEB) 0.5 mg-3 mg(2.5 mg base)/3 mL nebulizer solution Take 3 mLs by nebulization every 6 (six) hours while awake. Rescue 1 Box 0    alprazolam (XANAX) 0.5 MG tablet Take 0.5 mg by mouth every 6 (six) hours as needed for Anxiety.      budesonide-formoterol 160-4.5 mcg (SYMBICORT) 160-4.5 mcg/actuation HFAA Inhale 2 puffs into the lungs every 12 (twelve) hours. Controller 1 Inhaler 11    dextromethorphan-guaifenesin  mg (MUCINEX DM)  mg per 12 hr tablet Take 1 tablet by mouth.      diltiaZEM (CARDIZEM) 90 MG tablet Take 1 tablet (90 mg total) by mouth every 8 (eight) hours. 90 tablet 11    furosemide (LASIX) 40 MG tablet Take 40 mg by mouth 2 (two) times daily.      guaiFENesin (MUCINEX) 600 mg 12 hr tablet Take 1,200 mg by mouth 2 (two) times daily.      levocetirizine (XYZAL) 5 MG tablet Take 5 mg by mouth every evening.      levoFLOXacin (LEVAQUIN) 500 MG tablet Take 1 tablet (500 mg total) by mouth once daily. 10 tablet 0    lisinopril (PRINIVIL,ZESTRIL) 5 MG tablet Take 1 tablet (5 mg total) by mouth once daily. 30 tablet 5    metformin (GLUCOPHAGE) 500 MG tablet Take 1 tablet (500 mg total) by mouth 2 (two) times daily with meals. 60 tablet 5    NOVOLIN R REGULAR U-100 INSULN 100 unit/mL injection       OXYGEN-AIR DELIVERY SYSTEMS MISC by Misc.(Non-Drug; Combo Route) route.      predniSONE (DELTASONE) 5 MG tablet Take 40 mg by mouth once daily.       rivaroxaban (XARELTO) 20 mg Tab Take 20 mg by mouth daily with dinner or evening meal.      simvastatin (ZOCOR) 10 MG tablet Take 1 tablet (10 mg total) by mouth every evening. 30 tablet 11    umeclidinium (INCRUSE  ELLIPTA) 62.5 mcg/actuation DsDv Inhale into the lungs once daily. Controller      metOLazone (ZAROXOLYN) 5 MG tablet Take 1 tablet (5 mg total) by mouth once daily. 5 tablet 0    roflumilast 500 mcg Tab Take 1 tablet (500 mcg total) by mouth once daily. 30 tablet 1    [DISCONTINUED] azithromycin (Z-ERIK) 250 MG tablet Take 2 tablets by mouth on day 1; Take 1 tablet by mouth on days 2-5 6 tablet 0     No current facility-administered medications on file prior to visit.      Review of patient's allergies indicates:   Allergen Reactions    Meloxicam Other (See Comments)      Patient stated that she has muscle aches and pains    Morphine Rash    Naproxen Other (See Comments)     Patient states she has muscle and aches.    Pulmicort [budesonide] Other (See Comments)     Burning in chest       Objective:     Physical Exam   Musculoskeletal: She exhibits edema (1+).       Assessment:     1. Essential (primary) hypertension    2. Pure hypercholesterolemia    3. Acute on chronic diastolic congestive heart failure        Plan:     Essential (primary) hypertension    Pure hypercholesterolemia    Acute on chronic diastolic congestive heart failure    increase diuretics  Continue statin-hlp  Continue diltiazem, lisinopril, diuretics-htn

## 2018-04-18 NOTE — NURSING
Chief Complaint   Patient presents with    Other     Adrenal Insufficiency     Unable to get full set of vitals. Report called to Félix at [facility]. Will wait on transportation arrangements to be made in order to get patient ready for discharge. Will continue to monitor.

## 2018-04-24 ENCOUNTER — TELEPHONE (OUTPATIENT)
Dept: CARDIOLOGY | Facility: CLINIC | Age: 78
End: 2018-04-24

## 2018-04-24 NOTE — TELEPHONE ENCOUNTER
----- Message from Eleanor Alcantar sent at 4/24/2018 12:27 PM CDT -----  Contact: Adriana with [facility] [place] [place]  Calling concerning drawing patient labs there @ nursing home. Please call Adriana today ASAP URGENT!!! @ [phone]. States she left several messages multiple times and no respond. Thanks, robyn

## 2018-04-24 NOTE — TELEPHONE ENCOUNTER
I rtc and notified her I was out Fri and yesterday. It is ok to have them get BMP fasting, but must fax results to me at [phone]. She agreed. kacie

## 2018-05-17 ENCOUNTER — LAB VISIT (OUTPATIENT)
Dept: LAB | Facility: HOSPITAL | Age: 78
End: 2018-05-17
Attending: INTERNAL MEDICINE
Payer: MEDICARE

## 2018-05-17 ENCOUNTER — OFFICE VISIT (OUTPATIENT)
Dept: CARDIOLOGY | Facility: CLINIC | Age: 78
End: 2018-05-17
Payer: MEDICARE

## 2018-05-17 VITALS
DIASTOLIC BLOOD PRESSURE: 60 MMHG | HEIGHT: 63 IN | HEART RATE: 96 BPM | WEIGHT: 189.63 LBS | BODY MASS INDEX: 33.6 KG/M2 | SYSTOLIC BLOOD PRESSURE: 98 MMHG

## 2018-05-17 DIAGNOSIS — J44.9 STAGE 3 SEVERE COPD BY GOLD CLASSIFICATION: ICD-10-CM

## 2018-05-17 DIAGNOSIS — E78.5 TYPE 2 DIABETES MELLITUS WITH HYPERLIPIDEMIA: ICD-10-CM

## 2018-05-17 DIAGNOSIS — I50.33 ACUTE ON CHRONIC DIASTOLIC CONGESTIVE HEART FAILURE: ICD-10-CM

## 2018-05-17 DIAGNOSIS — I48.91 ATRIAL FIBRILLATION WITH RVR: ICD-10-CM

## 2018-05-17 DIAGNOSIS — E11.69 TYPE 2 DIABETES MELLITUS WITH HYPERLIPIDEMIA: ICD-10-CM

## 2018-05-17 DIAGNOSIS — E11.9 DIABETES MELLITUS WITHOUT COMPLICATION: Primary | ICD-10-CM

## 2018-05-17 DIAGNOSIS — I10 ESSENTIAL (PRIMARY) HYPERTENSION: ICD-10-CM

## 2018-05-17 LAB
ANION GAP SERPL CALC-SCNC: 17 MMOL/L
BUN SERPL-MCNC: 50 MG/DL
CALCIUM SERPL-MCNC: 9.5 MG/DL
CHLORIDE SERPL-SCNC: 84 MMOL/L
CO2 SERPL-SCNC: 30 MMOL/L
CREAT SERPL-MCNC: 2.1 MG/DL
EST. GFR  (AFRICAN AMERICAN): 26 ML/MIN/1.73 M^2
EST. GFR  (NON AFRICAN AMERICAN): 22.5 ML/MIN/1.73 M^2
GLUCOSE SERPL-MCNC: 202 MG/DL
POTASSIUM SERPL-SCNC: 4.4 MMOL/L
SODIUM SERPL-SCNC: 131 MMOL/L

## 2018-05-17 PROCEDURE — 3074F SYST BP LT 130 MM HG: CPT | Mod: CPTII,S$GLB,, | Performed by: INTERNAL MEDICINE

## 2018-05-17 PROCEDURE — 80048 BASIC METABOLIC PNL TOTAL CA: CPT

## 2018-05-17 PROCEDURE — 36415 COLL VENOUS BLD VENIPUNCTURE: CPT

## 2018-05-17 PROCEDURE — 99214 OFFICE O/P EST MOD 30 MIN: CPT | Mod: S$GLB,,, | Performed by: INTERNAL MEDICINE

## 2018-05-17 PROCEDURE — 99499 UNLISTED E&M SERVICE: CPT | Mod: S$GLB,,, | Performed by: INTERNAL MEDICINE

## 2018-05-17 PROCEDURE — 3045F PR MOST RECENT HEMOGLOBIN A1C LEVEL 7.0-9.0%: CPT | Mod: CPTII,S$GLB,, | Performed by: INTERNAL MEDICINE

## 2018-05-17 PROCEDURE — 99999 PR PBB SHADOW E&M-EST. PATIENT-LVL II: CPT | Mod: PBBFAC,,, | Performed by: INTERNAL MEDICINE

## 2018-05-17 PROCEDURE — 3078F DIAST BP <80 MM HG: CPT | Mod: CPTII,S$GLB,, | Performed by: INTERNAL MEDICINE

## 2018-05-17 RX ORDER — TRAMADOL HYDROCHLORIDE 100 MG/1
50 TABLET, EXTENDED RELEASE ORAL
COMMUNITY
End: 2018-06-18

## 2018-05-17 RX ORDER — AZITHROMYCIN 250 MG/1
250 TABLET, FILM COATED ORAL DAILY
COMMUNITY
Start: 2018-05-16 | End: 2018-06-18 | Stop reason: ALTCHOICE

## 2018-05-17 RX ORDER — CYCLOBENZAPRINE HCL 10 MG
10 TABLET ORAL 3 TIMES DAILY PRN
COMMUNITY

## 2018-05-17 RX ORDER — METOLAZONE 2.5 MG/1
2.5 TABLET ORAL
Qty: 20 TABLET | Refills: 11
Start: 2018-05-17 | End: 2018-06-18 | Stop reason: ALTCHOICE

## 2018-05-17 RX ORDER — ONDANSETRON 4 MG/1
4 TABLET, FILM COATED ORAL EVERY 6 HOURS PRN
COMMUNITY

## 2018-05-17 RX ORDER — PANTOPRAZOLE SODIUM 40 MG/1
40 TABLET, DELAYED RELEASE ORAL DAILY
COMMUNITY

## 2018-05-17 NOTE — PROGRESS NOTES
Subjective:   Patient ID:  Petrona Gonzalez is a 77 y.o. female who presents for follow-up of No chief complaint on file.  LE edema improved but feels swollen in the face.Pt states breathing worse.Pt denies weigh t gain. Pt denies CP.    Hypertension   This is a chronic problem. The current episode started more than 1 year ago. The problem has been gradually improving since onset. The problem is controlled. Pertinent negatives include no chest pain, palpitations or shortness of breath. Past treatments include calcium channel blockers, angiotensin blockers and diuretics. The current treatment provides moderate improvement. Compliance problems include medication side effects.    Atrial Fibrillation   Presents for follow-up visit. Symptoms are negative for bradycardia, chest pain, dizziness, hypotension, palpitations, shortness of breath, syncope, tachycardia and weakness. The symptoms have been stable. Past medical history includes atrial fibrillation. Medication compliance problems include medication side effects.       Review of Systems   Constitution: Negative. Negative for weakness and weight gain.   HENT: Negative.    Eyes: Negative.    Cardiovascular: Negative.  Negative for chest pain, leg swelling, palpitations and syncope.   Respiratory: Negative.  Negative for shortness of breath.    Endocrine: Negative.    Hematologic/Lymphatic: Negative.    Skin: Negative.    Musculoskeletal: Negative for muscle weakness.   Gastrointestinal: Negative.    Genitourinary: Negative.    Neurological: Negative.  Negative for dizziness.   Psychiatric/Behavioral: Negative.    Allergic/Immunologic: Negative.      Family History   Problem Relation Age of Onset    Arthritis Father     Cancer Father     Hearing loss Father     Cancer Sister     Early death Sister     Cancer Brother     Heart disease Brother      Past Medical History:   Diagnosis Date    COPD (chronic obstructive pulmonary disease) with emphysema     Diabetes  mellitus     Hyperlipidemia     Hypertension     Insomnia     Maxillary sinusitis, chronic      Current Outpatient Prescriptions on File Prior to Visit   Medication Sig Dispense Refill    albuterol sulfate 2.5 mg/0.5 mL Nebu Take 2.5 mg by nebulization every 6 (six) hours as needed (wheezing). Rescue 1 each 3    albuterol-ipratropium 2.5mg-0.5mg/3mL (DUO-NEB) 0.5 mg-3 mg(2.5 mg base)/3 mL nebulizer solution Take 3 mLs by nebulization every 6 (six) hours while awake. Rescue 1 Box 0    alprazolam (XANAX) 0.5 MG tablet Take 0.5 mg by mouth every 6 (six) hours as needed for Anxiety.      budesonide-formoterol 160-4.5 mcg (SYMBICORT) 160-4.5 mcg/actuation HFAA Inhale 2 puffs into the lungs every 12 (twelve) hours. Controller 1 Inhaler 11    dextromethorphan-guaifenesin  mg (MUCINEX DM)  mg per 12 hr tablet Take 1 tablet by mouth.      diltiaZEM (CARDIZEM) 90 MG tablet Take 1 tablet (90 mg total) by mouth every 8 (eight) hours. 90 tablet 11    furosemide (LASIX) 40 MG tablet Take 60 mg by mouth 2 (two) times daily.       guaiFENesin (MUCINEX) 600 mg 12 hr tablet Take 1,200 mg by mouth 2 (two) times daily.      levocetirizine (XYZAL) 5 MG tablet Take 5 mg by mouth every evening.      lisinopril (PRINIVIL,ZESTRIL) 5 MG tablet Take 1 tablet (5 mg total) by mouth once daily. 30 tablet 5    metOLazone (ZAROXOLYN) 2.5 MG tablet Take 1 tablet (2.5 mg total) by mouth every Mon, Wed, Fri. 12 tablet 11    NOVOLIN R REGULAR U-100 INSULN 100 unit/mL injection       OXYGEN-AIR DELIVERY SYSTEMS MISC by [facility].(Non-Drug; Combo Route) route.      predniSONE (DELTASONE) 5 MG tablet Take 40 mg by mouth once daily.       rivaroxaban (XARELTO) 20 mg Tab Take 20 mg by mouth daily with dinner or evening meal.      roflumilast 500 mcg Tab Take 1 tablet (500 mcg total) by mouth once daily. 30 tablet 1    simvastatin (ZOCOR) 10 MG tablet Take 1 tablet (10 mg total) by mouth every evening. 30 tablet 11     umeclidinium (INCRUSE ELLIPTA) 62.5 mcg/actuation DsDv Inhale into the lungs once daily. Controller      metformin (GLUCOPHAGE) 500 MG tablet Take 1 tablet (500 mg total) by mouth 2 (two) times daily with meals. 60 tablet 5     No current facility-administered medications on file prior to visit.      Review of patient's allergies indicates:   Allergen Reactions    Meloxicam Other (See Comments)      Patient stated that she has muscle aches and pains    Morphine Rash    Naproxen Other (See Comments)     Patient states she has muscle and aches.    Pulmicort [budesonide] Other (See Comments)     Burning in chest       Objective:     Physical Exam   Constitutional: She is oriented to person, place, and time. She appears well-developed and well-nourished.   HENT:   Head: Normocephalic and atraumatic.   Eyes: Conjunctivae and EOM are normal. Pupils are equal, round, and reactive to light.   Neck: Normal range of motion. Neck supple.   Cardiovascular: Normal rate, regular rhythm, normal heart sounds and intact distal pulses.    Pulmonary/Chest: Effort normal and breath sounds normal.   Abdominal: Soft. Bowel sounds are normal.   Musculoskeletal: Normal range of motion.   Neurological: She is alert and oriented to person, place, and time.   Skin: Skin is warm and dry.   Psychiatric: She has a normal mood and affect.   Nursing note and vitals reviewed.      Assessment:     1. Diabetes mellitus without complication    2. Essential (primary) hypertension    3. Stage 3 severe COPD by GOLD classification    4. Type 2 diabetes mellitus with hyperlipidemia    5. Atrial fibrillation with RVR        Plan:     Diabetes mellitus without complication    Essential (primary) hypertension    Stage 3 severe COPD by GOLD classification    Type 2 diabetes mellitus with hyperlipidemia    Atrial fibrillation with RVR    increase diuretics ( increase zaroxolyn M-F)  Continue statin-hlp  Continue diltiazem, lisinopril,  diuretics-htn  Continue xarelto- afib

## 2018-05-21 ENCOUNTER — TELEPHONE (OUTPATIENT)
Dept: CARDIOLOGY | Facility: CLINIC | Age: 78
End: 2018-05-21

## 2018-05-21 NOTE — TELEPHONE ENCOUNTER
Adriana LEVINE, nurse called and was notified to hold the zaroxolyn due to pt being to dry. She asked us to call back if we need to restart at a later date. kacie

## 2018-05-21 NOTE — TELEPHONE ENCOUNTER
Pt notified to hold zaroxolyn and she asked me to call [facility]. I called them at [phone] and left a message for Adriana christine with her today, to rtc to me at [phone]. cm

## 2018-06-15 DIAGNOSIS — I10 HYPERTENSION, UNSPECIFIED TYPE: Primary | ICD-10-CM

## 2018-06-18 ENCOUNTER — LAB VISIT (OUTPATIENT)
Dept: LAB | Facility: HOSPITAL | Age: 78
End: 2018-06-18
Attending: INTERNAL MEDICINE
Payer: MEDICARE

## 2018-06-18 ENCOUNTER — CLINICAL SUPPORT (OUTPATIENT)
Dept: CARDIOLOGY | Facility: CLINIC | Age: 78
End: 2018-06-18
Payer: MEDICARE

## 2018-06-18 ENCOUNTER — OFFICE VISIT (OUTPATIENT)
Dept: CARDIOLOGY | Facility: CLINIC | Age: 78
End: 2018-06-18
Payer: MEDICARE

## 2018-06-18 VITALS
WEIGHT: 192.88 LBS | HEIGHT: 63 IN | BODY MASS INDEX: 34.18 KG/M2 | SYSTOLIC BLOOD PRESSURE: 106 MMHG | HEART RATE: 127 BPM | DIASTOLIC BLOOD PRESSURE: 60 MMHG

## 2018-06-18 DIAGNOSIS — I10 ESSENTIAL (PRIMARY) HYPERTENSION: ICD-10-CM

## 2018-06-18 DIAGNOSIS — I48.91 ATRIAL FIBRILLATION WITH RVR: ICD-10-CM

## 2018-06-18 DIAGNOSIS — E78.00 PURE HYPERCHOLESTEROLEMIA: ICD-10-CM

## 2018-06-18 DIAGNOSIS — I10 HYPERTENSION, UNSPECIFIED TYPE: ICD-10-CM

## 2018-06-18 DIAGNOSIS — E78.5 DYSLIPIDEMIA: Primary | ICD-10-CM

## 2018-06-18 LAB
ANION GAP SERPL CALC-SCNC: 16 MMOL/L
BUN SERPL-MCNC: 21 MG/DL
CALCIUM SERPL-MCNC: 9.4 MG/DL
CHLORIDE SERPL-SCNC: 91 MMOL/L
CO2 SERPL-SCNC: 32 MMOL/L
CREAT SERPL-MCNC: 1.3 MG/DL
EST. GFR  (AFRICAN AMERICAN): 46.4 ML/MIN/1.73 M^2
EST. GFR  (NON AFRICAN AMERICAN): 40.2 ML/MIN/1.73 M^2
GLUCOSE SERPL-MCNC: 201 MG/DL
POTASSIUM SERPL-SCNC: 4.3 MMOL/L
SODIUM SERPL-SCNC: 139 MMOL/L

## 2018-06-18 PROCEDURE — 3074F SYST BP LT 130 MM HG: CPT | Mod: CPTII,S$GLB,, | Performed by: INTERNAL MEDICINE

## 2018-06-18 PROCEDURE — 93000 ELECTROCARDIOGRAM COMPLETE: CPT | Mod: S$GLB,,, | Performed by: NUCLEAR MEDICINE

## 2018-06-18 PROCEDURE — 3078F DIAST BP <80 MM HG: CPT | Mod: CPTII,S$GLB,, | Performed by: INTERNAL MEDICINE

## 2018-06-18 PROCEDURE — 80048 BASIC METABOLIC PNL TOTAL CA: CPT

## 2018-06-18 PROCEDURE — 99214 OFFICE O/P EST MOD 30 MIN: CPT | Mod: S$GLB,,, | Performed by: INTERNAL MEDICINE

## 2018-06-18 PROCEDURE — 99999 PR PBB SHADOW E&M-EST. PATIENT-LVL III: CPT | Mod: PBBFAC,,, | Performed by: INTERNAL MEDICINE

## 2018-06-18 PROCEDURE — 36415 COLL VENOUS BLD VENIPUNCTURE: CPT

## 2018-06-18 RX ORDER — FUROSEMIDE 20 MG/1
TABLET ORAL
COMMUNITY
Start: 2018-06-13

## 2018-06-18 RX ORDER — PREDNISONE 20 MG/1
TABLET ORAL
COMMUNITY
Start: 2018-05-31

## 2018-06-18 RX ORDER — IPRATROPIUM BROMIDE AND ALBUTEROL SULFATE 2.5; .5 MG/3ML; MG/3ML
3 SOLUTION RESPIRATORY (INHALATION) EVERY 6 HOURS PRN
COMMUNITY

## 2018-06-18 RX ORDER — TRAMADOL HYDROCHLORIDE 50 MG/1
TABLET ORAL
Status: ON HOLD | COMMUNITY
Start: 2018-05-30 | End: 2018-11-04 | Stop reason: HOSPADM

## 2018-06-18 RX ORDER — ACETAMINOPHEN 325 MG/1
325 TABLET ORAL EVERY 6 HOURS PRN
COMMUNITY

## 2018-06-18 NOTE — PROGRESS NOTES
Subjective:   Patient ID:  Petrona Gonzalez is a 77 y.o. female who presents for follow-up of Atrial Fibrillation; Congestive Heart Failure; Hyperlipidemia; and Hypertension  LE edema improved but face swelling and tearing.    Hypertension   This is a chronic problem. The current episode started more than 1 year ago. The problem has been gradually improving since onset. The problem is controlled. Pertinent negatives include no chest pain, palpitations or shortness of breath. Past treatments include ACE inhibitors and calcium channel blockers. The current treatment provides moderate improvement. Compliance problems include medication side effects and exercise.    Hyperlipidemia   This is a chronic problem. The current episode started more than 1 year ago. The problem is controlled. Recent lipid tests were reviewed and are variable. Pertinent negatives include no chest pain or shortness of breath. Current antihyperlipidemic treatment includes statins. The current treatment provides moderate improvement of lipids. Compliance problems include medication side effects and adherence to exercise.        Review of Systems   Constitution: Negative. Negative for weight gain.   HENT: Negative.    Eyes: Negative.    Cardiovascular: Negative.  Negative for chest pain, leg swelling and palpitations.   Respiratory: Negative.  Negative for shortness of breath.    Endocrine: Negative.    Hematologic/Lymphatic: Negative.    Skin: Negative.    Musculoskeletal: Negative for muscle weakness.   Gastrointestinal: Negative.    Genitourinary: Negative.    Neurological: Negative.  Negative for dizziness.   Psychiatric/Behavioral: Negative.    Allergic/Immunologic: Negative.      Family History   Problem Relation Age of Onset    Arthritis Father     Cancer Father     Hearing loss Father     Cancer Sister     Early death Sister     Cancer Brother     Heart disease Brother      Past Medical History:   Diagnosis Date    COPD (chronic  obstructive pulmonary disease) with emphysema     Diabetes mellitus     Hyperlipidemia     Hypertension     Insomnia     Maxillary sinusitis, chronic      Current Outpatient Prescriptions on File Prior to Visit   Medication Sig Dispense Refill    alprazolam (XANAX) 0.5 MG tablet Take 0.5 mg by mouth every 6 (six) hours as needed for Anxiety.      budesonide-formoterol 160-4.5 mcg (SYMBICORT) 160-4.5 mcg/actuation HFAA Inhale 2 puffs into the lungs every 12 (twelve) hours. Controller 1 Inhaler 11    cyclobenzaprine (FLEXERIL) 10 MG tablet Take 10 mg by mouth 3 (three) times daily as needed for Muscle spasms.      diltiaZEM (CARDIZEM) 90 MG tablet Take 1 tablet (90 mg total) by mouth every 8 (eight) hours. 90 tablet 11    guaiFENesin (MUCINEX) 600 mg 12 hr tablet Take 1,200 mg by mouth 2 (two) times daily.      levocetirizine (XYZAL) 5 MG tablet Take 5 mg by mouth every evening.      lisinopril (PRINIVIL,ZESTRIL) 5 MG tablet Take 1 tablet (5 mg total) by mouth once daily. 30 tablet 5    metformin (GLUCOPHAGE) 500 MG tablet Take 1 tablet (500 mg total) by mouth 2 (two) times daily with meals. 60 tablet 5    NOVOLIN R REGULAR U-100 INSULN 100 unit/mL injection       olopatadine (PAZEO) 0.7 % Drop Apply 1 drop to eye once daily.      ondansetron (ZOFRAN) 4 MG tablet Take 4 mg by mouth every 6 (six) hours as needed for Nausea.      OXYGEN-AIR DELIVERY SYSTEMS MISC [place] [facility]Non-Drug; Combo Route) route.      pantoprazole (PROTONIX) 40 MG tablet Take 40 mg by mouth once daily.      rivaroxaban (XARELTO) 20 mg Tab Take 20 mg by mouth daily with dinner or evening meal.      roflumilast 500 mcg Tab Take 1 tablet (500 mcg total) by mouth once daily. 30 tablet 1    simvastatin (ZOCOR) 10 MG tablet Take 1 tablet (10 mg total) by mouth every evening. 30 tablet 11    umeclidinium (INCRUSE ELLIPTA) 62.5 mcg/actuation DsDv Inhale into the lungs once daily. Controller      [DISCONTINUED] albuterol sulfate 2.5  mg/0.5 mL Nebu Take 2.5 mg by nebulization every 6 (six) hours as needed (wheezing). Rescue 1 each 3    [DISCONTINUED] albuterol-ipratropium 2.5mg-0.5mg/3mL (DUO-NEB) 0.5 mg-3 mg(2.5 mg base)/3 mL nebulizer solution Take 3 mLs by nebulization every 6 (six) hours while awake. Rescue 1 Box 0    metOLazone (ZAROXOLYN) 2.5 MG tablet Take 1 tablet (2.5 mg total) by mouth every Mon, Tues, Wed, Thurs, Fri. 20 tablet 11    [DISCONTINUED] azithromycin (Z-ERIK) 250 MG tablet Take 250 mg by mouth once daily. For 5 days only      [DISCONTINUED] furosemide (LASIX) 40 MG tablet Take 60 mg by mouth 2 (two) times daily.       [DISCONTINUED] predniSONE (DELTASONE) 5 MG tablet Take 40 mg by mouth once daily.       [DISCONTINUED] traMADol (ULTRAM-ER) 100 MG Tb24 Take 50 mg by mouth every 6 to 8 hours as needed.       No current facility-administered medications on file prior to visit.      Review of patient's allergies indicates:   Allergen Reactions    Meloxicam Other (See Comments)      Patient stated that she has muscle aches and pains    Morphine Rash    Naproxen Other (See Comments)     Patient states she has muscle and aches.    Pulmicort [budesonide] Other (See Comments)     Burning in chest       Objective:     Physical Exam   Constitutional: She is oriented to person, place, and time. She appears well-developed and well-nourished.   HENT:   Head: Normocephalic and atraumatic.   Eyes: Conjunctivae and EOM are normal. Pupils are equal, round, and reactive to light.   Neck: Normal range of motion. Neck supple.   Cardiovascular: Normal rate, regular rhythm, normal heart sounds and intact distal pulses.    Pulmonary/Chest: Effort normal and breath sounds normal.   Abdominal: Soft. Bowel sounds are normal.   Musculoskeletal: Normal range of motion.   Neurological: She is alert and oriented to person, place, and time.   Skin: Skin is warm and dry.   Psychiatric: She has a normal mood and affect.   Nursing note and vitals  reviewed.      Assessment:     1. Dyslipidemia    2. Essential (primary) hypertension    3. Pure hypercholesterolemia    4. Atrial fibrillation with RVR        Plan:     Dyslipidemia    Essential (primary) hypertension    Pure hypercholesterolemia    Atrial fibrillation with RVR    BMP today  Continue statin-hlp  Continue diltiazem, lisinopril, diuretics-htn  Continue xarelto- afib

## 2018-06-21 ENCOUNTER — TELEPHONE (OUTPATIENT)
Dept: CARDIOLOGY | Facility: CLINIC | Age: 78
End: 2018-06-21

## 2018-06-21 NOTE — TELEPHONE ENCOUNTER
----- Message from Kerry Rudd sent at 6/21/2018 10:38 AM CDT -----  Contact: [phone] at [facility].  Pt is requesting test results from EKG

## 2018-06-21 NOTE — TELEPHONE ENCOUNTER
Viridiana with [facility] called, pt wants her test results. I told her I called but could not reach her. Viridiana req I fax over last test done. I agreed. Faxed ekg and labs to [phone].cm

## 2018-06-21 NOTE — TELEPHONE ENCOUNTER
----- Message from Sky Houston MD sent at 6/20/2018  5:59 AM CDT -----  Please tell pt bmp improved

## 2018-06-25 ENCOUNTER — TELEPHONE (OUTPATIENT)
Dept: CARDIOLOGY | Facility: CLINIC | Age: 78
End: 2018-06-25

## 2018-06-25 NOTE — TELEPHONE ENCOUNTER
Reviewed case with YARI Sotelo for one Metolazone 2.5mg now and one tomorrow morning before first dose of Lasix.  Will call pt on Wednesday (June 27) for symptom check.    Called [facility], spoke with Shu Orozco, NP advised her regarding Metolazone dosing.  She repeated back and will follow up with pt on Wednesday as well.

## 2018-06-25 NOTE — TELEPHONE ENCOUNTER
Sister called voicing concern over swelling in pt's legs, neck, and around ears.  States they have been waiting on call back from Dr. Houston regarding possible medication change pending lab results from last week.  Advised that Dr. Houston is on vacation but GEORGINA Price NP is in clinic and will review her chart.

## 2018-11-01 ENCOUNTER — HOSPITAL ENCOUNTER (INPATIENT)
Facility: HOSPITAL | Age: 78
LOS: 3 days | Discharge: HOSPICE | DRG: 811 | End: 2018-11-04
Attending: EMERGENCY MEDICINE | Admitting: INTERNAL MEDICINE
Payer: MEDICARE

## 2018-11-01 DIAGNOSIS — Z45.2 ENCOUNTER FOR CENTRAL LINE PLACEMENT: ICD-10-CM

## 2018-11-01 DIAGNOSIS — N39.0 URINARY TRACT INFECTION WITHOUT HEMATURIA, SITE UNSPECIFIED: ICD-10-CM

## 2018-11-01 DIAGNOSIS — E11.69 TYPE 2 DIABETES MELLITUS WITH HYPERLIPIDEMIA: Chronic | ICD-10-CM

## 2018-11-01 DIAGNOSIS — K92.1 GASTROINTESTINAL HEMORRHAGE WITH MELENA: Chronic | ICD-10-CM

## 2018-11-01 DIAGNOSIS — A41.9 SEPTIC SHOCK: ICD-10-CM

## 2018-11-01 DIAGNOSIS — J96.22 ACUTE ON CHRONIC RESPIRATORY FAILURE WITH HYPOXIA AND HYPERCAPNIA: ICD-10-CM

## 2018-11-01 DIAGNOSIS — D64.9 ANEMIA, UNSPECIFIED TYPE: ICD-10-CM

## 2018-11-01 DIAGNOSIS — E87.6 HYPOKALEMIA: ICD-10-CM

## 2018-11-01 DIAGNOSIS — R19.7 DIARRHEA, UNSPECIFIED TYPE: ICD-10-CM

## 2018-11-01 DIAGNOSIS — I48.21 PERMANENT ATRIAL FIBRILLATION: ICD-10-CM

## 2018-11-01 DIAGNOSIS — J44.9 CHRONIC OBSTRUCTIVE PULMONARY DISEASE, UNSPECIFIED COPD TYPE: ICD-10-CM

## 2018-11-01 DIAGNOSIS — K92.2 GI BLEED: ICD-10-CM

## 2018-11-01 DIAGNOSIS — D53.9 ANEMIA ASSOCIATED WITH NUTRITIONAL DEFICIENCY: Chronic | ICD-10-CM

## 2018-11-01 DIAGNOSIS — E78.5 TYPE 2 DIABETES MELLITUS WITH HYPERLIPIDEMIA: Chronic | ICD-10-CM

## 2018-11-01 DIAGNOSIS — R57.9 SHOCK, UNSPECIFIED: Primary | ICD-10-CM

## 2018-11-01 DIAGNOSIS — R65.21 SEPTIC SHOCK: ICD-10-CM

## 2018-11-01 DIAGNOSIS — J96.21 ACUTE ON CHRONIC RESPIRATORY FAILURE WITH HYPOXIA AND HYPERCAPNIA: ICD-10-CM

## 2018-11-01 DIAGNOSIS — I48.91 A-FIB: ICD-10-CM

## 2018-11-01 DIAGNOSIS — J44.9 STAGE 3 SEVERE COPD BY GOLD CLASSIFICATION: Chronic | ICD-10-CM

## 2018-11-01 DIAGNOSIS — E87.8 ELECTROLYTE IMBALANCE: ICD-10-CM

## 2018-11-01 DIAGNOSIS — R10.9 ABDOMINAL PAIN: ICD-10-CM

## 2018-11-01 DIAGNOSIS — N17.9 AKI (ACUTE KIDNEY INJURY): ICD-10-CM

## 2018-11-01 DIAGNOSIS — K59.02 CONSTIPATION DUE TO OUTLET DYSFUNCTION: ICD-10-CM

## 2018-11-01 DIAGNOSIS — I48.91 ATRIAL FIBRILLATION WITH RVR: Chronic | ICD-10-CM

## 2018-11-01 DIAGNOSIS — R05.9 COUGH: ICD-10-CM

## 2018-11-01 DIAGNOSIS — N30.00 ACUTE CYSTITIS WITHOUT HEMATURIA: ICD-10-CM

## 2018-11-01 PROBLEM — J20.9 ACUTE BRONCHITIS: Status: ACTIVE | Noted: 2018-11-01

## 2018-11-01 LAB
ALBUMIN SERPL BCP-MCNC: 3.4 G/DL
ALP SERPL-CCNC: 45 U/L
ALT SERPL W/O P-5'-P-CCNC: 20 U/L
ANION GAP SERPL CALC-SCNC: 17 MMOL/L
APTT BLDCRRT: 27.9 SEC
AST SERPL-CCNC: 20 U/L
BACTERIA #/AREA URNS HPF: ABNORMAL /HPF
BACTERIA #/AREA URNS HPF: ABNORMAL /HPF
BASOPHILS # BLD AUTO: 0.02 K/UL
BASOPHILS NFR BLD: 0.1 %
BILIRUB SERPL-MCNC: 0.3 MG/DL
BILIRUB UR QL STRIP: NEGATIVE
BILIRUB UR QL STRIP: NEGATIVE
BUN SERPL-MCNC: 52 MG/DL
CALCIUM SERPL-MCNC: 9.2 MG/DL
CHLORIDE SERPL-SCNC: 90 MMOL/L
CLARITY UR: CLEAR
CLARITY UR: CLEAR
CO2 SERPL-SCNC: 30 MMOL/L
COLOR UR: YELLOW
COLOR UR: YELLOW
CREAT SERPL-MCNC: 2.4 MG/DL
DIFFERENTIAL METHOD: ABNORMAL
EOSINOPHIL # BLD AUTO: 0 K/UL
EOSINOPHIL NFR BLD: 0 %
ERYTHROCYTE [DISTWIDTH] IN BLOOD BY AUTOMATED COUNT: 21 %
EST. GFR  (AFRICAN AMERICAN): 22 ML/MIN/1.73 M^2
EST. GFR  (NON AFRICAN AMERICAN): 19 ML/MIN/1.73 M^2
GLUCOSE SERPL-MCNC: 241 MG/DL
GLUCOSE UR QL STRIP: NEGATIVE
GLUCOSE UR QL STRIP: NEGATIVE
HCT VFR BLD AUTO: 27.6 %
HGB BLD-MCNC: 7.9 G/DL
HGB UR QL STRIP: ABNORMAL
HGB UR QL STRIP: NEGATIVE
INR PPP: 1.1
KETONES UR QL STRIP: NEGATIVE
KETONES UR QL STRIP: NEGATIVE
LACTATE SERPL-SCNC: 4.8 MMOL/L
LACTATE SERPL-SCNC: 4.9 MMOL/L
LEUKOCYTE ESTERASE UR QL STRIP: NEGATIVE
LEUKOCYTE ESTERASE UR QL STRIP: NEGATIVE
LIPASE SERPL-CCNC: 40 U/L
LYMPHOCYTES # BLD AUTO: 0.9 K/UL
LYMPHOCYTES NFR BLD: 4.6 %
MCH RBC QN AUTO: 23.7 PG
MCHC RBC AUTO-ENTMCNC: 28.6 G/DL
MCV RBC AUTO: 83 FL
MICROSCOPIC COMMENT: ABNORMAL
MICROSCOPIC COMMENT: ABNORMAL
MONOCYTES # BLD AUTO: 0.4 K/UL
MONOCYTES NFR BLD: 2 %
NEUTROPHILS # BLD AUTO: 18.9 K/UL
NEUTROPHILS NFR BLD: 95.2 %
NITRITE UR QL STRIP: POSITIVE
NITRITE UR QL STRIP: POSITIVE
OB PNL STL: NEGATIVE
PH UR STRIP: 5 [PH] (ref 5–8)
PH UR STRIP: 6 [PH] (ref 5–8)
PLATELET # BLD AUTO: 327 K/UL
PMV BLD AUTO: 8.2 FL
POCT GLUCOSE: 267 MG/DL (ref 70–110)
POTASSIUM SERPL-SCNC: 3.8 MMOL/L
PROT SERPL-MCNC: 6.7 G/DL
PROT UR QL STRIP: NEGATIVE
PROT UR QL STRIP: NEGATIVE
PROTHROMBIN TIME: 11.1 SEC
RBC # BLD AUTO: 3.33 M/UL
RBC #/AREA URNS HPF: 0 /HPF (ref 0–4)
RBC #/AREA URNS HPF: 1 /HPF (ref 0–4)
SODIUM SERPL-SCNC: 137 MMOL/L
SP GR UR STRIP: 1.01 (ref 1–1.03)
SP GR UR STRIP: 1.01 (ref 1–1.03)
SQUAMOUS #/AREA URNS HPF: 1 /HPF
SQUAMOUS #/AREA URNS HPF: 1 /HPF
TROPONIN I SERPL DL<=0.01 NG/ML-MCNC: 0.02 NG/ML
URN SPEC COLLECT METH UR: ABNORMAL
URN SPEC COLLECT METH UR: ABNORMAL
UROBILINOGEN UR STRIP-ACNC: NEGATIVE EU/DL
UROBILINOGEN UR STRIP-ACNC: NEGATIVE EU/DL
WBC # BLD AUTO: 20.26 K/UL
WBC #/AREA URNS HPF: 6 /HPF (ref 0–5)
WBC #/AREA URNS HPF: 7 /HPF (ref 0–5)
WBC CLUMPS URNS QL MICRO: ABNORMAL

## 2018-11-01 PROCEDURE — 25000003 PHARM REV CODE 250: Performed by: PHYSICIAN ASSISTANT

## 2018-11-01 PROCEDURE — 27000221 HC OXYGEN, UP TO 24 HOURS

## 2018-11-01 PROCEDURE — 25000003 PHARM REV CODE 250: Performed by: INTERNAL MEDICINE

## 2018-11-01 PROCEDURE — 20000000 HC ICU ROOM

## 2018-11-01 PROCEDURE — 99291 CRITICAL CARE FIRST HOUR: CPT | Mod: 25

## 2018-11-01 PROCEDURE — 96366 THER/PROPH/DIAG IV INF ADDON: CPT

## 2018-11-01 PROCEDURE — 96367 TX/PROPH/DG ADDL SEQ IV INF: CPT

## 2018-11-01 PROCEDURE — 63600175 PHARM REV CODE 636 W HCPCS: Performed by: EMERGENCY MEDICINE

## 2018-11-01 PROCEDURE — 83690 ASSAY OF LIPASE: CPT

## 2018-11-01 PROCEDURE — 02HV33Z INSERTION OF INFUSION DEVICE INTO SUPERIOR VENA CAVA, PERCUTANEOUS APPROACH: ICD-10-PCS | Performed by: EMERGENCY MEDICINE

## 2018-11-01 PROCEDURE — 87086 URINE CULTURE/COLONY COUNT: CPT

## 2018-11-01 PROCEDURE — C9113 INJ PANTOPRAZOLE SODIUM, VIA: HCPCS | Performed by: EMERGENCY MEDICINE

## 2018-11-01 PROCEDURE — 93010 ELECTROCARDIOGRAM REPORT: CPT | Mod: ,,, | Performed by: INTERNAL MEDICINE

## 2018-11-01 PROCEDURE — 83605 ASSAY OF LACTIC ACID: CPT | Mod: 91

## 2018-11-01 PROCEDURE — 51702 INSERT TEMP BLADDER CATH: CPT

## 2018-11-01 PROCEDURE — 25000003 PHARM REV CODE 250: Performed by: NURSE PRACTITIONER

## 2018-11-01 PROCEDURE — 87186 SC STD MICRODIL/AGAR DIL: CPT

## 2018-11-01 PROCEDURE — 36415 COLL VENOUS BLD VENIPUNCTURE: CPT

## 2018-11-01 PROCEDURE — 87088 URINE BACTERIA CULTURE: CPT

## 2018-11-01 PROCEDURE — 25000003 PHARM REV CODE 250: Performed by: EMERGENCY MEDICINE

## 2018-11-01 PROCEDURE — 82272 OCCULT BLD FECES 1-3 TESTS: CPT

## 2018-11-01 PROCEDURE — 85730 THROMBOPLASTIN TIME PARTIAL: CPT

## 2018-11-01 PROCEDURE — 63600175 PHARM REV CODE 636 W HCPCS: Performed by: NURSE PRACTITIONER

## 2018-11-01 PROCEDURE — 96368 THER/DIAG CONCURRENT INF: CPT

## 2018-11-01 PROCEDURE — 84484 ASSAY OF TROPONIN QUANT: CPT

## 2018-11-01 PROCEDURE — 36556 INSERT NON-TUNNEL CV CATH: CPT

## 2018-11-01 PROCEDURE — 85025 COMPLETE CBC W/AUTO DIFF WBC: CPT

## 2018-11-01 PROCEDURE — 96365 THER/PROPH/DIAG IV INF INIT: CPT

## 2018-11-01 PROCEDURE — 94640 AIRWAY INHALATION TREATMENT: CPT

## 2018-11-01 PROCEDURE — S0030 INJECTION, METRONIDAZOLE: HCPCS | Performed by: EMERGENCY MEDICINE

## 2018-11-01 PROCEDURE — 87040 BLOOD CULTURE FOR BACTERIA: CPT | Mod: 59

## 2018-11-01 PROCEDURE — 96361 HYDRATE IV INFUSION ADD-ON: CPT

## 2018-11-01 PROCEDURE — 87077 CULTURE AEROBIC IDENTIFY: CPT

## 2018-11-01 PROCEDURE — 25000003 PHARM REV CODE 250

## 2018-11-01 PROCEDURE — 85610 PROTHROMBIN TIME: CPT

## 2018-11-01 PROCEDURE — 25000242 PHARM REV CODE 250 ALT 637 W/ HCPCS: Performed by: EMERGENCY MEDICINE

## 2018-11-01 PROCEDURE — 80053 COMPREHEN METABOLIC PANEL: CPT

## 2018-11-01 PROCEDURE — 81000 URINALYSIS NONAUTO W/SCOPE: CPT | Mod: 91

## 2018-11-01 RX ORDER — PANTOPRAZOLE SODIUM 40 MG/10ML
40 INJECTION, POWDER, LYOPHILIZED, FOR SOLUTION INTRAVENOUS DAILY
Status: DISCONTINUED | OUTPATIENT
Start: 2018-11-02 | End: 2018-11-01

## 2018-11-01 RX ORDER — ONDANSETRON 2 MG/ML
4 INJECTION INTRAMUSCULAR; INTRAVENOUS EVERY 12 HOURS PRN
Status: DISCONTINUED | OUTPATIENT
Start: 2018-11-01 | End: 2018-11-04 | Stop reason: HOSPADM

## 2018-11-01 RX ORDER — ROFLUMILAST 500 UG/1
500 TABLET ORAL DAILY
Status: DISCONTINUED | OUTPATIENT
Start: 2018-11-02 | End: 2018-11-04 | Stop reason: HOSPADM

## 2018-11-01 RX ORDER — LORATADINE 10 MG/1
10 TABLET ORAL DAILY
Status: ON HOLD | COMMUNITY
End: 2018-11-04 | Stop reason: HOSPADM

## 2018-11-01 RX ORDER — PANTOPRAZOLE SODIUM 40 MG/1
40 TABLET, DELAYED RELEASE ORAL DAILY
Status: DISCONTINUED | OUTPATIENT
Start: 2018-11-02 | End: 2018-11-04 | Stop reason: HOSPADM

## 2018-11-01 RX ORDER — NOREPINEPHRINE BITARTRATE/D5W 4MG/250ML
PLASTIC BAG, INJECTION (ML) INTRAVENOUS
Status: COMPLETED
Start: 2018-11-01 | End: 2018-11-01

## 2018-11-01 RX ORDER — ARFORMOTEROL TARTRATE 15 UG/2ML
15 SOLUTION RESPIRATORY (INHALATION) EVERY 12 HOURS
Status: DISCONTINUED | OUTPATIENT
Start: 2018-11-02 | End: 2018-11-02

## 2018-11-01 RX ORDER — INSULIN ASPART 100 [IU]/ML
0-5 INJECTION, SOLUTION INTRAVENOUS; SUBCUTANEOUS
Status: DISCONTINUED | OUTPATIENT
Start: 2018-11-01 | End: 2018-11-02

## 2018-11-01 RX ORDER — SODIUM CHLORIDE 9 MG/ML
INJECTION, SOLUTION INTRAVENOUS CONTINUOUS
Status: DISCONTINUED | OUTPATIENT
Start: 2018-11-01 | End: 2018-11-01

## 2018-11-01 RX ORDER — ACETAMINOPHEN 325 MG/1
650 TABLET ORAL EVERY 6 HOURS PRN
Status: DISCONTINUED | OUTPATIENT
Start: 2018-11-01 | End: 2018-11-04 | Stop reason: HOSPADM

## 2018-11-01 RX ORDER — TRAMADOL HYDROCHLORIDE 50 MG/1
50 TABLET ORAL EVERY 8 HOURS PRN
Status: DISCONTINUED | OUTPATIENT
Start: 2018-11-01 | End: 2018-11-04 | Stop reason: HOSPADM

## 2018-11-01 RX ORDER — FLUTICASONE PROPIONATE 50 MCG
1 SPRAY, SUSPENSION (ML) NASAL DAILY
Status: ON HOLD | COMMUNITY
End: 2018-11-04 | Stop reason: HOSPADM

## 2018-11-01 RX ORDER — DILTIAZEM HCL/D5W 125 MG/125
5 PLASTIC BAG, INJECTION (ML) INTRAVENOUS CONTINUOUS
Status: DISCONTINUED | OUTPATIENT
Start: 2018-11-01 | End: 2018-11-02

## 2018-11-01 RX ORDER — IBUPROFEN 200 MG
16 TABLET ORAL
Status: DISCONTINUED | OUTPATIENT
Start: 2018-11-01 | End: 2018-11-04 | Stop reason: HOSPADM

## 2018-11-01 RX ORDER — SODIUM CHLORIDE 0.9 % (FLUSH) 0.9 %
5 SYRINGE (ML) INJECTION
Status: DISCONTINUED | OUTPATIENT
Start: 2018-11-01 | End: 2018-11-04 | Stop reason: HOSPADM

## 2018-11-01 RX ORDER — IPRATROPIUM BROMIDE AND ALBUTEROL SULFATE 2.5; .5 MG/3ML; MG/3ML
3 SOLUTION RESPIRATORY (INHALATION)
Status: COMPLETED | OUTPATIENT
Start: 2018-11-01 | End: 2018-11-01

## 2018-11-01 RX ORDER — IBUPROFEN 200 MG
24 TABLET ORAL
Status: DISCONTINUED | OUTPATIENT
Start: 2018-11-01 | End: 2018-11-04 | Stop reason: HOSPADM

## 2018-11-01 RX ORDER — FLUTICASONE PROPIONATE 50 MCG
1 SPRAY, SUSPENSION (ML) NASAL DAILY
Status: DISCONTINUED | OUTPATIENT
Start: 2018-11-02 | End: 2018-11-04 | Stop reason: HOSPADM

## 2018-11-01 RX ORDER — FUROSEMIDE 20 MG/1
20 TABLET ORAL DAILY
Status: DISCONTINUED | OUTPATIENT
Start: 2018-11-02 | End: 2018-11-02

## 2018-11-01 RX ORDER — IPRATROPIUM BROMIDE AND ALBUTEROL SULFATE 2.5; .5 MG/3ML; MG/3ML
3 SOLUTION RESPIRATORY (INHALATION) EVERY 6 HOURS
Status: DISCONTINUED | OUTPATIENT
Start: 2018-11-02 | End: 2018-11-02

## 2018-11-01 RX ORDER — CETIRIZINE HYDROCHLORIDE 10 MG/1
10 TABLET ORAL DAILY
Status: DISCONTINUED | OUTPATIENT
Start: 2018-11-02 | End: 2018-11-02

## 2018-11-01 RX ORDER — NOREPINEPHRINE BITARTRATE/D5W 4MG/250ML
0.02 PLASTIC BAG, INJECTION (ML) INTRAVENOUS CONTINUOUS
Status: DISCONTINUED | OUTPATIENT
Start: 2018-11-01 | End: 2018-11-02

## 2018-11-01 RX ORDER — PANTOPRAZOLE SODIUM 40 MG/10ML
80 INJECTION, POWDER, LYOPHILIZED, FOR SOLUTION INTRAVENOUS
Status: COMPLETED | OUTPATIENT
Start: 2018-11-01 | End: 2018-11-01

## 2018-11-01 RX ORDER — ACETAMINOPHEN 325 MG/1
650 TABLET ORAL EVERY 8 HOURS PRN
Status: DISCONTINUED | OUTPATIENT
Start: 2018-11-01 | End: 2018-11-01 | Stop reason: SDUPTHER

## 2018-11-01 RX ORDER — GLUCAGON 1 MG
1 KIT INJECTION
Status: DISCONTINUED | OUTPATIENT
Start: 2018-11-01 | End: 2018-11-01

## 2018-11-01 RX ORDER — IPRATROPIUM BROMIDE AND ALBUTEROL SULFATE 2.5; .5 MG/3ML; MG/3ML
3 SOLUTION RESPIRATORY (INHALATION) EVERY 6 HOURS
Status: DISCONTINUED | OUTPATIENT
Start: 2018-11-01 | End: 2018-11-01 | Stop reason: SDUPTHER

## 2018-11-01 RX ORDER — SIMVASTATIN 5 MG/1
10 TABLET, FILM COATED ORAL NIGHTLY
Status: DISCONTINUED | OUTPATIENT
Start: 2018-11-01 | End: 2018-11-04 | Stop reason: HOSPADM

## 2018-11-01 RX ORDER — BUDESONIDE 0.5 MG/2ML
0.5 INHALANT ORAL EVERY 12 HOURS
Status: DISCONTINUED | OUTPATIENT
Start: 2018-11-02 | End: 2018-11-04 | Stop reason: HOSPADM

## 2018-11-01 RX ORDER — GUAIFENESIN 600 MG/1
1200 TABLET, EXTENDED RELEASE ORAL 2 TIMES DAILY
Status: DISCONTINUED | OUTPATIENT
Start: 2018-11-01 | End: 2018-11-04 | Stop reason: HOSPADM

## 2018-11-01 RX ORDER — ACETAMINOPHEN 325 MG/1
325 TABLET ORAL EVERY 6 HOURS PRN
Status: DISCONTINUED | OUTPATIENT
Start: 2018-11-01 | End: 2018-11-04 | Stop reason: HOSPADM

## 2018-11-01 RX ORDER — PREDNISONE 20 MG/1
20 TABLET ORAL DAILY
Status: DISCONTINUED | OUTPATIENT
Start: 2018-11-02 | End: 2018-11-04 | Stop reason: HOSPADM

## 2018-11-01 RX ORDER — METRONIDAZOLE 500 MG/100ML
500 INJECTION, SOLUTION INTRAVENOUS
Status: DISCONTINUED | OUTPATIENT
Start: 2018-11-01 | End: 2018-11-01

## 2018-11-01 RX ORDER — OLOPATADINE HYDROCHLORIDE 1 MG/ML
1 SOLUTION/ DROPS OPHTHALMIC DAILY
Status: DISCONTINUED | OUTPATIENT
Start: 2018-11-02 | End: 2018-11-04 | Stop reason: HOSPADM

## 2018-11-01 RX ORDER — GLUCAGON 1 MG
1 KIT INJECTION
Status: DISCONTINUED | OUTPATIENT
Start: 2018-11-02 | End: 2018-11-04 | Stop reason: HOSPADM

## 2018-11-01 RX ADMIN — IPRATROPIUM BROMIDE AND ALBUTEROL SULFATE 3 ML: .5; 3 SOLUTION RESPIRATORY (INHALATION) at 03:11

## 2018-11-01 RX ADMIN — SODIUM CHLORIDE 1000 ML: 0.9 INJECTION, SOLUTION INTRAVENOUS at 04:11

## 2018-11-01 RX ADMIN — IPRATROPIUM BROMIDE AND ALBUTEROL SULFATE 3 ML: .5; 3 SOLUTION RESPIRATORY (INHALATION) at 07:11

## 2018-11-01 RX ADMIN — SODIUM CHLORIDE: 0.9 INJECTION, SOLUTION INTRAVENOUS at 08:11

## 2018-11-01 RX ADMIN — Medication 0.02 MCG/KG/MIN: at 09:11

## 2018-11-01 RX ADMIN — GUAIFENESIN 1200 MG: 600 TABLET, EXTENDED RELEASE ORAL at 11:11

## 2018-11-01 RX ADMIN — DILTIAZEM HYDROCHLORIDE 5 MG/HR: 5 INJECTION INTRAVENOUS at 09:11

## 2018-11-01 RX ADMIN — METRONIDAZOLE 500 MG: 500 INJECTION, SOLUTION INTRAVENOUS at 06:11

## 2018-11-01 RX ADMIN — VANCOMYCIN HYDROCHLORIDE 1250 MG: 10 INJECTION, POWDER, LYOPHILIZED, FOR SOLUTION INTRAVENOUS at 08:11

## 2018-11-01 RX ADMIN — PIPERACILLIN AND TAZOBACTAM 4.5 G: 4; .5 INJECTION, POWDER, LYOPHILIZED, FOR SOLUTION INTRAVENOUS; PARENTERAL at 04:11

## 2018-11-01 RX ADMIN — SODIUM CHLORIDE 1000 ML: 0.9 INJECTION, SOLUTION INTRAVENOUS at 02:11

## 2018-11-01 RX ADMIN — SIMVASTATIN 10 MG: 5 TABLET, FILM COATED ORAL at 11:11

## 2018-11-01 RX ADMIN — PANTOPRAZOLE SODIUM 80 MG: 40 INJECTION, POWDER, FOR SOLUTION INTRAVENOUS at 02:11

## 2018-11-01 NOTE — HPI
Petrona Gonzalez is a 77 y.o. [facility] patient female patient with PMHx of COPD, chronic O2, DM, HLD, HTN, who presented to the ER for c/o blood in stool and SOB which onset gradually past week. Symptoms are constant and moderate in severity. No mitigating or exacerbating factors reported. Associated sxs included diarrhea one day only 10/24/18 following a flu shot, and hematochezia. A stool specimen was sent from the Nursing Home to the lab: specimen was solid so it wasn't tested for C. DIff. Pt's daughter also notes the pt's steady weight loss of 30 pounds over the last month, she states her dentures were poor fitting and she wasn't eating much. Dentures have now has been fixed. Pt also has a hernia on her R abdomen, which she is concerned about obstructing. Patient denies any fever, chills, constipation, dysuria, hematuria, urinary frequency, nausea, vomiting and all other sxs at this time. Pt is on Xarelto 20 mg for atrial fibrillation. Pt was on abx for 5 days for tx of a partially collapsed lung, as noted by pt's daughter. In ER, Lactic acid 4.8, H/H 7.9/27.6, Creatinine 2.4, WBC 20K, Urinalysis showed positive nitrate. And she was given 1.6 liters fluid on sepsis protocol based on ideal body weight of 115#. She has edema to lower extremities. Her sister at bedside states patient was told she couldn't have a colonoscopy due to high risk for anesthesia and she couldn't tolerate the bowel prep. She is a full code. Her sister, Bonnie Carmichael, is the surrogate decision maker. She is admitted for acute bronchitis and UTI to Observation.

## 2018-11-01 NOTE — SUBJECTIVE & OBJECTIVE
Past Medical History:   Diagnosis Date    COPD (chronic obstructive pulmonary disease) with emphysema     Diabetes mellitus     Hyperlipidemia     Hypertension     Insomnia     Maxillary sinusitis, chronic        Past Surgical History:   Procedure Laterality Date    BACK SURGERY      HERNIA REPAIR      HYSTERECTOMY         Review of patient's allergies indicates:   Allergen Reactions    Meloxicam Other (See Comments)      Patient stated that she has muscle aches and pains    Morphine Rash    Naproxen Other (See Comments)     Patient states she has muscle and aches.    Pulmicort [budesonide] Other (See Comments)     Burning in chest       No current facility-administered medications on file prior to encounter.      Current Outpatient Medications on File Prior to Encounter   Medication Sig    acetaminophen (TYLENOL) 325 MG tablet Take 325 mg by mouth every 6 (six) hours as needed for Pain.    albuterol sulfate (VENTOLIN INHL) Inhale into the lungs.    albuterol-ipratropium (DUO-NEB) 2.5 mg-0.5 mg/3 mL nebulizer solution Take 3 mLs by nebulization every 6 (six) hours as needed for Wheezing. Rescue    alprazolam (XANAX) 0.5 MG tablet Take 0.5 mg by mouth every 6 (six) hours as needed for Anxiety.    diltiaZEM (CARDIZEM) 90 MG tablet Take 1 tablet (90 mg total) by mouth every 8 (eight) hours.    fluticasone (FLONASE) 50 mcg/actuation nasal spray 1 spray by Each Nare route once daily.    levocetirizine (XYZAL) 5 MG tablet Take 5 mg by mouth every evening.    lisinopril (PRINIVIL,ZESTRIL) 5 MG tablet Take 1 tablet (5 mg total) by mouth once daily.    loratadine (CLARITIN) 10 mg tablet Take 10 mg by mouth once daily.    metformin (GLUCOPHAGE) 500 MG tablet Take 1 tablet (500 mg total) by mouth 2 (two) times daily with meals.    NOVOLIN R REGULAR U-100 INSULN 100 unit/mL injection     olopatadine (PAZEO) 0.7 % Drop Apply 1 drop to eye once daily.    ondansetron (ZOFRAN) 4 MG tablet Take 4 mg by  mouth every 6 (six) hours as needed for Nausea.    OXYGEN-AIR DELIVERY SYSTEMS MISC by [facility].(Non-Drug; Combo Route) route.    pantoprazole (PROTONIX) 40 MG tablet Take 40 mg by mouth once daily.    predniSONE (DELTASONE) 20 MG tablet     rivaroxaban (XARELTO) 20 mg Tab Take 20 mg by mouth daily with dinner or evening meal.    roflumilast 500 mcg Tab Take 1 tablet (500 mcg total) by mouth once daily.    simvastatin (ZOCOR) 10 MG tablet Take 1 tablet (10 mg total) by mouth every evening.    traMADol (ULTRAM) 50 mg tablet     umeclidinium (INCRUSE ELLIPTA) 62.5 mcg/actuation DsDv Inhale into the lungs once daily. Controller    budesonide-formoterol 160-4.5 mcg (SYMBICORT) 160-4.5 mcg/actuation HFAA Inhale 2 puffs into the lungs every 12 (twelve) hours. Controller    cyclobenzaprine (FLEXERIL) 10 MG tablet Take 10 mg by mouth 3 (three) times daily as needed for Muscle spasms.    furosemide (LASIX) 20 MG tablet     guaiFENesin (MUCINEX) 600 mg 12 hr tablet Take 1,200 mg by mouth 2 (two) times daily.     Family History     Problem Relation (Age of Onset)    Arthritis Father    Cancer Father, Sister, Brother    Early death Sister    Hearing loss Father    Heart disease Brother        Tobacco Use    Smoking status: Former Smoker     Packs/day: 1.00     Years: 30.00     Pack years: 30.00    Smokeless tobacco: Never Used   Substance and Sexual Activity    Alcohol use: No    Drug use: No    Sexual activity: Not Currently     Review of Systems   Constitutional: Negative.  Negative for appetite change, chills, fatigue and fever.   HENT: Negative.  Negative for congestion, ear discharge, facial swelling, sore throat and trouble swallowing.    Eyes: Negative.  Negative for photophobia, pain, discharge, redness and visual disturbance.   Respiratory: Positive for cough (positive green phlegm) and shortness of breath. Negative for chest tightness, wheezing and stridor.    Cardiovascular: Negative.  Negative for chest  pain, palpitations and leg swelling.   Gastrointestinal: Positive for blood in stool. Negative for abdominal distention, abdominal pain, anal bleeding, constipation, diarrhea, nausea, rectal pain and vomiting.   Endocrine: Negative.  Negative for cold intolerance, heat intolerance, polydipsia, polyphagia and polyuria.   Genitourinary: Negative.  Negative for difficulty urinating, dysuria, flank pain, frequency, hematuria, pelvic pain, urgency, vaginal bleeding and vaginal discharge.   Musculoskeletal: Negative.  Negative for arthralgias, back pain, gait problem, joint swelling, myalgias and neck pain.   Skin: Negative for color change, pallor, rash and wound.   Allergic/Immunologic: Negative.  Negative for food allergies and immunocompromised state.   Neurological: Negative for dizziness, tremors, seizures, syncope, facial asymmetry, speech difficulty, weakness, light-headedness, numbness and headaches.   Hematological: Negative.  Negative for adenopathy. Does not bruise/bleed easily.   Psychiatric/Behavioral: Positive for dysphoric mood. Negative for agitation, confusion, hallucinations, sleep disturbance and suicidal ideas. The patient is not nervous/anxious.    All other systems reviewed and are negative.    Objective:     Vital Signs (Most Recent):  Temp: 98.5 °F (36.9 °C) (11/01/18 1318)  Pulse: (!) 111 (11/01/18 1850)  Resp: (!) 38 (11/01/18 1616)  BP: (!) 98/47 (11/01/18 1841)  SpO2: (!) 90 % (11/01/18 1841) Vital Signs (24h Range):  Temp:  [98.5 °F (36.9 °C)] 98.5 °F (36.9 °C)  Pulse:  [100-116] 111  Resp:  [19-38] 38  SpO2:  [90 %-100 %] 90 %  BP: ()/(46-90) 98/47     Weight: 74.8 kg (165 lb)  Body mass index is 29.23 kg/m².    Physical Exam   Constitutional: She is oriented to person, place, and time. She appears well-developed and well-nourished. No distress.   HENT:   Head: Normocephalic and atraumatic.   Nose: Nose normal.   Eyes: Conjunctivae and EOM are normal. Pupils are equal, round, and  reactive to light. Right eye exhibits no discharge. Left eye exhibits no discharge.   Neck: Normal range of motion. Neck supple. No JVD present. No tracheal deviation present. No thyromegaly present.   Cardiovascular: Normal heart sounds. Exam reveals no gallop and no friction rub.   No murmur heard.  Irregular, irregular   Pulmonary/Chest: No stridor. No respiratory distress. She has no wheezes. She has rales. She exhibits no tenderness.   SOB while talking   Abdominal: Soft. Bowel sounds are normal. She exhibits no distension and no mass. There is no tenderness. There is no guarding.   Small round hernia near umbilicus   Musculoskeletal: Normal range of motion. She exhibits no edema, tenderness or deformity.   Lymphadenopathy:     She has no cervical adenopathy.   Neurological: She is alert and oriented to person, place, and time. She has normal reflexes. No cranial nerve deficit. Coordination normal.   Skin: Skin is warm and dry. No rash noted. She is not diaphoretic. No erythema. No pallor.   Psychiatric: She has a normal mood and affect. Her behavior is normal. Judgment and thought content normal.   Nursing note and vitals reviewed.        CRANIAL NERVES     CN III, IV, VI   Pupils are equal, round, and reactive to light.  Extraocular motions are normal.      Significant Labs:   CBC:   Recent Labs   Lab 11/01/18  1339   WBC 20.26*   HGB 7.9*   HCT 27.6*        CMP:   Recent Labs   Lab 11/01/18  1339      K 3.8   CL 90*   CO2 30*   *   BUN 52*   CREATININE 2.4*   CALCIUM 9.2   PROT 6.7   ALBUMIN 3.4*   BILITOT 0.3   ALKPHOS 45*   AST 20   ALT 20   ANIONGAP 17*   EGFRNONAA 19*     Cardiac Markers: No results for input(s): CKMB, MYOGLOBIN, BNP, TROPISTAT in the last 48 hours.    Significant Imaging: I have reviewed all pertinent imaging results/findings within the past 24 hours.   Imaging Results          CT Abdomen Pelvis  Without Contrast (Final result)  Result time 11/01/18 15:04:59    Procedure changed from CT Abdomen Pelvis With Contrast     Final result by Tuan Zaidi MD (11/01/18 15:04:59)                 Impression:      No acute abnormality. Moderate volume stool right colon.  Otherwise small bowel and colon are unremarkable.  No free fluid, free air, or obstruction.    Multiple nonobstructing right-sided renal stones, the largest at the interpolar region measuring 6 mm. No hydronephrosis.  No left-sided renal stones.      Electronically signed by: Tuan Zaidi  Date:    11/01/2018  Time:    15:04             Narrative:    EXAMINATION:  CT ABDOMEN PELVIS WITHOUT CONTRAST    CLINICAL HISTORY:  GI bleeding;    TECHNIQUE:  Low dose axial images, sagittal and coronal reformations were obtained from the lung bases to the pubic symphysis without IV or oral contrast.    COMPARISON:  None.    FINDINGS:  ABDOMEN:    - Lung bases: No infiltrates and no nodules.  Chronic right middle lobe proximal segmental atelectasis.  Aortic and coronary artery atherosclerotic calcifications.  Left hilar calcified granuloma.    - Liver: No focal mass.    - Gallbladder: Surgically absent.    - Bile Ducts: No evidence of intra or extra hepatic biliary ductal dilation.    - Spleen: Negative.    - Kidneys: Multiple nonobstructing right-sided renal stones, the largest at the interpolar region measuring 6 mm.  No hydronephrosis.  No left-sided renal stones.  Bladder unremarkable.    - Adrenals: Unremarkable.    - Pancreas: No mass or peripancreatic fat stranding.    - Retroperitoneum:  No significant adenopathy.    - Vascular: Aortoiliac atherosclerotic calcification.    - Abdominal wall:  Fat containing umbilical hernia protruding through a 2.7 cm wide neck, uncomplicated.    PELVIS:    Uterus is surgically absent.  No adnexal mass.    BOWEL/MESENTERY:    Moderate volume stool right colon.  Otherwise small bowel and colon are unremarkable.    BONES:  No acute osseous abnormality and no suspicious lytic or blastic  lesion.  Cysts degenerative changes L5-S1, mild to moderate facet arthropathy lower lumbar spine.  Degenerative changes bilateral hip joints, moderate, with articular surface sclerosis.    All CT scans at this facility use dose modulation, iterative reconstruction and/or weight based dosing when appropriate to reduce radiation dose to as low as reasonably achievable.                               X-Ray Chest 1 View (Final result)  Result time 11/01/18 14:25:00    Final result by Tuan Zaidi MD (11/01/18 14:25:00)                 Impression:      There is likely a component of chronic right middle lobe subsegmental atelectasis.Interval development of ill-defined 2.3 cm faint opacification overlying the right suprahilar region, may represent calcifying right 4th rib head anteriorly.  Recommend further evaluation with chest CT on a nonemergent basis or as clinically warranted.  Otherwise lungs are clear.      Electronically signed by: Tuan Zaidi  Date:    11/01/2018  Time:    14:25             Narrative:    EXAMINATION:  XR CHEST 1 VIEW    CLINICAL HISTORY:  . Cough    TECHNIQUE:  Single frontal portable view of the chest was performed.    COMPARISON:  04/04/2018, chest CT 12/21/2017    FINDINGS:  Support devices: Cardiac leads    Unchanged prominent right pericardial fat pad.  There is likely a component of chronic right middle lobe subsegmental atelectasis.Interval development of ill-defined 2.3 cm faint opacification overlying the right suprahilar region, may represent calcifying right 4th rib head anteriorly.  Recommend further evaluation with chest CT on a nonemergent basis.  Otherwise lungs are clear.    The cardiac silhouette is mildly enlarged, similar to prior studies.  The hilar and mediastinal contours are unremarkable.    Bones are intact.

## 2018-11-01 NOTE — ED PROVIDER NOTES
SCRIBE #1 NOTE: I, Hola Lancaster, am scribing for, and in the presence of, Jose D Renteria Jr., MD. I have scribed the entire note.      History      Chief Complaint   Patient presents with    Abdominal Pain     sent from [facility] for positive occult and sob.     GI Bleeding       Review of patient's allergies indicates:   Allergen Reactions    Meloxicam Other (See Comments)      Patient stated that she has muscle aches and pains    Morphine Rash    Naproxen Other (See Comments)     Patient states she has muscle and aches.    Pulmicort [budesonide] Other (See Comments)     Burning in chest        HPI   HPI    11/1/2018, 1:23 PM   History obtained from the daughter and patient      History of Present Illness: Petrona Gonzalez is a 77 y.o. female patient with PMHx of COPD, DM, HLD, and HTN who presents to the Emergency Department for abdominal pain which onset gradually about 3 weeks ago. Symptoms are constant and moderate in severity. No mitigating or exacerbating factors reported. Associated sxs include diarrhea, on-setting about 3-4 weeks ago, and hematochezia. Pt's daughter also notes the pt's steady weight loss of 30 pounds over the last month. Pt also has a hernia on her R abdomen, which she is concerned about obstructing. Patient denies any fever, chills, constipation, dysuria, hematuria, urinary frequency, nausea, vomiting and all other sxs at this time. Pt is on Xarelto 20 mg. Pt has also been on abx for 5 days for tx of a partially collapsed lung, as noted by pt's daughter. No further complaints or concerns at this time.     Arrival mode:  EMS     PCP: Primary Doctor No       Past Medical History:  Past Medical History:   Diagnosis Date    COPD (chronic obstructive pulmonary disease) with emphysema     Diabetes mellitus     Hyperlipidemia     Hypertension     Insomnia     Maxillary sinusitis, chronic        Past Surgical History:  Past Surgical History:   Procedure Laterality Date    BACK SURGERY       HERNIA REPAIR      HYSTERECTOMY           Family History:  Family History   Problem Relation Age of Onset    Arthritis Father     Cancer Father     Hearing loss Father     Cancer Sister     Early death Sister     Cancer Brother     Heart disease Brother        Social History:  Social History     Tobacco Use    Smoking status: Former Smoker     Years: 30.00    Smokeless tobacco: Never Used   Substance and Sexual Activity    Alcohol use: No    Drug use: No    Sexual activity: Not Currently       ROS   Review of Systems   Constitutional: Positive for unexpected weight change. Negative for chills and fever.   HENT: Negative for sore throat.    Respiratory: Negative for shortness of breath.    Cardiovascular: Negative for chest pain.   Gastrointestinal: Positive for abdominal pain, blood in stool and diarrhea. Negative for constipation, nausea and vomiting.   Genitourinary: Negative for dysuria, frequency and hematuria.   Musculoskeletal: Negative for back pain.   Skin: Negative for rash.   Neurological: Negative for weakness.   Hematological: Does not bruise/bleed easily.   All other systems reviewed and are negative.      Physical Exam      Initial Vitals [11/01/18 1318]   BP Pulse Resp Temp SpO2   (!) 101/46 107 (!) 26 98.5 °F (36.9 °C) 97 %      MAP       --          Physical Exam  Nursing Notes and Vital Signs Reviewed.  Constitutional: Patient is uncomfortable appearing. Well-developed and well-nourished.  Head: Atraumatic. Normocephalic.  Eyes: PERRL. EOM intact. Conjunctivae are not pale. No scleral icterus.  ENT: Mucous membranes are moist. Oropharynx is clear and symmetric.    Neck: Supple. Full ROM. No lymphadenopathy.  Cardiovascular: Regular rate. Regular rhythm. No murmurs, rubs, or gallops. Distal pulses are 2+ and symmetric.  Pulmonary/Chest: No respiratory distress. Clear to auscultation bilaterally. No wheezing or rales.  Abdominal: Soft and non-distended.  There is no tenderness.  No  "rebound, guarding, or rigidity. Good bowel sounds. Large R ventral hernia. Easily reproducible and non-tender.   Rectal:  No tenderness.  No masses.  No hemorrhoids.  Normal sphincter tone. Stool color is brown. Female chaperone present.   Genitourinary: No CVA tenderness  Musculoskeletal: Moves all extremities. No obvious deformities. No edema. No calf tenderness.  Skin: Warm and dry.  Neurological:  Alert, awake, and appropriate.  Normal speech.  No acute focal neurological deficits are appreciated.  Psychiatric: Normal affect. Good eye contact. Appropriate in content.      ED Course    Procedures  ED Vital Signs:  Vitals:    11/01/18 1318   BP: (!) 101/46   Pulse: 107   Resp: (!) 26   Temp: 98.5 °F (36.9 °C)   TempSrc: Oral   SpO2: 97%   Weight: 74.8 kg (165 lb)   Height: 5' 3" (1.6 m)       Abnormal Lab Results:  Labs Reviewed   CBC W/ AUTO DIFFERENTIAL - Abnormal; Notable for the following components:       Result Value    WBC 20.26 (*)     RBC 3.33 (*)     Hemoglobin 7.9 (*)     Hematocrit 27.6 (*)     MCH 23.7 (*)     MCHC 28.6 (*)     RDW 21.0 (*)     MPV 8.2 (*)     Gran # (ANC) 18.9 (*)     Lymph # 0.9 (*)     Gran% 95.2 (*)     Lymph% 4.6 (*)     Mono% 2.0 (*)     All other components within normal limits   COMPREHENSIVE METABOLIC PANEL - Abnormal; Notable for the following components:    Chloride 90 (*)     CO2 30 (*)     Glucose 241 (*)     BUN, Bld 52 (*)     Creatinine 2.4 (*)     Albumin 3.4 (*)     Alkaline Phosphatase 45 (*)     Anion Gap 17 (*)     eGFR if  22 (*)     eGFR if non  19 (*)     All other components within normal limits   LIPASE   APTT   PROTIME-INR   OCCULT BLOOD X 1, STOOL   URINALYSIS, REFLEX TO URINE CULTURE   TROPONIN I   URINALYSIS MICROSCOPIC        All Lab Results:  Results for orders placed or performed during the hospital encounter of 11/01/18   CBC auto differential   Result Value Ref Range    WBC 20.26 (H) 3.90 - 12.70 K/uL    RBC 3.33 (L) " 4.00 - 5.40 M/uL    Hemoglobin 7.9 (L) 12.0 - 16.0 g/dL    Hematocrit 27.6 (L) 37.0 - 48.5 %    MCV 83 82 - 98 fL    MCH 23.7 (L) 27.0 - 31.0 pg    MCHC 28.6 (L) 32.0 - 36.0 g/dL    RDW 21.0 (H) 11.5 - 14.5 %    Platelets 327 150 - 350 K/uL    MPV 8.2 (L) 9.2 - 12.9 fL    Gran # (ANC) 18.9 (H) 1.8 - 7.7 K/uL    Lymph # 0.9 (L) 1.0 - 4.8 K/uL    Mono # 0.4 0.3 - 1.0 K/uL    Eos # 0.0 0.0 - 0.5 K/uL    Baso # 0.02 0.00 - 0.20 K/uL    Gran% 95.2 (H) 38.0 - 73.0 %    Lymph% 4.6 (L) 18.0 - 48.0 %    Mono% 2.0 (L) 4.0 - 15.0 %    Eosinophil% 0.0 0.0 - 8.0 %    Basophil% 0.1 0.0 - 1.9 %    Differential Method Automated    Comprehensive metabolic panel   Result Value Ref Range    Sodium 137 136 - 145 mmol/L    Potassium 3.8 3.5 - 5.1 mmol/L    Chloride 90 (L) 95 - 110 mmol/L    CO2 30 (H) 23 - 29 mmol/L    Glucose 241 (H) 70 - 110 mg/dL    BUN, Bld 52 (H) 8 - 23 mg/dL    Creatinine 2.4 (H) 0.5 - 1.4 mg/dL    Calcium 9.2 8.7 - 10.5 mg/dL    Total Protein 6.7 6.0 - 8.4 g/dL    Albumin 3.4 (L) 3.5 - 5.2 g/dL    Total Bilirubin 0.3 0.1 - 1.0 mg/dL    Alkaline Phosphatase 45 (L) 55 - 135 U/L    AST 20 10 - 40 U/L    ALT 20 10 - 44 U/L    Anion Gap 17 (H) 8 - 16 mmol/L    eGFR if African American 22 (A) >60 mL/min/1.73 m^2    eGFR if non African American 19 (A) >60 mL/min/1.73 m^2   Lipase   Result Value Ref Range    Lipase 40 4 - 60 U/L   APTT   Result Value Ref Range    aPTT 27.9 21.0 - 32.0 sec   Protime-INR   Result Value Ref Range    Prothrombin Time 11.1 9.0 - 12.5 sec    INR 1.1 0.8 - 1.2   Occult blood x 1, stool   Result Value Ref Range    Occult Blood Negative Negative         Imaging Results:  Imaging Results          X-Ray Chest 1 View (In process)                The EKG was ordered, reviewed, and independently interpreted by the ED provider.  Interpretation time: 13:25  Rate: 118 BPM  Rhythm: atrial fibrillation with rapid ventricular response  Interpretation: Left axis deviation. Low voltage QRS. Septal infarct, age  undetermined. Abnormal ECG. No STEMI.             The Emergency Provider reviewed the vital signs and test results, which are outlined above.    ED Discussion     3:24 PM  Patient is short of breath worse from baseline with symptomatic anemia and COPD.  She is heme-negative stools if she is having diarrhea and her blood pressures dropped a bit since she has been here.  Will admit lighter cor morbidities.    4:03 PM: Re-evaluated pt. I have discussed test results, shared treatment plan, and the need for admission with patient and family at bedside. Pt and family express understanding at this time and agree with all information. All questions answered. Pt and family have no further questions or concerns at this time. Pt is ready for admit.    4:02 PM: Discussed case with ADRI Sanders (Hospital Medicine). Dr. Crawley agrees with current care and management of pt and accepts admission.   Admitting Service: Hospital medicine   Admitting Physician: Dr. Crawley  Admit to: Obs Tele         ED Medication(s):  Medications   sodium chloride 0.9% bolus 1,000 mL (not administered)   pantoprazole injection 80 mg (not administered)             Medical Decision Making    Medical Decision Making:   Clinical Tests:   Lab Tests: Ordered and Reviewed  Radiological Study: Ordered and Reviewed  Medical Tests: Ordered and Reviewed           Scribe Attestation:   Scribe #1: I performed the above scribed service and the documentation accurately describes the services I performed. I attest to the accuracy of the note.    Attending:   Physician Attestation Statement for Scribe #1: I, Jose D Renteria Jr., MD, personally performed the services described in this documentation, as scribed by Hola Lancaster, in my presence, and it is both accurate and complete.          Clinical Impression       ICD-10-CM ICD-9-CM   1. Abdominal pain R10.9 789.00   2. GI bleed K92.2 578.9   3. Cough R05 786.2       Disposition:   Disposition: Placed in  Observation  Condition: Jean Renteria Jr., MD  11/04/18 2004

## 2018-11-02 DIAGNOSIS — J44.9 STAGE 3 SEVERE COPD BY GOLD CLASSIFICATION: Primary | Chronic | ICD-10-CM

## 2018-11-02 PROBLEM — R57.9 SHOCK, UNSPECIFIED: Status: ACTIVE | Noted: 2018-11-01

## 2018-11-02 PROBLEM — E44.0 MODERATE MALNUTRITION: Status: ACTIVE | Noted: 2018-11-02

## 2018-11-02 PROBLEM — N17.9 AKI (ACUTE KIDNEY INJURY): Status: ACTIVE | Noted: 2018-11-02

## 2018-11-02 PROBLEM — E78.5 TYPE 2 DIABETES MELLITUS WITH HYPERLIPIDEMIA: Chronic | Status: ACTIVE | Noted: 2017-02-13

## 2018-11-02 PROBLEM — K59.02 CONSTIPATION DUE TO OUTLET DYSFUNCTION: Status: ACTIVE | Noted: 2018-11-02

## 2018-11-02 PROBLEM — E11.69 TYPE 2 DIABETES MELLITUS WITH HYPERLIPIDEMIA: Chronic | Status: ACTIVE | Noted: 2017-02-13

## 2018-11-02 PROBLEM — K92.1 GASTROINTESTINAL HEMORRHAGE WITH MELENA: Chronic | Status: ACTIVE | Noted: 2018-11-01

## 2018-11-02 PROBLEM — E87.6 HYPOKALEMIA: Status: ACTIVE | Noted: 2018-11-02

## 2018-11-02 PROBLEM — I48.91 ATRIAL FIBRILLATION WITH RVR: Chronic | Status: ACTIVE | Noted: 2017-06-14

## 2018-11-02 LAB
ABO + RH BLD: NORMAL
ALBUMIN SERPL BCP-MCNC: 3.1 G/DL
ALP SERPL-CCNC: 38 U/L
ALT SERPL W/O P-5'-P-CCNC: 29 U/L
ANION GAP SERPL CALC-SCNC: 12 MMOL/L
ANISOCYTOSIS BLD QL SMEAR: SLIGHT
AST SERPL-CCNC: 21 U/L
BASOPHILS # BLD AUTO: 0.03 K/UL
BASOPHILS NFR BLD: 0.2 %
BILIRUB SERPL-MCNC: 0.4 MG/DL
BLD GP AB SCN CELLS X3 SERPL QL: NORMAL
BUN SERPL-MCNC: 32 MG/DL
CALCIUM SERPL-MCNC: 8.3 MG/DL
CHLORIDE SERPL-SCNC: 100 MMOL/L
CO2 SERPL-SCNC: 28 MMOL/L
CREAT SERPL-MCNC: 1.5 MG/DL
DACRYOCYTES BLD QL SMEAR: ABNORMAL
DIFFERENTIAL METHOD: ABNORMAL
EOSINOPHIL # BLD AUTO: 0 K/UL
EOSINOPHIL NFR BLD: 0 %
ERYTHROCYTE [DISTWIDTH] IN BLOOD BY AUTOMATED COUNT: 21 %
EST. GFR  (AFRICAN AMERICAN): 39 ML/MIN/1.73 M^2
EST. GFR  (NON AFRICAN AMERICAN): 34 ML/MIN/1.73 M^2
ESTIMATED AVG GLUCOSE: 131 MG/DL
GLUCOSE SERPL-MCNC: 219 MG/DL
HBA1C MFR BLD HPLC: 6.2 %
HCT VFR BLD AUTO: 24.4 %
HGB BLD-MCNC: 7.1 G/DL
HYPOCHROMIA BLD QL SMEAR: ABNORMAL
LACTATE SERPL-SCNC: 2 MMOL/L
LACTATE SERPL-SCNC: 2.7 MMOL/L
LYMPHOCYTES # BLD AUTO: 1.6 K/UL
LYMPHOCYTES NFR BLD: 9.1 %
MAGNESIUM SERPL-MCNC: 1.9 MG/DL
MCH RBC QN AUTO: 23.8 PG
MCHC RBC AUTO-ENTMCNC: 29.1 G/DL
MCV RBC AUTO: 82 FL
MONOCYTES # BLD AUTO: 1.1 K/UL
MONOCYTES NFR BLD: 6.2 %
NEUTROPHILS # BLD AUTO: 15 K/UL
NEUTROPHILS NFR BLD: 86.6 %
OVALOCYTES BLD QL SMEAR: ABNORMAL
PLATELET # BLD AUTO: 314 K/UL
PLATELET BLD QL SMEAR: ABNORMAL
PMV BLD AUTO: 8 FL
POCT GLUCOSE: 160 MG/DL (ref 70–110)
POCT GLUCOSE: 163 MG/DL (ref 70–110)
POCT GLUCOSE: 168 MG/DL (ref 70–110)
POIKILOCYTOSIS BLD QL SMEAR: SLIGHT
POLYCHROMASIA BLD QL SMEAR: ABNORMAL
POTASSIUM SERPL-SCNC: 3.3 MMOL/L
PROT SERPL-MCNC: 5.8 G/DL
RBC # BLD AUTO: 2.98 M/UL
SODIUM SERPL-SCNC: 140 MMOL/L
SPHEROCYTES BLD QL SMEAR: ABNORMAL
VANCOMYCIN SERPL-MCNC: 10 UG/ML
WBC # BLD AUTO: 17.72 K/UL

## 2018-11-02 PROCEDURE — 25000003 PHARM REV CODE 250: Performed by: EMERGENCY MEDICINE

## 2018-11-02 PROCEDURE — 86901 BLOOD TYPING SEROLOGIC RH(D): CPT

## 2018-11-02 PROCEDURE — 99291 CRITICAL CARE FIRST HOUR: CPT | Mod: ,,, | Performed by: NURSE PRACTITIONER

## 2018-11-02 PROCEDURE — 25000003 PHARM REV CODE 250: Performed by: INTERNAL MEDICINE

## 2018-11-02 PROCEDURE — 86920 COMPATIBILITY TEST SPIN: CPT

## 2018-11-02 PROCEDURE — 63600175 PHARM REV CODE 636 W HCPCS: Performed by: NURSE PRACTITIONER

## 2018-11-02 PROCEDURE — 83735 ASSAY OF MAGNESIUM: CPT

## 2018-11-02 PROCEDURE — P9016 RBC LEUKOCYTES REDUCED: HCPCS

## 2018-11-02 PROCEDURE — 85025 COMPLETE CBC W/AUTO DIFF WBC: CPT

## 2018-11-02 PROCEDURE — 25000242 PHARM REV CODE 250 ALT 637 W/ HCPCS: Performed by: NURSE PRACTITIONER

## 2018-11-02 PROCEDURE — 94640 AIRWAY INHALATION TREATMENT: CPT

## 2018-11-02 PROCEDURE — 63600175 PHARM REV CODE 636 W HCPCS: Performed by: EMERGENCY MEDICINE

## 2018-11-02 PROCEDURE — 25000003 PHARM REV CODE 250: Performed by: NURSE PRACTITIONER

## 2018-11-02 PROCEDURE — 97802 MEDICAL NUTRITION INDIV IN: CPT

## 2018-11-02 PROCEDURE — 83036 HEMOGLOBIN GLYCOSYLATED A1C: CPT

## 2018-11-02 PROCEDURE — 27000221 HC OXYGEN, UP TO 24 HOURS

## 2018-11-02 PROCEDURE — 20000000 HC ICU ROOM

## 2018-11-02 PROCEDURE — 83605 ASSAY OF LACTIC ACID: CPT

## 2018-11-02 PROCEDURE — 36430 TRANSFUSION BLD/BLD COMPNT: CPT

## 2018-11-02 PROCEDURE — 80202 ASSAY OF VANCOMYCIN: CPT

## 2018-11-02 PROCEDURE — 80053 COMPREHEN METABOLIC PANEL: CPT

## 2018-11-02 RX ORDER — POLYETHYLENE GLYCOL 3350 17 G/17G
17 POWDER, FOR SOLUTION ORAL DAILY
Status: DISCONTINUED | OUTPATIENT
Start: 2018-11-02 | End: 2018-11-04 | Stop reason: HOSPADM

## 2018-11-02 RX ORDER — DILTIAZEM HCL 1 MG/ML
5 INJECTION, SOLUTION INTRAVENOUS CONTINUOUS
Status: DISCONTINUED | OUTPATIENT
Start: 2018-11-02 | End: 2018-11-02

## 2018-11-02 RX ORDER — SYRING-NEEDL,DISP,INSUL,0.3 ML 29 G X1/2"
296 SYRINGE, EMPTY DISPOSABLE MISCELLANEOUS
Status: COMPLETED | OUTPATIENT
Start: 2018-11-02 | End: 2018-11-02

## 2018-11-02 RX ORDER — POTASSIUM CHLORIDE 29.8 MG/ML
40 INJECTION INTRAVENOUS ONCE
Status: COMPLETED | OUTPATIENT
Start: 2018-11-02 | End: 2018-11-02

## 2018-11-02 RX ORDER — DILTIAZEM HYDROCHLORIDE 60 MG/1
60 TABLET, FILM COATED ORAL EVERY 8 HOURS
Status: DISCONTINUED | OUTPATIENT
Start: 2018-11-02 | End: 2018-11-03

## 2018-11-02 RX ORDER — PSEUDOEPHEDRINE/ACETAMINOPHEN 30MG-500MG
100 TABLET ORAL
Status: COMPLETED | OUTPATIENT
Start: 2018-11-02 | End: 2018-11-02

## 2018-11-02 RX ORDER — HYDROCODONE BITARTRATE AND ACETAMINOPHEN 500; 5 MG/1; MG/1
TABLET ORAL
Status: DISCONTINUED | OUTPATIENT
Start: 2018-11-02 | End: 2018-11-03

## 2018-11-02 RX ORDER — SODIUM CHLORIDE 9 MG/ML
INJECTION, SOLUTION INTRAVENOUS CONTINUOUS
Status: DISCONTINUED | OUTPATIENT
Start: 2018-11-01 | End: 2018-11-04

## 2018-11-02 RX ORDER — INSULIN ASPART 100 [IU]/ML
1-10 INJECTION, SOLUTION INTRAVENOUS; SUBCUTANEOUS
Status: DISCONTINUED | OUTPATIENT
Start: 2018-11-02 | End: 2018-11-04 | Stop reason: HOSPADM

## 2018-11-02 RX ORDER — ARFORMOTEROL TARTRATE 15 UG/2ML
15 SOLUTION RESPIRATORY (INHALATION) EVERY 12 HOURS
Status: DISCONTINUED | OUTPATIENT
Start: 2018-11-02 | End: 2018-11-04 | Stop reason: HOSPADM

## 2018-11-02 RX ORDER — ALPRAZOLAM 0.25 MG/1
0.25 TABLET ORAL ONCE
Status: COMPLETED | OUTPATIENT
Start: 2018-11-02 | End: 2018-11-02

## 2018-11-02 RX ADMIN — DILTIAZEM HYDROCHLORIDE 60 MG: 60 TABLET, FILM COATED ORAL at 12:11

## 2018-11-02 RX ADMIN — CETIRIZINE HYDROCHLORIDE 10 MG: 10 TABLET, FILM COATED ORAL at 09:11

## 2018-11-02 RX ADMIN — BUDESONIDE 0.5 MG: 0.5 SUSPENSION RESPIRATORY (INHALATION) at 07:11

## 2018-11-02 RX ADMIN — ARFORMOTEROL TARTRATE 15 MCG: 15 SOLUTION RESPIRATORY (INHALATION) at 07:11

## 2018-11-02 RX ADMIN — SODIUM CHLORIDE 500 ML: 0.9 INJECTION, SOLUTION INTRAVENOUS at 10:11

## 2018-11-02 RX ADMIN — INSULIN ASPART 1 UNITS: 100 INJECTION, SOLUTION INTRAVENOUS; SUBCUTANEOUS at 09:11

## 2018-11-02 RX ADMIN — TRAMADOL HYDROCHLORIDE 50 MG: 50 TABLET, COATED ORAL at 10:11

## 2018-11-02 RX ADMIN — ACETAMINOPHEN 650 MG: 325 TABLET, FILM COATED ORAL at 02:11

## 2018-11-02 RX ADMIN — PIPERACILLIN SODIUM AND TAZOBACTAM SODIUM 4.5 G: 4; .5 INJECTION, POWDER, LYOPHILIZED, FOR SOLUTION INTRAVENOUS at 09:11

## 2018-11-02 RX ADMIN — DILTIAZEM HYDROCHLORIDE 10 MG/HR: 5 INJECTION INTRAVENOUS at 09:11

## 2018-11-02 RX ADMIN — NOREPINEPHRINE BITARTRATE 0.28 MCG/KG/MIN: 1 INJECTION, SOLUTION, CONCENTRATE INTRAVENOUS at 08:11

## 2018-11-02 RX ADMIN — INSULIN ASPART 2 UNITS: 100 INJECTION, SOLUTION INTRAVENOUS; SUBCUTANEOUS at 05:11

## 2018-11-02 RX ADMIN — GUAIFENESIN 1200 MG: 600 TABLET, EXTENDED RELEASE ORAL at 09:11

## 2018-11-02 RX ADMIN — FLUTICASONE PROPIONATE 50 MCG: 50 SPRAY, METERED NASAL at 09:11

## 2018-11-02 RX ADMIN — SIMVASTATIN 10 MG: 5 TABLET, FILM COATED ORAL at 09:11

## 2018-11-02 RX ADMIN — ROFLUMILAST 500 MCG: 500 TABLET ORAL at 09:11

## 2018-11-02 RX ADMIN — IPRATROPIUM BROMIDE AND ALBUTEROL SULFATE 3 ML: .5; 3 SOLUTION RESPIRATORY (INHALATION) at 07:11

## 2018-11-02 RX ADMIN — INSULIN ASPART 2 UNITS: 100 INJECTION, SOLUTION INTRAVENOUS; SUBCUTANEOUS at 12:11

## 2018-11-02 RX ADMIN — PANTOPRAZOLE SODIUM 40 MG: 40 TABLET, DELAYED RELEASE ORAL at 09:11

## 2018-11-02 RX ADMIN — SODIUM CHLORIDE: 0.9 INJECTION, SOLUTION INTRAVENOUS at 07:11

## 2018-11-02 RX ADMIN — POLYETHYLENE GLYCOL 3350 17 G: 17 POWDER, FOR SOLUTION ORAL at 09:11

## 2018-11-02 RX ADMIN — RIVAROXABAN 15 MG: 15 TABLET, FILM COATED ORAL at 05:11

## 2018-11-02 RX ADMIN — PIPERACILLIN SODIUM AND TAZOBACTAM SODIUM 4.5 G: 4; .5 INJECTION, POWDER, LYOPHILIZED, FOR SOLUTION INTRAVENOUS at 01:11

## 2018-11-02 RX ADMIN — PIPERACILLIN SODIUM AND TAZOBACTAM SODIUM 4.5 G: 4; .5 INJECTION, POWDER, LYOPHILIZED, FOR SOLUTION INTRAVENOUS at 05:11

## 2018-11-02 RX ADMIN — MAGESIUM CITRATE 296 ML: 1.75 LIQUID ORAL at 09:11

## 2018-11-02 RX ADMIN — PREDNISONE 20 MG: 20 TABLET ORAL at 09:11

## 2018-11-02 RX ADMIN — VANCOMYCIN HYDROCHLORIDE 1250 MG: 10 INJECTION, POWDER, LYOPHILIZED, FOR SOLUTION INTRAVENOUS at 07:11

## 2018-11-02 RX ADMIN — OLOPATADINE HYDROCHLORIDE 1 DROP: 1 SOLUTION/ DROPS OPHTHALMIC at 09:11

## 2018-11-02 RX ADMIN — DILTIAZEM HYDROCHLORIDE 60 MG: 60 TABLET, FILM COATED ORAL at 09:11

## 2018-11-02 RX ADMIN — ALPRAZOLAM 0.25 MG: 0.25 TABLET ORAL at 09:11

## 2018-11-02 RX ADMIN — POTASSIUM CHLORIDE 40 MEQ: 29.8 INJECTION, SOLUTION INTRAVENOUS at 07:11

## 2018-11-02 RX ADMIN — IPRATROPIUM BROMIDE AND ALBUTEROL SULFATE 3 ML: .5; 3 SOLUTION RESPIRATORY (INHALATION) at 12:11

## 2018-11-02 RX ADMIN — FUROSEMIDE 20 MG: 20 TABLET ORAL at 09:11

## 2018-11-02 RX ADMIN — Medication 100 ML: at 09:11

## 2018-11-02 NOTE — HOSPITAL COURSE
11/2 - Fully awake and alert wanting to sleep.  Afeb since admit and had loose brown stool this AM.  CT Abd overnight confirms constipation of colon.  No melena or BRBPR noted.  Still on Cardizem and Levophed infusions.   11/3 - Off Levophed infusion since yesterday.  + BMs yesterday post enema.  Afeb and normal WBC.  Still RVR with exertion.  11/4 - Worsening hypoxic resp failure overnight now on Vapotherm with increased work of breathing.  A-Fib RVR rate now controlled.  Severe REES.  Patient and family do now desire any further aggressive Rx and ask for comfort care and Hospice consult.  Patient states she just wants to be comfortable.

## 2018-11-02 NOTE — ASSESSMENT & PLAN NOTE
-Monitor H/H  -No diarrhea, No need for C. Diff   -family states she can't have colonoscopy due to high risk or anesthesia    Continue Kolby Fritz d/w GI

## 2018-11-02 NOTE — ASSESSMENT & PLAN NOTE
Malnutrition in the context of Chronic Illness/Injury    Related to (etiology):  Inadequate energy intake     Signs and Symptoms (as evidenced by):  Energy Intake: <50% of estimated energy requirement for 3 months   Body Fat Depletion: mild depletion of orbitals   Muscle Mass Depletion: mild depletion of temples, clavicle region and scapular region   Weight Loss: 9 % within the last 5 months     Interventions/Recommendations (treatment strategy):  See above     Nutrition Diagnosis Status:  New

## 2018-11-02 NOTE — ASSESSMENT & PLAN NOTE
Afeb with improved leukocytosis  Suspect IVVD over Sepsis  Cont Vanc and Zosyn  De-escalate IVAB in AM if cultures remain NGTD  CXR without infiltrate  Blood cultures NGTD  Cont IVFs and transfuse 1 unit PRBCs

## 2018-11-02 NOTE — PROGRESS NOTES
Tissue Perfusion Assessment        Vital signs reviewed. A focused perfusion assessment was completed.      Pamela Reynoso

## 2018-11-02 NOTE — PLAN OF CARE
Problem: Patient Care Overview  Goal: Individualization & Mutuality  Outcome: Ongoing (interventions implemented as appropriate)   11/02/18 0408   Mutuality/Individual Preferences   What Anxieties, Fears, Concerns, or Questions Do You Have About Your Care? none   What Information Would Help Us Give You More Personalized Care? none   How Would You and/or Your Support Person Like to Participate In Your Care? decision making   Individualization   Patient Specific Preferences none   Patient Specific Goals to get better       Comments: Pt on cardizem and levophed titrated levophed over shift, nuñez in place, uop wnl, o2 per nc. No issues other than those noted, pt aaox4.

## 2018-11-02 NOTE — HOSPITAL COURSE
Pt admitted to [facility] as Obs initially but soon after developed Afib w RVR and hypotension and was transferred to ICU and placed on Levophed and Cardizem gtt and more IVF. She responded well and her BP and HR improved and was able to be weaned off Levophed next morning. She also got a unit of blood for her Severe anemia. She has ch Afib and had RVR since last night. She was started on CARDIZEM 60 QID and she slowed down and was able to come off Cardizem gtt. She was also given an enema with mild BM. She is resting well comfortably, no distress and is fully AAOx3, no focal deficit, speech clear.    11/3- doing better, was sitting up in chair but quiet out of breath with any exertion, tremors, shakes sec to Nebs-- hence Albuterol/ Duoneb d/fernanda. Also developed RVR again with Afib, hence started on Amiodarone gtt and Cards consulted, continued on oral Cardizem. Blood Cx NGTD but Urine CX Positive for GNR. Now on Rocephin.    11/4- pt developed worsening resp failure over nite, placed on Vapotherm, with increased work of breathing and then she and her family decided to just keep her comfortable and go to hospice and have chosen [facility]. SW making arrangements. Pt appears more relaxed now, surrounded by her family. She ate a little better this am. Family has chosen [facility] and she is being discharged there in fair condition by EMS.

## 2018-11-02 NOTE — ASSESSMENT & PLAN NOTE
Resume home Xarelto  Rate controlled - Change Cardizem to PO  ICU cardiac monitoring  Stop Albuterol  Volume resuscitation

## 2018-11-02 NOTE — PROGRESS NOTES
MD Ron in ER - notified of ongoing hypotension.  To bedside for assessment.  Ordered CL placement and vasopressors to be started - MD Do ER MD notified.  Supplies to bedside.  Family notified.

## 2018-11-02 NOTE — PROGRESS NOTES
MD ayala notified of Afib HR sustained above 130 bpm.  Currently 150bpm.  New orders noted  Patient requests nuñez catheter.  Says getting up to BSC is exhausting - MD notified - new orders noted.

## 2018-11-02 NOTE — ASSESSMENT & PLAN NOTE
No active bleeding currently  Discussed risk of CVA without Anticoagulation and risk of severe GI bleed on anticoagulation  Patient and family elect to continue Xarelto due to fear of CVA over severe GI bleed  No invasive work up elected per family.    Cont PPI

## 2018-11-02 NOTE — PLAN OF CARE
Problem: Patient Care Overview  Goal: Plan of Care Review  Outcome: Ongoing (interventions implemented as appropriate)  Pt now off levo gtt; transitioned from cardizem gtt to cardizem PO; a. Fib now with a controlled rate; pt continues to lay on right side; refuses to lay on left side or supine, stating she cannot breathe in the other positions; educated pt on skin breakdown and pressure ulcers; pt stated this is how she sleeps at the nursing home; rec'd one unit PRBC; tolerated without distress; family at bedside throughout the day.

## 2018-11-02 NOTE — ASSESSMENT & PLAN NOTE
Diabetes Mellitus  -SSI and accuchecks  -1800 felicia ADA diet  -nutritional counseling    Well controlled DM

## 2018-11-02 NOTE — ASSESSMENT & PLAN NOTE
Likely Septic Shock but no obvious source of infection  Continue IV Abx and de escalate soon, WBC improving  Off Levophed now

## 2018-11-02 NOTE — EICU
Bedside nurse called to report pt had ivf going of ns at 125 ml/hr. . She wants to know if you want them continued and if he wanted pt to be on iv or po cardizem , since pt has order for both. Informed Dr. Hampton.

## 2018-11-02 NOTE — CONSULTS
[facility]  Critical Care Medicine  Consult Note    Patient Name: Petrona Gonzalez  MRN: [id number]  Admission Date: 11/1/2018  Hospital Length of Stay: 1 days  Code Status: Full Code  Attending Physician: Giovani Hackett MD   Primary Care Provider: Primary Doctor No   Principal Problem: Shock, unspecified      Subjective:     HPI:  Ms Gonzalez is a 78 yo [facility] resident with a PMH of O2/Steroid Dependent COPD, Chronic A-Fib on Xarelto, HLD, HTN, and DM.  Family notes the pt's steady weight loss of 30 pounds over the last month, and states her dentures were poor fitting and she wasn't eating much. Dentures have now has been fixed. Pt also has a umbilical hernia.  She was sent to [facility] ED yesterday evening from NH due to Abd pain, SOB above baseline and diarrhea that tested positive for FOBT.  She has had + FOBT in past and seen by GI but no endoscopy pursued due to sedation risk given severity of COPD.  She was recently taken off Xarelto at NH due to + FOBT.  ED work up revealed Lactic acid 4.8, H/H 7.9/27.6, Creatinine 2.4, WBC 20K, Urinalysis showed positive nitrate. She was given 1.6 liters IVF and admitted to Observation.  Overnight she developed RVR of A-Fib and hypotension.  CL placed and she was transferred to ICU placed on Cardizem infusion and Levophed infusion.              Hospital/ICU Course:  11/2 - Fully awake and alert wanting to sleep.  Afeb since admit and had loose brown stool this AM.  CT Abd overnight confirms constipation of colon.  No melena or BRBPR noted.  Still on Cardizem and Levophed infusions.     Past Medical History:   Diagnosis Date    SHANE (acute kidney injury) 11/2/2018    COPD (chronic obstructive pulmonary disease) with emphysema     Diabetes mellitus     Hyperlipidemia     Hypertension     Insomnia     Maxillary sinusitis, chronic        Past Surgical History:   Procedure Laterality Date    BACK SURGERY      HERNIA REPAIR      HYSTERECTOMY         Review  of patient's allergies indicates:   Allergen Reactions    Meloxicam Other (See Comments)      Patient stated that she has muscle aches and pains    Morphine Rash    Naproxen Other (See Comments)     Patient states she has muscle and aches.    Pulmicort [budesonide] Other (See Comments)     Burning in chest       Family History     Problem Relation (Age of Onset)    Arthritis Father    Cancer Father, Sister, Brother    Early death Sister    Hearing loss Father    Heart disease Brother        Tobacco Use    Smoking status: Former Smoker     Packs/day: 1.00     Years: 30.00     Pack years: 30.00    Smokeless tobacco: Never Used   Substance and Sexual Activity    Alcohol use: No    Drug use: No    Sexual activity: Not Currently         Review of Systems   Constitutional: Positive for activity change, appetite change and fatigue. Negative for chills, diaphoresis and fever.   HENT: Negative for congestion.    Respiratory: Positive for shortness of breath (REES only at baseline). Negative for apnea, cough and wheezing.    Cardiovascular: Negative for chest pain and leg swelling.   Gastrointestinal: Positive for diarrhea. Negative for abdominal pain, nausea and vomiting.   Endocrine: Negative for polydipsia.   Genitourinary: Negative for difficulty urinating.   Musculoskeletal: Positive for myalgias (chronic).   Skin: Negative for color change and wound.   Allergic/Immunologic: Negative for immunocompromised state.   Neurological: Negative for dizziness, syncope and weakness.   Hematological: Does not bruise/bleed easily.   Psychiatric/Behavioral: Negative for agitation, confusion and hallucinations. The patient is not nervous/anxious.      Objective:     Vital Signs (Most Recent):  Temp: 99.1 °F (37.3 °C) (11/02/18 0310)  Pulse: 98 (11/02/18 0722)  Resp: 20 (11/02/18 0722)  BP: (!) 114/45 (11/02/18 0620)  SpO2: 100 % (11/02/18 0722) Vital Signs (24h Range):  Temp:  [98.5 °F (36.9 °C)-99.1 °F (37.3 °C)] 99.1 °F  (37.3 °C)  Pulse:  [] 98  Resp:  [13-46] 20  SpO2:  [90 %-100 %] 100 %  BP: ()/(26-90) 114/45     Weight: 79.2 kg (174 lb 9.7 oz)  Body mass index is 30.93 kg/m².      Intake/Output Summary (Last 24 hours) at 11/2/2018 1028  Last data filed at 11/2/2018 0600  Gross per 24 hour   Intake 3450 ml   Output 1430 ml   Net 2020 ml       Physical Exam   Constitutional: She is oriented to person, place, and time. She appears well-developed and well-nourished. She is cooperative.  Non-toxic appearance. She does not have a sickly appearance. She appears ill. No distress. She is not intubated. Nasal cannula in place.   HENT:   Head: Normocephalic and atraumatic.   Mouth/Throat: Oropharynx is clear and moist and mucous membranes are normal.   Eyes: EOM and lids are normal. Pupils are equal, round, and reactive to light.   Neck: Trachea normal and full passive range of motion without pain. Carotid bruit is not present.       Cardiovascular: Normal heart sounds. An irregular rhythm present. Tachycardia present.   Pulses:       Radial pulses are 2+ on the right side, and 2+ on the left side.        Dorsalis pedis pulses are 1+ on the right side, and 1+ on the left side.   Pulmonary/Chest: Effort normal. No accessory muscle usage. She is not intubated. No respiratory distress. She has decreased breath sounds.   Abdominal: Soft. Bowel sounds are normal. She exhibits no distension. There is no tenderness. A hernia is present.   Obese   Genitourinary:   Genitourinary Comments: Cutler in place   Musculoskeletal: Normal range of motion.        Right foot: There is no deformity.        Left foot: There is no deformity.   Trace edema   Lymphadenopathy:     She has no cervical adenopathy.   Neurological: She is alert and oriented to person, place, and time.   Skin: Skin is warm and dry. Capillary refill takes 2 to 3 seconds. Ecchymosis (bilat LEs) noted. No rash noted. No cyanosis.   Psychiatric: Her speech is normal and behavior  is normal. Judgment and thought content normal. Cognition and memory are normal. She exhibits a depressed mood.       Vents:  Oxygen Concentration (%): 30 (11/02/18 0712)    Lines/Drains/Airways     Central Venous Catheter Line                 Percutaneous Central Line Insertion/Assessment - triple lumen  11/01/18 2045 right subclavian less than 1 day          Drain                 Urethral Catheter 11/01/18 2159 Latex less than 1 day          Peripheral Intravenous Line                 Peripheral IV - Single Lumen 12/21/17 1546 Right Forearm 315 days         Peripheral IV - Single Lumen 11/01/18 1851 Right Antecubital less than 1 day                Significant Labs:    CBC/Anemia Profile:  Recent Labs   Lab 11/01/18  1339 11/01/18  1340 11/02/18  0430   WBC 20.26*  --  17.72*   HGB 7.9*  --  7.1*   HCT 27.6*  --  24.4*     --  314   MCV 83  --  82   RDW 21.0*  --  21.0*   OCCULTBLOOD  --  Negative  --         Chemistries:  Recent Labs   Lab 11/01/18  1339 11/02/18  0430    140   K 3.8 3.3*   CL 90* 100   CO2 30* 28   BUN 52* 32*   CREATININE 2.4* 1.5*   CALCIUM 9.2 8.3*   ALBUMIN 3.4* 3.1*   PROT 6.7 5.8*   BILITOT 0.3 0.4   ALKPHOS 45* 38*   ALT 20 29   AST 20 21   MG  --  1.9       Coagulation:   Recent Labs   Lab 11/01/18  1339   INR 1.1   APTT 27.9     POCT Glucose:   Recent Labs   Lab 11/01/18  2338   POCTGLUCOSE 267*     All pertinent labs within the past 24 hours have been reviewed.    Significant Imaging:   CT: I have reviewed all pertinent results/findings within the past 24 hours and my personal findings are:  Abd: mod stool in right colon  CXR: I have reviewed all pertinent results/findings within the past 24 hours and my personal findings are:  Hyperinflated but no acute pulm process appreciated    Assessment/Plan:     Pulmonary   Stage 3 severe COPD by GOLD classification    Cont Formeterol, Budesonide and Roflumilast  Cont home O2 and Prednisone     Cardiac/Vascular   Atrial fibrillation  with RVR w/ chronic A-fib on Xarelto    Resume home Xarelto  Rate controlled - Change Cardizem to PO  ICU cardiac monitoring  Stop Albuterol  Volume resuscitation     Renal/   SHANE (acute kidney injury)    Improved with volume resuscitation  Cont IVFs  CMP in AM  Monitor I/Os     Hypokalemia    Replace K+     Oncology   Anemia associated with nutritional deficiency    Possible acute blood loss anemia on chronic anemia  No GI scopes due to risk and no active bleeding  Close monitoring on Xarelto  Transfuse 1 unit PRBCs  CBC in AM     Endocrine   Type 2 diabetes mellitus with hyperlipidemia    Cont SSI increase scale  Cont statin     GI   Gastrointestinal hemorrhage with melena    No active bleeding currently  Discussed risk of CVA without Anticoagulation and risk of severe GI bleed on anticoagulation  Patient and family elect to continue Xarelto due to fear of CVA over severe GI bleed  No invasive work up elected per family.    Cont PPI     Constipation due to outlet dysfunction    Enema now and start Miralax daily     Other   * Shock, unspecified    Afeb with improved leukocytosis  Suspect IVVD over Sepsis  Cont Vanc and Zosyn  De-escalate IVAB in AM if cultures remain NGTD  CXR without infiltrate  Blood cultures NGTD  Cont IVFs and transfuse 1 unit PRBCs        Preventive Measures and Monitoring:   Stress Ulcer: PPI  Nutrition: ADA diet  Glucose control: SSI  Bowel prophylaxis: Miralax  DVT prophylaxis: Xarelto  Hx CAD on B-Blocker: no hx CAD  Head of Bed/Reposition: Elevate HOB and turn Q1-2 hours   Early Mobility: bed rest  Central Line Right SC Day: #2  Cutler Day: #2  IVAB Day: #2  Code Status: DNR  Pneumonia Vaccine: UTD  Flu Vaccine: ordered    Counseling/Consultation:I have discussed the care of this patient in detail with the bedside nursing staff and Dr. Naa Hackett    Critical Care Time: 58 minutes  Critical secondary to Patient has a condition that poses threat to life and bodily function:  Acute Renal Failure and Shock  Patient is currently on drug therapy requiring intensive monitoring for toxicity: Levophed infusion     Critical care was time spent personally by me on the following activities: development of treatment plan with patient or surrogate and bedside caregivers, discussions with consultants, evaluation of patient's response to treatment, examination of patient, ordering and performing treatments and interventions, ordering and review of laboratory studies, ordering and review of radiographic studies, pulse oximetry, re-evaluation of patient's condition. This critical care time did not overlap with that of any other provider or involve time for any procedures.    Thank you for your consult. I will follow-up with patient. Please contact us if you have any additional questions.     Carlos Sultana NP  Critical Care Medicine  [facility]

## 2018-11-02 NOTE — HPI
Ms Gonzalez is a 76 yo [facility] resident with a PMH of O2/Steroid Dependent COPD, Chronic A-Fib on Xarelto, HLD, HTN, and DM.  Family notes the pt's steady weight loss of 30 pounds over the last month, and states her dentures were poor fitting and she wasn't eating much. Dentures have now has been fixed. Pt also has a umbilical hernia.  She was sent to [facility] yesterday evening from [facility] due to Abd pain, SOB above baseline and diarrhea that tested positive for FOBT.  She has had + FOBT in past and seen by GI but no endoscopy pursued due to sedation risk given severity of COPD.  She was recently taken off Xarelto at NH due to + FOBT.  ED work up revealed Lactic acid 4.8, H/H 7.9/27.6, Creatinine 2.4, WBC 20K, Urinalysis showed positive nitrate. She was given 1.6 liters IVF and admitted to Observation.  Overnight she developed RVR of A-Fib and hypotension.  CL placed and she was transferred to ICU placed on Cardizem infusion and Levophed infusion.

## 2018-11-02 NOTE — PROGRESS NOTES
MD Ambrosio successfully placed Central Line to R IJ TLC.  CXR ordered for placement.  Patient tolerated well.  Vitals stable.  Family at bedside.

## 2018-11-02 NOTE — SUBJECTIVE & OBJECTIVE
Past Medical History:   Diagnosis Date    SHANE (acute kidney injury) 11/2/2018    COPD (chronic obstructive pulmonary disease) with emphysema     Diabetes mellitus     Hyperlipidemia     Hypertension     Insomnia     Maxillary sinusitis, chronic        Past Surgical History:   Procedure Laterality Date    BACK SURGERY      HERNIA REPAIR      HYSTERECTOMY         Review of patient's allergies indicates:   Allergen Reactions    Meloxicam Other (See Comments)      Patient stated that she has muscle aches and pains    Morphine Rash    Naproxen Other (See Comments)     Patient states she has muscle and aches.    Pulmicort [budesonide] Other (See Comments)     Burning in chest       Family History     Problem Relation (Age of Onset)    Arthritis Father    Cancer Father, Sister, Brother    Early death Sister    Hearing loss Father    Heart disease Brother        Tobacco Use    Smoking status: Former Smoker     Packs/day: 1.00     Years: 30.00     Pack years: 30.00    Smokeless tobacco: Never Used   Substance and Sexual Activity    Alcohol use: No    Drug use: No    Sexual activity: Not Currently         Review of Systems   Constitutional: Positive for activity change, appetite change and fatigue. Negative for chills, diaphoresis and fever.   HENT: Negative for congestion.    Respiratory: Positive for shortness of breath (REES only at baseline). Negative for apnea, cough and wheezing.    Cardiovascular: Negative for chest pain and leg swelling.   Gastrointestinal: Positive for diarrhea. Negative for abdominal pain, nausea and vomiting.   Endocrine: Negative for polydipsia.   Genitourinary: Negative for difficulty urinating.   Musculoskeletal: Positive for myalgias (chronic).   Skin: Negative for color change and wound.   Allergic/Immunologic: Negative for immunocompromised state.   Neurological: Negative for dizziness, syncope and weakness.   Hematological: Does not bruise/bleed easily.    Psychiatric/Behavioral: Negative for agitation, confusion and hallucinations. The patient is not nervous/anxious.      Objective:     Vital Signs (Most Recent):  Temp: 99.1 °F (37.3 °C) (11/02/18 0310)  Pulse: 98 (11/02/18 0722)  Resp: 20 (11/02/18 0722)  BP: (!) 114/45 (11/02/18 0620)  SpO2: 100 % (11/02/18 0722) Vital Signs (24h Range):  Temp:  [98.5 °F (36.9 °C)-99.1 °F (37.3 °C)] 99.1 °F (37.3 °C)  Pulse:  [] 98  Resp:  [13-46] 20  SpO2:  [90 %-100 %] 100 %  BP: ()/(26-90) 114/45     Weight: 79.2 kg (174 lb 9.7 oz)  Body mass index is 30.93 kg/m².      Intake/Output Summary (Last 24 hours) at 11/2/2018 1028  Last data filed at 11/2/2018 0600  Gross per 24 hour   Intake 3450 ml   Output 1430 ml   Net 2020 ml       Physical Exam   Constitutional: She is oriented to person, place, and time. She appears well-developed and well-nourished. She is cooperative.  Non-toxic appearance. She does not have a sickly appearance. She appears ill. No distress. She is not intubated. Nasal cannula in place.   HENT:   Head: Normocephalic and atraumatic.   Mouth/Throat: Oropharynx is clear and moist and mucous membranes are normal.   Eyes: EOM and lids are normal. Pupils are equal, round, and reactive to light.   Neck: Trachea normal and full passive range of motion without pain. Carotid bruit is not present.       Cardiovascular: Normal heart sounds. An irregular rhythm present. Tachycardia present.   Pulses:       Radial pulses are 2+ on the right side, and 2+ on the left side.        Dorsalis pedis pulses are 1+ on the right side, and 1+ on the left side.   Pulmonary/Chest: Effort normal. No accessory muscle usage. She is not intubated. No respiratory distress. She has decreased breath sounds.   Abdominal: Soft. Bowel sounds are normal. She exhibits no distension. There is no tenderness. A hernia is present.   Obese   Genitourinary:   Genitourinary Comments: Cutler in place   Musculoskeletal: Normal range of motion.         Right foot: There is no deformity.        Left foot: There is no deformity.   Trace edema   Lymphadenopathy:     She has no cervical adenopathy.   Neurological: She is alert and oriented to person, place, and time.   Skin: Skin is warm and dry. Capillary refill takes 2 to 3 seconds. Ecchymosis (bilat LEs) noted. No rash noted. No cyanosis.   Psychiatric: Her speech is normal and behavior is normal. Judgment and thought content normal. Cognition and memory are normal. She exhibits a depressed mood.       Vents:  Oxygen Concentration (%): 30 (11/02/18 0712)    Lines/Drains/Airways     Central Venous Catheter Line                 Percutaneous Central Line Insertion/Assessment - triple lumen  11/01/18 2045 right subclavian less than 1 day          Drain                 Urethral Catheter 11/01/18 2159 Latex less than 1 day          Peripheral Intravenous Line                 Peripheral IV - Single Lumen 12/21/17 1546 Right Forearm 315 days         Peripheral IV - Single Lumen 11/01/18 1851 Right Antecubital less than 1 day                Significant Labs:    CBC/Anemia Profile:  Recent Labs   Lab 11/01/18  1339 11/01/18  1340 11/02/18  0430   WBC 20.26*  --  17.72*   HGB 7.9*  --  7.1*   HCT 27.6*  --  24.4*     --  314   MCV 83  --  82   RDW 21.0*  --  21.0*   OCCULTBLOOD  --  Negative  --         Chemistries:  Recent Labs   Lab 11/01/18  1339 11/02/18  0430    140   K 3.8 3.3*   CL 90* 100   CO2 30* 28   BUN 52* 32*   CREATININE 2.4* 1.5*   CALCIUM 9.2 8.3*   ALBUMIN 3.4* 3.1*   PROT 6.7 5.8*   BILITOT 0.3 0.4   ALKPHOS 45* 38*   ALT 20 29   AST 20 21   MG  --  1.9       Coagulation:   Recent Labs   Lab 11/01/18  1339   INR 1.1   APTT 27.9     POCT Glucose:   Recent Labs   Lab 11/01/18  2338   POCTGLUCOSE 267*     All pertinent labs within the past 24 hours have been reviewed.    Significant Imaging:   CT: I have reviewed all pertinent results/findings within the past 24 hours and my personal  findings are:  Abd: mod stool in right colon  CXR: I have reviewed all pertinent results/findings within the past 24 hours and my personal findings are:  Hyperinflated but no acute pulm process appreciated

## 2018-11-02 NOTE — ASSESSMENT & PLAN NOTE
-Monitor H/H  -No diarrhea, No need for C. Diff   -family states she can't have colonoscopy due to high risk or anesthesia

## 2018-11-02 NOTE — EICU
Brief new admit note:  77 yr old female with hx of DM, HTN, COPD on o2, HLD , a fib on xarelto recent ? Blood in stool and received abx.  Anemia.  As per family can not do colonoscopy due to high risk. Elevated LA.  Admitted now for septic shock, a fib RVR .  Received fluid resuscitation, rt IJ and abx pending cultures.    Camera Eval:  Discussed with bed side RN.  MAP 65 , levo at 0.11, UOP ok.  100% sats on nasal o2.  Resting    Data:  Reviewed.  EKG, CxR seen    A/P:  1. Septic shock/lactemia : source lung. UA neg. CT abd neg.    2. Anemia: stool oc blood neg  3. A fib RVR> troponin ok.  4. Sever COPD stable  5. DM    - SSI ordered  - SCD ordered  - on xarelto in Nursing home.  - continue other care. On SUP  - watch glucose. Goal < 180  - asp precautions

## 2018-11-02 NOTE — PROGRESS NOTES
Patient lying on stretcher, vitals stable. Levophed, Cardizem gtt infusing as ordered.  Levophed titrated up - see flowsheet.  Family member at bedside.  Admission discussed to patient and family - VU.  POC and interventions discussed - BELTRAN.    Report called to Caryn MIKE in ICU bed 4.

## 2018-11-02 NOTE — PROGRESS NOTES
[facility] -   Adult Nutrition  Progress Note    SUMMARY     Recommendations    Recommendation/Intervention: 1.Consider changing diet to dental soft diabetic cardiac. 2. Add boost glucose control TID. 3. Will continue to monitor.   Goals: PO intake > 50 % by next RD visit  Nutrition Goal Status: new  Communication of RD Recs: (POc, sticky note)    Reason for Assessment    Reason for Assessment: identified at risk by screening criteria  Dx:  1. Chronic obstructive pulmonary disease, unspecified COPD type    2. Abdominal pain    3. GI bleed    4. Cough    5. Anemia, unspecified type    6. Urinary tract infection without hematuria, site unspecified    7. Diarrhea, unspecified type    8. Septic shock    9. Encounter for central line placement    10. Shock, unspecified    11. SHANE (acute kidney injury)    12. Anemia associated with nutritional deficiency    13. Atrial fibrillation with RVR    14. Constipation due to outlet dysfunction    15. Gastrointestinal hemorrhage with melena    16. Hypokalemia    17. Stage 3 severe COPD by GOLD classification    18. Type 2 diabetes mellitus with hyperlipidemia      Past Medical History:   Diagnosis Date    SHANE (acute kidney injury) 11/2/2018    COPD (chronic obstructive pulmonary disease) with emphysema     Diabetes mellitus     Hyperlipidemia     Hypertension     Insomnia     Maxillary sinusitis, chronic        General Information Comments: Pt currently in ICU. Pt reports wt loss of 30 lbs within the last month. Per epic records, weight loss of 18 lbs within the last 5 months. Pt reports decreased oral intake 2/2 teeth and gum problems (PO intake < 50 % x 3 months). Per [facility] (11.2.18): mild subcutaneous fat and muscle loss. Per Pt, PO intake today ~ 25 %. Pt agreed to try oral supplements.   Nutrition Discharge Planning: dental soft diabetic cardiac diet    Nutrition Risk Screen    Nutrition Risk Screen: dysphagia or difficulty swallowing    Nutrition/Diet  "History    Do you have any cultural, spiritual, Latter-day conflicts, given your current situation?: no    Anthropometrics    Temp: 99.1 °F (37.3 °C)  Height: 5' 3" (160 cm)  Height (inches): 63 in  Weight Method: Bed Scale  Weight: 79.2 kg (174 lb 9.7 oz)  Weight (lb): 174.61 lb  Ideal Body Weight (IBW), Female: 115 lb  % Ideal Body Weight, Female (lb): 151.83 lb  BMI (Calculated): 31  BMI Grade: 30 - 34.9- obesity - grade I  Usual Body Weight (UBW), k.27 kg  % Usual Body Weight: 90.94  % Weight Change From Usual Weight: -9.25 %       Lab/Procedures/Meds    Pertinent Labs Reviewed: reviewed  BMP  Lab Results   Component Value Date     2018    K 3.3 (L) 2018     2018    CO2 28 2018    BUN 32 (H) 2018    CREATININE 1.5 (H) 2018    CALCIUM 8.3 (L) 2018    ANIONGAP 12 2018    ESTGFRAFRICA 39 (A) 2018    EGFRNONAA 34 (A) 2018     Lab Results   Component Value Date    CALCIUM 8.3 (L) 2018    PHOS 2.3 (L) 2017     Lab Results   Component Value Date    ALBUMIN 3.1 (L) 2018     Recent Labs   Lab 18  2338   POCTGLUCOSE 267*      Lab Results   Component Value Date    HGBA1C 6.2 (H) 2018       Pertinent Medications Reviewed: reviewed    Physical Findings/Assessment    Overall Physical Appearance: (mild subcutaneous fat and muscle loss)  Oral/Mouth Cavity: (dental appliance present )  Skin: (Harry 15)    Estimated/Assessed Needs    Weight Used For Calorie Calculations: 79.2 kg (174 lb 9.7 oz)  Energy Calorie Requirements (kcal): 1570  Energy Need Method: [place]-St Jeor(x1.25)  Protein Requirements: 104 g  Weight Used For Protein Calculations: 52.2 kg (115 lb)(IBW - obese ICU)     Fluid Need Method: RDA Method(or per MD)  RDA Method (mL): 1570   CHO Requirement: 50 % EEN      Nutrition Prescription Ordered    Current Diet Order: diabetic diet    Evaluation of Received Nutrient/Fluid Intake    Intake/Output Summary (Last 24 " hours) at 11/2/2018 1227  Last data filed at 11/2/2018 0600  Gross per 24 hour   Intake 3450 ml   Output 1430 ml   Net 2020 ml          % Intake of Estimated Energy Needs: 25 - 50 %  % Meal Intake: 25 - 50 %    Nutrition Risk      2xweekly    Assessment and Plan    Moderate malnutrition      Malnutrition in the context of Chronic Illness/Injury    Related to (etiology):  Inadequate energy intake     Signs and Symptoms (as evidenced by):  Energy Intake: <50% of estimated energy requirement for 3 months   Body Fat Depletion: mild depletion of orbitals   Muscle Mass Depletion: mild depletion of temples, clavicle region and scapular region   Weight Loss: 9 % within the last 5 months     Interventions/Recommendations (treatment strategy):  See above     Nutrition Diagnosis Status:  New            Monitor and Evaluation    Food and Nutrient Intake: energy intake, food and beverage intake  Food and Nutrient Adminstration: diet order  Anthropometric Measurements: weight  Biochemical Data, Medical Tests and Procedures: electrolyte and renal panel, glucose/endocrine profile  Nutrition-Focused Physical Findings: overall appearance     Nutrition Follow-Up    RD Follow-up?: Yes(2xweekly)

## 2018-11-02 NOTE — PROGRESS NOTES
[facility] Hospital Medicine  Progress Note    Patient Name: Petrona Gonzalez  MRN: [id number]  Patient Class: IP- Inpatient   Admission Date: 11/1/2018  Length of Stay: 1 days  Attending Physician: Giovani Hackett MD  Primary Care Provider: Primary Doctor No        Subjective:     Principal Problem:Shock, unspecified    HPI:  Petrona Gonzalez is a 77 y.o.  [place] Age patient female patient with PMHx of COPD, chronic O2, DM, HLD, HTN, who presented to the ER for  c/o blood in stool  and SOB which onset gradually past week. Symptoms are constant and moderate in severity. No mitigating or exacerbating factors reported. Associated sxs included diarrhea  one day only 10/24/18 following a flu shot, and hematochezia.  A stool specimen was sent from the Nursing [facility] to the lab: specimen was solid so it wasn't tested for C. DIff. Pt's daughter also notes the pt's steady weight loss of 30 pounds over the last month, she states her dentures were poor fitting and she wasn't eating much. Dentures have now has been fixed. Pt also has a hernia on her R abdomen, which she is concerned about obstructing. Patient denies any fever, chills, constipation, dysuria, hematuria, urinary frequency, nausea, vomiting and all other sxs at this time. Pt is on Xarelto 20 mg for atrial fibrillation. Pt was on abx for 5 days for tx of a partially collapsed lung, as noted by pt's daughter.  In ER, Lactic acid 4.8, H/H 7.9/27.6, Creatinine 2.4, WBC 20K, Urinalysis showed positive nitrate. And she was given 1.6 liters fluid on sepsis protocol based on ideal body weight of 115#. She has edema to lower extremities. Her sister at bedside states patient was told she couldn't have a colonoscopy due to high risk for anesthesia and she couldn't tolerate the bowel prep. She is a full code. Her sister, Bonnie Carmichael, is the surrogate decision maker. She is admitted for acute bronchitis and UTI to Observation.         Hospital Course:  Pt admitted  to tele as Obs initially but soon after developed Afib w RVR and hypotension and was transferred to ICU and placed on Levophed and Cardizem gtt and more IVF. She responded well and her BP and HR improved and was able to be weaned off Levophed next morning. She also got a unit of blood for her Severe anemia. She has ch Afib and had RVR since last night. She was started on CARDIZEM 60 QID and she slowed down and was able to come off Cardizem gtt. She was also given an enema with mild BM. She is resting well comfortably, no distress and is fully AAOx3, no focal deficit, speech clear.    Interval History: She was also given an enema with mild BM. She is resting well comfortably, no distress and is fully AAOx3, no focal deficit, speech clear.    Review of Systems   Constitutional: Positive for activity change, appetite change and fatigue. Negative for chills, diaphoresis and fever.   HENT: Negative for congestion.    Respiratory: Positive for shortness of breath (REES only at baseline). Negative for apnea, cough and wheezing.    Cardiovascular: Negative for chest pain and leg swelling.   Gastrointestinal: Positive for diarrhea. Negative for abdominal pain, nausea and vomiting.   Endocrine: Negative for polydipsia.   Genitourinary: Negative for difficulty urinating.   Musculoskeletal: Positive for myalgias (chronic).   Skin: Negative for color change and wound.   Allergic/Immunologic: Negative for immunocompromised state.   Neurological: Negative for dizziness, syncope and weakness.   Hematological: Does not bruise/bleed easily.   Psychiatric/Behavioral: Negative for agitation, confusion and hallucinations. The patient is not nervous/anxious.      Objective:     Vital Signs (Most Recent):  Temp: 99.5 °F (37.5 °C) (11/02/18 1500)  Pulse: 89 (11/02/18 1500)  Resp: 17 (11/02/18 1500)  BP: (!) 94/37 (11/02/18 1500)  SpO2: 100 % (11/02/18 1500) Vital Signs (24h Range):  Temp:  [98.5 °F (36.9 °C)-99.7 °F (37.6 °C)] 99.5 °F (37.5  °C)  Pulse:  [] 89  Resp:  [13-46] 17  SpO2:  [88 %-100 %] 100 %  BP: ()/(26-75) 94/37     Weight: 79.2 kg (174 lb 9.7 oz)  Body mass index is 30.93 kg/m².    Intake/Output Summary (Last 24 hours) at 11/2/2018 1549  Last data filed at 11/2/2018 0904  Gross per 24 hour   Intake 3650 ml   Output 1430 ml   Net 2220 ml      Physical Exam   Constitutional: She is oriented to person, place, and time. She appears well-developed and well-nourished. She is cooperative.  Non-toxic appearance. She does not have a sickly appearance. She appears ill. No distress. She is not intubated. Nasal cannula in place.   Pleasant elderly lady lying in bed comfortably   HENT:   Head: Normocephalic and atraumatic.   Mouth/Throat: Oropharynx is clear and moist and mucous membranes are normal.   Eyes: EOM and lids are normal. Pupils are equal, round, and reactive to light.   Neck: Trachea normal and full passive range of motion without pain. Carotid bruit is not present.       Cardiovascular: Normal heart sounds. An irregular rhythm present. Tachycardia present.   Pulses:       Radial pulses are 2+ on the right side, and 2+ on the left side.        Dorsalis pedis pulses are 1+ on the right side, and 1+ on the left side.   irreg irreg pulse   Pulmonary/Chest: Effort normal. No accessory muscle usage. She is not intubated. No respiratory distress. She has decreased breath sounds.   Abdominal: Soft. Bowel sounds are normal. She exhibits no distension. There is no tenderness. A hernia is present.   Obese   Genitourinary:   Genitourinary Comments: Cutler in place   Musculoskeletal: Normal range of motion.        Right foot: There is no deformity.        Left foot: There is no deformity.   Trace edema   Lymphadenopathy:     She has no cervical adenopathy.   Neurological: She is alert and oriented to person, place, and time.   Skin: Skin is warm and dry. Capillary refill takes 2 to 3 seconds. Ecchymosis (bilat LEs) noted. No rash noted. No  cyanosis.   Psychiatric: Her speech is normal and behavior is normal. Judgment and thought content normal. Cognition and memory are normal. She exhibits a depressed mood.   Nursing note and vitals reviewed.      Significant Labs:   A1C:   Recent Labs   Lab 11/02/18  0430   HGBA1C 6.2*     ABGs: No results for input(s): PH, PCO2, HCO3, POCSATURATED, BE, TOTALHB, COHB, METHB, O2HB, POCFIO2 in the last 48 hours.  Blood Culture:   Recent Labs   Lab 11/01/18  1610 11/01/18  1700   LABBLOO No Growth to date No Growth to date     BMP:   Recent Labs   Lab 11/02/18  0430   *      K 3.3*      CO2 28   BUN 32*   CREATININE 1.5*   CALCIUM 8.3*   MG 1.9     CBC:   Recent Labs   Lab 11/01/18  1339 11/02/18  0430   WBC 20.26* 17.72*   HGB 7.9* 7.1*   HCT 27.6* 24.4*    314     CMP:   Recent Labs   Lab 11/01/18  1339 11/02/18  0430    140   K 3.8 3.3*   CL 90* 100   CO2 30* 28   * 219*   BUN 52* 32*   CREATININE 2.4* 1.5*   CALCIUM 9.2 8.3*   PROT 6.7 5.8*   ALBUMIN 3.4* 3.1*   BILITOT 0.3 0.4   ALKPHOS 45* 38*   AST 20 21   ALT 20 29   ANIONGAP 17* 12   EGFRNONAA 19* 34*     Cardiac Markers:   Coagulation:   Recent Labs   Lab 11/01/18  1339   INR 1.1   APTT 27.9     Lactic Acid:   Recent Labs   Lab 11/01/18  1946 11/01/18  2333 11/02/18  0430   LACTATE 4.9* 2.7* 2.0     Magnesium:   Recent Labs   Lab 11/02/18  0430   MG 1.9     Respiratory Culture:   Troponin:   Recent Labs   Lab 11/01/18  1339   TROPONINI 0.023     TSH:   Urine Culture:   All pertinent labs within the past 24 hours have been reviewed.    Significant Imaging: I have reviewed all pertinent imaging results/findings within the past 24 hours.    Assessment/Plan:      * Shock, unspecified    Likely Septic Shock but no obvious source of infection  Continue IV Abx and de escalate soon, WBC improving  Off Levophed now       Atrial fibrillation with RVR w/ chronic A-fib on Xarelto    Off Cardizem gtt now  Has ch Afib on Xarelto  May  need to hold due to ABL Anemia       Acute Blood Loss Anemia r/o GIB    -Monitor H/H  -No diarrhea, No need for C. Diff   -family states she can't have colonoscopy due to high risk or anesthesia    Continue Xarelto  Will d/w GI       SHANE (acute kidney injury) sec to Dehydration/ IVVD/ UGIB    Much improved with rehydration-- continue gently rehydration       Hypokalemia    repleted       Constipation due to outlet dysfunction    Mild response to Laxatives-- continue        Type 2 diabetes mellitus with hyperlipidemia    Diabetes Mellitus  -SSI and accuchecks  -1800 felicia ADA diet  -nutritional counseling    Well controlled DM       Moderate malnutrition    Has had recurrent illnesses recently  No much quality of life       Permanent atrial fibrillation    -continue home CCB  -hold Xarelto       Acute cystitis without hematuria    IV Abx  -urine culture    Continue IV Abx     Acute on chronic respiratory failure with hypercapnia    Pt on home O2, will continue the same here       Anemia associated with nutritional deficiency    Getting 1 unit of blood  Will give IV Iron, MVI, Folbic and Thiamine         VTE Risk Mitigation (From admission, onward)        Ordered     rivaroxaban tablet 15 mg  With dinner      11/02/18 0924     Place sequential compression device  Until discontinued      11/01/18 0521      Seen and discussed with Dr. Fortune and the ICU team  Condition: Critical  Prognosis: Guarded to poor      Critical care time spent on the evaluation and treatment of severe organ dysfunction, review of pertinent labs and imaging studies, discussions with consulting providers and discussions with patient/family: 43 minutes.    Giovani Hackett MD  Department of Hospital Medicine   [facility]

## 2018-11-02 NOTE — H&P
[facility] Hospital Medicine  History & Physical    Patient Name: Petrona Gonzalez  MRN: [id number]  Admission Date: 11/1/2018  Attending Physician: Dr. Giovani Hackett    Primary Care Provider: Primary Doctor No    Patient information was obtained from patient, spouse/SO, past medical records and ER records.     Subjective:     Principal Problem:Acute bronchitis    Chief Complaint:   Chief Complaint   Patient presents with    Abdominal Pain     sent from [facility] for positive occult and sob.     GI Bleeding      HPI: Petrona Gonzalez is a 77 y.o.  [place] Age patient female patient with PMHx of COPD, chronic O2, DM, HLD, HTN, who presented to the ER for  c/o blood in stool  and SOB which onset gradually past week. Symptoms are constant and moderate in severity. No mitigating or exacerbating factors reported. Associated sxs included diarrhea  one day only 10/24/18 following a flu shot, and hematochezia.  A stool specimen was sent from the Nursing Home to the lab: specimen was solid so it wasn't tested for C. DIff. Pt's daughter also notes the pt's steady weight loss of 30 pounds over the last month, she states her dentures were poor fitting and she wasn't eating much. Dentures have now has been fixed. Pt also has a hernia on her R abdomen, which she is concerned about obstructing. Patient denies any fever, chills, constipation, dysuria, hematuria, urinary frequency, nausea, vomiting and all other sxs at this time. Pt is on Xarelto 20 mg for atrial fibrillation. Pt was on abx for 5 days for tx of a partially collapsed lung, as noted by pt's daughter.  In ER, Lactic acid 4.8, H/H 7.9/27.6, Creatinine 2.4, WBC 20K, Urinalysis showed positive nitrate. And she was given 1.6 liters fluid on sepsis protocol based on ideal body weight of 115#. She has edema to lower extremities. Her sister at bedside states patient was told she couldn't have a colonoscopy due to high risk for anesthesia and she couldn't tolerate  the bowel prep. She is a full code. Her sister, Bonnie Carmichael, is the surrogate decision maker. She is admitted for acute bronchitis and UTI to Observation.         Past Medical History:   Diagnosis Date    COPD (chronic obstructive pulmonary disease) with emphysema     Diabetes mellitus     Hyperlipidemia     Hypertension     Insomnia     Maxillary sinusitis, chronic        Past Surgical History:   Procedure Laterality Date    BACK SURGERY      HERNIA REPAIR      HYSTERECTOMY         Review of patient's allergies indicates:   Allergen Reactions    Meloxicam Other (See Comments)      Patient stated that she has muscle aches and pains    Morphine Rash    Naproxen Other (See Comments)     Patient states she has muscle and aches.    Pulmicort [budesonide] Other (See Comments)     Burning in chest       No current facility-administered medications on file prior to encounter.      Current Outpatient Medications on File Prior to Encounter   Medication Sig    acetaminophen (TYLENOL) 325 MG tablet Take 325 mg by mouth every 6 (six) hours as needed for Pain.    albuterol sulfate (VENTOLIN INHL) Inhale into the lungs.    albuterol-ipratropium (DUO-NEB) 2.5 mg-0.5 mg/3 mL nebulizer solution Take 3 mLs by nebulization every 6 (six) hours as needed for Wheezing. Rescue    alprazolam (XANAX) 0.5 MG tablet Take 0.5 mg by mouth every 6 (six) hours as needed for Anxiety.    diltiaZEM (CARDIZEM) 90 MG tablet Take 1 tablet (90 mg total) by mouth every 8 (eight) hours.    fluticasone (FLONASE) 50 mcg/actuation nasal spray 1 spray by Each Nare route once daily.    levocetirizine (XYZAL) 5 MG tablet Take 5 mg by mouth every evening.    lisinopril (PRINIVIL,ZESTRIL) 5 MG tablet Take 1 tablet (5 mg total) by mouth once daily.    loratadine (CLARITIN) 10 mg tablet Take 10 mg by mouth once daily.    metformin (GLUCOPHAGE) 500 MG tablet Take 1 tablet (500 mg total) by mouth 2 (two) times daily with meals.    NOVOLIN R  REGULAR U-100 INSULN 100 unit/mL injection     olopatadine (PAZEO) 0.7 % Drop Apply 1 drop to eye once daily.    ondansetron (ZOFRAN) 4 MG tablet Take 4 mg by mouth every 6 (six) hours as needed for Nausea.    OXYGEN-AIR DELIVERY SYSTEMS MISC by [facility].(Non-Drug; Combo Route) route.    pantoprazole (PROTONIX) 40 MG tablet Take 40 mg by mouth once daily.    predniSONE (DELTASONE) 20 MG tablet     rivaroxaban (XARELTO) 20 mg Tab Take 20 mg by mouth daily with dinner or evening meal.    roflumilast 500 mcg Tab Take 1 tablet (500 mcg total) by mouth once daily.    simvastatin (ZOCOR) 10 MG tablet Take 1 tablet (10 mg total) by mouth every evening.    traMADol (ULTRAM) 50 mg tablet     umeclidinium (INCRUSE ELLIPTA) 62.5 mcg/actuation DsDv Inhale into the lungs once daily. Controller    budesonide-formoterol 160-4.5 mcg (SYMBICORT) 160-4.5 mcg/actuation HFAA Inhale 2 puffs into the lungs every 12 (twelve) hours. Controller    cyclobenzaprine (FLEXERIL) 10 MG tablet Take 10 mg by mouth 3 (three) times daily as needed for Muscle spasms.    furosemide (LASIX) 20 MG tablet     guaiFENesin (MUCINEX) 600 mg 12 hr tablet Take 1,200 mg by mouth 2 (two) times daily.     Family History     Problem Relation (Age of Onset)    Arthritis Father    Cancer Father, Sister, Brother    Early death Sister    Hearing loss Father    Heart disease Brother        Tobacco Use    Smoking status: Former Smoker     Packs/day: 1.00     Years: 30.00     Pack years: 30.00    Smokeless tobacco: Never Used   Substance and Sexual Activity    Alcohol use: No    Drug use: No    Sexual activity: Not Currently     Review of Systems   Constitutional: Negative.  Negative for appetite change, chills, fatigue and fever.   HENT: Negative.  Negative for congestion, ear discharge, facial swelling, sore throat and trouble swallowing.    Eyes: Negative.  Negative for photophobia, pain, discharge, redness and visual disturbance.   Respiratory:  Positive for cough (positive green phlegm) and shortness of breath. Negative for chest tightness, wheezing and stridor.    Cardiovascular: Negative.  Negative for chest pain, palpitations and leg swelling.   Gastrointestinal: Positive for blood in stool. Negative for abdominal distention, abdominal pain, anal bleeding, constipation, diarrhea, nausea, rectal pain and vomiting.   Endocrine: Negative.  Negative for cold intolerance, heat intolerance, polydipsia, polyphagia and polyuria.   Genitourinary: Negative.  Negative for difficulty urinating, dysuria, flank pain, frequency, hematuria, pelvic pain, urgency, vaginal bleeding and vaginal discharge.   Musculoskeletal: Negative.  Negative for arthralgias, back pain, gait problem, joint swelling, myalgias and neck pain.   Skin: Negative for color change, pallor, rash and wound.   Allergic/Immunologic: Negative.  Negative for food allergies and immunocompromised state.   Neurological: Negative for dizziness, tremors, seizures, syncope, facial asymmetry, speech difficulty, weakness, light-headedness, numbness and headaches.   Hematological: Negative.  Negative for adenopathy. Does not bruise/bleed easily.   Psychiatric/Behavioral: Positive for dysphoric mood. Negative for agitation, confusion, hallucinations, sleep disturbance and suicidal ideas. The patient is not nervous/anxious.    All other systems reviewed and are negative.    Objective:     Vital Signs (Most Recent):  Temp: 98.5 °F (36.9 °C) (11/01/18 1318)  Pulse: (!) 111 (11/01/18 1850)  Resp: (!) 38 (11/01/18 1616)  BP: (!) 98/47 (11/01/18 1841)  SpO2: (!) 90 % (11/01/18 1841) Vital Signs (24h Range):  Temp:  [98.5 °F (36.9 °C)] 98.5 °F (36.9 °C)  Pulse:  [100-116] 111  Resp:  [19-38] 38  SpO2:  [90 %-100 %] 90 %  BP: ()/(46-90) 98/47     Weight: 74.8 kg (165 lb)  Body mass index is 29.23 kg/m².    Physical Exam   Constitutional: She is oriented to person, place, and time. She appears well-developed and  well-nourished. No distress.   HENT:   Head: Normocephalic and atraumatic.   Nose: Nose normal.   Eyes: Conjunctivae and EOM are normal. Pupils are equal, round, and reactive to light. Right eye exhibits no discharge. Left eye exhibits no discharge.   Neck: Normal range of motion. Neck supple. No JVD present. No tracheal deviation present. No thyromegaly present.   Cardiovascular: Normal heart sounds. Exam reveals no gallop and no friction rub.   No murmur heard.  Irregular, irregular   Pulmonary/Chest: No stridor. No respiratory distress. She has no wheezes. She has rales. She exhibits no tenderness.   SOB while talking   Abdominal: Soft. Bowel sounds are normal. She exhibits no distension and no mass. There is no tenderness. There is no guarding.   Small round hernia near umbilicus   Musculoskeletal: Normal range of motion. She exhibits no edema, tenderness or deformity.   Lymphadenopathy:     She has no cervical adenopathy.   Neurological: She is alert and oriented to person, place, and time. She has normal reflexes. No cranial nerve deficit. Coordination normal.   Skin: Skin is warm and dry. No rash noted. She is not diaphoretic. No erythema. No pallor.   Psychiatric: She has a normal mood and affect. Her behavior is normal. Judgment and thought content normal.   Nursing note and vitals reviewed.        CRANIAL NERVES     CN III, IV, VI   Pupils are equal, round, and reactive to light.  Extraocular motions are normal.      Significant Labs:   CBC:   Recent Labs   Lab 11/01/18  1339   WBC 20.26*   HGB 7.9*   HCT 27.6*        CMP:   Recent Labs   Lab 11/01/18  1339      K 3.8   CL 90*   CO2 30*   *   BUN 52*   CREATININE 2.4*   CALCIUM 9.2   PROT 6.7   ALBUMIN 3.4*   BILITOT 0.3   ALKPHOS 45*   AST 20   ALT 20   ANIONGAP 17*   EGFRNONAA 19*     Cardiac Markers: No results for input(s): CKMB, MYOGLOBIN, BNP, TROPISTAT in the last 48 hours.    Significant Imaging: I have reviewed all pertinent  imaging results/findings within the past 24 hours.   Imaging Results          CT Abdomen Pelvis  Without Contrast (Final result)  Result time 11/01/18 15:04:59   Procedure changed from CT Abdomen Pelvis With Contrast     Final result by Tuan Zaidi MD (11/01/18 15:04:59)                 Impression:      No acute abnormality. Moderate volume stool right colon.  Otherwise small bowel and colon are unremarkable.  No free fluid, free air, or obstruction.    Multiple nonobstructing right-sided renal stones, the largest at the interpolar region measuring 6 mm. No hydronephrosis.  No left-sided renal stones.      Electronically signed by: Tuan Zaidi  Date:    11/01/2018  Time:    15:04             Narrative:    EXAMINATION:  CT ABDOMEN PELVIS WITHOUT CONTRAST    CLINICAL HISTORY:  GI bleeding;    TECHNIQUE:  Low dose axial images, sagittal and coronal reformations were obtained from the lung bases to the pubic symphysis without IV or oral contrast.    COMPARISON:  None.    FINDINGS:  ABDOMEN:    - Lung bases: No infiltrates and no nodules.  Chronic right middle lobe proximal segmental atelectasis.  Aortic and coronary artery atherosclerotic calcifications.  Left hilar calcified granuloma.    - Liver: No focal mass.    - Gallbladder: Surgically absent.    - Bile Ducts: No evidence of intra or extra hepatic biliary ductal dilation.    - Spleen: Negative.    - Kidneys: Multiple nonobstructing right-sided renal stones, the largest at the interpolar region measuring 6 mm.  No hydronephrosis.  No left-sided renal stones.  Bladder unremarkable.    - Adrenals: Unremarkable.    - Pancreas: No mass or peripancreatic fat stranding.    - Retroperitoneum:  No significant adenopathy.    - Vascular: Aortoiliac atherosclerotic calcification.    - Abdominal wall:  Fat containing umbilical hernia protruding through a 2.7 cm wide neck, uncomplicated.    PELVIS:    Uterus is surgically absent.  No adnexal  mass.    BOWEL/MESENTERY:    Moderate volume stool right colon.  Otherwise small bowel and colon are unremarkable.    BONES:  No acute osseous abnormality and no suspicious lytic or blastic lesion.  Cysts degenerative changes L5-S1, mild to moderate facet arthropathy lower lumbar spine.  Degenerative changes bilateral hip joints, moderate, with articular surface sclerosis.    All CT scans at this facility use dose modulation, iterative reconstruction and/or weight based dosing when appropriate to reduce radiation dose to as low as reasonably achievable.                               X-Ray Chest 1 View (Final result)  Result time 11/01/18 14:25:00    Final result by uTan Zaidi MD (11/01/18 14:25:00)                 Impression:      There is likely a component of chronic right middle lobe subsegmental atelectasis.Interval development of ill-defined 2.3 cm faint opacification overlying the right suprahilar region, may represent calcifying right 4th rib head anteriorly.  Recommend further evaluation with chest CT on a nonemergent basis or as clinically warranted.  Otherwise lungs are clear.      Electronically signed by: Tuan Zaidi  Date:    11/01/2018  Time:    14:25             Narrative:    EXAMINATION:  XR CHEST 1 VIEW    CLINICAL HISTORY:  . Cough    TECHNIQUE:  Single frontal portable view of the chest was performed.    COMPARISON:  04/04/2018, chest CT 12/21/2017    FINDINGS:  Support devices: Cardiac leads    Unchanged prominent right pericardial fat pad.  There is likely a component of chronic right middle lobe subsegmental atelectasis.Interval development of ill-defined 2.3 cm faint opacification overlying the right suprahilar region, may represent calcifying right 4th rib head anteriorly.  Recommend further evaluation with chest CT on a nonemergent basis.  Otherwise lungs are clear.    The cardiac silhouette is mildly enlarged, similar to prior studies.  The hilar and mediastinal contours are  unremarkable.    Bones are intact.                                  Assessment/Plan:     * Acute bronchitis    -IV Abx Zosyn  -nebulizers  -mucinex  -Home O2 at 2.5 liters  -sputum cultures         Hematochezia    -Monitor H/H  -No diarrhea, No need for C. Diff   -family states she can't have colonoscopy due to high risk or anesthesia       Chronic obstructive pulmonary disease    -as above       Acute cystitis without hematuria    IV Abx  -urine culture       Permanent atrial fibrillation    -continue home CCB       Type 2 diabetes mellitus with hyperlipidemia    Diabetes Mellitus  -SSI and accuchecks  -1800 felicia ADA diet  -nutritional counseling         VTE Risk Mitigation (From admission, onward)    None        Griselda Mendoza NP  Department of Hospital Medicine   [facility]

## 2018-11-02 NOTE — PLAN OF CARE
Assessment completed.  Met with the patient. CM explained and left info in blue transition of care folder regarding Advance Directives, Living Will ( FULL CODE ) ,  Pamphlet on D/C planning on admission and Pharmacy bedside delivery.  The role of CM explained for ICU transitions of care/ discharge planning. Per EMR,   [facility] resident with PMHx of COPD, chronic O2, DM, HLD, HTN, who presented to the ER for  c/o blood in stool  and SOB which onset gradually past week. Associated sxs included diarrhea  one day only 10/24/18 following a flu shot, and hematochezia.  A stool specimen was sent from the Nursing Home to the lab: specimen was solid so it wasn't tested for C. DIff. Pt also has a hernia on her R abdomen, which she is concerned about obstructing.Pt is on Xarelto 20 mg for atrial fibrillation. Pt was on abx for 5 days for tx of a partially collapsed lung, as noted by pt's daughter.  She has edema to lower extremities. Her sister at bedside states patient was told she couldn't have a colonoscopy due to high risk for anesthesia and she couldn't tolerate the bowel prep. She is a full code. Her sister, Bonnie Carmichael, is the surrogate decision maker. Patient has PHN insurance. Patient has no needs at this time and d/c plan is to return to [facility] ( permanent resident) . CM to f/u for safe transition    Primary Doctor No       [id number] - [place], [place] - [address]  [address] [address] [address]  Phone: [phone] Fax: [phone]    [id number] [place] [facility] [address]  [address] [address]  Phone: [phone] Fax: [phone]         11/02/18 1206   Discharge Assessment   Assessment Type Discharge Planning Assessment   Confirmed/corrected address and phone number on facesheet? Yes   Assessment information obtained from? Patient   Expected Length of Stay (days) (TBD)   Communicated expected length of stay with  patient/caregiver no   Prior to hospitilization cognitive status: Alert/Oriented   Prior to hospitalization functional status: Assistive Equipment   Current cognitive status: Alert/Oriented   Current Functional Status: Assistive Equipment   Lives With facility resident   Able to Return to Prior Arrangements yes   Is patient able to care for self after discharge? No   Patient's perception of discharge disposition nursing home   Equipment Currently Used at Home walker, rolling;oxygen   Do you have any problems affording any of your prescribed medications? No   Is the patient taking medications as prescribed? yes   Does the patient have transportation home? Yes   Transportation Available agency transportation   Does the patient receive services at the [facility]? No   Discharge Plan A Return to nursing home   Discharge Plan B Return to Nursing Home   Patient/Family In Agreement With Plan yes

## 2018-11-02 NOTE — SUBJECTIVE & OBJECTIVE
Interval History: She was also given an enema with mild BM. She is resting well comfortably, no distress and is fully AAOx3, no focal deficit, speech clear.    Review of Systems   Constitutional: Positive for activity change, appetite change and fatigue. Negative for chills, diaphoresis and fever.   HENT: Negative for congestion.    Respiratory: Positive for shortness of breath (REES only at baseline). Negative for apnea, cough and wheezing.    Cardiovascular: Negative for chest pain and leg swelling.   Gastrointestinal: Positive for diarrhea. Negative for abdominal pain, nausea and vomiting.   Endocrine: Negative for polydipsia.   Genitourinary: Negative for difficulty urinating.   Musculoskeletal: Positive for myalgias (chronic).   Skin: Negative for color change and wound.   Allergic/Immunologic: Negative for immunocompromised state.   Neurological: Negative for dizziness, syncope and weakness.   Hematological: Does not bruise/bleed easily.   Psychiatric/Behavioral: Negative for agitation, confusion and hallucinations. The patient is not nervous/anxious.      Objective:     Vital Signs (Most Recent):  Temp: 99.5 °F (37.5 °C) (11/02/18 1500)  Pulse: 89 (11/02/18 1500)  Resp: 17 (11/02/18 1500)  BP: (!) 94/37 (11/02/18 1500)  SpO2: 100 % (11/02/18 1500) Vital Signs (24h Range):  Temp:  [98.5 °F (36.9 °C)-99.7 °F (37.6 °C)] 99.5 °F (37.5 °C)  Pulse:  [] 89  Resp:  [13-46] 17  SpO2:  [88 %-100 %] 100 %  BP: ()/(26-75) 94/37     Weight: 79.2 kg (174 lb 9.7 oz)  Body mass index is 30.93 kg/m².    Intake/Output Summary (Last 24 hours) at 11/2/2018 1549  Last data filed at 11/2/2018 0904  Gross per 24 hour   Intake 3650 ml   Output 1430 ml   Net 2220 ml      Physical Exam   Constitutional: She is oriented to person, place, and time. She appears well-developed and well-nourished. She is cooperative.  Non-toxic appearance. She does not have a sickly appearance. She appears ill. No distress. She is not intubated.  Nasal cannula in place.   Pleasant elderly lady lying in bed comfortably   HENT:   Head: Normocephalic and atraumatic.   Mouth/Throat: Oropharynx is clear and moist and mucous membranes are normal.   Eyes: EOM and lids are normal. Pupils are equal, round, and reactive to light.   Neck: Trachea normal and full passive range of motion without pain. Carotid bruit is not present.       Cardiovascular: Normal heart sounds. An irregular rhythm present. Tachycardia present.   Pulses:       Radial pulses are 2+ on the right side, and 2+ on the left side.        Dorsalis pedis pulses are 1+ on the right side, and 1+ on the left side.   irreg irreg pulse   Pulmonary/Chest: Effort normal. No accessory muscle usage. She is not intubated. No respiratory distress. She has decreased breath sounds.   Abdominal: Soft. Bowel sounds are normal. She exhibits no distension. There is no tenderness. A hernia is present.   Obese   Genitourinary:   Genitourinary Comments: Cutler in place   Musculoskeletal: Normal range of motion.        Right foot: There is no deformity.        Left foot: There is no deformity.   Trace edema   Lymphadenopathy:     She has no cervical adenopathy.   Neurological: She is alert and oriented to person, place, and time.   Skin: Skin is warm and dry. Capillary refill takes 2 to 3 seconds. Ecchymosis (bilat LEs) noted. No rash noted. No cyanosis.   Psychiatric: Her speech is normal and behavior is normal. Judgment and thought content normal. Cognition and memory are normal. She exhibits a depressed mood.   Nursing note and vitals reviewed.      Significant Labs:   A1C:   Recent Labs   Lab 11/02/18  0430   HGBA1C 6.2*     ABGs: No results for input(s): PH, PCO2, HCO3, POCSATURATED, BE, TOTALHB, COHB, METHB, O2HB, POCFIO2 in the last 48 hours.  Blood Culture:   Recent Labs   Lab 11/01/18  1610 11/01/18  1700   LABBLOO No Growth to date No Growth to date     BMP:   Recent Labs   Lab 11/02/18  0430   *       K 3.3*      CO2 28   BUN 32*   CREATININE 1.5*   CALCIUM 8.3*   MG 1.9     CBC:   Recent Labs   Lab 11/01/18  1339 11/02/18  0430   WBC 20.26* 17.72*   HGB 7.9* 7.1*   HCT 27.6* 24.4*    314     CMP:   Recent Labs   Lab 11/01/18  1339 11/02/18  0430    140   K 3.8 3.3*   CL 90* 100   CO2 30* 28   * 219*   BUN 52* 32*   CREATININE 2.4* 1.5*   CALCIUM 9.2 8.3*   PROT 6.7 5.8*   ALBUMIN 3.4* 3.1*   BILITOT 0.3 0.4   ALKPHOS 45* 38*   AST 20 21   ALT 20 29   ANIONGAP 17* 12   EGFRNONAA 19* 34*     Cardiac Markers:   Coagulation:   Recent Labs   Lab 11/01/18  1339   INR 1.1   APTT 27.9     Lactic Acid:   Recent Labs   Lab 11/01/18  1946 11/01/18  2333 11/02/18  0430   LACTATE 4.9* 2.7* 2.0     Magnesium:   Recent Labs   Lab 11/02/18  0430   MG 1.9     Respiratory Culture:   Troponin:   Recent Labs   Lab 11/01/18  1339   TROPONINI 0.023     TSH:   Urine Culture:   All pertinent labs within the past 24 hours have been reviewed.    Significant Imaging: I have reviewed all pertinent imaging results/findings within the past 24 hours.

## 2018-11-02 NOTE — CONSULTS
Petrona Gonzalez [id number] is a 77 y.o. female who has been consulted for vancomycin dosing.  Consult ordered by Pamela Reynoso NP     Dx: Septic shock with/PNA  Vancomycin goal trough = 15-20 mcg/mL  Hx: DM, HTN, COPD, Afib  Concomitant abx tx: Zosyn 4.5 g every 8 hours,     Tmax: 98.9 F,  Cultures collected; NGTD  The patient has the following labs:     Date Creatinine (mg/dl)    BUN WBC Count   11/2/2018 Estimated Creatinine Clearance: 20.2 mL/min (A) (based on SCr of 2.4 mg/dL (H)). Lab Results   Component Value Date    BUN 52 (H) 11/01/2018     Lab Results   Component Value Date    WBC 20.26 (H) 11/01/2018      which calculates to an Estimated Creatinine Clearance: 20.2 mL/min (A) (based on SCr of 2.4 mg/dL (H))..       Current weight is 79.2 kg (174 lb 9.7 oz)    The patient will be started on vancomycin at a dose of 1250 mg for one dose. Due to her poor renal function at this time, Pharmacy will collect post dose random levels daily to determine the appropriate dose and interval.  We scheduled a Vancomycin 1,250 mg Placeholder Dose while random levels are collected.   Goal trough = 15-20 mcg/mL    Patient will be followed by pharmacy for changes in renal function, toxicity, and efficacy.      The initial vancomycin random level has been ordered for 11/02 at 2000.      Thank you for allowing us to participate in this patient's care.     Goran Stoddard

## 2018-11-02 NOTE — PROVIDER PROGRESS NOTES - EMERGENCY DEPT.
"Encounter Date: 11/1/2018    ED Physician Progress Notes           Central Line  Date/Time: 11/1/2018 8:00 PM  Performed by: Ady Noble Do, MD  Consent Done: Yes  Time out: Immediately prior to procedure a "time out" was called to verify the correct patient, procedure, equipment, support staff and site/side marked as required.  Indications: vascular access  Anesthesia: local infiltration    Anesthesia:  Local Anesthetic: lidocaine 1% without epinephrine  Anesthetic total: 6 mL  Preparation: skin prepped with ChloraPrep  Skin prep agent dried: skin prep agent completely dried prior to procedure  Sterile barriers: all five maximum sterile barriers used - cap, mask, sterile gown, sterile gloves, and large sterile sheet  Hand hygiene: hand hygiene performed prior to central venous catheter insertion  Location details: right internal jugular  Catheter type: triple lumen  Catheter size: 7 Fr  Catheter Length: 15cm    Ultrasound guidance: yes  , compressibility normal  Needle advanced into vessel with real time Ultrasound guidance.  Guidewire confirmed in vessel.  Sterile sheath used.  Number of attempts: 3  Assessment: placement verified by x-ray  Complications: none  Estimated blood loss (mL): 0  Post-procedure: line sutured,  chlorhexidine patch,  sterile dressing applied and blood return through all ports  Complications: No          "

## 2018-11-02 NOTE — PLAN OF CARE
11/02/18 1126   Medicare Message   Important Message from Medicare regarding Discharge Appeal Rights Given to patient/caregiver;Explained to patient/caregiver;Signed/date by patient/caregiver   Date IMM was signed 11/02/18   Time IMM was signed 1126

## 2018-11-02 NOTE — PLAN OF CARE
Problem: Patient Care Overview  Goal: Plan of Care Review  Outcome: Ongoing (interventions implemented as appropriate)  Recommendations     Recommendation/Intervention: 1.Consider changing diet to dental soft diabetic cardiac. 2. Add boost glucose control TID. 3. Will continue to monitor.   Goals: PO intake > 50 % by next RD visit  Nutrition Goal Status: new  Communication of RD Recs: (POc, sticky note)

## 2018-11-02 NOTE — ASSESSMENT & PLAN NOTE
Possible acute blood loss anemia on chronic anemia  No GI scopes due to risk and no active bleeding  Close monitoring on Xarelto  Transfuse 1 unit PRBCs  CBC in AM

## 2018-11-03 PROBLEM — K59.02 CONSTIPATION DUE TO OUTLET DYSFUNCTION: Status: RESOLVED | Noted: 2018-11-02 | Resolved: 2018-11-03

## 2018-11-03 PROBLEM — R57.9 SHOCK, UNSPECIFIED: Status: RESOLVED | Noted: 2018-11-01 | Resolved: 2018-11-03

## 2018-11-03 PROBLEM — E87.8 ELECTROLYTE IMBALANCE: Status: ACTIVE | Noted: 2018-11-02

## 2018-11-03 PROBLEM — E87.8 ELECTROLYTE IMBALANCE: Status: RESOLVED | Noted: 2018-11-02 | Resolved: 2018-11-03

## 2018-11-03 PROBLEM — N17.9 AKI (ACUTE KIDNEY INJURY): Status: RESOLVED | Noted: 2018-11-02 | Resolved: 2018-11-03

## 2018-11-03 LAB
ALBUMIN SERPL BCP-MCNC: 2.7 G/DL
ALP SERPL-CCNC: 35 U/L
ALT SERPL W/O P-5'-P-CCNC: 45 U/L
ANION GAP SERPL CALC-SCNC: 10 MMOL/L
ANION GAP SERPL CALC-SCNC: 9 MMOL/L
AST SERPL-CCNC: 33 U/L
BASOPHILS # BLD AUTO: 0.01 K/UL
BASOPHILS NFR BLD: 0.1 %
BILIRUB SERPL-MCNC: 0.3 MG/DL
BUN SERPL-MCNC: 12 MG/DL
BUN SERPL-MCNC: 12 MG/DL
CALCIUM SERPL-MCNC: 7.8 MG/DL
CALCIUM SERPL-MCNC: 8.1 MG/DL
CHLORIDE SERPL-SCNC: 102 MMOL/L
CHLORIDE SERPL-SCNC: 105 MMOL/L
CO2 SERPL-SCNC: 28 MMOL/L
CO2 SERPL-SCNC: 29 MMOL/L
CREAT SERPL-MCNC: 0.9 MG/DL
CREAT SERPL-MCNC: 1.1 MG/DL
DIFFERENTIAL METHOD: ABNORMAL
EOSINOPHIL # BLD AUTO: 0 K/UL
EOSINOPHIL NFR BLD: 0.3 %
ERYTHROCYTE [DISTWIDTH] IN BLOOD BY AUTOMATED COUNT: 19.3 %
EST. GFR  (AFRICAN AMERICAN): 57 ML/MIN/1.73 M^2
EST. GFR  (AFRICAN AMERICAN): >60 ML/MIN/1.73 M^2
EST. GFR  (NON AFRICAN AMERICAN): 49 ML/MIN/1.73 M^2
EST. GFR  (NON AFRICAN AMERICAN): >60 ML/MIN/1.73 M^2
GLUCOSE SERPL-MCNC: 237 MG/DL
GLUCOSE SERPL-MCNC: 92 MG/DL
HCT VFR BLD AUTO: 27.7 %
HGB BLD-MCNC: 8.3 G/DL
LYMPHOCYTES # BLD AUTO: 1.6 K/UL
LYMPHOCYTES NFR BLD: 14.5 %
MAGNESIUM SERPL-MCNC: 1.7 MG/DL
MAGNESIUM SERPL-MCNC: 2.3 MG/DL
MCH RBC QN AUTO: 25.2 PG
MCHC RBC AUTO-ENTMCNC: 30 G/DL
MCV RBC AUTO: 84 FL
MONOCYTES # BLD AUTO: 0.7 K/UL
MONOCYTES NFR BLD: 6 %
NEUTROPHILS # BLD AUTO: 8.7 K/UL
NEUTROPHILS NFR BLD: 79.1 %
PLATELET # BLD AUTO: 228 K/UL
PMV BLD AUTO: 8 FL
POCT GLUCOSE: 161 MG/DL (ref 70–110)
POCT GLUCOSE: 208 MG/DL (ref 70–110)
POCT GLUCOSE: 217 MG/DL (ref 70–110)
POCT GLUCOSE: 99 MG/DL (ref 70–110)
POTASSIUM SERPL-SCNC: 3.4 MMOL/L
POTASSIUM SERPL-SCNC: 3.8 MMOL/L
PROT SERPL-MCNC: 5.3 G/DL
RBC # BLD AUTO: 3.29 M/UL
SODIUM SERPL-SCNC: 140 MMOL/L
SODIUM SERPL-SCNC: 143 MMOL/L
WBC # BLD AUTO: 10.92 K/UL

## 2018-11-03 PROCEDURE — 97530 THERAPEUTIC ACTIVITIES: CPT

## 2018-11-03 PROCEDURE — 83735 ASSAY OF MAGNESIUM: CPT | Mod: 91

## 2018-11-03 PROCEDURE — 97166 OT EVAL MOD COMPLEX 45 MIN: CPT

## 2018-11-03 PROCEDURE — 63600175 PHARM REV CODE 636 W HCPCS: Performed by: NURSE PRACTITIONER

## 2018-11-03 PROCEDURE — 85025 COMPLETE CBC W/AUTO DIFF WBC: CPT

## 2018-11-03 PROCEDURE — 25000242 PHARM REV CODE 250 ALT 637 W/ HCPCS: Performed by: NURSE PRACTITIONER

## 2018-11-03 PROCEDURE — 25000003 PHARM REV CODE 250: Performed by: NURSE PRACTITIONER

## 2018-11-03 PROCEDURE — 94640 AIRWAY INHALATION TREATMENT: CPT

## 2018-11-03 PROCEDURE — G8982 BODY POS GOAL STATUS: HCPCS | Mod: CK

## 2018-11-03 PROCEDURE — 27000221 HC OXYGEN, UP TO 24 HOURS

## 2018-11-03 PROCEDURE — 63600175 PHARM REV CODE 636 W HCPCS: Performed by: EMERGENCY MEDICINE

## 2018-11-03 PROCEDURE — G8981 BODY POS CURRENT STATUS: HCPCS | Mod: CL

## 2018-11-03 PROCEDURE — 63600175 PHARM REV CODE 636 W HCPCS: Performed by: INTERNAL MEDICINE

## 2018-11-03 PROCEDURE — 80053 COMPREHEN METABOLIC PANEL: CPT

## 2018-11-03 PROCEDURE — 20000000 HC ICU ROOM

## 2018-11-03 PROCEDURE — 93005 ELECTROCARDIOGRAM TRACING: CPT

## 2018-11-03 PROCEDURE — 25000003 PHARM REV CODE 250: Performed by: EMERGENCY MEDICINE

## 2018-11-03 PROCEDURE — 80048 BASIC METABOLIC PNL TOTAL CA: CPT

## 2018-11-03 PROCEDURE — 93010 ELECTROCARDIOGRAM REPORT: CPT | Mod: ,,, | Performed by: INTERNAL MEDICINE

## 2018-11-03 PROCEDURE — 97163 PT EVAL HIGH COMPLEX 45 MIN: CPT

## 2018-11-03 PROCEDURE — G8988 SELF CARE GOAL STATUS: HCPCS | Mod: CJ

## 2018-11-03 PROCEDURE — 99291 CRITICAL CARE FIRST HOUR: CPT | Mod: ,,, | Performed by: NURSE PRACTITIONER

## 2018-11-03 PROCEDURE — G8987 SELF CARE CURRENT STATUS: HCPCS | Mod: CL

## 2018-11-03 PROCEDURE — 99222 1ST HOSP IP/OBS MODERATE 55: CPT | Mod: ,,, | Performed by: INTERNAL MEDICINE

## 2018-11-03 RX ORDER — POTASSIUM CHLORIDE 750 MG/1
30 TABLET, EXTENDED RELEASE ORAL EVERY 4 HOURS
Status: COMPLETED | OUTPATIENT
Start: 2018-11-03 | End: 2018-11-03

## 2018-11-03 RX ORDER — ALPRAZOLAM 0.5 MG/1
0.5 TABLET ORAL 2 TIMES DAILY PRN
Status: DISCONTINUED | OUTPATIENT
Start: 2018-11-03 | End: 2018-11-04 | Stop reason: HOSPADM

## 2018-11-03 RX ORDER — DILTIAZEM HYDROCHLORIDE 5 MG/ML
10 INJECTION INTRAVENOUS ONCE
Status: COMPLETED | OUTPATIENT
Start: 2018-11-03 | End: 2018-11-03

## 2018-11-03 RX ORDER — LANOLIN ALCOHOL/MO/W.PET/CERES
400 CREAM (GRAM) TOPICAL ONCE
Status: COMPLETED | OUTPATIENT
Start: 2018-11-03 | End: 2018-11-03

## 2018-11-03 RX ORDER — DIGOXIN 0.25 MG/ML
500 INJECTION INTRAMUSCULAR; INTRAVENOUS ONCE
Status: COMPLETED | OUTPATIENT
Start: 2018-11-03 | End: 2018-11-03

## 2018-11-03 RX ORDER — HYDROMORPHONE HYDROCHLORIDE 1 MG/ML
0.2 INJECTION, SOLUTION INTRAMUSCULAR; INTRAVENOUS; SUBCUTANEOUS ONCE
Status: COMPLETED | OUTPATIENT
Start: 2018-11-03 | End: 2018-11-03

## 2018-11-03 RX ORDER — DIGOXIN 0.25 MG/ML
250 INJECTION INTRAMUSCULAR; INTRAVENOUS DAILY
Status: DISCONTINUED | OUTPATIENT
Start: 2018-11-04 | End: 2018-11-04 | Stop reason: HOSPADM

## 2018-11-03 RX ADMIN — ARFORMOTEROL TARTRATE 15 MCG: 15 SOLUTION RESPIRATORY (INHALATION) at 07:11

## 2018-11-03 RX ADMIN — SIMVASTATIN 10 MG: 5 TABLET, FILM COATED ORAL at 09:11

## 2018-11-03 RX ADMIN — POLYETHYLENE GLYCOL 3350 17 G: 17 POWDER, FOR SOLUTION ORAL at 08:11

## 2018-11-03 RX ADMIN — ALPRAZOLAM 0.5 MG: 0.5 TABLET ORAL at 08:11

## 2018-11-03 RX ADMIN — PANTOPRAZOLE SODIUM 40 MG: 40 TABLET, DELAYED RELEASE ORAL at 08:11

## 2018-11-03 RX ADMIN — BUDESONIDE 0.5 MG: 0.5 SUSPENSION RESPIRATORY (INHALATION) at 07:11

## 2018-11-03 RX ADMIN — POTASSIUM CHLORIDE 30 MEQ: 750 TABLET, EXTENDED RELEASE ORAL at 02:11

## 2018-11-03 RX ADMIN — INSULIN ASPART 4 UNITS: 100 INJECTION, SOLUTION INTRAVENOUS; SUBCUTANEOUS at 11:11

## 2018-11-03 RX ADMIN — ROFLUMILAST 500 MCG: 500 TABLET ORAL at 08:11

## 2018-11-03 RX ADMIN — FLUTICASONE PROPIONATE 50 MCG: 50 SPRAY, METERED NASAL at 08:11

## 2018-11-03 RX ADMIN — PREDNISONE 20 MG: 20 TABLET ORAL at 08:11

## 2018-11-03 RX ADMIN — AMIODARONE HYDROCHLORIDE 150 MG: 1.5 INJECTION, SOLUTION INTRAVENOUS at 11:11

## 2018-11-03 RX ADMIN — GUAIFENESIN 1200 MG: 600 TABLET, EXTENDED RELEASE ORAL at 09:11

## 2018-11-03 RX ADMIN — TRAMADOL HYDROCHLORIDE 50 MG: 50 TABLET, COATED ORAL at 04:11

## 2018-11-03 RX ADMIN — DILTIAZEM HYDROCHLORIDE 90 MG: 30 TABLET, FILM COATED ORAL at 02:11

## 2018-11-03 RX ADMIN — CEFTRIAXONE 2 G: 2 INJECTION, SOLUTION INTRAVENOUS at 07:11

## 2018-11-03 RX ADMIN — AMIODARONE HYDROCHLORIDE 0.5 MG/MIN: 1.8 INJECTION, SOLUTION INTRAVENOUS at 05:11

## 2018-11-03 RX ADMIN — ALPRAZOLAM 0.5 MG: 0.5 TABLET ORAL at 09:11

## 2018-11-03 RX ADMIN — HYDROMORPHONE HYDROCHLORIDE 0.2 MG: 1 INJECTION, SOLUTION INTRAMUSCULAR; INTRAVENOUS; SUBCUTANEOUS at 10:11

## 2018-11-03 RX ADMIN — DILTIAZEM HYDROCHLORIDE 10 MG: 5 INJECTION INTRAVENOUS at 09:11

## 2018-11-03 RX ADMIN — TRAMADOL HYDROCHLORIDE 50 MG: 50 TABLET, COATED ORAL at 08:11

## 2018-11-03 RX ADMIN — MAGNESIUM OXIDE TAB 400 MG (241.3 MG ELEMENTAL MG) 400 MG: 400 (241.3 MG) TAB at 07:11

## 2018-11-03 RX ADMIN — INSULIN ASPART 1 UNITS: 100 INJECTION, SOLUTION INTRAVENOUS; SUBCUTANEOUS at 09:11

## 2018-11-03 RX ADMIN — OLOPATADINE HYDROCHLORIDE 1 DROP: 1 SOLUTION/ DROPS OPHTHALMIC at 08:11

## 2018-11-03 RX ADMIN — PIPERACILLIN SODIUM AND TAZOBACTAM SODIUM 4.5 G: 4; .5 INJECTION, POWDER, LYOPHILIZED, FOR SOLUTION INTRAVENOUS at 01:11

## 2018-11-03 RX ADMIN — AMIODARONE HYDROCHLORIDE 1 MG/MIN: 1.8 INJECTION, SOLUTION INTRAVENOUS at 12:11

## 2018-11-03 RX ADMIN — POTASSIUM CHLORIDE 30 MEQ: 750 TABLET, EXTENDED RELEASE ORAL at 10:11

## 2018-11-03 RX ADMIN — DILTIAZEM HYDROCHLORIDE 60 MG: 60 TABLET, FILM COATED ORAL at 05:11

## 2018-11-03 RX ADMIN — INSULIN ASPART 4 UNITS: 100 INJECTION, SOLUTION INTRAVENOUS; SUBCUTANEOUS at 05:11

## 2018-11-03 RX ADMIN — DIGOXIN 500 MCG: 0.25 INJECTION INTRAMUSCULAR; INTRAVENOUS at 02:11

## 2018-11-03 RX ADMIN — GUAIFENESIN 1200 MG: 600 TABLET, EXTENDED RELEASE ORAL at 08:11

## 2018-11-03 RX ADMIN — DILTIAZEM HYDROCHLORIDE 90 MG: 30 TABLET, FILM COATED ORAL at 09:11

## 2018-11-03 NOTE — CONSULTS
[facility] -   Cardiology  Consult Note    Patient Name: Petrona Gonzalez  MRN: [id number]  Admission Date: 11/1/2018  Hospital Length of Stay: 2 days  Code Status: DNR   Attending Provider: Giovani Hackett MD   Consulting Provider: Monique Chavez NP  Primary Care Physician: Primary Doctor No  Principal Problem:Stage 3 severe COPD by GOLD classification    Patient information was obtained from patient, relative(s), past medical records and ER records.     Inpatient consult to Cardiology  Consult performed by: Monique Chavez NP  Consult ordered by: Carlos Sultana NP        Subjective:     Chief Complaint:  AFIB/RVR     HPI:   Mrs. Gonzalez is a 77 year old female with history of COPD, Chronic O2, DM2, HLP, HTN, PAF who presented to [facility] on 11/1/2018 due to blood in stool and SOB which onset gradually. She resides at [facility]. [facility] medicine admitted patient to ICU. Cardiology consulted today to assist with AFIB/RVR. Patient seen and examined in ICU. Denies chest pain or anginal equivalents. Continues to have SOB today. EKG reveals AFIB/RVR. She was previously on IV Cardizem gtt and transitioned to PO dosing but remains in AFIB/RVR. IV Amiodarone gtt started today per ICU team with improvement in HR from 150 to 130's. Will check 2D Echo. Continue IV amiodarone gtt, CCB, and Xarelto. Further recs to follow    Past Medical History:   Diagnosis Date    SHANE (acute kidney injury) 11/2/2018    COPD (chronic obstructive pulmonary disease) with emphysema     Diabetes mellitus     Hyperlipidemia     Hypertension     Insomnia     Maxillary sinusitis, chronic        Past Surgical History:   Procedure Laterality Date    BACK SURGERY      HERNIA REPAIR      HYSTERECTOMY         Review of patient's allergies indicates:   Allergen Reactions    Meloxicam Other (See Comments)      Patient stated that she has muscle aches and pains    Morphine Rash    Naproxen Other (See Comments)     Patient states  she has muscle and aches.    Pulmicort [budesonide] Other (See Comments)     Burning in chest       No current facility-administered medications on file prior to encounter.      Current Outpatient Medications on File Prior to Encounter   Medication Sig    acetaminophen (TYLENOL) 325 MG tablet Take 325 mg by mouth every 6 (six) hours as needed for Pain.    albuterol sulfate (VENTOLIN INHL) Inhale into the lungs.    albuterol-ipratropium (DUO-NEB) 2.5 mg-0.5 mg/3 mL nebulizer solution Take 3 mLs by nebulization every 6 (six) hours as needed for Wheezing. Rescue    alprazolam (XANAX) 0.5 MG tablet Take 0.5 mg by mouth every 6 (six) hours as needed for Anxiety.    diltiaZEM (CARDIZEM) 90 MG tablet Take 1 tablet (90 mg total) by mouth every 8 (eight) hours.    fluticasone (FLONASE) 50 mcg/actuation nasal spray 1 spray by Each Nare route once daily.    levocetirizine (XYZAL) 5 MG tablet Take 5 mg by mouth every evening.    lisinopril (PRINIVIL,ZESTRIL) 5 MG tablet Take 1 tablet (5 mg total) by mouth once daily.    loratadine (CLARITIN) 10 mg tablet Take 10 mg by mouth once daily.    metformin (GLUCOPHAGE) 500 MG tablet Take 1 tablet (500 mg total) by mouth 2 (two) times daily with meals.    NOVOLIN R REGULAR U-100 INSULN 100 unit/mL injection     olopatadine (PAZEO) 0.7 % Drop Apply 1 drop to eye once daily.    ondansetron (ZOFRAN) 4 MG tablet Take 4 mg by mouth every 6 (six) hours as needed for Nausea.    OXYGEN-AIR DELIVERY SYSTEMS MISC by [facility] [place] [facility].(Non-Drug; Combo Route) route.    pantoprazole (PROTONIX) 40 MG tablet Take 40 mg by mouth once daily.    predniSONE (DELTASONE) 20 MG tablet     rivaroxaban (XARELTO) 20 mg Tab Take 20 mg by mouth daily with dinner or evening meal.    roflumilast 500 mcg Tab Take 1 tablet (500 mcg total) by mouth once daily.    simvastatin (ZOCOR) 10 MG tablet Take 1 tablet (10 mg total) by mouth every evening.    traMADol (ULTRAM) 50 mg tablet     umeclidinium (INCRUSE  ELLIPTA) 62.5 mcg/actuation DsDv Inhale into the lungs once daily. Controller    budesonide-formoterol 160-4.5 mcg (SYMBICORT) 160-4.5 mcg/actuation HFAA Inhale 2 puffs into the lungs every 12 (twelve) hours. Controller    cyclobenzaprine (FLEXERIL) 10 MG tablet Take 10 mg by mouth 3 (three) times daily as needed for Muscle spasms.    furosemide (LASIX) 20 MG tablet     guaiFENesin (MUCINEX) 600 mg 12 hr tablet Take 1,200 mg by mouth 2 (two) times daily.     Family History     Problem Relation (Age of Onset)    Arthritis Father    Cancer Father, Sister, Brother    Early death Sister    Hearing loss Father    Heart disease Brother        Tobacco Use    Smoking status: Former Smoker     Packs/day: 1.00     Years: 30.00     Pack years: 30.00    Smokeless tobacco: Never Used   Substance and Sexual Activity    Alcohol use: No    Drug use: No    Sexual activity: Not Currently     Review of Systems   Constitution: Negative for weakness.   HENT: Negative for hearing loss and hoarse voice.    Eyes: Negative for blurred vision and visual disturbance.   Cardiovascular: Positive for dyspnea on exertion, irregular heartbeat and palpitations. Negative for chest pain, claudication, leg swelling, near-syncope, orthopnea, paroxysmal nocturnal dyspnea and syncope.   Respiratory: Positive for shortness of breath. Negative for cough, hemoptysis, sleep disturbances due to breathing, snoring and wheezing.    Endocrine: Negative for cold intolerance and heat intolerance.   Hematologic/Lymphatic: Bruises/bleeds easily.   Skin: Negative for color change, dry skin and nail changes.   Musculoskeletal: Positive for arthritis and back pain. Negative for joint pain and myalgias.   Gastrointestinal: Negative for bloating, abdominal pain, constipation, nausea and vomiting.   Genitourinary: Negative for dysuria, flank pain, hematuria and hesitancy.   Neurological: Negative for headaches, light-headedness, loss of balance, numbness and  paresthesias.   Psychiatric/Behavioral: Negative for altered mental status.   Allergic/Immunologic: Negative for environmental allergies.     Objective:     Vital Signs (Most Recent):  Temp: 98.9 °F (37.2 °C) (11/03/18 1100)  Pulse: (!) 159 (11/03/18 1120)  Resp: (!) 28 (11/03/18 1120)  BP: (!) 124/93 (11/03/18 1120)  SpO2: 97 % (11/03/18 1120) Vital Signs (24h Range):  Temp:  [98.7 °F (37.1 °C)-99.7 °F (37.6 °C)] 98.9 °F (37.2 °C)  Pulse:  [] 159  Resp:  [12-33] 28  SpO2:  [81 %-100 %] 97 %  BP: ()/(33-93) 124/93     Weight: 79.2 kg (174 lb 9.7 oz)  Body mass index is 30.93 kg/m².    SpO2: 97 %  O2 Device (Oxygen Therapy): nasal cannula w/ humidification      Intake/Output Summary (Last 24 hours) at 11/3/2018 1203  Last data filed at 11/3/2018 1105  Gross per 24 hour   Intake 3308.33 ml   Output 1675 ml   Net 1633.33 ml       Lines/Drains/Airways     Central Venous Catheter Line                 Percutaneous Central Line Insertion/Assessment - triple lumen  11/01/18 2045 right subclavian 1 day          Drain                 Urethral Catheter 11/01/18 2159 Latex 1 day          Peripheral Intravenous Line                 Peripheral IV - Single Lumen 12/21/17 1546 Right Forearm 316 days         Peripheral IV - Single Lumen 11/01/18 1851 Right Antecubital 1 day                Physical Exam   Constitutional: She is oriented to person, place, and time. She appears well-developed and well-nourished. No distress.   HENT:   Head: Normocephalic and atraumatic.   Eyes: Pupils are equal, round, and reactive to light.   Neck: Normal range of motion and full passive range of motion without pain. Neck supple. No JVD present.   Cardiovascular: S1 normal, S2 normal and intact distal pulses. An irregularly irregular rhythm present. Tachycardia present. PMI is not displaced. Exam reveals no distant heart sounds.   No murmur heard.  Pulses:       Radial pulses are 2+ on the right side, and 2+ on the left side.         Dorsalis pedis pulses are 1+ on the right side, and 1+ on the left side.   Pulmonary/Chest: Accessory muscle usage present. No respiratory distress. She has decreased breath sounds in the left lower field. She has no wheezes. She has no rales.   Abdominal: Soft. Bowel sounds are normal. She exhibits no distension. There is no tenderness.   Musculoskeletal: Normal range of motion. She exhibits no edema.        Right ankle: She exhibits no swelling.        Left ankle: She exhibits no swelling.   Neurological: She is alert and oriented to person, place, and time.   Skin: Skin is warm and dry. She is not diaphoretic. No cyanosis. Nails show no clubbing.   Psychiatric: She has a normal mood and affect. Her speech is normal and behavior is normal. Judgment and thought content normal. Cognition and memory are normal.   Nursing note and vitals reviewed.      Significant Labs:   All pertinent lab results from the last 24 hours have been reviewed. and   Recent Lab Results       11/03/18  1109   11/03/18  0739   11/03/18  0530   11/02/18  2115   11/02/18  1737        Albumin     2.7         Alkaline Phosphatase     35         ALT     45         Anion Gap     10         AST     33         Baso #     0.01         Basophil%     0.1         Total Bilirubin     0.3  Comment:  For infants and newborns, interpretation of results should be based  on gestational age, weight and in agreement with clinical  observations.  Premature Infant recommended reference ranges:  Up to 24 hours.............<8.0 mg/dL  Up to 48 hours............<12.0 mg/dL  3-5 days..................<15.0 mg/dL  6-29 days.................<15.0 mg/dL           BUN, Bld     12         Calcium     7.8         Chloride     105         CO2     28         Creatinine     0.9         Differential Method     Automated         eGFR if      >60         eGFR if non      >60  Comment:  Calculation used to obtain the estimated glomerular  filtration  rate (eGFR) is the CKD-EPI equation.            Eos #     0.0         Eosinophil%     0.3         Glucose     92         Gran # (ANC)     8.7         Gran%     79.1         Hematocrit     27.7         Hemoglobin     8.3         Lymph #     1.6         Lymph%     14.5         Magnesium     1.7         MCH     25.2         MCHC     30.0         MCV     84         Mono #     0.7         Mono%     6.0         MPV     8.0         Platelets     228         POCT Glucose 208 99   160 163     Potassium     3.4         Total Protein     5.3         RBC     3.29         RDW     19.3         Sodium     143         Vancomycin, Random               WBC     10.92                          11/02/18  1538   11/02/18  1244        Albumin         Alkaline Phosphatase         ALT         Anion Gap         AST         Baso #         Basophil%         Total Bilirubin         BUN, Bld         Calcium         Chloride         CO2         Creatinine         Differential Method         eGFR if          eGFR if non          Eos #         Eosinophil%         Glucose         Gran # (ANC)         Gran%         Hematocrit         Hemoglobin         Lymph #         Lymph%         Magnesium         MCH         MCHC         MCV         Mono #         Mono%         MPV         Platelets         POCT Glucose   168     Potassium         Total Protein         RBC         RDW         Sodium         Vancomycin, Random 10.0       WBC               Significant Imaging: Echocardiogram:   2D echo with color flow doppler:   Results for orders placed or performed during the hospital encounter of 12/21/17   2D echo with color flow doppler   Result Value Ref Range    Calculated EF 60 55 - 65    Diastolic Dysfunction No     and X-Ray: CXR: X-Ray Chest 1 View (CXR):   Results for orders placed or performed during the hospital encounter of 11/01/18   X-Ray Chest 1 View    Narrative    EXAMINATION:  XR CHEST 1  VIEW    CLINICAL HISTORY:  Right internal jugular line placement.    COMPARISON:  Low January 2018, 1407 hours.    FINDINGS:  There has been placement of a right central line tip terminating at the level of the caval atrial junction.  No pneumothorax.  Heart is stable in size.  There is stable hazy increased density at the medial right lung base similar to previous exams.      Impression    Right internal jugular line tip terminates near the cavoatrial junction.  No pneumothorax.      Electronically signed by: Mahin Koehlre MD  Date:    11/01/2018  Time:    21:22    and X-Ray Chest PA and Lateral (CXR): No results found for this visit on 11/01/18.    Assessment and Plan:     Electrolyte imbalance    Keep K+ >4  Keep Mag >2     Atrial fibrillation with RVR w/ chronic A-fib on Xarelto    AFIB/RVR responded well to IV Cardizem gtt  Transitioned to PO Cardizem  Converted back to AFIB/RVR today with rate 150's with exertional activity  Start IV Amiodarone gtt  Will check 2D Echo  No BB due to lung disease  Continue Xarelto for CVA prophylaxis  No CNS complaints to suggest TIA or CVA today  No signs of abnormal bleeding on Xarelto  Further recs to follow  Continue CCB, IV amio gtt, Xarelto     Acute on chronic respiratory failure with hypoxia and hypercapnia    Per primary team     Type 2 diabetes mellitus with hyperlipidemia    Continue statin         VTE Risk Mitigation (From admission, onward)        Ordered     rivaroxaban tablet 15 mg  With dinner      11/02/18 0924     Place sequential compression device  Until discontinued      11/01/18 2199          Thank you for your consult. I will follow-up with patient. Please contact us if you have any additional questions.    Monique Chavez NP  Cardiology   [facility]

## 2018-11-03 NOTE — HPI
Mrs. Gonzalez is a 77 year old female with history of COPD, Chronic O2, DM2, HLP, HTN, PAF who presented to [facility] on 11/1/2018 due to blood in stool and SOB which onset gradually. She resides at [facility]. [facility] medicine admitted patient to ICU. Cardiology consulted today to assist with AFIB/RVR. Patient seen and examined in ICU. Denies chest pain or anginal equivalents. Continues to have SOB today. EKG reveals AFIB/RVR. She was previously on IV Cardizem gtt and transitioned to PO dosing but remains in AFIB/RVR. IV Amiodarone gtt started today per ICU team with improvement in HR from 150 to 130's. Will check 2D Echo. Continue IV amiodarone gtt, CCB, and Xarelto. Further recs to follow

## 2018-11-03 NOTE — PT/OT/SLP EVAL
Occupational Therapy   Evaluation    Name: Petrona Gonzalez  MRN: [id number]  Admitting Diagnosis:  Stage 3 severe COPD by GOLD classification      Recommendations:     Discharge Recommendations: nursing facility, basic, nursing facility, skilled(snf vs bnf)  Discharge Equipment Recommendations:     Barriers to discharge:       History:     Occupational Profile:  Living Environment: lives at nursing facility  Previous level of function:   Roles and Routines: occupational therapy  Equipment Used at Home:  wheelchair  Assistance upon Discharge:     Past Medical History:   Diagnosis Date    SHANE (acute kidney injury) 11/2/2018    COPD (chronic obstructive pulmonary disease) with emphysema     Diabetes mellitus     Hyperlipidemia     Hypertension     Insomnia     Maxillary sinusitis, chronic        Past Surgical History:   Procedure Laterality Date    BACK SURGERY      HERNIA REPAIR      HYSTERECTOMY         Subjective     Chief Complaint: debility and generalized weakness  Patient/Family Comments/goals:     Pain/Comfort:  · Pain Rating 1: 0/10    Patients cultural, spiritual, Anabaptist conflicts given the current situation:      Objective:     Communicated with: nurse joshi and epic chart review prior to session.  Patient found all lines intact, call button in reach, nurse Maddy  notified and family present and pulse ox (continuous), oxygen, blood pressure cuff, telemetry upon OT entry to room.    General Precautions: Standard,     Orthopedic Precautions:N/A   Braces: N/A     Occupational Performance:  Functional Mobility/Transfers:  · Patient completed Sit <> Stand Transfer with minimum assistance  with  rolling walker     Cognitive/Visual Perceptual:  Cognitive/Psychosocial Skills:     -       Oriented to: Person, Place, Time and Situation   -       Follows Commands/attention:Follows one-step commands  -       Communication: clear/fluent  -       Memory: No Deficits noted  -       Safety awareness/insight to  "disability: impaired   Visual/Perceptual:      -Intact .    Physical Exam:  Upper Extremity Range of Motion:     -       Right Upper Extremity: WFL .  -       Left Upper Extremity: WFL . .  Upper Extremity Strength:    -       Right Upper Extremity: mmt: 3/5 grossly  -       Left Upper Extremity: mmt: 3/5 grossly. .   Strength:    -       Right Upper Extremity: mmt:3/5 grossly . .   -       Left Upper Extremity: mmt: 3/5 grossly .    AMPAC 6 Click ADL:  AMPAC Total Score: 15    Treatment & Education:    Education:    Patient left sitting eob with all lines intact, call button in reach, nurse Maddy  notified and family present    Assessment:     Petrona Gonzalez is a 77 y.o. female with a medical diagnosis of Stage 3 severe COPD by GOLD classification.  She presents with the following performance deficits affecting function: weakness, impaired self care skills, impaired balance, decreased safety awareness, decreased ROM, impaired endurance, impaired functional mobilty, decreased upper extremity function, gait instability.      Rehab Prognosis: Good; patient would benefit from acute skilled OT services to address these deficits and reach maximum level of function.         Clinical Decision Makin.  OT Mod:  "Pt evaluation falls under moderate complexity for evaluation coding due to identification of 3-5 performance deficits noted as stated above. Eval required Min/Mod assistance to complete on this date and detailed assessment(s) were utilized. Moreover, an expanded review of history and occupational profile obtained with additional review of cognitive, physical and psychosocial hx."     Plan:     Patient to be seen 3 x/week to address the above listed problems via self-care/home management, therapeutic activities, therapeutic exercises  · Plan of Care Expires: 11/10/18  · Plan of Care Reviewed with: patient    This Plan of care has been discussed with the patient who was involved in its development and " understands and is in agreement with the identified goals and treatment plan    GOALS:   Multidisciplinary Problems     Occupational Therapy Goals        Problem: Occupational Therapy Goal    Goal Priority Disciplines Outcome Interventions   Occupational Therapy Goal     OT, PT/OT     Description:  ot goals to be met by 11-10-18  Pt will req sba with ue dressing  Pt will req sba bsc t/f's  Pt will tolerate 1 set x 10 reps b ue rom exercise                    Time Tracking:     OT Date of Treatment: 11/03/18  OT Start Time: 1131  OT Stop Time: 1154  OT Total Time (min): 23 min    Billable Minutes:Evaluation 10 minutes  Therapeutic Activity 13 minutes    Rosaline Ventura OT  11/3/2018

## 2018-11-03 NOTE — ASSESSMENT & PLAN NOTE
Possible acute blood loss anemia on chronic anemia  No GI scopes due to Pulm risk and no active bleeding  Close monitoring on Xarelto  Transfused 1 unit PRBCs yesterday  CBC daily

## 2018-11-03 NOTE — PROGRESS NOTES
[facility] Hospital Medicine  Progress Note    Patient Name: Petrona Gonzalez  MRN: [id number]  Patient Class: IP- Inpatient   Admission Date: 11/1/2018  Length of Stay: 2 days  Attending Physician: Giovani Hackett MD  Primary Care Provider: Primary Doctor No        Subjective:     Principal Problem:Stage 3 severe COPD by GOLD classification    HPI:  Petrona Gonzalez is a 77 y.o.  [place] Age patient female patient with PMHx of COPD, chronic O2, DM, HLD, HTN, who presented to the ER for  c/o blood in stool  and SOB which onset gradually past week. Symptoms are constant and moderate in severity. No mitigating or exacerbating factors reported. Associated sxs included diarrhea  one day only 10/24/18 following a flu shot, and hematochezia.  A stool specimen was sent from the [facility] to the lab: specimen was solid so it wasn't tested for C. DIff. Pt's daughter also notes the pt's steady weight loss of 30 pounds over the last month, she states her dentures were poor fitting and she wasn't eating much. Dentures have now has been fixed. Pt also has a hernia on her R abdomen, which she is concerned about obstructing. Patient denies any fever, chills, constipation, dysuria, hematuria, urinary frequency, nausea, vomiting and all other sxs at this time. Pt is on Xarelto 20 mg for atrial fibrillation. Pt was on abx for 5 days for tx of a partially collapsed lung, as noted by pt's daughter.  In ER, Lactic acid 4.8, H/H 7.9/27.6, Creatinine 2.4, WBC 20K, Urinalysis showed positive nitrate. And she was given 1.6 liters fluid on sepsis protocol based on ideal body weight of 115#. She has edema to lower extremities. Her sister at bedside states patient was told she couldn't have a colonoscopy due to high risk for anesthesia and she couldn't tolerate the bowel prep. She is a full code. Her sister, Bonnie Carmichael, is the surrogate decision maker. She is admitted for acute bronchitis and UTI to Observation.          Hospital Course:  Pt admitted to [facility] as Obs initially but soon after developed Afib w RVR and hypotension and was transferred to ICU and placed on Levophed and Cardizem gtt and more IVF. She responded well and her BP and HR improved and was able to be weaned off Levophed next morning. She also got a unit of blood for her Severe anemia. She has ch Afib and had RVR since last night. She was started on CARDIZEM 60 QID and she slowed down and was able to come off Cardizem gtt. She was also given an enema with mild BM. She is resting well comfortably, no distress and is fully AAOx3, no focal deficit, speech clear.    11/3- doing better, was sitting up in chair but quiet out of breath with any exertion, tremors, shakes sec to Nebs-- hence Albuterol/ Duoneb d/fernanda. Also developed RVR again with Afib, hence started on Amiodarone gtt and Cards consulted, continued on oral Cardizem. Blood Cx NGTD but Urine CX Positive for GNR. Now on Rocephin.    Interval History: doing a little better, but got really out of breath when she got out of bed to chair and HR raced, on Amiodarone for RVR and Rocephin for UTI.    Review of Systems   Constitutional: Positive for activity change, appetite change and fatigue. Negative for chills, diaphoresis and fever.   HENT: Negative for congestion.    Respiratory: Positive for shortness of breath (REES only at baseline). Negative for apnea, cough and wheezing.    Cardiovascular: Negative for chest pain and leg swelling.   Gastrointestinal: Negative for abdominal pain, diarrhea, nausea and vomiting.   Endocrine: Negative for polydipsia.   Genitourinary: Negative for difficulty urinating.   Musculoskeletal: Positive for myalgias (chronic).   Skin: Negative for color change and wound.   Allergic/Immunologic: Negative for immunocompromised state.   Neurological: Negative for dizziness, syncope and weakness.   Hematological: Does not bruise/bleed easily.   Psychiatric/Behavioral: Negative for  agitation, confusion and hallucinations. The patient is not nervous/anxious.      Objective:     Vital Signs (Most Recent):  Temp: 98.9 °F (37.2 °C) (11/03/18 1100)  Pulse: (!) 159 (11/03/18 1120)  Resp: (!) 28 (11/03/18 1120)  BP: (!) 103/46 (11/03/18 1432)  SpO2: 97 % (11/03/18 1120) Vital Signs (24h Range):  Temp:  [98.7 °F (37.1 °C)-99.4 °F (37.4 °C)] 98.9 °F (37.2 °C)  Pulse:  [] 159  Resp:  [12-33] 28  SpO2:  [81 %-100 %] 97 %  BP: ()/(33-93) 103/46     Weight: 79.2 kg (174 lb 9.7 oz)  Body mass index is 30.93 kg/m².    Intake/Output Summary (Last 24 hours) at 11/3/2018 1609  Last data filed at 11/3/2018 1105  Gross per 24 hour   Intake 2808.33 ml   Output 1135 ml   Net 1673.33 ml      Physical Exam   Constitutional: She is oriented to person, place, and time. Vital signs are normal. She appears well-developed and well-nourished. She is cooperative.  Non-toxic appearance. She does not have a sickly appearance. She appears ill. No distress. She is not intubated. Nasal cannula in place.   HENT:   Head: Normocephalic and atraumatic.   Mouth/Throat: Oropharynx is clear and moist and mucous membranes are normal.   Eyes: EOM and lids are normal. Pupils are equal, round, and reactive to light.   Neck: Trachea normal and full passive range of motion without pain. Carotid bruit is not present.       Cardiovascular: Normal heart sounds. An irregular rhythm present. Tachycardia present.   Pulses:       Radial pulses are 2+ on the right side, and 2+ on the left side.        Dorsalis pedis pulses are 1+ on the right side, and 1+ on the left side.   Pulmonary/Chest: Accessory muscle usage present. Tachypnea noted. She is not intubated. She has decreased breath sounds.   Abdominal: Soft. She exhibits no distension. Bowel sounds are decreased. There is no tenderness. A hernia is present.   Obese   Genitourinary:   Genitourinary Comments: Cutler in place   Musculoskeletal: Normal range of motion.        Right foot:  There is no deformity.        Left foot: There is no deformity.   Trace edema   Lymphadenopathy:     She has no cervical adenopathy.   Neurological: She is alert and oriented to person, place, and time.   Skin: Skin is warm and dry. Capillary refill takes 2 to 3 seconds. Ecchymosis (bilat LEs) noted. No rash noted. No cyanosis.   Psychiatric: Her speech is normal and behavior is normal. Judgment and thought content normal. Cognition and memory are normal. She exhibits a depressed mood.   Nursing note and vitals reviewed.      Significant Labs:   ABGs:   Blood Culture:   Recent Labs   Lab 11/01/18  1700   LABBLOO No Growth to date  No Growth to date     BMP:   Recent Labs   Lab 11/03/18  1428   *      K 3.8      CO2 29   BUN 12   CREATININE 1.1   CALCIUM 8.1*   MG 2.3     CBC:   Recent Labs   Lab 11/02/18  0430 11/03/18  0530   WBC 17.72* 10.92   HGB 7.1* 8.3*   HCT 24.4* 27.7*    228     CMP:   Recent Labs   Lab 11/02/18  0430 11/03/18  0530 11/03/18  1428    143 140   K 3.3* 3.4* 3.8    105 102   CO2 28 28 29   * 92 237*   BUN 32* 12 12   CREATININE 1.5* 0.9 1.1   CALCIUM 8.3* 7.8* 8.1*   PROT 5.8* 5.3*  --    ALBUMIN 3.1* 2.7*  --    BILITOT 0.4 0.3  --    ALKPHOS 38* 35*  --    AST 21 33  --    ALT 29 45*  --    ANIONGAP 12 10 9   EGFRNONAA 34* >60 49*     Lactic Acid:   Recent Labs   Lab 11/01/18  1946 11/01/18  2333 11/02/18  0430   LACTATE 4.9* 2.7* 2.0     Magnesium:   Recent Labs   Lab 11/02/18  0430 11/03/18  0530 11/03/18  1428   MG 1.9 1.7 2.3     Respiratory Culture:   TSH:   Urine Culture:   All pertinent labs within the past 24 hours have been reviewed.    Significant Imaging: I have reviewed all pertinent imaging results/findings within the past 24 hours.    Assessment/Plan:      * Acute Exacerbation of Stage 3 severe COPD sec to Acute bronchitis    Improving, on Prednisone, steroids, nebs       Atrial fibrillation with RVR w/ chronic A-fib on Xarelto     Off Cardizem gtt now  Has ch Afib on Xarelto  May need to hold due to ABL Anemia    Has chronic Afib and HR keeps going up-- hence placed on Amiodarone gtt  Cards consulted       Acute Blood Loss Anemia r/o GIB    -Monitor H/H  -No diarrhea, No need for C. Diff   -family states she can't have colonoscopy due to high risk or anesthesia    Continue Xarelto  Will d/w GI  H/H stable     Type 2 diabetes mellitus with hyperlipidemia    Diabetes Mellitus  -SSI and accuchecks  -1800 felicia ADA diet  -nutritional counseling    Well controlled DM  BS high due to steroids       Moderate malnutrition    Has had recurrent illnesses recently  No much quality of life       Permanent atrial fibrillation    -continue home CCB  -hold Xarelto       E Coli UTI    IV Abx  -urine culture    Continue IV Abx  Continue Rocephin     Acute on chronic respiratory failure with hypoxia and hypercapnia    Sec to COPD exacerbation and bronchitis-- better  She is DNR       Anemia associated with nutritional deficiency    Getting 1 unit of blood  Will give IV Iron, MVI, Folbic and Thiamine    H/H stable post transfusion         VTE Risk Mitigation (From admission, onward)        Ordered     rivaroxaban tablet 15 mg  With dinner      11/02/18 0924     Place sequential compression device  Until discontinued      11/01/18 2339      Seen and discussed with Dr. Fortune and the ICU team  Condition: Critical  Prognosis: Guarded to poor      Critical care time spent on the evaluation and treatment of severe organ dysfunction, review of pertinent labs and imaging studies, discussions with consulting providers and discussions with patient/family: 43 minutes.    Giovani Hackett MD  Department of Hospital Medicine   [facility]

## 2018-11-03 NOTE — ASSESSMENT & PLAN NOTE
Diabetes Mellitus  -SSI and accuchecks  -1800 felicia ADA diet  -nutritional counseling    Well controlled DM  BS high due to steroids

## 2018-11-03 NOTE — ASSESSMENT & PLAN NOTE
AFIB/RVR responded well to IV Cardizem gtt  Transitioned to PO Cardizem  Converted back to AFIB/RVR today with rate 150's with exertional activity  Start IV Amiodarone gtt  Will check 2D Echo  No BB due to lung disease  Continue Xarelto for CVA prophylaxis  No CNS complaints to suggest TIA or CVA today  No signs of abnormal bleeding on Xarelto  Further recs to follow  Continue CCB, IV amio gtt, Xarelto

## 2018-11-03 NOTE — SUBJECTIVE & OBJECTIVE
Review of Systems   Constitutional: Positive for malaise/fatigue. Negative for chills and fever.   HENT: Negative for congestion.    Eyes: Negative for blurred vision.   Respiratory: Positive for shortness of breath (REES). Negative for cough and sputum production.    Cardiovascular: Negative for chest pain and leg swelling.   Gastrointestinal: Positive for diarrhea. Negative for abdominal pain, constipation, nausea and vomiting.   Genitourinary: Negative for dysuria.   Musculoskeletal: Negative for myalgias.   Skin: Negative for rash.   Neurological: Positive for weakness. Negative for dizziness, focal weakness and headaches.   Endo/Heme/Allergies: Does not bruise/bleed easily.   Psychiatric/Behavioral: The patient is not nervous/anxious.        Objective:     Vital Signs (Most Recent):  Temp: 98.8 °F (37.1 °C) (11/03/18 0305)  Pulse: 103 (11/03/18 0758)  Resp: 15 (11/03/18 0758)  BP: (!) 107/33 (11/03/18 0600)  SpO2: 100 % (11/03/18 0758) Vital Signs (24h Range):  Temp:  [98.8 °F (37.1 °C)-99.7 °F (37.6 °C)] 98.8 °F (37.1 °C)  Pulse:  [] 103  Resp:  [12-33] 15  SpO2:  [88 %-100 %] 100 %  BP: ()/(33-79) 107/33     Weight: 79.2 kg (174 lb 9.7 oz)  Body mass index is 30.93 kg/m².      Intake/Output Summary (Last 24 hours) at 11/3/2018 0839  Last data filed at 11/3/2018 0600  Gross per 24 hour   Intake 3493.75 ml   Output 3350 ml   Net 143.75 ml       Physical Exam   Constitutional: She is oriented to person, place, and time. Vital signs are normal. She appears well-developed and well-nourished. She is cooperative.  Non-toxic appearance. She does not have a sickly appearance. She appears ill. No distress. She is not intubated. Nasal cannula in place.   HENT:   Head: Normocephalic and atraumatic.   Mouth/Throat: Oropharynx is clear and moist and mucous membranes are normal.   Eyes: EOM and lids are normal. Pupils are equal, round, and reactive to light.   Neck: Trachea normal and full passive range of motion  without pain. Carotid bruit is not present.       Cardiovascular: Normal heart sounds. An irregular rhythm present. Tachycardia present.   Pulses:       Radial pulses are 2+ on the right side, and 2+ on the left side.        Dorsalis pedis pulses are 1+ on the right side, and 1+ on the left side.   Pulmonary/Chest: Effort normal. No accessory muscle usage. She is not intubated. No respiratory distress. She has decreased breath sounds.   Abdominal: Soft. She exhibits no distension. Bowel sounds are decreased. There is no tenderness. A hernia is present.   Obese   Genitourinary:   Genitourinary Comments: Cutler in place   Musculoskeletal: Normal range of motion.        Right foot: There is no deformity.        Left foot: There is no deformity.   Trace edema   Lymphadenopathy:     She has no cervical adenopathy.   Neurological: She is alert and oriented to person, place, and time.   Skin: Skin is warm and dry. Capillary refill takes 2 to 3 seconds. Ecchymosis (bilat LEs) noted. No rash noted. No cyanosis.   Psychiatric: Her speech is normal and behavior is normal. Judgment and thought content normal. Cognition and memory are normal. She exhibits a depressed mood.       Vents:  Oxygen Concentration (%): 30 (11/03/18 0754)    Lines/Drains/Airways     Central Venous Catheter Line                 Percutaneous Central Line Insertion/Assessment - triple lumen  11/01/18 2045 right subclavian 1 day          Drain                 Urethral Catheter 11/01/18 2159 Latex 1 day          Peripheral Intravenous Line                 Peripheral IV - Single Lumen 12/21/17 1546 Right Forearm 316 days         Peripheral IV - Single Lumen 11/01/18 1851 Right Antecubital 1 day                Significant Labs:    CBC/Anemia Profile:  Recent Labs   Lab 11/01/18  1339 11/01/18  1340 11/02/18  0430 11/03/18  0530   WBC 20.26*  --  17.72* 10.92   HGB 7.9*  --  7.1* 8.3*   HCT 27.6*  --  24.4* 27.7*     --  314 228   MCV 83  --  82 84   RDW  21.0*  --  21.0* 19.3*   OCCULTBLOOD  --  Negative  --   --         Chemistries:  Recent Labs   Lab 11/01/18  1339 11/02/18  0430 11/03/18  0530    140 143   K 3.8 3.3* 3.4*   CL 90* 100 105   CO2 30* 28 28   BUN 52* 32* 12   CREATININE 2.4* 1.5* 0.9   CALCIUM 9.2 8.3* 7.8*   ALBUMIN 3.4* 3.1* 2.7*   PROT 6.7 5.8* 5.3*   BILITOT 0.3 0.4 0.3   ALKPHOS 45* 38* 35*   ALT 20 29 45*   AST 20 21 33   MG  --  1.9 1.7       All pertinent labs within the past 24 hours have been reviewed.

## 2018-11-03 NOTE — PROGRESS NOTES
Evaluated patient at bedside upon nursing staff's request for hypotension. BP low in the 70-80 range, 's. Lactic acid 4.8. WBC 20,000. Admitted earlier today to Obs for Acute Bronchitis. Discussed with Dr. Ambrosio to place a central line. Will start vasopressors.    Septic Shock: NS 30 ml/kg bolus. Start levophed. Lactic acid 4.9. Repeat lactic acid q 4 x 2. Change from Obs to In-patient. Continue IV antibiotics.     SHANE: Creatinine 2.4. IV fluids.    Discussed with Dr. Ambrosio, patient and her daughter at the bedside. Will transfer to ICU.    Critical care time: 35 minutes.  Critical care time was exclusive of separately billable procedures and treating other patients.

## 2018-11-03 NOTE — EICU
eICU Note :    Called by the [facility] eRN:    Problem: Xanax for sleep , takes it at the NH     Pertinent History and labs reviewed :has Hay fever; Anemia, deficiency; Anxiety; Obesity with body mass index 30 or greater; Cellulitis of elbow; Chest pain; Chronic maxillary sinusitis; Depression; Diabetes mellitus without complication; Diabetic polyneuropathy; Dyslipidemia; Essential (primary) hypertension; Anemia associated with nutritional deficiency; Acute Exacerbation of Stage 3 severe COPD sec to Acute bronchitis; Shoulder pain; Exomphalos; Acute on chronic respiratory failure with hypercapnia; Insomnia; Type 2 diabetes mellitus with hyperlipidemia; Uncontrolled type 2 diabetes mellitus with mild nonproliferative retinopathy and macular edema, without long-term current use of insulin; Hyperlipidemia; Poorly controlled diabetes mellitus; Elevated troponin; Atrial fibrillation with RVR w/ chronic A-fib on Xarelto; CHF, acute on chronic; Hyperthyroidism; Hypoxemia requiring supplemental oxygen; Steroid-dependent COPD; Acute Blood Loss Anemia r/o GIB; Acute cystitis without hematuria; Permanent atrial fibrillation; Shock, unspecified; Constipation due to outlet dysfunction; SHANE (acute kidney injury) sec to Dehydration/ IVVD/ UGIB; Hypokalemia; and Moderate malnutrition on their problem list.      Treatment /Intervention given: Xanax .25 PO x1        Anastasiia Murcia M.D  eICU Physician

## 2018-11-03 NOTE — ASSESSMENT & PLAN NOTE
Blood cultures NGTD  Urine + GNR  Change Zosyn to Rocephin  Stop Vanc  Cont IVFs  Remove Cutler asap

## 2018-11-03 NOTE — SUBJECTIVE & OBJECTIVE
Interval History: doing a little better, but got really out of breath when she got out of bed to chair and HR raced, on Amiodarone for RVR and Rocephin for UTI.    Review of Systems   Constitutional: Positive for activity change, appetite change and fatigue. Negative for chills, diaphoresis and fever.   HENT: Negative for congestion.    Respiratory: Positive for shortness of breath (REES only at baseline). Negative for apnea, cough and wheezing.    Cardiovascular: Negative for chest pain and leg swelling.   Gastrointestinal: Negative for abdominal pain, diarrhea, nausea and vomiting.   Endocrine: Negative for polydipsia.   Genitourinary: Negative for difficulty urinating.   Musculoskeletal: Positive for myalgias (chronic).   Skin: Negative for color change and wound.   Allergic/Immunologic: Negative for immunocompromised state.   Neurological: Negative for dizziness, syncope and weakness.   Hematological: Does not bruise/bleed easily.   Psychiatric/Behavioral: Negative for agitation, confusion and hallucinations. The patient is not nervous/anxious.      Objective:     Vital Signs (Most Recent):  Temp: 98.9 °F (37.2 °C) (11/03/18 1100)  Pulse: (!) 159 (11/03/18 1120)  Resp: (!) 28 (11/03/18 1120)  BP: (!) 103/46 (11/03/18 1432)  SpO2: 97 % (11/03/18 1120) Vital Signs (24h Range):  Temp:  [98.7 °F (37.1 °C)-99.4 °F (37.4 °C)] 98.9 °F (37.2 °C)  Pulse:  [] 159  Resp:  [12-33] 28  SpO2:  [81 %-100 %] 97 %  BP: ()/(33-93) 103/46     Weight: 79.2 kg (174 lb 9.7 oz)  Body mass index is 30.93 kg/m².    Intake/Output Summary (Last 24 hours) at 11/3/2018 1609  Last data filed at 11/3/2018 1105  Gross per 24 hour   Intake 2808.33 ml   Output 1135 ml   Net 1673.33 ml      Physical Exam   Constitutional: She is oriented to person, place, and time. Vital signs are normal. She appears well-developed and well-nourished. She is cooperative.  Non-toxic appearance. She does not have a sickly appearance. She appears ill. No  distress. She is not intubated. Nasal cannula in place.   HENT:   Head: Normocephalic and atraumatic.   Mouth/Throat: Oropharynx is clear and moist and mucous membranes are normal.   Eyes: EOM and lids are normal. Pupils are equal, round, and reactive to light.   Neck: Trachea normal and full passive range of motion without pain. Carotid bruit is not present.       Cardiovascular: Normal heart sounds. An irregular rhythm present. Tachycardia present.   Pulses:       Radial pulses are 2+ on the right side, and 2+ on the left side.        Dorsalis pedis pulses are 1+ on the right side, and 1+ on the left side.   Pulmonary/Chest: Accessory muscle usage present. Tachypnea noted. She is not intubated. She has decreased breath sounds.   Abdominal: Soft. She exhibits no distension. Bowel sounds are decreased. There is no tenderness. A hernia is present.   Obese   Genitourinary:   Genitourinary Comments: Cutler in place   Musculoskeletal: Normal range of motion.        Right foot: There is no deformity.        Left foot: There is no deformity.   Trace edema   Lymphadenopathy:     She has no cervical adenopathy.   Neurological: She is alert and oriented to person, place, and time.   Skin: Skin is warm and dry. Capillary refill takes 2 to 3 seconds. Ecchymosis (bilat LEs) noted. No rash noted. No cyanosis.   Psychiatric: Her speech is normal and behavior is normal. Judgment and thought content normal. Cognition and memory are normal. She exhibits a depressed mood.   Nursing note and vitals reviewed.      Significant Labs:   ABGs:   Blood Culture:   Recent Labs   Lab 11/01/18  1700   LABBLOO No Growth to date  No Growth to date     BMP:   Recent Labs   Lab 11/03/18  1428   *      K 3.8      CO2 29   BUN 12   CREATININE 1.1   CALCIUM 8.1*   MG 2.3     CBC:   Recent Labs   Lab 11/02/18  0430 11/03/18  0530   WBC 17.72* 10.92   HGB 7.1* 8.3*   HCT 24.4* 27.7*    228     CMP:   Recent Labs   Lab  11/02/18  0430 11/03/18  0530 11/03/18  1428    143 140   K 3.3* 3.4* 3.8    105 102   CO2 28 28 29   * 92 237*   BUN 32* 12 12   CREATININE 1.5* 0.9 1.1   CALCIUM 8.3* 7.8* 8.1*   PROT 5.8* 5.3*  --    ALBUMIN 3.1* 2.7*  --    BILITOT 0.4 0.3  --    ALKPHOS 38* 35*  --    AST 21 33  --    ALT 29 45*  --    ANIONGAP 12 10 9   EGFRNONAA 34* >60 49*     Lactic Acid:   Recent Labs   Lab 11/01/18  1946 11/01/18  2333 11/02/18  0430   LACTATE 4.9* 2.7* 2.0     Magnesium:   Recent Labs   Lab 11/02/18  0430 11/03/18  0530 11/03/18  1428   MG 1.9 1.7 2.3     Respiratory Culture:   TSH:   Urine Culture:   All pertinent labs within the past 24 hours have been reviewed.    Significant Imaging: I have reviewed all pertinent imaging results/findings within the past 24 hours.

## 2018-11-03 NOTE — PLAN OF CARE
Problem: Physical Therapy Goal  Goal: Physical Therapy Goal  Outcome: Ongoing (interventions implemented as appropriate)  PT eval complete. The following goals should be met in 7 days  1. Pt will perform be mobility with min A  2. Pt will transfer bed to chair with RW and SBA  3.  Pt will tolerate 15 reps of seated LE exercises

## 2018-11-03 NOTE — ASSESSMENT & PLAN NOTE
Off Cardizem gtt now  Has ch Afib on Xarelto  May need to hold due to ABL Anemia    Has chronic Afib and HR keeps going up-- hence placed on Amiodarone gtt  Cards consulted

## 2018-11-03 NOTE — ASSESSMENT & PLAN NOTE
-Monitor H/H  -No diarrhea, No need for C. Diff   -family states she can't have colonoscopy due to high risk or anesthesia    Continue Xarelto  Will d/w GI  H/H stable

## 2018-11-03 NOTE — EICU
Bedside nurse reports pt requesting xanax for sleep . Nurse reports pt is anxious. Informed Dr. Chapman.

## 2018-11-03 NOTE — PLAN OF CARE
Problem: Patient Care Overview  Goal: Individualization & Mutuality  Outcome: Ongoing (interventions implemented as appropriate)   11/02/18 0408   Mutuality/Individual Preferences   What Anxieties, Fears, Concerns, or Questions Do You Have About Your Care? none   What Information Would Help Us Give You More Personalized Care? none   How Would You and/or Your Support Person Like to Participate In Your Care? decision making   Individualization   Patient Specific Preferences none   Patient Specific Goals to get better       Comments: No acute events over shift, pt remains anxious, uop wnl, rested well after xanax.refuses to turn off of right side

## 2018-11-03 NOTE — PT/OT/SLP EVAL
Physical Therapy Evaluation    Patient Name:  Petrona Gonzalez   MRN:  [id number]    Recommendations:     Discharge Recommendations:  nursing facility, basic   Discharge Equipment Recommendations: none   Barriers to discharge: None    Assessment:     Petrona Gonzalez is a 77 y.o. female admitted with a medical diagnosis of Stage 3 severe COPD by GOLD classification.  She presents with the following impairments/functional limitations:  weakness, impaired endurance, impaired functional mobilty, impaired balance, impaired cardiopulmonary response to activity .    Rehab Prognosis:  fair; patient would benefit from acute skilled PT services to address these deficits and reach maximum level of function.      Recent Surgery: * No surgery found *      Plan:     During this hospitalization, patient to be seen (will be seen a min of 5 out 7 days a week as ezio) to address the above listed problems via gait training, therapeutic activities, therapeutic exercises  · Plan of Care Expires:  11/10/18   Plan of Care Reviewed with: patient    Subjective     Communicated with nurse Castañeda  prior to session.  Patient found sitting EOB leaning on table upon PT entry to room, agreeable to evaluation.      Chief Complaint: SOB   Patient comments/goals: able to get to  easier  Pain/Comfort:  · Pain Rating 1: 0/10    Patients cultural, spiritual, Moravian conflicts given the current situation:      Living Environment:  Pt nursing home resident. Transfer to . In  most of day.  Admits mostly bed bound the past month.  Ind with dressing. Needs Assist with bathing  Prior to admission, patients level of function was mobile via .  Needs assist with ADLs.  Patient has the following equipment: wheelchair, oxygen.  DME owned (not currently used): none.  Upon discharge, patient will have assistance from nursing home.    Objective:     Patient found with: pulse ox (continuous), oxygen, blood pressure cuff, telemetry     General Precautions:  Standard, respiratory   Orthopedic Precautions:    Braces:       Exams:  · RLE ROM: WFL  · RLE Strength: WFL  · LLE ROM: WFL  · LLE Strength: WFL    Functional Mobility:  · Transfers:     · Sit to Stand:  minimum assistance with rolling walker    AM-PAC 6 CLICK MOBILITY  Total Score:11       Therapeutic Activities and Exercises:   Pt found sitting.  Agrees to perform sit to stand.  Sit to stand x 2 with 5 seconds standing.  VC for nasal breathing. Pt tachycardic and desat to 78% during 2nd sit to stand.  tx stopped at that time.  Pt requested to sit EOB.    Patient left sitting EOB with all lines intact, call button in reach and family present.    GOALS:   Multidisciplinary Problems     Physical Therapy Goals        Problem: Physical Therapy Goal    Goal Priority Disciplines Outcome Goal Variances Interventions   Physical Therapy Goal     PT, PT/OT Ongoing (interventions implemented as appropriate)                     History:     Past Medical History:   Diagnosis Date    SHANE (acute kidney injury) 11/2/2018    COPD (chronic obstructive pulmonary disease) with emphysema     Diabetes mellitus     Hyperlipidemia     Hypertension     Insomnia     Maxillary sinusitis, chronic        Past Surgical History:   Procedure Laterality Date    BACK SURGERY      HERNIA REPAIR      HYSTERECTOMY         Clinical Decision Making:     History  Co-morbidities and personal factors that may impact the plan of care Examination  Body Structures and Functions, activity limitations and participation restrictions that may impact the plan of care Clinical Presentation   Decision Making/ Complexity Score   Co-morbidities:   [] Time since onset of injury / illness / exacerbation  [] Status of current condition  []Patient's cognitive status and safety concerns    [] Multiple Medical Problems (see med hx)  Personal Factors:   [] Patient's age  [] Prior Level of function   [] Patient's home situation (environment and family support)  []  Patient's level of motivation  [] Expected progression of patient      HISTORY:(criteria)    [] 91219 - no personal factors/history    [] 03799 - has 1-2 personal factor/comorbidity     [] 13185 - has >3 personal factor/comorbidity     Body Regions:  [] Objective examination findings  [] Head     []  Neck  [] Trunk   [] Upper Extremity  [] Lower Extremity    Body Systems:  [] For communication ability, affect, cognition, language, and learning style: the assessment of the ability to make needs known, consciousness, orientation (person, place, and time), expected emotional /behavioral responses, and learning preferences (eg, learning barriers, education  needs)  [] For the neuromuscular system: a general assessment of gross coordinated movement (eg, balance, gait, locomotion, transfers, and transitions) and motor function  (motor control and motor learning)  [] For the musculoskeletal system: the assessment of gross symmetry, gross range of motion, gross strength, height, and weight  [] For the integumentary system: the assessment of pliability(texture), presence of scar formation, skin color, and skin integrity  [] For cardiovascular/pulmonary system: the assessment of heart rate, respiratory rate, blood pressure, and edema     Activity limitations:    [] Patient's cognitive status and saf ety concerns          [] Status of current condition      [] Weight bearing restriction  [] Cardiopulmunary Restriction    Participation Restrictions:   [] Goals and goal agreement with the patient     [] Rehab potential (prognosis) and probable outcome      Examination of Body System: (criteria)    [] 16922 - addressing 1-2 elements    [] 48233 - addressing a total of 3 or more elements     [] 73421 -  Addressing a total of 4 or more elements         Clinical Presentation: (criteria)  Choose one     On examination of body system using standardized tests and measures patient presents with (CHOOSE ONE) elements from any of the  following: body structures and functions, activity limitations, and/or participation restrictions.  Leading to a clinical presentation that is considered (CHOOSE ONE)                              Clinical Decision Making  (Eval Complexity):  Choose One     Time Tracking:     PT Received On: 11/03/18  PT Start Time: 1100     PT Stop Time: 1125  PT Total Time (min): 25 min     Billable Minutes: Evaluation 15 and Therapeutic Activity 10      Nav Verdin PT  11/03/2018

## 2018-11-03 NOTE — PLAN OF CARE
Problem: Patient Care Overview  Goal: Plan of Care Review  Outcome: Ongoing (interventions implemented as appropriate)  Pt up to chair briefly today; all activity caused increase in heart rate up to 150s sustained; over coarse of day pt has rec'd scheduled Cardizem PO; digoxin IVP; and started on amio gtt; heart rate remains in 110s-120s; cardiology consult and aware of pt and amio gtt; pt turned self in bed, causing O2 Sats to fall into upper 50s and pt with light blue around lips; O2 increased to 5L/NC; sats improved to 95%; pt weaned back down to 3L/NC; exhausted; sister in to visit; pt too weak to talk.

## 2018-11-03 NOTE — SUBJECTIVE & OBJECTIVE
Past Medical History:   Diagnosis Date    SHANE (acute kidney injury) 11/2/2018    COPD (chronic obstructive pulmonary disease) with emphysema     Diabetes mellitus     Hyperlipidemia     Hypertension     Insomnia     Maxillary sinusitis, chronic        Past Surgical History:   Procedure Laterality Date    BACK SURGERY      HERNIA REPAIR      HYSTERECTOMY         Review of patient's allergies indicates:   Allergen Reactions    Meloxicam Other (See Comments)      Patient stated that she has muscle aches and pains    Morphine Rash    Naproxen Other (See Comments)     Patient states she has muscle and aches.    Pulmicort [budesonide] Other (See Comments)     Burning in chest       No current facility-administered medications on file prior to encounter.      Current Outpatient Medications on File Prior to Encounter   Medication Sig    acetaminophen (TYLENOL) 325 MG tablet Take 325 mg by mouth every 6 (six) hours as needed for Pain.    albuterol sulfate (VENTOLIN INHL) Inhale into the lungs.    albuterol-ipratropium (DUO-NEB) 2.5 mg-0.5 mg/3 mL nebulizer solution Take 3 mLs by nebulization every 6 (six) hours as needed for Wheezing. Rescue    alprazolam (XANAX) 0.5 MG tablet Take 0.5 mg by mouth every 6 (six) hours as needed for Anxiety.    diltiaZEM (CARDIZEM) 90 MG tablet Take 1 tablet (90 mg total) by mouth every 8 (eight) hours.    fluticasone (FLONASE) 50 mcg/actuation nasal spray 1 spray by Each Nare route once daily.    levocetirizine (XYZAL) 5 MG tablet Take 5 mg by mouth every evening.    lisinopril (PRINIVIL,ZESTRIL) 5 MG tablet Take 1 tablet (5 mg total) by mouth once daily.    loratadine (CLARITIN) 10 mg tablet Take 10 mg by mouth once daily.    metformin (GLUCOPHAGE) 500 MG tablet Take 1 tablet (500 mg total) by mouth 2 (two) times daily with meals.    NOVOLIN R REGULAR U-100 INSULN 100 unit/mL injection     olopatadine (PAZEO) 0.7 % Drop Apply 1 drop to eye once daily.     ondansetron (ZOFRAN) 4 MG tablet Take 4 mg by mouth every 6 (six) hours as needed for Nausea.    OXYGEN-AIR DELIVERY SYSTEMS MISC by [facility].(Non-Drug; Combo Route) route.    pantoprazole (PROTONIX) 40 MG tablet Take 40 mg by mouth once daily.    predniSONE (DELTASONE) 20 MG tablet     rivaroxaban (XARELTO) 20 mg Tab Take 20 mg by mouth daily with dinner or evening meal.    roflumilast 500 mcg Tab Take 1 tablet (500 mcg total) by mouth once daily.    simvastatin (ZOCOR) 10 MG tablet Take 1 tablet (10 mg total) by mouth every evening.    traMADol (ULTRAM) 50 mg tablet     umeclidinium (INCRUSE ELLIPTA) 62.5 mcg/actuation DsDv Inhale into the lungs once daily. Controller    budesonide-formoterol 160-4.5 mcg (SYMBICORT) 160-4.5 mcg/actuation HFAA Inhale 2 puffs into the lungs every 12 (twelve) hours. Controller    cyclobenzaprine (FLEXERIL) 10 MG tablet Take 10 mg by mouth 3 (three) times daily as needed for Muscle spasms.    furosemide (LASIX) 20 MG tablet     guaiFENesin (MUCINEX) 600 mg 12 hr tablet Take 1,200 mg by mouth 2 (two) times daily.     Family History     Problem Relation (Age of Onset)    Arthritis Father    Cancer Father, Sister, Brother    Early death Sister    Hearing loss Father    Heart disease Brother        Tobacco Use    Smoking status: Former Smoker     Packs/day: 1.00     Years: 30.00     Pack years: 30.00    Smokeless tobacco: Never Used   Substance and Sexual Activity    Alcohol use: No    Drug use: No    Sexual activity: Not Currently     Review of Systems   Constitution: Negative for weakness.   HENT: Negative for hearing loss and hoarse voice.    Eyes: Negative for blurred vision and visual disturbance.   Cardiovascular: Positive for dyspnea on exertion, irregular heartbeat and palpitations. Negative for chest pain, claudication, leg swelling, near-syncope, orthopnea, paroxysmal nocturnal dyspnea and syncope.   Respiratory: Positive for shortness of breath. Negative for cough,  hemoptysis, sleep disturbances due to breathing, snoring and wheezing.    Endocrine: Negative for cold intolerance and heat intolerance.   Hematologic/Lymphatic: Bruises/bleeds easily.   Skin: Negative for color change, dry skin and nail changes.   Musculoskeletal: Positive for arthritis and back pain. Negative for joint pain and myalgias.   Gastrointestinal: Negative for bloating, abdominal pain, constipation, nausea and vomiting.   Genitourinary: Negative for dysuria, flank pain, hematuria and hesitancy.   Neurological: Negative for headaches, light-headedness, loss of balance, numbness and paresthesias.   Psychiatric/Behavioral: Negative for altered mental status.   Allergic/Immunologic: Negative for environmental allergies.     Objective:     Vital Signs (Most Recent):  Temp: 98.9 °F (37.2 °C) (11/03/18 1100)  Pulse: (!) 159 (11/03/18 1120)  Resp: (!) 28 (11/03/18 1120)  BP: (!) 124/93 (11/03/18 1120)  SpO2: 97 % (11/03/18 1120) Vital Signs (24h Range):  Temp:  [98.7 °F (37.1 °C)-99.7 °F (37.6 °C)] 98.9 °F (37.2 °C)  Pulse:  [] 159  Resp:  [12-33] 28  SpO2:  [81 %-100 %] 97 %  BP: ()/(33-93) 124/93     Weight: 79.2 kg (174 lb 9.7 oz)  Body mass index is 30.93 kg/m².    SpO2: 97 %  O2 Device (Oxygen Therapy): nasal cannula w/ humidification      Intake/Output Summary (Last 24 hours) at 11/3/2018 1203  Last data filed at 11/3/2018 1105  Gross per 24 hour   Intake 3308.33 ml   Output 1675 ml   Net 1633.33 ml       Lines/Drains/Airways     Central Venous Catheter Line                 Percutaneous Central Line Insertion/Assessment - triple lumen  11/01/18 2045 right subclavian 1 day          Drain                 Urethral Catheter 11/01/18 2159 Latex 1 day          Peripheral Intravenous Line                 Peripheral IV - Single Lumen 12/21/17 1546 Right Forearm 316 days         Peripheral IV - Single Lumen 11/01/18 1851 Right Antecubital 1 day                Physical Exam   Constitutional: She is  oriented to person, place, and time. She appears well-developed and well-nourished. No distress.   HENT:   Head: Normocephalic and atraumatic.   Eyes: Pupils are equal, round, and reactive to light.   Neck: Normal range of motion and full passive range of motion without pain. Neck supple. No JVD present.   Cardiovascular: S1 normal, S2 normal and intact distal pulses. An irregularly irregular rhythm present. Tachycardia present. PMI is not displaced. Exam reveals no distant heart sounds.   No murmur heard.  Pulses:       Radial pulses are 2+ on the right side, and 2+ on the left side.        Dorsalis pedis pulses are 1+ on the right side, and 1+ on the left side.   Pulmonary/Chest: Accessory muscle usage present. No respiratory distress. She has decreased breath sounds in the left lower field. She has no wheezes. She has no rales.   Abdominal: Soft. Bowel sounds are normal. She exhibits no distension. There is no tenderness.   Musculoskeletal: Normal range of motion. She exhibits no edema.        Right ankle: She exhibits no swelling.        Left ankle: She exhibits no swelling.   Neurological: She is alert and oriented to person, place, and time.   Skin: Skin is warm and dry. She is not diaphoretic. No cyanosis. Nails show no clubbing.   Psychiatric: She has a normal mood and affect. Her speech is normal and behavior is normal. Judgment and thought content normal. Cognition and memory are normal.   Nursing note and vitals reviewed.      Significant Labs:   All pertinent lab results from the last 24 hours have been reviewed. and   Recent Lab Results       11/03/18  1109   11/03/18  0739   11/03/18  0530   11/02/18  2115   11/02/18  1737        Albumin     2.7         Alkaline Phosphatase     35         ALT     45         Anion Gap     10         AST     33         Baso #     0.01         Basophil%     0.1         Total Bilirubin     0.3  Comment:  For infants and newborns, interpretation of results should be  based  on gestational age, weight and in agreement with clinical  observations.  Premature Infant recommended reference ranges:  Up to 24 hours.............<8.0 mg/dL  Up to 48 hours............<12.0 mg/dL  3-5 days..................<15.0 mg/dL  6-29 days.................<15.0 mg/dL           BUN, Bld     12         Calcium     7.8         Chloride     105         CO2     28         Creatinine     0.9         Differential Method     Automated         eGFR if      >60         eGFR if non      >60  Comment:  Calculation used to obtain the estimated glomerular filtration  rate (eGFR) is the CKD-EPI equation.            Eos #     0.0         Eosinophil%     0.3         Glucose     92         Gran # (ANC)     8.7         Gran%     79.1         Hematocrit     27.7         Hemoglobin     8.3         Lymph #     1.6         Lymph%     14.5         Magnesium     1.7         MCH     25.2         MCHC     30.0         MCV     84         Mono #     0.7         Mono%     6.0         MPV     8.0         Platelets     228         POCT Glucose 208 99   160 163     Potassium     3.4         Total Protein     5.3         RBC     3.29         RDW     19.3         Sodium     143         Vancomycin, Random               WBC     10.92                          11/02/18  1538   11/02/18  1244        Albumin         Alkaline Phosphatase         ALT         Anion Gap         AST         Baso #         Basophil%         Total Bilirubin         BUN, Bld         Calcium         Chloride         CO2         Creatinine         Differential Method         eGFR if          eGFR if non          Eos #         Eosinophil%         Glucose         Gran # (ANC)         Gran%         Hematocrit         Hemoglobin         Lymph #         Lymph%         Magnesium         MCH         MCHC         MCV         Mono #         Mono%         MPV         Platelets         POCT Glucose   168      Potassium         Total Protein         RBC         RDW         Sodium         Vancomycin, Random 10.0       WBC               Significant Imaging: Echocardiogram:   2D echo with color flow doppler:   Results for orders placed or performed during the hospital encounter of 12/21/17   2D echo with color flow doppler   Result Value Ref Range    Calculated EF 60 55 - 65    Diastolic Dysfunction No     and X-Ray: CXR: X-Ray Chest 1 View (CXR):   Results for orders placed or performed during the hospital encounter of 11/01/18   X-Ray Chest 1 View    Narrative    EXAMINATION:  XR CHEST 1 VIEW    CLINICAL HISTORY:  Right internal jugular line placement.    COMPARISON:  Low January 2018, 1407 hours.    FINDINGS:  There has been placement of a right central line tip terminating at the level of the caval atrial junction.  No pneumothorax.  Heart is stable in size.  There is stable hazy increased density at the medial right lung base similar to previous exams.      Impression    Right internal jugular line tip terminates near the cavoatrial junction.  No pneumothorax.      Electronically signed by: Mahin Koehler MD  Date:    11/01/2018  Time:    21:22    and X-Ray Chest PA and Lateral (CXR): No results found for this visit on 11/01/18.

## 2018-11-04 VITALS
OXYGEN SATURATION: 92 % | BODY MASS INDEX: 30.86 KG/M2 | WEIGHT: 174.19 LBS | DIASTOLIC BLOOD PRESSURE: 80 MMHG | RESPIRATION RATE: 24 BRPM | HEIGHT: 63 IN | TEMPERATURE: 99 F | HEART RATE: 106 BPM | SYSTOLIC BLOOD PRESSURE: 126 MMHG

## 2018-11-04 PROBLEM — N30.00 ACUTE CYSTITIS WITHOUT HEMATURIA: Status: RESOLVED | Noted: 2018-11-01 | Resolved: 2018-11-04

## 2018-11-04 PROBLEM — I48.91 ATRIAL FIBRILLATION WITH RVR: Chronic | Status: RESOLVED | Noted: 2017-06-14 | Resolved: 2018-11-04

## 2018-11-04 PROBLEM — Z87.19 H/O: GI BLEED: Chronic | Status: RESOLVED | Noted: 2018-11-04 | Resolved: 2018-11-04

## 2018-11-04 PROBLEM — Z87.19 H/O: GI BLEED: Chronic | Status: ACTIVE | Noted: 2018-11-04

## 2018-11-04 LAB
ALBUMIN SERPL BCP-MCNC: 2.8 G/DL
ALLENS TEST: ABNORMAL
ALLENS TEST: ABNORMAL
ALP SERPL-CCNC: 42 U/L
ALT SERPL W/O P-5'-P-CCNC: 68 U/L
ANION GAP SERPL CALC-SCNC: 10 MMOL/L
AST SERPL-CCNC: 31 U/L
BACTERIA UR CULT: NORMAL
BASOPHILS # BLD AUTO: 0.01 K/UL
BASOPHILS NFR BLD: 0.1 %
BILIRUB SERPL-MCNC: 0.2 MG/DL
BUN SERPL-MCNC: 11 MG/DL
CALCIUM SERPL-MCNC: 8.1 MG/DL
CHLORIDE SERPL-SCNC: 105 MMOL/L
CO2 SERPL-SCNC: 26 MMOL/L
CREAT SERPL-MCNC: 0.9 MG/DL
DELSYS: ABNORMAL
DELSYS: ABNORMAL
DIFFERENTIAL METHOD: ABNORMAL
EOSINOPHIL # BLD AUTO: 0 K/UL
EOSINOPHIL NFR BLD: 0.1 %
EP: 4
ERYTHROCYTE [DISTWIDTH] IN BLOOD BY AUTOMATED COUNT: 19.9 %
ERYTHROCYTE [SEDIMENTATION RATE] IN BLOOD BY WESTERGREN METHOD: 24 MM/H
EST. GFR  (AFRICAN AMERICAN): >60 ML/MIN/1.73 M^2
EST. GFR  (NON AFRICAN AMERICAN): >60 ML/MIN/1.73 M^2
FIO2: 100
FIO2: 40
FLOW: 40
GLUCOSE SERPL-MCNC: 107 MG/DL
HCO3 UR-SCNC: 29.1 MMOL/L (ref 24–28)
HCO3 UR-SCNC: 34.9 MMOL/L (ref 24–28)
HCT VFR BLD AUTO: 27.9 %
HGB BLD-MCNC: 8.1 G/DL
IP: 16
LYMPHOCYTES # BLD AUTO: 1.2 K/UL
LYMPHOCYTES NFR BLD: 7.2 %
MAGNESIUM SERPL-MCNC: 2.2 MG/DL
MCH RBC QN AUTO: 25.2 PG
MCHC RBC AUTO-ENTMCNC: 29 G/DL
MCV RBC AUTO: 87 FL
MODE: ABNORMAL
MODE: ABNORMAL
MONOCYTES # BLD AUTO: 1 K/UL
MONOCYTES NFR BLD: 5.8 %
NEUTROPHILS # BLD AUTO: 14.7 K/UL
NEUTROPHILS NFR BLD: 88 %
PCO2 BLDA: 74.3 MMHG (ref 35–45)
PCO2 BLDA: 86.1 MMHG (ref 35–45)
PH SMN: 7.2 [PH] (ref 7.35–7.45)
PH SMN: 7.22 [PH] (ref 7.35–7.45)
PLATELET # BLD AUTO: 228 K/UL
PMV BLD AUTO: 8.1 FL
PO2 BLDA: 421 MMHG (ref 80–100)
PO2 BLDA: 94 MMHG (ref 80–100)
POC BE: 1 MMOL/L
POC BE: 7 MMOL/L
POC SATURATED O2: 100 % (ref 95–100)
POC SATURATED O2: 95 % (ref 95–100)
POCT GLUCOSE: 137 MG/DL (ref 70–110)
POTASSIUM SERPL-SCNC: 4.4 MMOL/L
PROT SERPL-MCNC: 5.5 G/DL
RBC # BLD AUTO: 3.21 M/UL
SAMPLE: ABNORMAL
SAMPLE: ABNORMAL
SITE: ABNORMAL
SITE: ABNORMAL
SODIUM SERPL-SCNC: 141 MMOL/L
WBC # BLD AUTO: 16.89 K/UL

## 2018-11-04 PROCEDURE — 82803 BLOOD GASES ANY COMBINATION: CPT

## 2018-11-04 PROCEDURE — 99900035 HC TECH TIME PER 15 MIN (STAT)

## 2018-11-04 PROCEDURE — 80053 COMPREHEN METABOLIC PANEL: CPT

## 2018-11-04 PROCEDURE — 25000003 PHARM REV CODE 250: Performed by: NURSE PRACTITIONER

## 2018-11-04 PROCEDURE — 93010 ELECTROCARDIOGRAM REPORT: CPT | Mod: ,,, | Performed by: INTERNAL MEDICINE

## 2018-11-04 PROCEDURE — 99232 SBSQ HOSP IP/OBS MODERATE 35: CPT | Mod: ,,, | Performed by: INTERNAL MEDICINE

## 2018-11-04 PROCEDURE — 85025 COMPLETE CBC W/AUTO DIFF WBC: CPT

## 2018-11-04 PROCEDURE — 99291 CRITICAL CARE FIRST HOUR: CPT | Mod: ,,, | Performed by: NURSE PRACTITIONER

## 2018-11-04 PROCEDURE — 93005 ELECTROCARDIOGRAM TRACING: CPT

## 2018-11-04 PROCEDURE — 25000242 PHARM REV CODE 250 ALT 637 W/ HCPCS: Performed by: NURSE PRACTITIONER

## 2018-11-04 PROCEDURE — 25000003 PHARM REV CODE 250: Performed by: INTERNAL MEDICINE

## 2018-11-04 PROCEDURE — 27100171 HC OXYGEN HIGH FLOW UP TO 24 HOURS

## 2018-11-04 PROCEDURE — 25000242 PHARM REV CODE 250 ALT 637 W/ HCPCS: Performed by: INTERNAL MEDICINE

## 2018-11-04 PROCEDURE — 27100092 HC HIGH FLOW DELIVERY CANNULA

## 2018-11-04 PROCEDURE — 94660 CPAP INITIATION&MGMT: CPT

## 2018-11-04 PROCEDURE — 94640 AIRWAY INHALATION TREATMENT: CPT

## 2018-11-04 PROCEDURE — 83735 ASSAY OF MAGNESIUM: CPT

## 2018-11-04 PROCEDURE — 36600 WITHDRAWAL OF ARTERIAL BLOOD: CPT

## 2018-11-04 PROCEDURE — 63600175 PHARM REV CODE 636 W HCPCS: Performed by: NURSE PRACTITIONER

## 2018-11-04 PROCEDURE — 27000190 HC CPAP FULL FACE MASK W/VALVE

## 2018-11-04 RX ORDER — IPRATROPIUM BROMIDE 0.5 MG/2.5ML
500 SOLUTION RESPIRATORY (INHALATION) EVERY 6 HOURS PRN
Qty: 1 BOX | Refills: 1 | Status: SHIPPED | OUTPATIENT
Start: 2018-11-04 | End: 2019-11-04

## 2018-11-04 RX ORDER — POLYETHYLENE GLYCOL 3350 17 G/17G
17 POWDER, FOR SOLUTION ORAL DAILY
Qty: 1 EACH | Refills: 1 | Status: SHIPPED | OUTPATIENT
Start: 2018-11-05

## 2018-11-04 RX ORDER — AMIODARONE HYDROCHLORIDE 200 MG/1
200 TABLET ORAL 2 TIMES DAILY
Status: DISCONTINUED | OUTPATIENT
Start: 2018-11-04 | End: 2018-11-04 | Stop reason: HOSPADM

## 2018-11-04 RX ORDER — IPRATROPIUM BROMIDE 0.5 MG/2.5ML
0.5 SOLUTION RESPIRATORY (INHALATION) EVERY 6 HOURS PRN
Status: DISCONTINUED | OUTPATIENT
Start: 2018-11-04 | End: 2018-11-04 | Stop reason: HOSPADM

## 2018-11-04 RX ORDER — FUROSEMIDE 10 MG/ML
40 INJECTION INTRAMUSCULAR; INTRAVENOUS ONCE
Status: COMPLETED | OUTPATIENT
Start: 2018-11-04 | End: 2018-11-04

## 2018-11-04 RX ORDER — MORPHINE SULFATE 2 MG/ML
2 INJECTION, SOLUTION INTRAMUSCULAR; INTRAVENOUS
Status: DISCONTINUED | OUTPATIENT
Start: 2018-11-04 | End: 2018-11-04 | Stop reason: HOSPADM

## 2018-11-04 RX ADMIN — IPRATROPIUM BROMIDE 0.5 MG: 0.5 SOLUTION RESPIRATORY (INHALATION) at 01:11

## 2018-11-04 RX ADMIN — GUAIFENESIN 1200 MG: 600 TABLET, EXTENDED RELEASE ORAL at 09:11

## 2018-11-04 RX ADMIN — TRAMADOL HYDROCHLORIDE 50 MG: 50 TABLET, COATED ORAL at 05:11

## 2018-11-04 RX ADMIN — BUDESONIDE 0.5 MG: 0.5 SUSPENSION RESPIRATORY (INHALATION) at 07:11

## 2018-11-04 RX ADMIN — PREDNISONE 20 MG: 20 TABLET ORAL at 09:11

## 2018-11-04 RX ADMIN — ARFORMOTEROL TARTRATE 15 MCG: 15 SOLUTION RESPIRATORY (INHALATION) at 07:11

## 2018-11-04 RX ADMIN — IPRATROPIUM BROMIDE 0.5 MG: 0.5 SOLUTION RESPIRATORY (INHALATION) at 07:11

## 2018-11-04 RX ADMIN — CEFTRIAXONE 2 G: 2 INJECTION, SOLUTION INTRAVENOUS at 09:11

## 2018-11-04 RX ADMIN — PANTOPRAZOLE SODIUM 40 MG: 40 TABLET, DELAYED RELEASE ORAL at 09:11

## 2018-11-04 RX ADMIN — AMIODARONE HYDROCHLORIDE 200 MG: 200 TABLET ORAL at 09:11

## 2018-11-04 RX ADMIN — FLUTICASONE PROPIONATE 50 MCG: 50 SPRAY, METERED NASAL at 09:11

## 2018-11-04 RX ADMIN — DIGOXIN 250 MCG: 0.25 INJECTION INTRAMUSCULAR; INTRAVENOUS at 09:11

## 2018-11-04 RX ADMIN — SODIUM CHLORIDE: 0.9 INJECTION, SOLUTION INTRAVENOUS at 05:11

## 2018-11-04 RX ADMIN — POLYETHYLENE GLYCOL 3350 17 G: 17 POWDER, FOR SOLUTION ORAL at 09:11

## 2018-11-04 RX ADMIN — OLOPATADINE HYDROCHLORIDE 1 DROP: 1 SOLUTION/ DROPS OPHTHALMIC at 09:11

## 2018-11-04 RX ADMIN — SODIUM CHLORIDE 250 ML: 0.9 INJECTION, SOLUTION INTRAVENOUS at 02:11

## 2018-11-04 RX ADMIN — DILTIAZEM HYDROCHLORIDE 90 MG: 30 TABLET, FILM COATED ORAL at 05:11

## 2018-11-04 RX ADMIN — ROFLUMILAST 500 MCG: 500 TABLET ORAL at 09:11

## 2018-11-04 RX ADMIN — FUROSEMIDE 40 MG: 10 INJECTION, SOLUTION INTRAMUSCULAR; INTRAVENOUS at 09:11

## 2018-11-04 RX ADMIN — AMIODARONE HYDROCHLORIDE 0.5 MG/MIN: 1.8 INJECTION, SOLUTION INTRAVENOUS at 04:11

## 2018-11-04 NOTE — EICU
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      Treatment /Intervention given: Tien Murcia M.D  eICU Physician  2:27 AM   BP 60's , Hold Amio + NS bolus 250  3:36 AM  7.21/ 86/421/34.9/100 on Vapotherm , reduced FiO2   And changed to BIPAP 16/7 fio2 40% repeat ABG  5:03 AM  7.20/74/94/29.1, on BIPAP 16/7 , FiO2 40%

## 2018-11-04 NOTE — ASSESSMENT & PLAN NOTE
AFIB/RVR responded well to IV Cardizem gtt  Transitioned to PO Cardizem  Converted back to AFIB/RVR today with rate 150's with exertional activity  Start IV Amiodarone gtt  Will check 2D Echo  No BB due to lung disease  Continue Xarelto for CVA prophylaxis  No CNS complaints to suggest TIA or CVA today  No signs of abnormal bleeding on Xarelto  Further recs to follow  Continue CCB, IV amio gtt, Xarelto    11/4  -Continue CCB, Amio BID, Xarelto  Needs OP Cardiology follow up  No CNS complaints to suggest TIA or CVA today  No signs of abnormal bleeding on Xarelto  ECHO pending

## 2018-11-04 NOTE — PROGRESS NOTES
[facility]  Critical Care Medicine  Progress Note    Patient Name: Petrona Gonzalez  MRN: [id number]  Admission Date: 11/1/2018  Hospital Length of Stay: 3 days  Code Status: DNR  Attending Provider: Giovani Hackett MD  Primary Care Provider: Primary Doctor No   Principal Problem: Acute on chronic respiratory failure with hypoxia and hypercapnia    Subjective:     HPI:  Ms Gonzalez is a 78 yo WF NH resident with a PMH of O2/Steroid Dependent COPD, Chronic A-Fib on Xarelto, HLD, HTN, and DM.  Family notes the pt's steady weight loss of 30 pounds over the last month, and states her dentures were poor fitting and she wasn't eating much. Dentures have now has been fixed. Pt also has a umbilical hernia.  She was sent to [facility] ED yesterday evening from NH due to Abd pain, SOB above baseline and diarrhea that tested positive for FOBT.  She has had + FOBT in past and seen by GI but no endoscopy pursued due to sedation risk given severity of COPD.  She was recently taken off Xarelto at NH due to + FOBT.  ED work up revealed Lactic acid 4.8, H/H 7.9/27.6, Creatinine 2.4, WBC 20K, Urinalysis showed positive nitrate. She was given 1.6 liters IVF and admitted to Observation.  Overnight she developed RVR of A-Fib and hypotension.  CL placed and she was transferred to ICU placed on Cardizem infusion and Levophed infusion.              Hospital/ICU Course:  11/2 - Fully awake and alert wanting to sleep.  Afeb since admit and had loose brown stool this AM.  CT Abd overnight confirms constipation of colon.  No melena or BRBPR noted.  Still on Cardizem and Levophed infusions.   11/3 - Off Levophed infusion since yesterday.  + BMs yesterday post enema.  Afeb and normal WBC.  Still RVR with exertion.  11/4 - Worsening hypoxic resp failure overnight now on Vapotherm with increased work of breathing.  A-Fib RVR rate now controlled.  Severe REES.  Patient and family do now desire any further aggressive Rx and ask for  comfort care and Hospice consult.  Patient states she just wants to be comfortable.     Review of Systems   Constitutional: Positive for malaise/fatigue. Negative for chills and fever.   HENT: Negative for congestion.    Eyes: Negative for blurred vision.   Respiratory: Positive for cough and shortness of breath (worsened). Negative for sputum production.    Cardiovascular: Negative for chest pain and leg swelling.   Gastrointestinal: Positive for diarrhea. Negative for abdominal pain, constipation, nausea and vomiting.   Genitourinary: Negative for dysuria.   Musculoskeletal: Negative for myalgias.   Skin: Negative for rash.   Neurological: Positive for weakness. Negative for dizziness, focal weakness and headaches.   Endo/Heme/Allergies: Does not bruise/bleed easily.   Psychiatric/Behavioral: The patient is not nervous/anxious.        Objective:     Vital Signs (Most Recent):  Temp: 98.7 °F (37.1 °C) (11/04/18 0313)  Pulse: 92 (11/04/18 0727)  Resp: (!) 24 (11/04/18 0727)  BP: (!) 121/55 (11/04/18 0641)  SpO2: (!) 89 % (11/04/18 0727) Vital Signs (24h Range):  Temp:  [98.1 °F (36.7 °C)-98.9 °F (37.2 °C)] 98.7 °F (37.1 °C)  Pulse:  [] 92  Resp:  [13-33] 24  SpO2:  [77 %-100 %] 89 %  BP: ()/() 121/55     Weight: 79 kg (174 lb 2.6 oz)  Body mass index is 30.85 kg/m².      Intake/Output Summary (Last 24 hours) at 11/4/2018 0858  Last data filed at 11/4/2018 0600  Gross per 24 hour   Intake 2503.76 ml   Output 820 ml   Net 1683.76 ml       Physical Exam   Constitutional: She is oriented to person, place, and time. Vital signs are normal. She appears well-developed and well-nourished. She is cooperative.  Non-toxic appearance. She has a sickly appearance. She appears ill. She appears distressed (mild). She is not intubated. Nasal cannula in place.   HENT:   Head: Normocephalic and atraumatic.   Mouth/Throat: Oropharynx is clear and moist and mucous membranes are normal.   Eyes: EOM and lids are normal.  Pupils are equal, round, and reactive to light.   Neck: Trachea normal and full passive range of motion without pain. Carotid bruit is not present.       Cardiovascular: Normal rate and normal heart sounds. An irregular rhythm present.   Pulses:       Radial pulses are 2+ on the right side, and 2+ on the left side.        Dorsalis pedis pulses are 1+ on the right side, and 1+ on the left side.   Pulmonary/Chest: Accessory muscle usage (mild to mod) present. Tachypnea noted. She is not intubated. She is in respiratory distress (mild). She has decreased breath sounds. She has rales.   Abdominal: Soft. She exhibits no distension. Bowel sounds are decreased. There is no tenderness. A hernia is present.   Obese   Genitourinary:   Genitourinary Comments: Cutler in place   Musculoskeletal: Normal range of motion.        Right foot: There is no deformity.        Left foot: There is no deformity.   Trace edema   Lymphadenopathy:     She has no cervical adenopathy.   Neurological: She is alert and oriented to person, place, and time.   Skin: Skin is warm and dry. Capillary refill takes 2 to 3 seconds. Ecchymosis (bilat LEs) noted. No rash noted. No cyanosis. Nails show clubbing.   Psychiatric: Her speech is normal and behavior is normal. Judgment and thought content normal. Cognition and memory are normal. She exhibits a depressed mood.       Vents:  Oxygen Concentration (%): 50 (11/04/18 0723)    Lines/Drains/Airways     Central Venous Catheter Line                 Percutaneous Central Line Insertion/Assessment - triple lumen  11/01/18 2045 right subclavian 2 days          Drain                 Urethral Catheter 11/01/18 2159 Latex 2 days          Peripheral Intravenous Line                 Peripheral IV - Single Lumen 12/21/17 1546 Right Forearm 317 days         Peripheral IV - Single Lumen 11/01/18 1851 Right Antecubital 2 days                Significant Labs:    CBC/Anemia Profile:  Recent Labs   Lab 11/03/18  8536  11/04/18  0408   WBC 10.92 16.89*   HGB 8.3* 8.1*   HCT 27.7* 27.9*    228   MCV 84 87   RDW 19.3* 19.9*        Chemistries:  Recent Labs   Lab 11/03/18  0530 11/03/18  1428 11/04/18  0408    140 141   K 3.4* 3.8 4.4    102 105   CO2 28 29 26   BUN 12 12 11   CREATININE 0.9 1.1 0.9   CALCIUM 7.8* 8.1* 8.1*   ALBUMIN 2.7*  --  2.8*   PROT 5.3*  --  5.5*   BILITOT 0.3  --  0.2   ALKPHOS 35*  --  42*   ALT 45*  --  68*   AST 33  --  31   MG 1.7 2.3 2.2       ABGs:   Recent Labs   Lab 11/04/18  0441   PH 7.201*   PCO2 74.3*   HCO3 29.1*   POCSATURATED 95   BE 1     POCT Glucose:   Recent Labs   Lab 11/03/18  1741 11/03/18  2131 11/04/18  0624   POCTGLUCOSE 217* 161* 137*     All pertinent labs within the past 24 hours have been reviewed.    Significant Imaging:  CXR: I have reviewed all pertinent results/findings within the past 24 hours and my personal findings are:  mild pulm edema      Assessment/Plan:     Problem   Acute On Chronic Respiratory Failure With Hypoxia and Hypercapnia   Acute Exacerbation of Stage 3 severe COPD sec to Acute bronchitis   E Coli UTI   Atrial fibrillation with RVR w/ chronic A-fib on Xarelto   Anemia Associated With Nutritional Deficiency   Type 2 Diabetes Mellitus With Hyperlipidemia   H/O: GI Bleed     Patient and daughter at bedside have elected for comfort care and Inpt Hospice over aggressive Rx.  Will consult  for Hospice and sign off.  Please call if needed.     Preventive Measures and Monitoring:   Stress Ulcer: PPI  Nutrition: ADA diet  Glucose control: SSI  Bowel prophylaxis: Miralax  DVT prophylaxis: Xarelto  Hx CAD on B-Blocker: no hx CAD  Head of Bed/Reposition: Elevate HOB and turn Q1-2 hours   Early Mobility: up on EOB now  Central Line Right SC Day: #4  Cutler Day: #4  IVAB Day: #4  Code Status: DNR  Pneumonia Vaccine: UTD  Flu Vaccine: ordered     Counseling/Consultation:I have discussed the care of this patient in detail with the bedside  nursing staff and Dr. Johnson and Dr. Hackett    Critical Care Time: 41 minutes  Critical secondary to Patient has a condition that poses threat to life and bodily function: Acute on Chronic Hypoxic Resp Failure      Critical care was time spent personally by me on the following activities: development of treatment plan with patient or surrogate and bedside caregivers, discussions with consultants, evaluation of patient's response to treatment, examination of patient, ordering and performing treatments and interventions, ordering and review of laboratory studies, ordering and review of radiographic studies, pulse oximetry, re-evaluation of patient's condition. This critical care time did not overlap with that of any other provider or involve time for any procedures.     Carlos Sultana NP  Critical Care Medicine  [facility]

## 2018-11-04 NOTE — PLAN OF CARE
Problem: Patient Care Overview  Goal: Plan of Care Review  Outcome: Outcome(s) achieved Date Met: 11/04/18  Pt decided to go to inpatient hospice facility; arrangements made with [facility]; pt AAOx4 for most of day; now tired and sleepy; family at bedside throughout the day; report called to RASHID Plunkett at [facility]; [facility] on the way.

## 2018-11-04 NOTE — PT/OT/SLP PROGRESS
Occupational Therapy      Patient Name:  Petrona Gonzalez   MRN:  [id number]    Patient not seen today secondary to  To pt discharged from OT. Due to placed on hospice.    Rosaline Ventura OT  11/4/2018   0913

## 2018-11-04 NOTE — ASSESSMENT & PLAN NOTE
Off Cardizem gtt now  Has ch Afib on Xarelto  May need to hold due to ABL Anemia    Has chronic Afib and HR keeps going up-- hence placed on Amiodarone gtt  Cards consulted    Under control, getting off amiodarone, going to hospice

## 2018-11-04 NOTE — NURSING
EICU notified. Blood pressure dropping with MAP in 40-50s. Patient heart rate in 60-80s. On vapotherm at 40 L and 100 percent O2. Per orders to bolus patient. ABG. Will cont to monitor

## 2018-11-04 NOTE — PT/OT/SLP PROGRESS
Physical Therapy      Patient Name:  Petrona Gonzalez   MRN:  [id number]    Patient not seen today secondary to Other (Comment) Nurse Maddy states pt just put on hospice and to put PT on hold . Will DC from PT services  .    Nav Verdin, PT

## 2018-11-04 NOTE — HOSPITAL COURSE
11/4/18-Patient seen and examined in room, sitting on side of bed. Remains in AFIB today, VR 's today. Labs reviewed, Mag 2.2, K+4.4.

## 2018-11-04 NOTE — ASSESSMENT & PLAN NOTE
IV Abx  -urine culture    Continue IV Abx  Continue Rocephin    Going to hospice now, may give her oral cipro

## 2018-11-04 NOTE — PLAN OF CARE
Problem: Physical Therapy Goal  Goal: Physical Therapy Goal  Outcome: Unable to achieve outcome(s) by discharge  Pt is being put on hospice.  Will DC PT services

## 2018-11-04 NOTE — PLAN OF CARE
Sw contact [facility] for dc transportation. [facility] will hold transport in queue until they receive an authorization from [facility]. Robyn contacted Bri at [facility] for authorization. Bri is requested an order from MD stating pt needs ambulance transport. Bri needs order faxed back to [phone]. Robyn will consult MD.

## 2018-11-04 NOTE — PROGRESS NOTES
[facility]   Cardiology  Progress Note    Patient Name: Petrona Gonzalez  MRN: [id number]  Admission Date: 11/1/2018  Hospital Length of Stay: 3 days  Code Status: DNR   Attending Physician: Giovani Hackett MD   Primary Care Physician: Primary Doctor No  Expected Discharge Date:   Principal Problem:Acute on chronic respiratory failure with hypoxia and hypercapnia    Subjective:     Hospital Course:   11/4/18-Patient seen and examined in room, sitting on side of bed. Remains in AFIB today, VR 's today. Labs reviewed, Mag 2.2, K+4.4.     Interval History: no acute issues o/n    Review of Systems   Constitution: Negative for weakness.   HENT: Negative for hearing loss and hoarse voice.    Eyes: Negative for blurred vision and visual disturbance.   Cardiovascular: Positive for dyspnea on exertion (improving), irregular heartbeat and palpitations. Negative for chest pain, claudication, leg swelling, near-syncope, orthopnea, paroxysmal nocturnal dyspnea and syncope.   Respiratory: Positive for shortness of breath (chronic). Negative for cough, hemoptysis, sleep disturbances due to breathing, snoring and wheezing.    Endocrine: Negative for cold intolerance and heat intolerance.   Hematologic/Lymphatic: Bruises/bleeds easily.   Skin: Negative for color change, dry skin and nail changes.   Musculoskeletal: Positive for arthritis and back pain. Negative for joint pain and myalgias.   Gastrointestinal: Negative for bloating, abdominal pain, constipation, nausea and vomiting.   Genitourinary: Negative for dysuria, flank pain, hematuria and hesitancy.   Neurological: Negative for headaches, light-headedness, loss of balance, numbness and paresthesias.   Psychiatric/Behavioral: Negative for altered mental status.   Allergic/Immunologic: Negative for environmental allergies.     Objective:     Vital Signs (Most Recent):  Temp: 98.7 °F (37.1 °C) (11/04/18 0313)  Pulse: 92 (11/04/18 0727)  Resp: (!) 24 (11/04/18  0727)  BP: (!) 121/55 (11/04/18 0641)  SpO2: (!) 89 % (11/04/18 0727) Vital Signs (24h Range):  Temp:  [98.1 °F (36.7 °C)-98.9 °F (37.2 °C)] 98.7 °F (37.1 °C)  Pulse:  [] 92  Resp:  [13-32] 24  SpO2:  [77 %-100 %] 89 %  BP: ()/() 121/55     Weight: 79 kg (174 lb 2.6 oz)  Body mass index is 30.85 kg/m².     SpO2: (!) 89 %  O2 Device (Oxygen Therapy): Vapotherm      Intake/Output Summary (Last 24 hours) at 11/4/2018 1113  Last data filed at 11/4/2018 0600  Gross per 24 hour   Intake 2272.51 ml   Output 700 ml   Net 1572.51 ml       Lines/Drains/Airways     Central Venous Catheter Line                 Percutaneous Central Line Insertion/Assessment - triple lumen  11/01/18 2045 right subclavian 2 days          Drain                 Urethral Catheter 11/01/18 2159 Latex 2 days          Peripheral Intravenous Line                 Peripheral IV - Single Lumen 12/21/17 1546 Right Forearm 317 days         Peripheral IV - Single Lumen 11/01/18 1851 Right Antecubital 2 days                Physical Exam   Constitutional: She is oriented to person, place, and time. She appears well-developed and well-nourished. No distress.   HENT:   Head: Normocephalic and atraumatic.   Eyes: Pupils are equal, round, and reactive to light.   Neck: Normal range of motion and full passive range of motion without pain. Neck supple. No JVD present.   Cardiovascular: S1 normal, S2 normal and intact distal pulses. An irregularly irregular rhythm present. Tachycardia present. PMI is not displaced. Exam reveals no distant heart sounds.   No murmur heard.  Pulses:       Radial pulses are 2+ on the right side, and 2+ on the left side.        Dorsalis pedis pulses are 1+ on the right side, and 1+ on the left side.   Pulmonary/Chest: Accessory muscle usage present. No respiratory distress. She has decreased breath sounds in the left lower field. She has no wheezes. She has no rales.   +vapotherm   Abdominal: Soft. Bowel sounds are normal.  She exhibits no distension. There is no tenderness.   Musculoskeletal: Normal range of motion. She exhibits no edema.        Right ankle: She exhibits no swelling.        Left ankle: She exhibits no swelling.   Neurological: She is alert and oriented to person, place, and time.   Skin: Skin is warm and dry. She is not diaphoretic. No cyanosis. Nails show no clubbing.   Psychiatric: She has a normal mood and affect. Her speech is normal and behavior is normal. Judgment and thought content normal. Cognition and memory are normal.   Nursing note and vitals reviewed.      Significant Labs:   All pertinent lab results from the last 24 hours have been reviewed. and   Recent Lab Results       11/04/18  0624   11/04/18  0441   11/04/18  0408   11/04/18  0238   11/03/18  2131        Albumin     2.8         Alkaline Phosphatase     42         Allens Test   Pass   Pass       ALT     68         Anion Gap     10         AST     31         Baso #     0.01         Basophil%     0.1         Total Bilirubin     0.2  Comment:  For infants and newborns, interpretation of results should be based  on gestational age, weight and in agreement with clinical  observations.  Premature Infant recommended reference ranges:  Up to 24 hours.............<8.0 mg/dL  Up to 48 hours............<12.0 mg/dL  3-5 days..................<15.0 mg/dL  6-29 days.................<15.0 mg/dL           Site   RR   RR       BUN, Bld     11         Calcium     8.1         Chloride     105         CO2     26         Creatinine     0.9         DelSys   CPAP/BiPAP   Nasal Can       Differential Method     Automated         eGFR if      >60         eGFR if non      >60  Comment:  Calculation used to obtain the estimated glomerular filtration  rate (eGFR) is the CKD-EPI equation.            Eos #     0.0         Eosinophil%     0.1         EP   4           FiO2   40   100       Flow       40       Glucose     107         Gran # (ANC)      14.7         Gran%     88.0         Hematocrit     27.9         Hemoglobin     8.1         IP   16           Lymph #     1.2         Lymph%     7.2         Magnesium     2.2         MCH     25.2         MCHC     29.0         MCV     87         Mode   BiPAP   SPONT       Mono #     1.0         Mono%     5.8         MPV     8.1         Platelets     228         POC BE   1   7       POC HCO3   29.1   34.9       POC PCO2   74.3   86.1       POC PH   7.201   7.216       POC PO2   94   421       POC SATURATED O2   95   100       POCT Glucose 137       161     Potassium     4.4         Total Protein     5.5         Rate   24           RBC     3.21         RDW     19.9         Sample   ARTERIAL   ARTERIAL       Sodium     141         WBC     16.89                          11/03/18  1741   11/03/18  1428        Albumin         Alkaline Phosphatase         Allens Test         ALT         Anion Gap   9     AST         Baso #         Basophil%         Total Bilirubin         Site         BUN, Bld   12     Calcium   8.1     Chloride   102     CO2   29     Creatinine   1.1     DelSys         Differential Method         eGFR if    57     eGFR if non    49  Comment:  Calculation used to obtain the estimated glomerular filtration  rate (eGFR) is the CKD-EPI equation.        Eos #         Eosinophil%         EP         FiO2         Flow         Glucose   237     Gran # (ANC)         Gran%         Hematocrit         Hemoglobin         IP         Lymph #         Lymph%         Magnesium   2.3     MCH         MCHC         MCV         Mode         Mono #         Mono%         MPV         Platelets         POC BE         POC HCO3         POC PCO2         POC PH         POC PO2         POC SATURATED O2         POCT Glucose 217       Potassium   3.8     Total Protein         Rate         RBC         RDW         Sample         Sodium   140     WBC               Significant Imaging: Echocardiogram:   2D echo  with color flow doppler:   Results for orders placed or performed during the hospital encounter of 12/21/17   2D echo with color flow doppler   Result Value Ref Range    Calculated EF 60 55 - 65    Diastolic Dysfunction No     and X-Ray: CXR: X-Ray Chest 1 View (CXR):   Results for orders placed or performed during the hospital encounter of 11/01/18   X-Ray Chest 1 View    Narrative    EXAMINATION:  XR CHEST 1 VIEW    CLINICAL HISTORY:  SOB;    COMPARISON:  Chest x-ray 11/01/2018.    FINDINGS:  Heart is borderline enlarged.  Increasing consolidation and volume loss in the right lung base compared to prior exam.  Soft tissue following this in the left apex.  Question mild congestion.      Impression    See above.      Electronically signed by: Carlos Murdock MD  Date:    11/04/2018  Time:    08:57     Assessment and Plan:       * Acute on chronic respiratory failure with hypoxia and hypercapnia    Per primary team     Atrial fibrillation with RVR w/ chronic A-fib on Xarelto    AFIB/RVR responded well to IV Cardizem gtt  Transitioned to PO Cardizem  Converted back to AFIB/RVR today with rate 150's with exertional activity  Start IV Amiodarone gtt  Will check 2D Echo  No BB due to lung disease  Continue Xarelto for CVA prophylaxis  No CNS complaints to suggest TIA or CVA today  No signs of abnormal bleeding on Xarelto  Further recs to follow  Continue CCB, IV amio gtt, Xarelto    11/4  -Continue CCB, Amio BID, Xarelto  Needs OP Cardiology follow up  No CNS complaints to suggest TIA or CVA today  No signs of abnormal bleeding on Xarelto  ECHO pending     Type 2 diabetes mellitus with hyperlipidemia    Continue statin         VTE Risk Mitigation (From admission, onward)        Ordered     rivaroxaban tablet 15 mg  With dinner      11/02/18 0924     Place sequential compression device  Until discontinued      11/01/18 0391          Monique Chavez NP  Cardiology  [facility]

## 2018-11-04 NOTE — SUBJECTIVE & OBJECTIVE
Interval History: no acute issues o/n    Review of Systems   Constitution: Negative for weakness.   HENT: Negative for hearing loss and hoarse voice.    Eyes: Negative for blurred vision and visual disturbance.   Cardiovascular: Positive for dyspnea on exertion (improving), irregular heartbeat and palpitations. Negative for chest pain, claudication, leg swelling, near-syncope, orthopnea, paroxysmal nocturnal dyspnea and syncope.   Respiratory: Positive for shortness of breath (chronic). Negative for cough, hemoptysis, sleep disturbances due to breathing, snoring and wheezing.    Endocrine: Negative for cold intolerance and heat intolerance.   Hematologic/Lymphatic: Bruises/bleeds easily.   Skin: Negative for color change, dry skin and nail changes.   Musculoskeletal: Positive for arthritis and back pain. Negative for joint pain and myalgias.   Gastrointestinal: Negative for bloating, abdominal pain, constipation, nausea and vomiting.   Genitourinary: Negative for dysuria, flank pain, hematuria and hesitancy.   Neurological: Negative for headaches, light-headedness, loss of balance, numbness and paresthesias.   Psychiatric/Behavioral: Negative for altered mental status.   Allergic/Immunologic: Negative for environmental allergies.     Objective:     Vital Signs (Most Recent):  Temp: 98.7 °F (37.1 °C) (11/04/18 0313)  Pulse: 92 (11/04/18 0727)  Resp: (!) 24 (11/04/18 0727)  BP: (!) 121/55 (11/04/18 0641)  SpO2: (!) 89 % (11/04/18 0727) Vital Signs (24h Range):  Temp:  [98.1 °F (36.7 °C)-98.9 °F (37.2 °C)] 98.7 °F (37.1 °C)  Pulse:  [] 92  Resp:  [13-32] 24  SpO2:  [77 %-100 %] 89 %  BP: ()/() 121/55     Weight: 79 kg (174 lb 2.6 oz)  Body mass index is 30.85 kg/m².     SpO2: (!) 89 %  O2 Device (Oxygen Therapy): Vapotherm      Intake/Output Summary (Last 24 hours) at 11/4/2018 1113  Last data filed at 11/4/2018 0600  Gross per 24 hour   Intake 2272.51 ml   Output 700 ml   Net 1572.51 ml        Lines/Drains/Airways     Central Venous Catheter Line                 Percutaneous Central Line Insertion/Assessment - triple lumen  11/01/18 2045 right subclavian 2 days          Drain                 Urethral Catheter 11/01/18 2159 Latex 2 days          Peripheral Intravenous Line                 Peripheral IV - Single Lumen 12/21/17 1546 Right Forearm 317 days         Peripheral IV - Single Lumen 11/01/18 1851 Right Antecubital 2 days                Physical Exam   Constitutional: She is oriented to person, place, and time. She appears well-developed and well-nourished. No distress.   HENT:   Head: Normocephalic and atraumatic.   Eyes: Pupils are equal, round, and reactive to light.   Neck: Normal range of motion and full passive range of motion without pain. Neck supple. No JVD present.   Cardiovascular: S1 normal, S2 normal and intact distal pulses. An irregularly irregular rhythm present. Tachycardia present. PMI is not displaced. Exam reveals no distant heart sounds.   No murmur heard.  Pulses:       Radial pulses are 2+ on the right side, and 2+ on the left side.        Dorsalis pedis pulses are 1+ on the right side, and 1+ on the left side.   Pulmonary/Chest: Accessory muscle usage present. No respiratory distress. She has decreased breath sounds in the left lower field. She has no wheezes. She has no rales.   +vapotherm   Abdominal: Soft. Bowel sounds are normal. She exhibits no distension. There is no tenderness.   Musculoskeletal: Normal range of motion. She exhibits no edema.        Right ankle: She exhibits no swelling.        Left ankle: She exhibits no swelling.   Neurological: She is alert and oriented to person, place, and time.   Skin: Skin is warm and dry. She is not diaphoretic. No cyanosis. Nails show no clubbing.   Psychiatric: She has a normal mood and affect. Her speech is normal and behavior is normal. Judgment and thought content normal. Cognition and memory are normal.   Nursing  note and vitals reviewed.      Significant Labs:   All pertinent lab results from the last 24 hours have been reviewed. and   Recent Lab Results       11/04/18  0624   11/04/18  0441   11/04/18  0408   11/04/18  0238   11/03/18  2131        Albumin     2.8         Alkaline Phosphatase     42         Allens Test   Pass   Pass       ALT     68         Anion Gap     10         AST     31         Baso #     0.01         Basophil%     0.1         Total Bilirubin     0.2  Comment:  For infants and newborns, interpretation of results should be based  on gestational age, weight and in agreement with clinical  observations.  Premature Infant recommended reference ranges:  Up to 24 hours.............<8.0 mg/dL  Up to 48 hours............<12.0 mg/dL  3-5 days..................<15.0 mg/dL  6-29 days.................<15.0 mg/dL           Site   RR   RR       BUN, Bld     11         Calcium     8.1         Chloride     105         CO2     26         Creatinine     0.9         DelSys   CPAP/BiPAP   Nasal Can       Differential Method     Automated         eGFR if      >60         eGFR if non      >60  Comment:  Calculation used to obtain the estimated glomerular filtration  rate (eGFR) is the CKD-EPI equation.            Eos #     0.0         Eosinophil%     0.1         EP   4           FiO2   40   100       Flow       40       Glucose     107         Gran # (ANC)     14.7         Gran%     88.0         Hematocrit     27.9         Hemoglobin     8.1         IP   16           Lymph #     1.2         Lymph%     7.2         Magnesium     2.2         MCH     25.2         MCHC     29.0         MCV     87         Mode   BiPAP   SPONT       Mono #     1.0         Mono%     5.8         MPV     8.1         Platelets     228         POC BE   1   7       POC HCO3   29.1   34.9       POC PCO2   74.3   86.1       POC PH   7.201   7.216       POC PO2   94   421       POC SATURATED O2   95   100       POCT Glucose  137       161     Potassium     4.4         Total Protein     5.5         Rate   24           RBC     3.21         RDW     19.9         Sample   ARTERIAL   ARTERIAL       Sodium     141         WBC     16.89                          11/03/18  1741   11/03/18  1428        Albumin         Alkaline Phosphatase         Allens Test         ALT         Anion Gap   9     AST         Baso #         Basophil%         Total Bilirubin         Site         BUN, Bld   12     Calcium   8.1     Chloride   102     CO2   29     Creatinine   1.1     DelSys         Differential Method         eGFR if    57     eGFR if non    49  Comment:  Calculation used to obtain the estimated glomerular filtration  rate (eGFR) is the CKD-EPI equation.        Eos #         Eosinophil%         EP         FiO2         Flow         Glucose   237     Gran # (ANC)         Gran%         Hematocrit         Hemoglobin         IP         Lymph #         Lymph%         Magnesium   2.3     MCH         MCHC         MCV         Mode         Mono #         Mono%         MPV         Platelets         POC BE         POC HCO3         POC PCO2         POC PH         POC PO2         POC SATURATED O2         POCT Glucose 217       Potassium   3.8     Total Protein         Rate         RBC         RDW         Sample         Sodium   140     WBC               Significant Imaging: Echocardiogram:   2D echo with color flow doppler:   Results for orders placed or performed during the hospital encounter of 12/21/17   2D echo with color flow doppler   Result Value Ref Range    Calculated EF 60 55 - 65    Diastolic Dysfunction No     and X-Ray: CXR: X-Ray Chest 1 View (CXR):   Results for orders placed or performed during the hospital encounter of 11/01/18   X-Ray Chest 1 View    Narrative    EXAMINATION:  XR CHEST 1 VIEW    CLINICAL HISTORY:  SOB;    COMPARISON:  Chest x-ray 11/01/2018.    FINDINGS:  Heart is borderline enlarged.  Increasing  consolidation and volume loss in the right lung base compared to prior exam.  Soft tissue following this in the left apex.  Question mild congestion.      Impression    See above.      Electronically signed by: Carlos Murdock MD  Date:    11/04/2018  Time:    08:57

## 2018-11-04 NOTE — ASSESSMENT & PLAN NOTE
Has had recurrent illnesses recently  No much quality of life    Not eating enough- PCM getting worse

## 2018-11-04 NOTE — ASSESSMENT & PLAN NOTE
Getting 1 unit of blood  Will give IV Iron, MVI, Folbic and Thiamine    H/H stable post transfusion  See above

## 2018-11-04 NOTE — NURSING
Called report to RASHID Plunkett at [facility]; [facility] will be here within the hour; family at bedside.

## 2018-11-04 NOTE — SUBJECTIVE & OBJECTIVE
Review of Systems   Constitutional: Positive for malaise/fatigue. Negative for chills and fever.   HENT: Negative for congestion.    Eyes: Negative for blurred vision.   Respiratory: Positive for cough and shortness of breath (worsened). Negative for sputum production.    Cardiovascular: Negative for chest pain and leg swelling.   Gastrointestinal: Positive for diarrhea. Negative for abdominal pain, constipation, nausea and vomiting.   Genitourinary: Negative for dysuria.   Musculoskeletal: Negative for myalgias.   Skin: Negative for rash.   Neurological: Positive for weakness. Negative for dizziness, focal weakness and headaches.   Endo/Heme/Allergies: Does not bruise/bleed easily.   Psychiatric/Behavioral: The patient is not nervous/anxious.        Objective:     Vital Signs (Most Recent):  Temp: 98.7 °F (37.1 °C) (11/04/18 0313)  Pulse: 92 (11/04/18 0727)  Resp: (!) 24 (11/04/18 0727)  BP: (!) 121/55 (11/04/18 0641)  SpO2: (!) 89 % (11/04/18 0727) Vital Signs (24h Range):  Temp:  [98.1 °F (36.7 °C)-98.9 °F (37.2 °C)] 98.7 °F (37.1 °C)  Pulse:  [] 92  Resp:  [13-33] 24  SpO2:  [77 %-100 %] 89 %  BP: ()/() 121/55     Weight: 79 kg (174 lb 2.6 oz)  Body mass index is 30.85 kg/m².      Intake/Output Summary (Last 24 hours) at 11/4/2018 0858  Last data filed at 11/4/2018 0600  Gross per 24 hour   Intake 2503.76 ml   Output 820 ml   Net 1683.76 ml       Physical Exam   Constitutional: She is oriented to person, place, and time. Vital signs are normal. She appears well-developed and well-nourished. She is cooperative.  Non-toxic appearance. She has a sickly appearance. She appears ill. She appears distressed (mild). She is not intubated. Nasal cannula in place.   HENT:   Head: Normocephalic and atraumatic.   Mouth/Throat: Oropharynx is clear and moist and mucous membranes are normal.   Eyes: EOM and lids are normal. Pupils are equal, round, and reactive to light.   Neck: Trachea normal and full passive  range of motion without pain. Carotid bruit is not present.       Cardiovascular: Normal rate and normal heart sounds. An irregular rhythm present.   Pulses:       Radial pulses are 2+ on the right side, and 2+ on the left side.        Dorsalis pedis pulses are 1+ on the right side, and 1+ on the left side.   Pulmonary/Chest: Accessory muscle usage (mild to mod) present. Tachypnea noted. She is not intubated. She is in respiratory distress (mild). She has decreased breath sounds. She has rales.   Abdominal: Soft. She exhibits no distension. Bowel sounds are decreased. There is no tenderness. A hernia is present.   Obese   Genitourinary:   Genitourinary Comments: Cutler in place   Musculoskeletal: Normal range of motion.        Right foot: There is no deformity.        Left foot: There is no deformity.   Trace edema   Lymphadenopathy:     She has no cervical adenopathy.   Neurological: She is alert and oriented to person, place, and time.   Skin: Skin is warm and dry. Capillary refill takes 2 to 3 seconds. Ecchymosis (bilat LEs) noted. No rash noted. No cyanosis. Nails show clubbing.   Psychiatric: Her speech is normal and behavior is normal. Judgment and thought content normal. Cognition and memory are normal. She exhibits a depressed mood.       Vents:  Oxygen Concentration (%): 50 (11/04/18 0723)    Lines/Drains/Airways     Central Venous Catheter Line                 Percutaneous Central Line Insertion/Assessment - triple lumen  11/01/18 2045 right subclavian 2 days          Drain                 Urethral Catheter 11/01/18 2159 Latex 2 days          Peripheral Intravenous Line                 Peripheral IV - Single Lumen 12/21/17 1546 Right Forearm 317 days         Peripheral IV - Single Lumen 11/01/18 1851 Right Antecubital 2 days                Significant Labs:    CBC/Anemia Profile:  Recent Labs   Lab 11/03/18  0530 11/04/18  0408   WBC 10.92 16.89*   HGB 8.3* 8.1*   HCT 27.7* 27.9*    228   MCV 84 87    RDW 19.3* 19.9*        Chemistries:  Recent Labs   Lab 11/03/18  0530 11/03/18  1428 11/04/18  0408    140 141   K 3.4* 3.8 4.4    102 105   CO2 28 29 26   BUN 12 12 11   CREATININE 0.9 1.1 0.9   CALCIUM 7.8* 8.1* 8.1*   ALBUMIN 2.7*  --  2.8*   PROT 5.3*  --  5.5*   BILITOT 0.3  --  0.2   ALKPHOS 35*  --  42*   ALT 45*  --  68*   AST 33  --  31   MG 1.7 2.3 2.2       ABGs:   Recent Labs   Lab 11/04/18  0441   PH 7.201*   PCO2 74.3*   HCO3 29.1*   POCSATURATED 95   BE 1     POCT Glucose:   Recent Labs   Lab 11/03/18  1741 11/03/18  2131 11/04/18  0624   POCTGLUCOSE 217* 161* 137*     All pertinent labs within the past 24 hours have been reviewed.    Significant Imaging:  CXR: I have reviewed all pertinent results/findings within the past 24 hours and my personal findings are:  mild pulm edema

## 2018-11-04 NOTE — PLAN OF CARE
Bri called back from [facility] [place] with authorization. Pt's auth is [id number]. Robyn contacted [facility] with authorization. Ambulance will arrive within the hour. Robyn notified pt's nurse.

## 2018-11-04 NOTE — PROGRESS NOTES
[facility] Hospital Medicine  Progress Note    Patient Name: Petrona Gonzalez  MRN: [id number]  Patient Class: IP- Inpatient   Admission Date: 11/1/2018  Length of Stay: 3 days  Attending Physician: Giovani Hackett MD  Primary Care Provider: Primary Doctor No        Subjective:     Principal Problem:Acute on chronic respiratory failure with hypoxia and hypercapnia    HPI:  Petrona Gonzalez is a 77 y.o.  [place] Age patient female patient with PMHx of COPD, chronic O2, DM, HLD, HTN, who presented to the ER for  c/o blood in stool  and SOB which onset gradually past week. Symptoms are constant and moderate in severity. No mitigating or exacerbating factors reported. Associated sxs included diarrhea  one day only 10/24/18 following a flu shot, and hematochezia.  A stool specimen was sent from the [facility] to the lab: specimen was solid so it wasn't tested for C. DIff. Pt's daughter also notes the pt's steady weight loss of 30 pounds over the last month, she states her dentures were poor fitting and she wasn't eating much. Dentures have now has been fixed. Pt also has a hernia on her R abdomen, which she is concerned about obstructing. Patient denies any fever, chills, constipation, dysuria, hematuria, urinary frequency, nausea, vomiting and all other sxs at this time. Pt is on Xarelto 20 mg for atrial fibrillation. Pt was on abx for 5 days for tx of a partially collapsed lung, as noted by pt's daughter.  In ER, Lactic acid 4.8, H/H 7.9/27.6, Creatinine 2.4, WBC 20K, Urinalysis showed positive nitrate. And she was given 1.6 liters fluid on sepsis protocol based on ideal body weight of 115#. She has edema to lower extremities. Her sister at bedside states patient was told she couldn't have a colonoscopy due to high risk for anesthesia and she couldn't tolerate the bowel prep. She is a full code. Her sister, Bonnie Carmichael, is the surrogate decision maker. She is admitted for acute bronchitis and UTI to  Observation.         Hospital Course:  Pt admitted to [facility] as Obs initially but soon after developed Afib w RVR and hypotension and was transferred to ICU and placed on Levophed and Cardizem gtt and more IVF. She responded well and her BP and HR improved and was able to be weaned off Levophed next morning. She also got a unit of blood for her Severe anemia. She has ch Afib and had RVR since last night. She was started on CARDIZEM 60 QID and she slowed down and was able to come off Cardizem gtt. She was also given an enema with mild BM. She is resting well comfortably, no distress and is fully AAOx3, no focal deficit, speech clear.    11/3- doing better, was sitting up in chair but quiet out of breath with any exertion, tremors, shakes sec to Nebs-- hence Albuterol/ Duoneb d/fernanda. Also developed RVR again with Afib, hence started on Amiodarone gtt and Cards consulted, continued on oral Cardizem. Blood Cx NGTD but Urine CX Positive for GNR. Now on Rocephin.    11/4- pt developed worsening resp failure over nite, placed on Vapotherm, with increased work of breathing and then she and her family decided to just keep her comfortable and go to hospice and have chosen [facility]. SW making arrangements. Pt appears more relaxed now, surrounded by her family. She ate a little better this am.     Interval History: pt has increased SOB and has decided about hospice and now appears more relaxed and comfortable. Await hospice placement this afternoon.    Review of Systems   Constitutional: Positive for activity change, appetite change and fatigue. Negative for chills, diaphoresis and fever.   HENT: Negative for congestion.    Respiratory: Positive for shortness of breath (REES only at baseline). Negative for apnea, cough and wheezing.    Cardiovascular: Negative for chest pain and leg swelling.   Gastrointestinal: Negative for abdominal pain, diarrhea, nausea and vomiting.   Endocrine: Negative for polydipsia.   Genitourinary:  Negative for difficulty urinating.   Musculoskeletal: Positive for myalgias (chronic).   Skin: Negative for color change and wound.   Allergic/Immunologic: Negative for immunocompromised state.   Neurological: Negative for dizziness, syncope and weakness.   Hematological: Does not bruise/bleed easily.   Psychiatric/Behavioral: Negative for agitation, confusion and hallucinations. The patient is not nervous/anxious.      Objective:     Vital Signs (Most Recent):  Temp: 98.7 °F (37.1 °C) (11/04/18 0313)  Pulse: 92 (11/04/18 0727)  Resp: (!) 24 (11/04/18 0727)  BP: (!) 121/55 (11/04/18 0641)  SpO2: (!) 89 % (11/04/18 0727) Vital Signs (24h Range):  Temp:  [98.1 °F (36.7 °C)-98.9 °F (37.2 °C)] 98.7 °F (37.1 °C)  Pulse:  [] 92  Resp:  [13-33] 24  SpO2:  [77 %-100 %] 89 %  BP: ()/() 121/55     Weight: 79 kg (174 lb 2.6 oz)  Body mass index is 30.85 kg/m².    Intake/Output Summary (Last 24 hours) at 11/4/2018 1015  Last data filed at 11/4/2018 0600  Gross per 24 hour   Intake 2353.76 ml   Output 745 ml   Net 1608.76 ml      Physical Exam   Constitutional: She is oriented to person, place, and time. Vital signs are normal. She appears well-developed and well-nourished. She is cooperative.  Non-toxic appearance. She has a sickly appearance. She appears ill. She appears distressed (mild). She is not intubated. Nasal cannula in place.   HENT:   Head: Normocephalic and atraumatic.   Mouth/Throat: Oropharynx is clear and moist and mucous membranes are normal.   Eyes: EOM and lids are normal. Pupils are equal, round, and reactive to light.   Neck: Trachea normal and full passive range of motion without pain. Carotid bruit is not present.       Cardiovascular: Normal rate and normal heart sounds. An irregular rhythm present.   Pulses:       Radial pulses are 2+ on the right side, and 2+ on the left side.        Dorsalis pedis pulses are 1+ on the right side, and 1+ on the left side.   Pulmonary/Chest: Accessory  muscle usage (mild to mod) present. Tachypnea noted. She is not intubated. She is in respiratory distress (mild). She has decreased breath sounds. She has rales.   Abdominal: Soft. She exhibits no distension. Bowel sounds are decreased. There is no tenderness. A hernia is present.   Obese   Genitourinary:   Genitourinary Comments: Cutler in place   Musculoskeletal: Normal range of motion.        Right foot: There is no deformity.        Left foot: There is no deformity.   Trace edema   Lymphadenopathy:     She has no cervical adenopathy.   Neurological: She is alert and oriented to person, place, and time.   Skin: Skin is warm and dry. Capillary refill takes 2 to 3 seconds. Ecchymosis (bilat LEs) noted. No rash noted. No cyanosis. Nails show clubbing.   Psychiatric: Her speech is normal and behavior is normal. Judgment and thought content normal. Cognition and memory are normal. She exhibits a depressed mood.   Nursing note and vitals reviewed.      Significant Labs:    ABGs:   Recent Labs   Lab 11/04/18  0441   PH 7.201*   PCO2 74.3*   HCO3 29.1*   POCSATURATED 95   BE 1     Blood Culture:   BMP:   Recent Labs   Lab 11/04/18  0408         K 4.4      CO2 26   BUN 11   CREATININE 0.9   CALCIUM 8.1*   MG 2.2     CBC:   Recent Labs   Lab 11/03/18  0530 11/04/18  0408   WBC 10.92 16.89*   HGB 8.3* 8.1*   HCT 27.7* 27.9*    228     Coagulation:   Lactic Acid:   POCT Glucose:   Recent Labs   Lab 11/03/18  1741 11/03/18  2131 11/04/18  0624   POCTGLUCOSE 217* 161* 137*     Respiratory Culture:   Urine Culture:   All pertinent labs within the past 24 hours have been reviewed.    Significant Imaging: I have reviewed all pertinent imaging results/findings within the past 24 hours.    Assessment/Plan:      * Acute on chronic respiratory failure with hypoxia and hypercapnia    Sec to COPD exacerbation and bronchitis-- better  She is DNR    Getting worse with increased work of breathing and resp  distress-- now on Vapotherm  Pt and family have decided about [facility] and hospice placement  Await placement.       Atrial fibrillation with RVR w/ chronic A-fib on Xarelto    Off Cardizem gtt now  Has ch Afib on Xarelto  May need to hold due to ABL Anemia    Has chronic Afib and HR keeps going up-- hence placed on Amiodarone gtt  Cards consulted    Under control, getting off amiodarone, going to hospice     Acute Exacerbation of Stage 3 severe COPD sec to Acute bronchitis    Improving, on Prednisone, steroids, nebs    Worsening now with worsening Resp Failure and Resp Acidosis  Going to hospice       Acute Blood Loss Anemia r/o GIB    -Monitor H/H  -No diarrhea, No need for C. Diff   -family states she can't have colonoscopy due to high risk or anesthesia    Continue Xarelto  Will d/w GI  H/H stable    S/p 1 unit of blood  Comfort care now-- to hospice     Type 2 diabetes mellitus with hyperlipidemia    Diabetes Mellitus  -SSI and accuchecks  -1800 felicia ADA diet  -nutritional counseling    Well controlled DM  BS high due to steroids       Moderate malnutrition    Has had recurrent illnesses recently  No much quality of life    Not eating enough- PCM getting worse     Permanent atrial fibrillation    -continue home CCB  -hold Xarelto       E Coli UTI    IV Abx  -urine culture    Continue IV Abx  Continue Rocephin    Going to hospice now, may give her oral cipro     Anemia associated with nutritional deficiency    Getting 1 unit of blood  Will give IV Iron, MVI, Folbic and Thiamine    H/H stable post transfusion  See above       VTE Risk Mitigation (From admission, onward)        Ordered     rivaroxaban tablet 15 mg  With dinner      11/02/18 0924     Place sequential compression device  Until discontinued      11/01/18 9021      Seen and discussed with the ICU team  Condition: Critical  Prognosis: Guarded to poor      Critical care time spent on the evaluation and treatment of severe organ dysfunction, review of  pertinent labs and imaging studies, discussions with consulting providers and discussions with patient/family: 37 minutes.    Giovani Hackett MD  Department of Hospital Medicine   [facility]

## 2018-11-04 NOTE — ASSESSMENT & PLAN NOTE
Sec to COPD exacerbation and bronchitis-- better  She is DNR    Getting worse with increased work of breathing and resp distress-- now on Vapotherm  Pt and family have decided about [facility] and hospice placement  Await placement.

## 2018-11-04 NOTE — ASSESSMENT & PLAN NOTE
-Monitor H/H  -No diarrhea, No need for C. Diff   -family states she can't have colonoscopy due to high risk or anesthesia    Continue Xarelto  Will d/w GI  H/H stable    S/p 1 unit of blood  Comfort care now-- to hospice

## 2018-11-04 NOTE — PLAN OF CARE
Problem: Patient Care Overview  Goal: Plan of Care Review  Outcome: Ongoing (interventions implemented as appropriate)  Plan of care reviewed with patient and family. Patient stable. VSS. Disoriented to situation. Afib on monitor. Amiodarone gtt. Cont fluids per orders. Patient became hypotensive. EICU notified. 250 ml bolus given per orders. Patient on oxygen via NC at start of shift. Placed on vapotherm due to increase work of breathing and abdominal muscle use. ABG drawn placed on BIPAP. Patient desats when sleeping and movement. PRN pain medication given. Blood glucose monitoring. Cutler to gravity, with clear yellow urine. One BM this shift. Patient refuses to turn in bed. Lying on right side helps with work of breathing. Sat on edge of bed for few hours, which helped with with work of breathing. Family at bedside. Will cont to monitor.

## 2018-11-04 NOTE — ASSESSMENT & PLAN NOTE
Improving, on Prednisone, steroids, nebs    Worsening now with worsening Resp Failure and Resp Acidosis  Going to hospice

## 2018-11-04 NOTE — CONSULTS
Consult answered by Anne Morris on 11/4/2018.  See comments below.      Sw was consulted by ICU nurse. Pt requested to dc with Hospice. Sw met with pt and her sister at bedside. Pt chose [facility]. Pt's sister signed freedom of choice form. Yohan sent referral via [facility] and will contact on call staff.     Rosa Maria Ibarra LMSW, ALL-[facility]OHAN, [facility]  11/4/2018

## 2018-11-04 NOTE — DISCHARGE SUMMARY
[facility] Hospital Medicine  Discharge Summary      Patient Name: Petrona Gonzalez  MRN: [id number]  Admission Date: 11/1/2018  Hospital Length of Stay: 3 days  Discharge Date and Time:  11/04/2018 1:01 PM  Attending Physician: Giovani Hackett MD   Discharging Provider: Givoani Hackett MD  Primary Care Provider: Primary Doctor No      HPI:   Petrona Gonzalez is a 77 y.o.  [place] Age patient female patient with PMHx of COPD, chronic O2, DM, HLD, HTN, who presented to the ER for  c/o blood in stool  and SOB which onset gradually past week. Symptoms are constant and moderate in severity. No mitigating or exacerbating factors reported. Associated sxs included diarrhea  one day only 10/24/18 following a flu shot, and hematochezia.  A stool specimen was sent from the Nursing Home to the lab: specimen was solid so it wasn't tested for C. DIff. Pt's daughter also notes the pt's steady weight loss of 30 pounds over the last month, she states her dentures were poor fitting and she wasn't eating much. Dentures have now has been fixed. Pt also has a hernia on her R abdomen, which she is concerned about obstructing. Patient denies any fever, chills, constipation, dysuria, hematuria, urinary frequency, nausea, vomiting and all other sxs at this time. Pt is on Xarelto 20 mg for atrial fibrillation. Pt was on abx for 5 days for tx of a partially collapsed lung, as noted by pt's daughter.  In ER, Lactic acid 4.8, H/H 7.9/27.6, Creatinine 2.4, WBC 20K, Urinalysis showed positive nitrate. And she was given 1.6 liters fluid on sepsis protocol based on ideal body weight of 115#. She has edema to lower extremities. Her sister at bedside states patient was told she couldn't have a colonoscopy due to high risk for anesthesia and she couldn't tolerate the bowel prep. She is a full code. Her sister, Bonnie Carmichael, is the surrogate decision maker. She is admitted for acute bronchitis and UTI to Observation.         * No surgery  found *      Hospital Course:   Pt admitted to [facility] as Obs initially but soon after developed Afib w RVR and hypotension and was transferred to ICU and placed on Levophed and Cardizem gtt and more IVF. She responded well and her BP and HR improved and was able to be weaned off Levophed next morning. She also got a unit of blood for her Severe anemia. She has ch Afib and had RVR since last night. She was started on CARDIZEM 60 QID and she slowed down and was able to come off Cardizem gtt. She was also given an enema with mild BM. She is resting well comfortably, no distress and is fully AAOx3, no focal deficit, speech clear.    11/3- doing better, was sitting up in chair but quiet out of breath with any exertion, tremors, shakes sec to Nebs-- hence Albuterol/ Duoneb d/fernanda. Also developed RVR again with Afib, hence started on Amiodarone gtt and Cards consulted, continued on oral Cardizem. Blood Cx NGTD but Urine CX Positive for GNR. Now on Rocephin.    11/4- pt developed worsening resp failure over nite, placed on Vapotherm, with increased work of breathing and then she and her family decided to just keep her comfortable and go to hospice and have chosen [facility]. SW making arrangements. Pt appears more relaxed now, surrounded by her family. She ate a little better this am. Family has chosen [facility] and she is being discharged there in fair condition by EMS.     Consults:   Consults (From admission, onward)        Status Ordering Provider     Inpatient consult to Cardiology  Once     Provider:  Sky Houston MD    Completed BAYLEE OSHEA     Inpatient consult to Pulmonology  Once     Provider:  Coy Taylor MD    Completed AMBER LINN     Inpatient consult to Social Work/Case Management  Once     Provider:  (Not yet assigned)    Completed BAYLEE OSHEA     IP consult to case management  Once     Provider:  (Not yet assigned)    Completed LAUREN ORTIZ      Pharmacy to dose Vancomycin consult  Once     Provider:  (Not yet assigned)    JERRI Blankenship          No new Assessment & Plan notes have been filed under this hospital service since the last note was generated.  Service: Hospital Medicine    Final Active Diagnoses:    Diagnosis Date Noted POA    PRINCIPAL PROBLEM:  Acute on chronic respiratory failure with hypoxia and hypercapnia [J96.21, J96.22] 05/17/2017 Yes    Acute Exacerbation of Stage 3 severe COPD sec to Acute bronchitis [J44.9] 02/05/2013 Yes     Chronic    Acute Blood Loss Anemia r/o GIB [K92.1] 11/01/2018 Yes     Chronic    Type 2 diabetes mellitus with hyperlipidemia [E11.69, E78.5] 02/13/2017 Yes     Chronic    Moderate malnutrition [E44.0] 11/02/2018 Yes    Permanent atrial fibrillation [I48.2] 11/01/2018 Yes    Anemia associated with nutritional deficiency [D53.9] 08/10/2015 Yes     Chronic    Anemia [D64.9] 08/10/2015 Yes      Problems Resolved During this Admission:    Diagnosis Date Noted Date Resolved POA    Shock, unspecified [R57.9] 11/01/2018 11/03/2018 Yes    Atrial fibrillation with RVR w/ chronic A-fib on Xarelto [I48.91] 06/14/2017 11/04/2018 Yes     Chronic    SHANE (acute kidney injury) sec to Dehydration/ IVVD/ UGIB [N17.9] 11/02/2018 11/03/2018 Yes    Electrolyte imbalance [E87.8] 11/02/2018 11/03/2018 Yes    Constipation due to outlet dysfunction [K59.02] 11/02/2018 11/03/2018 Yes    H/O: GI bleed [Z87.19] 11/04/2018 11/04/2018 Not Applicable     Chronic    Urinary tract infection without hematuria [N39.0]  11/04/2018 Yes    E Coli UTI [N30.00] 11/01/2018 11/04/2018 Yes       Discharged Condition: fair    Disposition: Hospice/Home    Follow Up:  Follow-up Information     Bernarda Fortune MD. Schedule an appointment as soon as possible for a visit in 1 week.    Specialty:  Pulmonary Disease  Why:  Hospital follow up, As needed  Contact information:  [address] [place] [address] [address] Otoniel ELIZABETH  [id number]  [phone]                 Patient Instructions:      Diet diabetic     Activity as tolerated       Significant Diagnostic Studies: Labs:   BMP:   Recent Labs   Lab 11/03/18  0530 11/03/18  1428 11/04/18  0408   GLU 92 237* 107    140 141   K 3.4* 3.8 4.4    102 105   CO2 28 29 26   BUN 12 12 11   CREATININE 0.9 1.1 0.9   CALCIUM 7.8* 8.1* 8.1*   MG 1.7 2.3 2.2   , CMP   Recent Labs   Lab 11/03/18  0530 11/03/18  1428 11/04/18  0408    140 141   K 3.4* 3.8 4.4    102 105   CO2 28 29 26   GLU 92 237* 107   BUN 12 12 11   CREATININE 0.9 1.1 0.9   CALCIUM 7.8* 8.1* 8.1*   PROT 5.3*  --  5.5*   ALBUMIN 2.7*  --  2.8*   BILITOT 0.3  --  0.2   ALKPHOS 35*  --  42*   AST 33  --  31   ALT 45*  --  68*   ANIONGAP 10 9 10   ESTGFRAFRICA >60 57* >60   EGFRNONAA >60 49* >60   , CBC   Recent Labs   Lab 11/03/18  0530 11/04/18  0408   WBC 10.92 16.89*   HGB 8.3* 8.1*   HCT 27.7* 27.9*    228   , INR   Lab Results   Component Value Date    INR 1.1 11/01/2018    INR 1.0 12/21/2017    INR 1.0 06/14/2017   , Troponin   Recent Labs   Lab 11/01/18  1339   TROPONINI 0.023    and All labs within the past 24 hours have been reviewed    Pending Diagnostic Studies:     Procedure Component Value Units Date/Time    Lactic acid, plasma [[id number]] Collected:  11/01/18 2000    Order Status:  Sent Lab Status:  In process Updated:  11/02/18 0251    Specimen:  Blood     Narrative:       Collection has been rescheduled by SEG at 11/01/2018 23:32          Medications:  Reconciled Home Medications:      Medication List      START taking these medications    ipratropium 0.02 % nebulizer solution  Commonly known as:  ATROVENT  Take 2.5 mLs (500 mcg total) by nebulization every 6 (six) hours as needed (Shortness of breath). Rescue     polyethylene glycol 17 gram Pwpk  Commonly known as:  GLYCOLAX  Take 17 g by mouth once daily.  Start taking on:  11/5/2018        CONTINUE taking these medications    acetaminophen  325 MG tablet  Commonly known as:  TYLENOL  Take 325 mg by mouth every 6 (six) hours as needed for Pain.     albuterol-ipratropium 2.5 mg-0.5 mg/3 mL nebulizer solution  Commonly known as:  DUO-NEB  Take 3 mLs by nebulization every 6 (six) hours as needed for Wheezing. Rescue     ALPRAZolam 0.5 MG tablet  Commonly known as:  XANAX  Take 0.5 mg by mouth every 6 (six) hours as needed for Anxiety.     budesonide-formoterol 160-4.5 mcg 160-4.5 mcg/actuation Hfaa  Commonly known as:  SYMBICORT  Inhale 2 puffs into the lungs every 12 (twelve) hours. Controller     cyclobenzaprine 10 MG tablet  Commonly known as:  FLEXERIL  Take 10 mg by mouth 3 (three) times daily as needed for Muscle spasms.     diltiaZEM 90 MG tablet  Commonly known as:  CARDIZEM  Take 1 tablet (90 mg total) by mouth every 8 (eight) hours.     furosemide 20 MG tablet  Commonly known as:  LASIX     guaiFENesin 600 mg 12 hr tablet  Commonly known as:  MUCINEX  Take 1,200 mg by mouth 2 (two) times daily.     INCRUSE ELLIPTA 62.5 mcg/actuation Dsdv  Generic drug:  umeclidinium  Inhale into the lungs once daily. Controller     levocetirizine 5 MG tablet  Commonly known as:  XYZAL  Take 5 mg by mouth every evening.     metFORMIN 500 MG tablet  Commonly known as:  GLUCOPHAGE  Take 1 tablet (500 mg total) by mouth 2 (two) times daily with meals.     NovoLIN R Regular U-100 Insuln 100 unit/mL injection  Generic drug:  insulin regular     OXYGEN-AIR DELIVERY SYSTEMS MISC  by Misc.(Non-Drug; Combo Route) route.     pantoprazole 40 MG tablet  Commonly known as:  PROTONIX  Take 40 mg by mouth once daily.     PAZEO 0.7 % Drop  Generic drug:  olopatadine  Apply 1 drop to eye once daily.     predniSONE 20 MG tablet  Commonly known as:  DELTASONE     roflumilast 500 mcg Tab  Commonly known as:  DALIRESP  Take 1 tablet (500 mcg total) by mouth once daily.     XARELTO 20 mg Tab  Generic drug:  rivaroxaban  Take 20 mg by mouth daily with dinner or evening meal.     ZOFRAN  4 MG tablet  Generic drug:  ondansetron  Take 4 mg by mouth every 6 (six) hours as needed for Nausea.        STOP taking these medications    fluticasone 50 mcg/actuation nasal spray  Commonly known as:  FLONASE     lisinopril 5 MG tablet  Commonly known as:  PRINIVIL,ZESTRIL     loratadine 10 mg tablet  Commonly known as:  CLARITIN     simvastatin 10 MG tablet  Commonly known as:  ZOCOR     traMADol 50 mg tablet  Commonly known as:  ULTRAM     VENTOLIN INHL            Indwelling Lines/Drains at time of discharge:   Lines/Drains/Airways     Central Venous Catheter Line                 Percutaneous Central Line Insertion/Assessment - triple lumen  11/01/18 2045 right subclavian 2 days          Drain                 Urethral Catheter 11/01/18 2159 Latex 2 days                Time spent on the discharge of patient: 35 minutes  Patient was seen and examined on the date of discharge and determined to be suitable for discharge.    Critical care time spent on the evaluation and treatment of severe organ dysfunction, review of pertinent labs and imaging studies, discussions with consulting providers and discussions with patient/family: 57 minutes.     Giovani Hackett MD  Department of Hospital Medicine  [facility]

## 2018-11-04 NOTE — NURSING
"Patient stating "want to go". VSS. Increased work of breathing with abdominal muscle use. Placed on vapotherm to decrease work of breathing. Family updated and called. At bedside. Lying on right side due to it feeling easier to breath. Will cont to monitor  "

## 2018-11-04 NOTE — PLAN OF CARE
Sw was consulted by ICU nurse. Pt requested to dc with Hospice. Robyn met with pt and her sister at bedside. Pt chose [facility]. Pt's sister signed freedom of choice form. Robyn sent referral via [employer] and will contact on call staff.

## 2018-11-06 LAB
BACTERIA BLD CULT: NORMAL
BACTERIA BLD CULT: NORMAL
BLD PROD TYP BPU: NORMAL
BLD PROD TYP BPU: NORMAL
BLOOD UNIT EXPIRATION DATE: NORMAL
BLOOD UNIT EXPIRATION DATE: NORMAL
BLOOD UNIT TYPE CODE: 6200
BLOOD UNIT TYPE CODE: 6200
BLOOD UNIT TYPE: NORMAL
BLOOD UNIT TYPE: NORMAL
CODING SYSTEM: NORMAL
CODING SYSTEM: NORMAL
DISPENSE STATUS: NORMAL
DISPENSE STATUS: NORMAL
NUM UNITS TRANS PACKED RBC: NORMAL
NUM UNITS TRANS PACKED RBC: NORMAL

## 2018-11-07 NOTE — PHYSICIAN QUERY
PT Name: Petrona Gonzalez  MR #: [id number]     Physician Query Form - Diagnosis Clarification      CDS/: Norah Beckman  RN CCDS             Contact information:[email or web address]    This form is a permanent document in the medical record.     Query Date: November 7, 2018    By submitting this query, we are merely seeking further clarification of documentation.  Please utilize your independent clinical judgment when addressing the question(s) below.     The medical record contains the following:      Findings Supporting Clinical Information Location in Medical Record       Sepsis/septic shock Vital Signs (24h Range):  Temp:  [98.5 °F (36.9 °C)] 98.5 °F (36.9 °C)  Pulse:  [100-116] 111  Resp:  [19-38] 38  SpO2:  [90 %-100 %] 90 %  BP: ()/(46-90) 98/47    WBC=20.26  Lactic acid=4.8    Rocephin, Vancomycin, Zosyn  Norepinephrine gtt 11/1-11/3  Elevated LA. Admitted now for septic shock, a fib RVR .    Septic shock/lactemia : source lung.    She is admitted for acute bronchitis and UTI to Observation.  Septic Shock   VS on admit              Lab 11/1      MAR    EICU 11/1          DS    Inpatient admit orders to ICU     Please clarify if the ____Sepsis/Septic shock________ diagnosis has been:     Ruled In    Ruled In, Now Resolved    X Ruled Out    Other/Clarification of findings (please specify): Hypovolemic shock due to anemia    Clinically insignificant      Clinically undetermined     Please document in your progress notes daily for the duration of treatment, until resolved, and include in your discharge summary.

## 2022-06-10 NOTE — ASSESSMENT & PLAN NOTE
- SSI   - diabetic diet     Type:  Needs Medical Advice    Who Called: pt sara dailey  Symptoms (please be specific): na   How long has patient had these symptoms:  na  Pharmacy name and phone #:  na  Would the patient rather a call back or a response via [facility]? Call back  Best Call Back Number: [phone]  Additional Information: pt wife stated that she need to speak with someone in the office about her  medical issues please advise

## 2022-09-20 NOTE — ED NOTES
Pt reports she doesn't want to take the Trazadone scheduled for 2100 at this time. Simvastatin has not been received in ED from [facility].      40

## 2025-06-12 NOTE — PLAN OF CARE
06/15/17 1010   Readmission Questionnaire   At the time of your discharge, did someone talk to you about what your health problems were? Yes   At the time of discharge, did someone talk to you about what to watch out for regarding worsening of your health problem? Yes   At the time of discharge, did someone talk to you about what to do if you experienced worsening of your health problem? Yes   At the time of discharge, did someone talk to you about which medication to take when you left the hospital and which ones to stop taking? Yes   At the time of discharge, did someone talk to you about when and where to follow up with a doctor after you left the hospital? Yes   What do you believe caused you to be sick enough to be re-admitted? A- Fib   How often do you need to have someone help you when you read instructions, pamphlets, or other written material from your doctor or pharmacy? Often   Do you have problems taking your medications as prescribed? No   Do you have any problems affording any of  your prescribed medications? No   Do you have problems obtaining/receiving your medications? No   Does the patient have transportation to healthcare appointments? Yes   Lives With facility resident   Living Arrangements residential facility   Does the patient have family/friends to help with healtcare needs after discharge? yes   Does your caregiver provide all the help you need? Yes   Are you currently feeling confused? No   Are you currently having problems thinking? No   Are you currently having memory problems? No   Have you felt down, depressed, or hopeless? 0   Have you felt little interest or pleasure in doing things? 0   In the last 7 days, my sleep quality was: fair      NSTEMI  As mentioned above      Latest Reference Range & Units 03/25/23 14:56 03/25/23 17:55 07/17/24 19:43 01/29/25 05:17 06/11/25 10:50 06/11/25 12:52 06/11/25 15:27   Troponin I High Sensitivity <=14.9 pg/mL 869.8 (HH) 750.5 (HH) 20.6 (H) 14.5 72.1 (HH) 90.2 (HH) 125.1 (HH)   (HH): Data is critically high  (H): Data is abnormally high    Discussed with patient and family members, bedside nurse and .  Answered all questions.